# Patient Record
Sex: MALE | Race: WHITE | Employment: UNEMPLOYED | URBAN - NONMETROPOLITAN AREA
[De-identification: names, ages, dates, MRNs, and addresses within clinical notes are randomized per-mention and may not be internally consistent; named-entity substitution may affect disease eponyms.]

---

## 2020-01-07 ENCOUNTER — APPOINTMENT (OUTPATIENT)
Dept: GENERAL RADIOLOGY | Age: 55
DRG: 871 | End: 2020-01-07
Payer: MEDICARE

## 2020-01-07 ENCOUNTER — HOSPITAL ENCOUNTER (INPATIENT)
Age: 55
LOS: 2 days | Discharge: NURSING, REHAB OR LONG-TERM CARE | DRG: 871 | End: 2020-01-09
Attending: EMERGENCY MEDICINE | Admitting: INTERNAL MEDICINE
Payer: MEDICARE

## 2020-01-07 PROBLEM — A41.9 SEPSIS (HCC): Status: ACTIVE | Noted: 2020-01-07

## 2020-01-07 LAB
ALBUMIN SERPL-MCNC: 4 G/DL (ref 3.5–5.1)
ALP BLD-CCNC: 76 U/L (ref 38–126)
ALT SERPL-CCNC: 7 U/L (ref 11–66)
ANION GAP SERPL CALCULATED.3IONS-SCNC: 14 MEQ/L (ref 8–16)
ANION GAP SERPL CALCULATED.3IONS-SCNC: 14 MEQ/L (ref 8–16)
AST SERPL-CCNC: 15 U/L (ref 5–40)
AVERAGE GLUCOSE: 255 MG/DL (ref 70–126)
BASOPHILS # BLD: 0.2 %
BASOPHILS ABSOLUTE: 0 THOU/MM3 (ref 0–0.1)
BILIRUB SERPL-MCNC: 0.2 MG/DL (ref 0.3–1.2)
BILIRUBIN DIRECT: < 0.2 MG/DL (ref 0–0.3)
BILIRUBIN URINE: NEGATIVE
BLOOD, URINE: NEGATIVE
BUN BLDV-MCNC: 10 MG/DL (ref 7–22)
BUN BLDV-MCNC: 14 MG/DL (ref 7–22)
CALCIUM SERPL-MCNC: 9.3 MG/DL (ref 8.5–10.5)
CALCIUM SERPL-MCNC: 9.3 MG/DL (ref 8.5–10.5)
CHARACTER, URINE: CLEAR
CHLORIDE BLD-SCNC: 99 MEQ/L (ref 98–111)
CHLORIDE BLD-SCNC: 99 MEQ/L (ref 98–111)
CO2: 22 MEQ/L (ref 23–33)
CO2: 26 MEQ/L (ref 23–33)
COLOR: YELLOW
CREAT SERPL-MCNC: 0.6 MG/DL (ref 0.4–1.2)
CREAT SERPL-MCNC: 0.7 MG/DL (ref 0.4–1.2)
EOSINOPHIL # BLD: 0.3 %
EOSINOPHILS ABSOLUTE: 0.1 THOU/MM3 (ref 0–0.4)
ERYTHROCYTE [DISTWIDTH] IN BLOOD BY AUTOMATED COUNT: 12 % (ref 11.5–14.5)
ERYTHROCYTE [DISTWIDTH] IN BLOOD BY AUTOMATED COUNT: 44.2 FL (ref 35–45)
FLU A ANTIGEN: NEGATIVE
FLU B ANTIGEN: NEGATIVE
GFR SERPL CREATININE-BSD FRML MDRD: > 90 ML/MIN/1.73M2
GFR SERPL CREATININE-BSD FRML MDRD: > 90 ML/MIN/1.73M2
GLUCOSE BLD-MCNC: 237 MG/DL (ref 70–108)
GLUCOSE BLD-MCNC: 244 MG/DL (ref 70–108)
GLUCOSE BLD-MCNC: 270 MG/DL (ref 70–108)
GLUCOSE BLD-MCNC: 286 MG/DL (ref 70–108)
GLUCOSE BLD-MCNC: 319 MG/DL (ref 70–108)
GLUCOSE URINE: >= 1000 MG/DL
HBA1C MFR BLD: 10.5 % (ref 4.4–6.4)
HCT VFR BLD CALC: 43.7 % (ref 42–52)
HEMOGLOBIN: 13.8 GM/DL (ref 14–18)
IMMATURE GRANS (ABS): 0.06 THOU/MM3 (ref 0–0.07)
IMMATURE GRANULOCYTES: 0.3 %
KETONES, URINE: 15
LACTIC ACID, SEPSIS: 2.9 MMOL/L (ref 0.5–1.9)
LACTIC ACID, SEPSIS: 2.9 MMOL/L (ref 0.5–1.9)
LACTIC ACID, SEPSIS: 4.9 MMOL/L (ref 0.5–1.9)
LEUKOCYTE ESTERASE, URINE: NEGATIVE
LYMPHOCYTES # BLD: 10.5 %
LYMPHOCYTES ABSOLUTE: 1.8 THOU/MM3 (ref 1–4.8)
MCH RBC QN AUTO: 31.6 PG (ref 26–33)
MCHC RBC AUTO-ENTMCNC: 31.6 GM/DL (ref 32.2–35.5)
MCV RBC AUTO: 100 FL (ref 80–94)
MONOCYTES # BLD: 5.8 %
MONOCYTES ABSOLUTE: 1 THOU/MM3 (ref 0.4–1.3)
MRSA SCREEN RT-PCR: NEGATIVE
NITRITE, URINE: NEGATIVE
NUCLEATED RED BLOOD CELLS: 0 /100 WBC
OSMOLALITY CALCULATION: 280.1 MOSMOL/KG (ref 275–300)
OSMOLALITY CALCULATION: 284.7 MOSMOL/KG (ref 275–300)
PH UA: 5.5 (ref 5–9)
PLATELET # BLD: 181 THOU/MM3 (ref 130–400)
PMV BLD AUTO: 9.2 FL (ref 9.4–12.4)
POTASSIUM REFLEX MAGNESIUM: 4.3 MEQ/L (ref 3.5–5.2)
POTASSIUM REFLEX MAGNESIUM: 4.7 MEQ/L (ref 3.5–5.2)
PROTEIN UA: NEGATIVE
RBC # BLD: 4.37 MILL/MM3 (ref 4.7–6.1)
SEG NEUTROPHILS: 82.9 %
SEGMENTED NEUTROPHILS ABSOLUTE COUNT: 14.3 THOU/MM3 (ref 1.8–7.7)
SODIUM BLD-SCNC: 135 MEQ/L (ref 135–145)
SODIUM BLD-SCNC: 139 MEQ/L (ref 135–145)
SPECIFIC GRAVITY, URINE: > 1.03 (ref 1–1.03)
TOTAL PROTEIN: 7.3 G/DL (ref 6.1–8)
TROPONIN T: < 0.01 NG/ML
UROBILINOGEN, URINE: 0.2 EU/DL (ref 0–1)
VANCOMYCIN RESISTANT ENTEROCOCCUS: NEGATIVE
WBC # BLD: 17.2 THOU/MM3 (ref 4.8–10.8)

## 2020-01-07 PROCEDURE — 6370000000 HC RX 637 (ALT 250 FOR IP): Performed by: INTERNAL MEDICINE

## 2020-01-07 PROCEDURE — 82948 REAGENT STRIP/BLOOD GLUCOSE: CPT

## 2020-01-07 PROCEDURE — 36415 COLL VENOUS BLD VENIPUNCTURE: CPT

## 2020-01-07 PROCEDURE — 80048 BASIC METABOLIC PNL TOTAL CA: CPT

## 2020-01-07 PROCEDURE — 83036 HEMOGLOBIN GLYCOSYLATED A1C: CPT

## 2020-01-07 PROCEDURE — 87500 VANOMYCIN DNA AMP PROBE: CPT

## 2020-01-07 PROCEDURE — 2580000003 HC RX 258: Performed by: INTERNAL MEDICINE

## 2020-01-07 PROCEDURE — 83605 ASSAY OF LACTIC ACID: CPT

## 2020-01-07 PROCEDURE — 87040 BLOOD CULTURE FOR BACTERIA: CPT

## 2020-01-07 PROCEDURE — 99285 EMERGENCY DEPT VISIT HI MDM: CPT

## 2020-01-07 PROCEDURE — 84484 ASSAY OF TROPONIN QUANT: CPT

## 2020-01-07 PROCEDURE — 71045 X-RAY EXAM CHEST 1 VIEW: CPT

## 2020-01-07 PROCEDURE — 6370000000 HC RX 637 (ALT 250 FOR IP): Performed by: PHYSICIAN ASSISTANT

## 2020-01-07 PROCEDURE — 99223 1ST HOSP IP/OBS HIGH 75: CPT | Performed by: INTERNAL MEDICINE

## 2020-01-07 PROCEDURE — 2580000003 HC RX 258: Performed by: EMERGENCY MEDICINE

## 2020-01-07 PROCEDURE — 87081 CULTURE SCREEN ONLY: CPT

## 2020-01-07 PROCEDURE — 87449 NOS EACH ORGANISM AG IA: CPT

## 2020-01-07 PROCEDURE — 87899 AGENT NOS ASSAY W/OPTIC: CPT

## 2020-01-07 PROCEDURE — 93005 ELECTROCARDIOGRAM TRACING: CPT | Performed by: EMERGENCY MEDICINE

## 2020-01-07 PROCEDURE — 87804 INFLUENZA ASSAY W/OPTIC: CPT

## 2020-01-07 PROCEDURE — 81003 URINALYSIS AUTO W/O SCOPE: CPT

## 2020-01-07 PROCEDURE — 6360000002 HC RX W HCPCS: Performed by: EMERGENCY MEDICINE

## 2020-01-07 PROCEDURE — 2060000000 HC ICU INTERMEDIATE R&B

## 2020-01-07 PROCEDURE — 85025 COMPLETE CBC W/AUTO DIFF WBC: CPT

## 2020-01-07 PROCEDURE — 87641 MR-STAPH DNA AMP PROBE: CPT

## 2020-01-07 PROCEDURE — 94760 N-INVAS EAR/PLS OXIMETRY 1: CPT

## 2020-01-07 PROCEDURE — 80076 HEPATIC FUNCTION PANEL: CPT

## 2020-01-07 PROCEDURE — 6360000002 HC RX W HCPCS: Performed by: INTERNAL MEDICINE

## 2020-01-07 RX ORDER — SODIUM CHLORIDE 0.9 % (FLUSH) 0.9 %
10 SYRINGE (ML) INJECTION EVERY 12 HOURS SCHEDULED
Status: DISCONTINUED | OUTPATIENT
Start: 2020-01-07 | End: 2020-01-09 | Stop reason: HOSPADM

## 2020-01-07 RX ORDER — DIVALPROEX SODIUM 500 MG/1
1000 TABLET, EXTENDED RELEASE ORAL NIGHTLY
Status: DISCONTINUED | OUTPATIENT
Start: 2020-01-07 | End: 2020-01-09 | Stop reason: HOSPADM

## 2020-01-07 RX ORDER — DEXTROSE MONOHYDRATE 25 G/50ML
12.5 INJECTION, SOLUTION INTRAVENOUS PRN
Status: DISCONTINUED | OUTPATIENT
Start: 2020-01-07 | End: 2020-01-09 | Stop reason: HOSPADM

## 2020-01-07 RX ORDER — NICOTINE POLACRILEX 4 MG
15 LOZENGE BUCCAL PRN
Status: DISCONTINUED | OUTPATIENT
Start: 2020-01-07 | End: 2020-01-09 | Stop reason: HOSPADM

## 2020-01-07 RX ORDER — CITALOPRAM 40 MG/1
40 TABLET ORAL DAILY
Status: DISCONTINUED | OUTPATIENT
Start: 2020-01-07 | End: 2020-01-09 | Stop reason: HOSPADM

## 2020-01-07 RX ORDER — DIAPER,BRIEF,INFANT-TODD,DISP
EACH MISCELLANEOUS PRN
Status: ON HOLD | COMMUNITY
End: 2020-01-09

## 2020-01-07 RX ORDER — CLOZAPINE 25 MG/1
50 TABLET ORAL 2 TIMES DAILY
Status: ON HOLD | COMMUNITY
End: 2020-01-09 | Stop reason: SDUPTHER

## 2020-01-07 RX ORDER — CLOZAPINE 100 MG/1
200 TABLET ORAL NIGHTLY
Status: DISCONTINUED | OUTPATIENT
Start: 2020-01-07 | End: 2020-01-09 | Stop reason: HOSPADM

## 2020-01-07 RX ORDER — CLOZAPINE 25 MG/1
50 TABLET ORAL 2 TIMES DAILY WITH MEALS
Status: DISCONTINUED | OUTPATIENT
Start: 2020-01-08 | End: 2020-01-09 | Stop reason: HOSPADM

## 2020-01-07 RX ORDER — METOPROLOL TARTRATE 50 MG/1
50 TABLET, FILM COATED ORAL 2 TIMES DAILY
Status: DISCONTINUED | OUTPATIENT
Start: 2020-01-07 | End: 2020-01-09 | Stop reason: HOSPADM

## 2020-01-07 RX ORDER — DIVALPROEX SODIUM 500 MG/1
1000 TABLET, DELAYED RELEASE ORAL NIGHTLY
Status: ON HOLD | COMMUNITY
End: 2020-01-09

## 2020-01-07 RX ORDER — SODIUM CHLORIDE 9 MG/ML
INJECTION, SOLUTION INTRAVENOUS CONTINUOUS
Status: DISCONTINUED | OUTPATIENT
Start: 2020-01-07 | End: 2020-01-09 | Stop reason: HOSPADM

## 2020-01-07 RX ORDER — CLOZAPINE 100 MG/1
200 TABLET ORAL NIGHTLY
Status: ON HOLD | COMMUNITY
End: 2020-01-09 | Stop reason: SDUPTHER

## 2020-01-07 RX ORDER — DIVALPROEX SODIUM 500 MG/1
500 TABLET, EXTENDED RELEASE ORAL DAILY
Status: DISCONTINUED | OUTPATIENT
Start: 2020-01-07 | End: 2020-01-09 | Stop reason: HOSPADM

## 2020-01-07 RX ORDER — CLOTRIMAZOLE 1 %
CREAM (GRAM) TOPICAL 2 TIMES DAILY PRN
COMMUNITY

## 2020-01-07 RX ORDER — DEXTROSE MONOHYDRATE 50 MG/ML
100 INJECTION, SOLUTION INTRAVENOUS PRN
Status: DISCONTINUED | OUTPATIENT
Start: 2020-01-07 | End: 2020-01-09 | Stop reason: HOSPADM

## 2020-01-07 RX ORDER — DIVALPROEX SODIUM 500 MG/1
1000 TABLET, DELAYED RELEASE ORAL NIGHTLY
Status: DISCONTINUED | OUTPATIENT
Start: 2020-01-07 | End: 2020-01-07

## 2020-01-07 RX ORDER — BENZONATATE 100 MG/1
100 CAPSULE ORAL 3 TIMES DAILY PRN
Status: DISCONTINUED | OUTPATIENT
Start: 2020-01-07 | End: 2020-01-09 | Stop reason: HOSPADM

## 2020-01-07 RX ORDER — NICOTINE 21 MG/24HR
1 PATCH, TRANSDERMAL 24 HOURS TRANSDERMAL NIGHTLY
Status: DISCONTINUED | OUTPATIENT
Start: 2020-01-07 | End: 2020-01-09 | Stop reason: HOSPADM

## 2020-01-07 RX ORDER — DESVENLAFAXINE 50 MG/1
50 TABLET, EXTENDED RELEASE ORAL DAILY
Status: DISCONTINUED | OUTPATIENT
Start: 2020-01-07 | End: 2020-01-09 | Stop reason: HOSPADM

## 2020-01-07 RX ORDER — ASPIRIN 81 MG/1
81 TABLET ORAL DAILY
Status: DISCONTINUED | OUTPATIENT
Start: 2020-01-07 | End: 2020-01-09 | Stop reason: HOSPADM

## 2020-01-07 RX ORDER — 0.9 % SODIUM CHLORIDE 0.9 %
1000 INTRAVENOUS SOLUTION INTRAVENOUS ONCE
Status: COMPLETED | OUTPATIENT
Start: 2020-01-07 | End: 2020-01-07

## 2020-01-07 RX ORDER — DOCUSATE SODIUM 100 MG/1
100 CAPSULE, LIQUID FILLED ORAL DAILY
Status: DISCONTINUED | OUTPATIENT
Start: 2020-01-07 | End: 2020-01-09 | Stop reason: HOSPADM

## 2020-01-07 RX ORDER — SODIUM CHLORIDE 0.9 % (FLUSH) 0.9 %
10 SYRINGE (ML) INJECTION PRN
Status: DISCONTINUED | OUTPATIENT
Start: 2020-01-07 | End: 2020-01-09 | Stop reason: HOSPADM

## 2020-01-07 RX ORDER — DOCUSATE SODIUM 100 MG/1
100 CAPSULE, LIQUID FILLED ORAL DAILY
COMMUNITY

## 2020-01-07 RX ADMIN — AZITHROMYCIN DIHYDRATE 500 MG: 500 INJECTION, POWDER, LYOPHILIZED, FOR SOLUTION INTRAVENOUS at 21:51

## 2020-01-07 RX ADMIN — INSULIN LISPRO 2 UNITS: 100 INJECTION, SOLUTION INTRAVENOUS; SUBCUTANEOUS at 21:55

## 2020-01-07 RX ADMIN — CITALOPRAM 40 MG: 40 TABLET, FILM COATED ORAL at 20:34

## 2020-01-07 RX ADMIN — SODIUM CHLORIDE 1000 ML: 9 INJECTION, SOLUTION INTRAVENOUS at 15:26

## 2020-01-07 RX ADMIN — ASPIRIN 81 MG: 81 TABLET ORAL at 20:33

## 2020-01-07 RX ADMIN — METOPROLOL TARTRATE 50 MG: 50 TABLET, FILM COATED ORAL at 20:34

## 2020-01-07 RX ADMIN — ENOXAPARIN SODIUM 40 MG: 40 INJECTION SUBCUTANEOUS at 20:36

## 2020-01-07 RX ADMIN — DOCUSATE SODIUM 100 MG: 100 CAPSULE, LIQUID FILLED ORAL at 20:34

## 2020-01-07 RX ADMIN — CEFEPIME HYDROCHLORIDE 2 G: 2 INJECTION, POWDER, FOR SOLUTION INTRAVENOUS at 17:11

## 2020-01-07 RX ADMIN — SODIUM CHLORIDE: 9 INJECTION, SOLUTION INTRAVENOUS at 23:13

## 2020-01-07 RX ADMIN — DIVALPROEX SODIUM 1000 MG: 500 TABLET, EXTENDED RELEASE ORAL at 21:45

## 2020-01-07 RX ADMIN — DESVENLAFAXINE SUCCINATE 50 MG: 50 TABLET, FILM COATED, EXTENDED RELEASE ORAL at 20:35

## 2020-01-07 RX ADMIN — BENZONATATE 100 MG: 100 CAPSULE ORAL at 20:34

## 2020-01-07 RX ADMIN — VANCOMYCIN HYDROCHLORIDE 2000 MG: 5 INJECTION, POWDER, LYOPHILIZED, FOR SOLUTION INTRAVENOUS at 17:51

## 2020-01-07 RX ADMIN — CLOZAPINE 200 MG: 100 TABLET ORAL at 21:45

## 2020-01-07 ASSESSMENT — PAIN SCALES - GENERAL
PAINLEVEL_OUTOF10: 0

## 2020-01-07 ASSESSMENT — ENCOUNTER SYMPTOMS
SHORTNESS OF BREATH: 0
NAUSEA: 0
ABDOMINAL PAIN: 0
VOMITING: 0
COUGH: 1

## 2020-01-07 NOTE — ED PROVIDER NOTES
[address] [facility]          CHIEF COMPLAINT       Chief Complaint   Patient presents with    Gait Problem       Nurses Notes reviewed and I agree except as noted inthe HPI. HISTORY OF PRESENT ILLNESS    Comfort Gardiner is a 49 y.o. male who presents to the Emergency Department by EMs from [facility]. The patient was apparently sent in for unsteady gait appreciated by staff at the facility this morning. The patient is a poor historian, but states he was walking down the hallway around 1100 this morning when he developed lightheadedness and dizziness. This persisted for approximately 10 minutes. The gait imbalance was appreciated at that time. Patient denies falls or syncope and neither of these things were reported. Patient was said to have had some bilateral leg weakness as well which patient is denying at this time. He was said to have been dragging around his right foot. Patient denies any numbness, weakness, or tingling. Patient does not have any complaints at this time other than a cough. He states his cough is productive with yellow sputum. He is alert and oriented although he does is unsure the reasoning for him being sent here. Reported short of breath with diaphoresis today. He denies chest pain or SOB. He is afebrile. He denies nausea, vomiting, or abdominal pain. He denies headache. He denies cold symptoms other than the cough. He denies diarrhea, constipation, or dysuria. HPI is limited. Patient is a poor historian. REVIEW OF SYSTEMS     Review of Systems   Constitutional: Negative for chills and fever. Eyes: Negative for visual disturbance. Respiratory: Positive for cough. Negative for shortness of breath. Cardiovascular: Negative for chest pain. Gastrointestinal: Negative for abdominal pain, nausea and vomiting. Musculoskeletal: Positive for gait problem. Negative for arthralgias.    Neurological: Positive for dizziness, Cardiovascular:      Rate and Rhythm: Regular rhythm. Tachycardia present. Pulmonary:      Effort: Pulmonary effort is normal. No respiratory distress. Breath sounds: No stridor. Rhonchi (scattered) present. Abdominal:      General: There is no distension. Palpations: Abdomen is soft. Musculoskeletal: Normal range of motion. Skin:     General: Skin is warm and dry. Findings: No erythema. Neurological:      Mental Status: He is alert and oriented to person, place, and time. Cranial Nerves: Cranial nerves are intact. Sensory: Sensation is intact. Motor: Motor function is intact. No abnormal muscle tone. Coordination: Coordination is intact. Finger-Nose-Finger Test normal.   Psychiatric:         Behavior: Behavior normal.       DIFFERENTIAL DIAGNOSIS:   Orthostasis, Pneumonia, sepsis, unlikely CVA. RESULTS     EKG: All EKG's are interpreted by the Emergency Department Physician who either signs or Co-signs this chart in the absence of acardiologist.    .Vent. Rate: 110 bpm  GA interval: 204 ms  QRS duration: 68 ms  QTc: 414 ms  P-R-T axes: 67, 89, 63  Sinus tachycardia. Nonspecific T wave abnormality. No STEMI  Compared to old EKG on 28-Sep-2013    RADIOLOGY: non-plain film images(s) such as CT, Ultrasound and MRI are read by the radiologist.    XR CHEST PORTABLE   Final Result      Left mid and lower lung atelectasis/infiltrate. **This report has been created using voice recognition software. It may contain minor errors which are inherent in voice recognition technology. **      Final report electronically signed by Dr. Sharmaine Ngo on 1/7/2020 2:46 PM          LABS:   Labs Reviewed   CBC WITH AUTO DIFFERENTIAL - Abnormal; Notable for the following components:       Result Value    WBC 17.2 (*)     RBC 4.37 (*)     Hemoglobin 13.8 (*)     .0 (*)     MCHC 31.6 (*)     MPV 9.2 (*)     Segs Absolute 14.3 (*)     All other components within normal limits   RAPID INFLUENZA A/B ANTIGENS   BASIC METABOLIC PANEL W/ REFLEX TO MG FOR LOW K   HEPATIC FUNCTION PANEL   TROPONIN   URINE RT REFLEX TO CULTURE       EMERGENCY DEPARTMENT COURSE:   Vitals:    Vitals:    01/07/20 1347   BP: 130/77   Pulse: 110   Resp: 18   Temp: 99.5 °F (37.5 °C)   TempSrc: Oral   SpO2: 93%   Weight: 195 lb (88.5 kg)   Height: 5' 9\" (1.753 m)     1432 Labs and chest xray ordered         MDM  52-year-old male with episodes of exertional dyspnea, cough productive of green-yellow sputum, episode of orthostasis. For orthostatic hypotension ED. Vital signs patient has a low-grade fever and tachycardic. No respiratory distress. Lungs scattered rhonchi cardiovascular  Chest x-ray left mid and lower infiltrates  White blood cell count 17,000. Lactate 2.9. ECG and troponin negative for ischemia  Patient heart rate improved with IV hydration. IV antibiotics started in the ED. D/w Dr. Mohan Matos- who graciously accepted admission    CRITICAL CARE:   None      CONSULTS:  none    PROCEDURES:  None    FINAL IMPRESSION      1. Septicemia ([facility] [address]    2. Pneumonia due to organism          DISPOSITION/PLAN   admit    PATIENT REFERRED TO:  No follow-up provider specified. DISCHARGE MEDICATIONS:  New Prescriptions    No medications on file       (Please note that portions of this note were completed with a voice recognition program.Efforts were made to edit the dictations but occasionally words are mis-transcribed.)    Scribe:  Signed by: Saima Bland, 1/7/202:30 PM Scribing for and in the presence of Lynn De Anda DO    Provider:  I personally performed the services described in the documentation, reviewed and edited the documentation which was dictated to the scribe in mypresence, and it accurately records my words and actions.     Lynn De Anda DO 1/7/20 3:09 PM                      Lynn De Anda DO  01/07/20 2806

## 2020-01-07 NOTE — PROGRESS NOTES
Pharmacy Note  Vancomycin Consult    Kay Sanchez is a 49 y.o. male started on Vancomycin for sepsis; consult received from Dr. Nupur Borges to manage therapy. Also receiving the following antibiotics: azithromycin, cefepime. Patient Active Problem List   Diagnosis    Sepsis ([address]       Allergies:  Patient has no known allergies. Temp max: 99.5    Recent Labs     01/07/20  1456   BUN 14       Recent Labs     01/07/20  1456   CREATININE 0.7       Recent Labs     01/07/20  1456   WBC 17.2*       No intake or output data in the 24 hours ending 01/07/20 1745      Culture Date Source Results   1/7/20 BCx2    1/7/20 Flu A/B neg              Ht Readings from Last 1 Encounters:   01/07/20 5' 9\" (1.753 m)        Wt Readings from Last 1 Encounters:   01/07/20 195 lb (88.5 kg)         Body mass index is 28.8 kg/m². Estimated Creatinine Clearance: 134 mL/min (based on SCr of 0.7 mg/dL). Goal Trough Level: 15-20 mcg/mL    Assessment/Plan:  Received vancomycin 2000 mg in ER at 1751 today. Will initiate vancomycin 1250 mg IV every 12 hours. Timing of trough level will be determined based on culture results, renal function, and clinical response. Thank you for the consult. Will continue to follow.     Rina Perkins RPh  1/7/2020  5:50 PM

## 2020-01-07 NOTE — ED NOTES
From Nursing Home for new onset of altered gate. Jenny Holloway RN states about 15 minutes ago pt began walking and dragging his right foot. RN states this is not normal. States he feels the pt \"seems out of it. \" Pt alert and oriented. Denies pain or any concerns. Ambulance in route.      Andrew Reinoso RN  01/07/20 4272

## 2020-01-07 NOTE — ED NOTES
Bed: 011A  Expected date:   Expected time:   Means of arrival: [facility]  Comments:     Trent Marroquin RN  01/07/20 2947

## 2020-01-07 NOTE — ED TRIAGE NOTES
Pt to ed from [facility] assisted living for abnormal gait that was noted today. Upon arrival pt A&O x3 reporting dizziness while he was up walking around home when staff noted the abnormal gait. Noted pt to be diaphoretic however pt denies pain and or sob at this time. ekg obtained and pt placed on the cardiac monitor. Lung sounds diminished with a productive cough pt reports phlegm is thick and green and intermittent. Pt reports no other concerns. Orthostatic vitals obtained and positive with standing position. Pt returned to bed without incident. Pt also provided a clean catch urine sample at this time. Pt waiting on provider to discuss the POC will monitor for further orders.  Call light in reach

## 2020-01-07 NOTE — H&P
Hospitalist - History & Physical      Patient: Kay Lay    Unit/Bed:11/011A  YOB: 1965  MRN: [id number]   Acct: [de-identified]   PCP: No primary care provider on file. Date of Service: Pt seen/examined on 01/07/20  and Admitted to Inpatient with expected LOS greater than two midnights due to medical therapy. Chief Complaint:  Lightheaded    Assessment and Plan:-  1. Sepsis due to likely Pneumonia   - Await sputum and blood cultures   - UA negative   - Start on broad spectrum antibiotics   - IVF    2. Schizoaffective disorder   - Denies any active SI, or hallucination/delusions   - Continue home antipsychotics    3. Hypertension   - Continue betablocker    4. Cough   - likely due to Pneumonia   - Cough suppressant PRN    5. Hyperglycemia   - check A1c   - insulin SS added    History Of Present Illness:    50-year-old gentleman with past medical history of schizoaffective disorder who came from [facility] due to feeling lightheaded. History is limited as patient is a poor historian. Apparently patient was having lightheadedness and dizziness that started this morning at 11 AM.  Patient states it continued for 10 minutes. He denies any syncope or falls. Denies any chest pain or shortness of breath. Does have cough with productive sputum. He states that he has had sick contacts. Denies any chest pain or shortness of breath. No fevers but does have chills. Patient has no other complaints. In ER, patient was tachycardic, with low-grade fever. Labs showed a lactic of 2.9, white count of 17.2. Patient was appropriately hydrated. UA was negative chest x-ray showed questionable infiltrate. Past Medical History:    History reviewed. No pertinent past medical history. Past Surgical History:    History reviewed. No pertinent surgical history. Home Medications:   No current facility-administered medications on file prior to encounter.       Current Outpatient Medications on File Prior to Encounter   Medication Sig Dispense Refill    aspirin 81 MG tablet Take 81 mg by mouth daily      docusate sodium (COLACE) 100 MG capsule Take 100 mg by mouth daily      cloZAPine (CLOZARIL) 25 MG tablet Take 50 mg by mouth 2 times daily      cloZAPine (CLOZARIL) 100 MG tablet Take 200 mg by mouth nightly      divalproex (DEPAKOTE ER) 500 MG ER tablet Take 500 mg by mouth daily Takes 3 tabs daily      desvenlafaxine (PRISTIQ) 50 MG TB24 Take 50 mg by mouth daily      metoprolol (LOPRESSOR) 50 MG tablet Take 50 mg by mouth 2 times daily      citalopram (CELEXA) 40 MG tablet Take 40 mg by mouth daily         Allergies:    Patient has no known allergies. Social History:    reports that he has been smoking cigarettes. He has been smoking about 1.00 pack per day. He has never used smokeless tobacco. He reports that he does not drink alcohol or use drugs. Family History:   History reviewed. No pertinent family history. Diet:  DIET GENERAL;    Review of systems:   Pertinent positives as noted in the HPI. All other systems reviewed and negative. PHYSICAL EXAM:  /79   Pulse 103   Temp 99.5 °F (37.5 °C) (Oral)   Resp 18   Ht 5' 9\" (1.753 m)   Wt 195 lb (88.5 kg)   SpO2 92%   BMI 28.80 kg/m²   General appearance: No apparent distress, appears stated age and cooperative. HEENT: Normal cephalic, atraumatic without obvious deformity. Pupils equal, round, and reactive to light. Extra ocular muscles intact. Conjunctivae/corneas clear. Neck: Supple, with full range of motion. No jugular venous distention. Trachea midline. Respiratory:  Normal respiratory effort. Clear to auscultation, bilaterally without Rales/Wheezes/Rhonchi. Cardiovascular: Regular rate and rhythm with normal S1/S2 without murmurs, rubs or gallops. Abdomen: Soft, non-tender, non-distended with normal bowel sounds. Musculoskeletal:  No clubbing, cyanosis or edema bilaterally.   Skin: Skin color, texture, turgor normal.  No

## 2020-01-08 LAB
ANION GAP SERPL CALCULATED.3IONS-SCNC: 13 MEQ/L (ref 8–16)
APTT: 36.6 SECONDS (ref 22–38)
BASOPHILS # BLD: 0.3 %
BASOPHILS ABSOLUTE: 0 THOU/MM3 (ref 0–0.1)
BUN BLDV-MCNC: 9 MG/DL (ref 7–22)
CALCIUM SERPL-MCNC: 8.8 MG/DL (ref 8.5–10.5)
CHLORIDE BLD-SCNC: 103 MEQ/L (ref 98–111)
CO2: 23 MEQ/L (ref 23–33)
CREAT SERPL-MCNC: 0.5 MG/DL (ref 0.4–1.2)
EKG ATRIAL RATE: 110 BPM
EKG P AXIS: 67 DEGREES
EKG P-R INTERVAL: 204 MS
EKG Q-T INTERVAL: 306 MS
EKG QRS DURATION: 68 MS
EKG QTC CALCULATION (BAZETT): 414 MS
EKG R AXIS: 89 DEGREES
EKG T AXIS: 63 DEGREES
EKG VENTRICULAR RATE: 110 BPM
EOSINOPHIL # BLD: 0.8 %
EOSINOPHILS ABSOLUTE: 0.1 THOU/MM3 (ref 0–0.4)
ERYTHROCYTE [DISTWIDTH] IN BLOOD BY AUTOMATED COUNT: 12.1 % (ref 11.5–14.5)
ERYTHROCYTE [DISTWIDTH] IN BLOOD BY AUTOMATED COUNT: 43.8 FL (ref 35–45)
GFR SERPL CREATININE-BSD FRML MDRD: > 90 ML/MIN/1.73M2
GLUCOSE BLD-MCNC: 207 MG/DL (ref 70–108)
GLUCOSE BLD-MCNC: 219 MG/DL (ref 70–108)
GLUCOSE BLD-MCNC: 229 MG/DL (ref 70–108)
GLUCOSE BLD-MCNC: 254 MG/DL (ref 70–108)
GLUCOSE BLD-MCNC: 286 MG/DL (ref 70–108)
HCT VFR BLD CALC: 40.2 % (ref 42–52)
HEMOGLOBIN: 12.8 GM/DL (ref 14–18)
IMMATURE GRANS (ABS): 0.04 THOU/MM3 (ref 0–0.07)
IMMATURE GRANULOCYTES: 0.3 %
INR BLD: 1.03 (ref 0.85–1.13)
LACTIC ACID, SEPSIS: 1.9 MMOL/L (ref 0.5–1.9)
LEGIONELLA PNEUMOPHILIA AG, URINE: NORMAL
LV EF: 65 %
LVEF MODALITY: NORMAL
LYMPHOCYTES # BLD: 17.8 %
LYMPHOCYTES ABSOLUTE: 2.5 THOU/MM3 (ref 1–4.8)
MAGNESIUM: 2.1 MG/DL (ref 1.6–2.4)
MCH RBC QN AUTO: 31.3 PG (ref 26–33)
MCHC RBC AUTO-ENTMCNC: 31.8 GM/DL (ref 32.2–35.5)
MCV RBC AUTO: 98.3 FL (ref 80–94)
MONOCYTES # BLD: 8.7 %
MONOCYTES ABSOLUTE: 1.2 THOU/MM3 (ref 0.4–1.3)
NUCLEATED RED BLOOD CELLS: 0 /100 WBC
PLATELET # BLD: 167 THOU/MM3 (ref 130–400)
PMV BLD AUTO: 9.4 FL (ref 9.4–12.4)
POTASSIUM SERPL-SCNC: 3.9 MEQ/L (ref 3.5–5.2)
PROCALCITONIN: 0.09 NG/ML (ref 0.01–0.09)
RBC # BLD: 4.09 MILL/MM3 (ref 4.7–6.1)
SEG NEUTROPHILS: 72.1 %
SEGMENTED NEUTROPHILS ABSOLUTE COUNT: 10.3 THOU/MM3 (ref 1.8–7.7)
SODIUM BLD-SCNC: 139 MEQ/L (ref 135–145)
STREP PNEUMO AG, UR: NORMAL
WBC # BLD: 14.3 THOU/MM3 (ref 4.8–10.8)

## 2020-01-08 PROCEDURE — 2060000000 HC ICU INTERMEDIATE R&B

## 2020-01-08 PROCEDURE — 83605 ASSAY OF LACTIC ACID: CPT

## 2020-01-08 PROCEDURE — 82948 REAGENT STRIP/BLOOD GLUCOSE: CPT

## 2020-01-08 PROCEDURE — 99232 SBSQ HOSP IP/OBS MODERATE 35: CPT | Performed by: INTERNAL MEDICINE

## 2020-01-08 PROCEDURE — 85610 PROTHROMBIN TIME: CPT

## 2020-01-08 PROCEDURE — 97165 OT EVAL LOW COMPLEX 30 MIN: CPT

## 2020-01-08 PROCEDURE — 2580000003 HC RX 258: Performed by: INTERNAL MEDICINE

## 2020-01-08 PROCEDURE — 85025 COMPLETE CBC W/AUTO DIFF WBC: CPT

## 2020-01-08 PROCEDURE — 97110 THERAPEUTIC EXERCISES: CPT

## 2020-01-08 PROCEDURE — 97162 PT EVAL MOD COMPLEX 30 MIN: CPT

## 2020-01-08 PROCEDURE — 36415 COLL VENOUS BLD VENIPUNCTURE: CPT

## 2020-01-08 PROCEDURE — 6370000000 HC RX 637 (ALT 250 FOR IP): Performed by: PHYSICIAN ASSISTANT

## 2020-01-08 PROCEDURE — 80048 BASIC METABOLIC PNL TOTAL CA: CPT

## 2020-01-08 PROCEDURE — 84145 PROCALCITONIN (PCT): CPT

## 2020-01-08 PROCEDURE — 6370000000 HC RX 637 (ALT 250 FOR IP): Performed by: INTERNAL MEDICINE

## 2020-01-08 PROCEDURE — 97535 SELF CARE MNGMENT TRAINING: CPT

## 2020-01-08 PROCEDURE — 6360000002 HC RX W HCPCS: Performed by: INTERNAL MEDICINE

## 2020-01-08 PROCEDURE — 2709999900 HC NON-CHARGEABLE SUPPLY

## 2020-01-08 PROCEDURE — 93306 TTE W/DOPPLER COMPLETE: CPT

## 2020-01-08 PROCEDURE — 85730 THROMBOPLASTIN TIME PARTIAL: CPT

## 2020-01-08 PROCEDURE — 83735 ASSAY OF MAGNESIUM: CPT

## 2020-01-08 PROCEDURE — 93010 ELECTROCARDIOGRAM REPORT: CPT | Performed by: INTERNAL MEDICINE

## 2020-01-08 RX ADMIN — CEFEPIME HYDROCHLORIDE 2 G: 2 INJECTION, POWDER, FOR SOLUTION INTRAVENOUS at 18:26

## 2020-01-08 RX ADMIN — CLOZAPINE 50 MG: 25 TABLET ORAL at 08:43

## 2020-01-08 RX ADMIN — SODIUM CHLORIDE: 9 INJECTION, SOLUTION INTRAVENOUS at 06:19

## 2020-01-08 RX ADMIN — INSULIN LISPRO 2 UNITS: 100 INJECTION, SOLUTION INTRAVENOUS; SUBCUTANEOUS at 08:44

## 2020-01-08 RX ADMIN — INSULIN LISPRO 2 UNITS: 100 INJECTION, SOLUTION INTRAVENOUS; SUBCUTANEOUS at 18:26

## 2020-01-08 RX ADMIN — SODIUM CHLORIDE: 9 INJECTION, SOLUTION INTRAVENOUS at 20:50

## 2020-01-08 RX ADMIN — SODIUM CHLORIDE: 9 INJECTION, SOLUTION INTRAVENOUS at 13:42

## 2020-01-08 RX ADMIN — CEFEPIME HYDROCHLORIDE 2 G: 2 INJECTION, POWDER, FOR SOLUTION INTRAVENOUS at 08:44

## 2020-01-08 RX ADMIN — METOPROLOL TARTRATE 50 MG: 50 TABLET, FILM COATED ORAL at 08:43

## 2020-01-08 RX ADMIN — DIVALPROEX SODIUM 1000 MG: 500 TABLET, EXTENDED RELEASE ORAL at 19:54

## 2020-01-08 RX ADMIN — DOCUSATE SODIUM 100 MG: 100 CAPSULE, LIQUID FILLED ORAL at 08:43

## 2020-01-08 RX ADMIN — METOPROLOL TARTRATE 50 MG: 50 TABLET, FILM COATED ORAL at 19:53

## 2020-01-08 RX ADMIN — AZITHROMYCIN DIHYDRATE 500 MG: 500 INJECTION, POWDER, LYOPHILIZED, FOR SOLUTION INTRAVENOUS at 20:52

## 2020-01-08 RX ADMIN — INSULIN LISPRO 2 UNITS: 100 INJECTION, SOLUTION INTRAVENOUS; SUBCUTANEOUS at 20:00

## 2020-01-08 RX ADMIN — CLOZAPINE 50 MG: 25 TABLET ORAL at 18:26

## 2020-01-08 RX ADMIN — CLOZAPINE 200 MG: 100 TABLET ORAL at 19:53

## 2020-01-08 RX ADMIN — DESVENLAFAXINE SUCCINATE 50 MG: 50 TABLET, FILM COATED, EXTENDED RELEASE ORAL at 08:43

## 2020-01-08 RX ADMIN — CITALOPRAM 40 MG: 40 TABLET, FILM COATED ORAL at 08:43

## 2020-01-08 RX ADMIN — INSULIN LISPRO 2 UNITS: 100 INJECTION, SOLUTION INTRAVENOUS; SUBCUTANEOUS at 11:50

## 2020-01-08 RX ADMIN — DIVALPROEX SODIUM 500 MG: 500 TABLET, EXTENDED RELEASE ORAL at 08:43

## 2020-01-08 RX ADMIN — ENOXAPARIN SODIUM 40 MG: 40 INJECTION SUBCUTANEOUS at 18:26

## 2020-01-08 RX ADMIN — ASPIRIN 81 MG: 81 TABLET ORAL at 08:43

## 2020-01-08 RX ADMIN — CEFEPIME HYDROCHLORIDE 2 G: 2 INJECTION, POWDER, FOR SOLUTION INTRAVENOUS at 00:50

## 2020-01-08 ASSESSMENT — PAIN SCALES - GENERAL
PAINLEVEL_OUTOF10: 0

## 2020-01-08 NOTE — CARE COORDINATION
1/8/20, 1:28 PM    DISCHARGE PLANNING EVALUATION    Spoke with Andrew Vieyra and he stated that patient can return to [facility] if medically appropriate for their facility.

## 2020-01-08 NOTE — PROGRESS NOTES
[address] PHYSICAL THERAPY  EVALUATION  [facility] ICU STEPDOWN TELEMETRY 4K - 4K-01/001-A    Time In: 7493  Time Out: 0800  Timed Code Treatment Minutes: 8 Minutes  Minutes: 23          Date: 2020  Patient Name: Jeet Cody,  Gender:  male        MRN: [id number]  : 1965  (49 y.o.)      Referring Practitioner: Bharathi Kothari MD  Diagnosis: sepsis  Additional Pertinent Hx: Per EMR: \" Jeet Cody is a 49 y.o. male who presents to the Emergency Department by EMs from [facility]. The patient was apparently sent in for unsteady gait appreciated by staff at the facility this morning. The patient is a poor historian, but states he was walking down the hallway around 1100 this morning when he developed lightheadedness and dizziness. This persisted for approximately 10 minutes. The gait imbalance was appreciated at that time. Patient denies falls or syncope and neither of these things were reported. Patient was said to have had some bilateral leg weakness as well which patient is denying at this time. He was said to have been dragging around his right foot. Patient denies any numbness, weakness, or tingling. Patient does not have any complaints at this time other than a cough. He states his cough is productive with yellow sputum. He is alert and oriented although he does is unsure the reasoning for him being sent here. Reported short of breath with diaphoresis today. He denies chest pain or SOB. He is afebrile. He denies nausea, vomiting, or abdominal pain. He denies headache. He denies cold symptoms other than the cough. He denies diarrhea, constipation, or dysuria. \"     Restrictions/Precautions:  Restrictions/Precautions: Fall Risk, General Precautions    Subjective:  Chart Reviewed: Yes  Patient assessed for rehabilitation services?: Yes  Family / Caregiver Present: No  Subjective: RN approved PT evaluation. Pt in supine upon entry and was agreeable to PT interventions.  Post

## 2020-01-08 NOTE — PROGRESS NOTES
[address] [facility]  INPATIENT OCCUPATIONAL THERAPY  [facility] ICU STEPDOWN TELEMETRY 4K  EVALUATION    Time:    Time In: 920  Time Out:   Timed Code Treatment Minutes: 10 Minutes  Minutes: 23          Date: 2020  Patient Name: Bisi Middleton,   Gender: male      MRN: [id number]  : 1965  (49 y.o.)  Referring Practitioner: Dr. Princess Grayson. Cookie   Diagnosis: sepsis  Additional Pertinent Hx: 49 y.o. male who presents to the Emergency Department by EMs from [facility]. The patient was apparently sent in for unsteady gait appreciated by staff at the facility this morning. The patient is a poor historian, but states he was walking down the hallway around 1100 this morning when he developed lightheadedness and dizziness. Patient was said to have had some bilateral leg weakness as well which patient is denying at this time. He was said to have been dragging around his right foot. Restrictions/Precautions:  Restrictions/Precautions: Fall Risk, General Precautions    Subjective  Chart Reviewed: Yes, History and Physical, Orders, Progress Notes  Patient assessed for rehabilitation services?: Yes    Subjective: Pt finished up with ECHO. Pt agreeable to OT session. Pain:  Pain Assessment  Patient Currently in Pain: Denies    Social/Functional History:  Lives With: Alone  Type of Home: Assisted living([facility]  Home Layout: One level  Home Access: Level entry   Bathroom Shower/Tub: Walk-in shower  Bathroom Toilet: Handicap height  Bathroom Accessibility: Accessible       ADL Assistance: Independent  Ambulation Assistance: Independent  Transfer Assistance: Independent          Additional Comments: Pt reports getting assist for bathing, laundry, cleaning, medication mgmt and meals. pt stating otherwise he was indep with other ADL tasks.     VISION:WNL    HEARING:  WNL    COGNITION: Decreased Insight    RANGE OF MOTION:  Bilateral Upper Extremity:  WNL    STRENGTH:  Bilateral Upper Extremity: WNL    ADL:   Grooming: Supervision. standing at sink   Lower Extremity Dressing: Supervision. donning socks at EOB   Toileting: Supervision. Toilet Transfer: Supervision. Joel Rodriguez BALANCE:  Sitting Balance:  Modified Independent  Standing Balance: Stand By Assistance  standng with no UE support     BED MOBILITY:  Supine to Sit: Supervision    Sit to Supine: Supervision      TRANSFERS:  Sit to Stand:  Stand By Assistance. from eOB  Stand to Sit: Stand By Assistance. onto EOB    FUNCTIONAL MOBILITY:  Assistive Device: None  Assist Level:  Stand By Assistance. Distance: To and from bathroom and around unit  Pt with no LOB during        Activity Tolerance:  Patient tolerance of  treatment: good. Assessment:  Assessment: Pt dmeo ability to complete his ADL tasks with S this date. Pt stating he feels like he is at his pLOF. Pt will not need any further skilled OTservices at this time. Performance deficits / Impairments: Decreased ADL status, Decreased balance  Prognosis: Good  REQUIRES OT FOLLOW UP: No  Decision Making: Low Complexity    Treatment Initiated: Treatment and education initiated within context of evaluation. Evaluation time included review of current medical information, gathering information related to past medical, social and functional history, completion of standardized testing, formal and informal observation of tasks, assessment of data and development of plan of care and goals. Treatment time included skilled education and facilitation of tasks to increase safety and independence with ADL's for improved functional independence and quality of life.     Discharge Recommendations:  (assisted living)    Patient Education:  OT Education: OT Role, Plan of Care    Equipment Recommendations:  Equipment Needed: No    Plan:  Times per week: 1 visit    Goals:  Patient goals : go home   Short term goals  Time Frame for Short term goals: 1 visit  Short term goal 1: Pt to complete ADL tasks with S and no vcs for sfety - mET  Short term goal 2: Pt to demo dynamic standing > 2 min with no UE support and S to complete ADL tasks- MET  Long term goals  Time Frame for Long term goals : not est d/t ELOS          Following session, patient left in safe position with all fall risk precautions in place.

## 2020-01-08 NOTE — FLOWSHEET NOTE
01/07/20 2029   Provider Notification   Reason for Communication Evaluate  (Home mediation incorrect)   Provider Name [facility]   Provider Notification Physician Assistant   Method of Communication Secure Message   Response Waiting for response   Notification Time 2029 2029- This RN sent a secure message to [facility] PA to update that the patient was admitted tonight with sepsis/ pneumonia. Pt's home nightly Depakote is incorrect. Pt takes Depakote 1000mg extended release not the DR- can we get this changed to the correct medication? Waiting on response. 2035- Carol Cruz 6 placed her own orders and corrected the patients depakote medication.

## 2020-01-08 NOTE — PLAN OF CARE
Problem: Risk for Impaired Skin Integrity  Goal: Tissue integrity - skin and mucous membranes  Description  Structural intactness and normal physiological function of skin and  mucous membranes. Outcome: Ongoing  Note:   No new areas of skin breakdown noted tonight. Skin is warm and dry. Patient has scattered abrasions. Patient able to turn and repositioned himself independently. Problem: Discharge Planning:  Goal: Discharged to appropriate level of care  Description  Discharged to appropriate level of care  Outcome: Ongoing  Note:   Patient from [facility] living and plans to return when medically stable. Problem: Airway Clearance - Ineffective:  Goal: Clear lung sounds  Description  Clear lung sounds  Outcome: Ongoing  Note:   Patient's lungs are clear in the upper lobes and diminished in the bases. Problem: Tobacco Use:  Goal: Will participate in inpatient tobacco-use cessation counseling  Description  Will participate in inpatient tobacco-use cessation counseling  Outcome: Ongoing  Note:   Patient is a current everyday smoker. Patient getting a nicotine patch daily. Problem: Pain Control  Goal: Maintain pain level at or below patient's acceptable level (or 5 if patient is unable to determine acceptable level)  Outcome: Ongoing  Note:   Pain Assessment: 0-10  Pain Level: 0   Patient's Stated Pain Goal: No pain   Is pain goal met at this time? Yes     Problem: Cardiovascular  Goal: No DVT, peripheral vascular complications  Outcome: Ongoing  Note:   No signs or symptoms of DVT. Patient getting Lovenox injections daily. Goal: Hemodynamic stability  Outcome: Ongoing  Note:   Vital signs have remained stable and within normal limits. Vitals being monitored every 4 hours and as needed. Continuous cardiac monitor in place. Heart rhythm is NSR/ST. Problem: Respiratory  Goal: No pulmonary complications  Outcome: Ongoing  Note:   Patient on room air with oxygen saturations above 92%. No SOB noted at rest or with ambulation. Lungs are clear in the upper lobes and diminished in the bases. Goal: O2 Sat > 90%  Outcome: Ongoing  Note:   Patient on room air with oxygen saturations above 92%. Problem: GI  Goal: No bowel complications  Outcome: Ongoing  Note:   No BM tonight. Abdomen is soft, rounded and non-tender. Active bowel sounds heard in all 4 quadrants. Patient is passing gas. Problem:   Goal: Adequate urinary output  Outcome: Ongoing  Note:   Patient voiding and adequate amount tonight. Patient voiding clear, yellow urine. Problem: Nutrition  Goal: Optimal nutrition therapy  Outcome: Ongoing  Note:   Patient on a general diet. Problem: Musculor/Skeletal Functional Status  Goal: Absence of falls  Outcome: Ongoing  Note:   Patent has remained free from falls tonight. Call light and bedside table are within reach. Patient alert and oriented and uses his call light appropriately. Patient ambulates with 1 assist from staff. Fall precautions in place for his safety. Nurse checks on the patient every hour. Problem: Serum Glucose Level - Abnormal:  Goal: Ability to maintain appropriate glucose levels will improve  Description  Ability to maintain appropriate glucose levels will improve  Outcome: Ongoing  Note:   Checking the patient's blood sugar before meals and at bedtime. Care plan reviewed with patient. Patient verbalize understanding of the plan of care and contribute to goal setting.

## 2020-01-09 VITALS
WEIGHT: 190.4 LBS | HEIGHT: 69 IN | SYSTOLIC BLOOD PRESSURE: 111 MMHG | RESPIRATION RATE: 16 BRPM | HEART RATE: 79 BPM | OXYGEN SATURATION: 94 % | BODY MASS INDEX: 28.2 KG/M2 | TEMPERATURE: 99.2 F | DIASTOLIC BLOOD PRESSURE: 65 MMHG

## 2020-01-09 LAB
ANION GAP SERPL CALCULATED.3IONS-SCNC: 10 MEQ/L (ref 8–16)
BASOPHILS # BLD: 0.4 %
BASOPHILS ABSOLUTE: 0 THOU/MM3 (ref 0–0.1)
BUN BLDV-MCNC: 9 MG/DL (ref 7–22)
CALCIUM SERPL-MCNC: 8.8 MG/DL (ref 8.5–10.5)
CHLORIDE BLD-SCNC: 105 MEQ/L (ref 98–111)
CO2: 26 MEQ/L (ref 23–33)
CREAT SERPL-MCNC: 0.6 MG/DL (ref 0.4–1.2)
EOSINOPHIL # BLD: 1.6 %
EOSINOPHILS ABSOLUTE: 0.2 THOU/MM3 (ref 0–0.4)
ERYTHROCYTE [DISTWIDTH] IN BLOOD BY AUTOMATED COUNT: 12.3 % (ref 11.5–14.5)
ERYTHROCYTE [DISTWIDTH] IN BLOOD BY AUTOMATED COUNT: 45.4 FL (ref 35–45)
GFR SERPL CREATININE-BSD FRML MDRD: > 90 ML/MIN/1.73M2
GLUCOSE BLD-MCNC: 188 MG/DL (ref 70–108)
GLUCOSE BLD-MCNC: 194 MG/DL (ref 70–108)
GLUCOSE BLD-MCNC: 238 MG/DL (ref 70–108)
HCT VFR BLD CALC: 40.2 % (ref 42–52)
HEMOGLOBIN: 13 GM/DL (ref 14–18)
IMMATURE GRANS (ABS): 0.04 THOU/MM3 (ref 0–0.07)
IMMATURE GRANULOCYTES: 0.4 %
LYMPHOCYTES # BLD: 19.5 %
LYMPHOCYTES ABSOLUTE: 2 THOU/MM3 (ref 1–4.8)
MCH RBC QN AUTO: 32.5 PG (ref 26–33)
MCHC RBC AUTO-ENTMCNC: 32.3 GM/DL (ref 32.2–35.5)
MCV RBC AUTO: 100.5 FL (ref 80–94)
MONOCYTES # BLD: 9.9 %
MONOCYTES ABSOLUTE: 1 THOU/MM3 (ref 0.4–1.3)
MRSA SCREEN: NORMAL
NUCLEATED RED BLOOD CELLS: 0 /100 WBC
PLATELET # BLD: 169 THOU/MM3 (ref 130–400)
PMV BLD AUTO: 9.4 FL (ref 9.4–12.4)
POTASSIUM SERPL-SCNC: 4.3 MEQ/L (ref 3.5–5.2)
RBC # BLD: 4 MILL/MM3 (ref 4.7–6.1)
SEG NEUTROPHILS: 68.2 %
SEGMENTED NEUTROPHILS ABSOLUTE COUNT: 7 THOU/MM3 (ref 1.8–7.7)
SODIUM BLD-SCNC: 141 MEQ/L (ref 135–145)
WBC # BLD: 10.2 THOU/MM3 (ref 4.8–10.8)

## 2020-01-09 PROCEDURE — 97116 GAIT TRAINING THERAPY: CPT

## 2020-01-09 PROCEDURE — 97110 THERAPEUTIC EXERCISES: CPT

## 2020-01-09 PROCEDURE — 82948 REAGENT STRIP/BLOOD GLUCOSE: CPT

## 2020-01-09 PROCEDURE — 2580000003 HC RX 258: Performed by: INTERNAL MEDICINE

## 2020-01-09 PROCEDURE — 80048 BASIC METABOLIC PNL TOTAL CA: CPT

## 2020-01-09 PROCEDURE — 6360000002 HC RX W HCPCS: Performed by: INTERNAL MEDICINE

## 2020-01-09 PROCEDURE — 6370000000 HC RX 637 (ALT 250 FOR IP): Performed by: INTERNAL MEDICINE

## 2020-01-09 PROCEDURE — 36415 COLL VENOUS BLD VENIPUNCTURE: CPT

## 2020-01-09 PROCEDURE — 99239 HOSP IP/OBS DSCHRG MGMT >30: CPT | Performed by: INTERNAL MEDICINE

## 2020-01-09 PROCEDURE — 94760 N-INVAS EAR/PLS OXIMETRY 1: CPT

## 2020-01-09 PROCEDURE — 85025 COMPLETE CBC W/AUTO DIFF WBC: CPT

## 2020-01-09 RX ORDER — IBUPROFEN 600 MG/1
600 TABLET ORAL EVERY 8 HOURS PRN
COMMUNITY

## 2020-01-09 RX ORDER — NICOTINE 21 MG/24HR
1 PATCH, TRANSDERMAL 24 HOURS TRANSDERMAL NIGHTLY
Qty: 30 PATCH | Refills: 3 | Status: SHIPPED | OUTPATIENT
Start: 2020-01-09

## 2020-01-09 RX ORDER — DIVALPROEX SODIUM 500 MG/1
1000 TABLET, EXTENDED RELEASE ORAL NIGHTLY
COMMUNITY

## 2020-01-09 RX ORDER — CLOZAPINE 50 MG/1
50 TABLET ORAL 2 TIMES DAILY
COMMUNITY

## 2020-01-09 RX ORDER — AMOXICILLIN AND CLAVULANATE POTASSIUM 500; 125 MG/1; MG/1
1 TABLET, FILM COATED ORAL 3 TIMES DAILY
Qty: 21 TABLET | Refills: 0 | Status: SHIPPED | OUTPATIENT
Start: 2020-01-09 | End: 2020-01-16

## 2020-01-09 RX ORDER — ACETAMINOPHEN 325 MG/1
650 TABLET ORAL EVERY 6 HOURS PRN
COMMUNITY

## 2020-01-09 RX ORDER — GREEN TEA/HOODIA GORDONII 315-12.5MG
1 CAPSULE ORAL 2 TIMES DAILY
Qty: 60 TABLET | Refills: 0 | Status: SHIPPED | OUTPATIENT
Start: 2020-01-09 | End: 2020-02-08

## 2020-01-09 RX ORDER — AZITHROMYCIN 250 MG/1
500 TABLET, FILM COATED ORAL EVERY 24 HOURS
Status: DISCONTINUED | OUTPATIENT
Start: 2020-01-09 | End: 2020-01-09 | Stop reason: HOSPADM

## 2020-01-09 RX ORDER — BENZONATATE 100 MG/1
100 CAPSULE ORAL 3 TIMES DAILY PRN
Qty: 30 CAPSULE | Refills: 0 | Status: SHIPPED | OUTPATIENT
Start: 2020-01-09 | End: 2020-01-16

## 2020-01-09 RX ORDER — GLUCOSAMINE HCL/CHONDROITIN SU 500-400 MG
CAPSULE ORAL
Qty: 100 STRIP | Refills: 0 | Status: SHIPPED | OUTPATIENT
Start: 2020-01-09

## 2020-01-09 RX ORDER — GUAIFENESIN 100 MG/5ML
SYRUP ORAL 4 TIMES DAILY PRN
COMMUNITY

## 2020-01-09 RX ORDER — BLOOD-GLUCOSE METER
1 KIT MISCELLANEOUS DAILY
Qty: 1 KIT | Refills: 0 | Status: SHIPPED | OUTPATIENT
Start: 2020-01-09

## 2020-01-09 RX ORDER — CLOZAPINE 200 MG/1
200 TABLET ORAL NIGHTLY
COMMUNITY

## 2020-01-09 RX ORDER — METOPROLOL TARTRATE 50 MG/1
50 TABLET, FILM COATED ORAL 2 TIMES DAILY
COMMUNITY

## 2020-01-09 RX ORDER — DIAPER,BRIEF,INFANT-TODD,DISP
EACH MISCELLANEOUS 3 TIMES DAILY PRN
COMMUNITY

## 2020-01-09 RX ORDER — PSEUDOEPHEDRINE HYDROCHLORIDE 30 MG/1
30 TABLET ORAL 3 TIMES DAILY PRN
COMMUNITY

## 2020-01-09 RX ADMIN — INSULIN LISPRO 1 UNITS: 100 INJECTION, SOLUTION INTRAVENOUS; SUBCUTANEOUS at 08:09

## 2020-01-09 RX ADMIN — CLOZAPINE 50 MG: 25 TABLET ORAL at 08:09

## 2020-01-09 RX ADMIN — ASPIRIN 81 MG: 81 TABLET ORAL at 08:09

## 2020-01-09 RX ADMIN — DESVENLAFAXINE SUCCINATE 50 MG: 50 TABLET, FILM COATED, EXTENDED RELEASE ORAL at 08:09

## 2020-01-09 RX ADMIN — METOPROLOL TARTRATE 50 MG: 50 TABLET, FILM COATED ORAL at 08:09

## 2020-01-09 RX ADMIN — INSULIN LISPRO 2 UNITS: 100 INJECTION, SOLUTION INTRAVENOUS; SUBCUTANEOUS at 12:48

## 2020-01-09 RX ADMIN — CITALOPRAM 40 MG: 40 TABLET, FILM COATED ORAL at 08:09

## 2020-01-09 RX ADMIN — DOCUSATE SODIUM 100 MG: 100 CAPSULE, LIQUID FILLED ORAL at 08:09

## 2020-01-09 RX ADMIN — SODIUM CHLORIDE: 9 INJECTION, SOLUTION INTRAVENOUS at 04:09

## 2020-01-09 RX ADMIN — CEFEPIME HYDROCHLORIDE 2 G: 2 INJECTION, POWDER, FOR SOLUTION INTRAVENOUS at 08:10

## 2020-01-09 RX ADMIN — CEFEPIME HYDROCHLORIDE 2 G: 2 INJECTION, POWDER, FOR SOLUTION INTRAVENOUS at 00:40

## 2020-01-09 RX ADMIN — DIVALPROEX SODIUM 500 MG: 500 TABLET, EXTENDED RELEASE ORAL at 08:19

## 2020-01-09 ASSESSMENT — PAIN SCALES - GENERAL
PAINLEVEL_OUTOF10: 0
PAINLEVEL_OUTOF10: 0

## 2020-01-09 NOTE — PROGRESS NOTES
[facility] Patient[facility]  Total # of Interventions Recommended: 0   -   Total # Interventions Accepted: 0  Intervention Severity:   - Level 1 Intervention Present?: No   - Level 2 #: 0   - Level 3 #: 0   Time Spent (min): 15    Additional Documentation:  AVS reviewed for discharge back to assisted living.

## 2020-01-09 NOTE — CARE COORDINATION
Update: client did not qualify for home oxygen; collaborated with Mary Gaines, [address]    1/9/20, 12:34 PM  Client plans return [facility] when medically cleared  Patient goals/plan/ treatment preferences discussed by  and . Patient goals/plan/ treatment preferences reviewed with patient/ family. Patient/ family verbalize understanding of discharge plan and are in agreement with goal/plan/treatment preferences. Understanding was demonstrated using the teach back method. AVS provided by RN at time of discharge, which includes all necessary medical information pertaining to the patients current course of illness, treatment, post-discharge goals of care, and treatment preferences.

## 2020-01-09 NOTE — PROGRESS NOTES
Hospitalist - Progress      Patient: Héctor Hilton    Unit/Bed:4K-01/001-A  YOB: 1965  MRN: [id number]   Acct: [de-identified]   PCP: No primary care provider on file. Date of Service: Pt seen/examined on 01/08/20  and Admitted to Inpatient with expected LOS greater than two midnights due to medical therapy. Chief Complaint:  Lightheaded    Assessment and Plan:-  Sepsis present on admission secondary to pneumonia  Resolving  Lactic acid normalized  Still with low-grade fever  Continue IV antibiotics as ordered    Acute hypoxic respiratory failure secondary to pneumonia and sepsis  -On 2 L of oxygen wean oxygen as able    2. Schizoaffective disorder   - Denies any active SI, or hallucination/delusions   - Continue home antipsychotics    3. Hypertension   - Continue betablocker    4. Cough   - likely due to Pneumonia   - Cough suppressant PRN    5. Hyperglycemia   - check A1c- pending   - insulin SS added    History Of Present Illness:    25-year-old gentleman with past medical history of schizoaffective disorder who came from [facility] due to feeling lightheaded. History is limited as patient is a poor historian. Apparently patient was having lightheadedness and dizziness that started this morning at 11 AM.  Patient states it continued for 10 minutes. He denies any syncope or falls. Denies any chest pain or shortness of breath. Does have cough with productive sputum. He states that he has had sick contacts. Denies any chest pain or shortness of breath. No fevers but does have chills. Patient has no other complaints. In ER, patient was tachycardic, with low-grade fever. Labs showed a lactic of 2.9, white count of 17.2. Patient was appropriately hydrated. UA was negative chest x-ray showed questionable infiltrate.     Subjective:- (Last 24 hours)  Very pleasant  Less short of breath today, has productive cough  Still with low-grade fever  Denies any nausea or vomiting or chest pain      Past medical history, family history, social history and allergies reviewed again and is unchanged since admission. ROS (12 point review of systems completed. Pertinent positives noted. Otherwise ROS is negative)      Scheduled Meds:   aspirin  81 mg Oral Daily    citalopram  40 mg Oral Daily    cloZAPine  200 mg Oral Nightly    desvenlafaxine succinate  50 mg Oral Daily    cloZAPine  50 mg Oral BID WC    divalproex  500 mg Oral Daily    docusate sodium  100 mg Oral Daily    metoprolol tartrate  50 mg Oral BID    sodium chloride flush  10 mL Intravenous 2 times per day    enoxaparin  40 mg Subcutaneous Q24H    cefepime  2 g Intravenous Q8H    azithromycin  500 mg Intravenous Q24H    insulin lispro  0-6 Units Subcutaneous TID WC    insulin lispro  0-3 Units Subcutaneous Nightly    nicotine  1 patch Transdermal Nightly    divalproex  1,000 mg Oral Nightly     Continuous Infusions:   sodium chloride 150 mL/hr at 01/08/20 2050    dextrose       PRN Meds:.sodium chloride flush, benzonatate, glucose, dextrose, glucagon (rDNA), dextrose     Diet:  DIET GENERAL;    Review of systems:   Pertinent positives as noted in the HPI. All other systems reviewed and negative. PHYSICAL EXAM:  /69   Pulse 93   Temp 98.9 °F (37.2 °C) (Oral)   Resp 16   Ht 5' 9\" (1.753 m)   Wt 189 lb 4 oz (85.8 kg)   SpO2 92%   BMI 27.95 kg/m²   General appearance: No apparent distress, appears stated age and cooperative. HEENT: Normal cephalic, atraumatic without obvious deformity. Pupils equal, round, and reactive to light. Extra ocular muscles intact. Conjunctivae/corneas clear. Neck: Supple, with full range of motion. No jugular venous distention. Trachea midline. Respiratory:  Normal respiratory effort. Clear to auscultation, bilaterally without Rales/Wheezes/Rhonchi. Cardiovascular: Regular rate and rhythm with normal S1/S2 without murmurs, rubs or gallops.   Abdomen: Soft, non-tender, lung. Degenerative changes in the thoracic spine are poorly visualized. Soft tissues are unremarkable. Left mid and lower lung atelectasis/infiltrate. **This report has been created using voice recognition software. It may contain minor errors which are inherent in voice recognition technology. ** Final report electronically signed by Dr. Eric Hodgson on 1/7/2020 2:46 PM    Electronically signed by Melissa Garcia MD on 1/8/2020 at 8:55 PM

## 2020-01-09 NOTE — PROGRESS NOTES
Pharmacy Intravenous to Oral Protocol  Medication changed per P&T protocol: Azithromycin    Patient meets criteria based on the followin. IV therapy > 24 hours: Yes  2. Nausea/vomiting: No  3. Regular diet : Yes  4. Tolerating other oral medications: Yes  5. Afebrile for 24 hours: Yes  6. WBC trending downward:  Yes

## 2020-01-09 NOTE — DISCHARGE INSTR - COC
ADGN:077309275}  Toileting  {P DME RFJM:556619862}  Feeding  {[facility] DME ZUVD:641561640}  Med Admin  {[facility] DME LLWU:424016042}  Med Delivery   { [facility] MED Delivery:605304898}    Wound Care Documentation and Therapy:        Elimination:  Continence:   · Bowel: {YES / TV:30776}  · Bladder: {YES / OR:08893}  Urinary Catheter: {Urinary Catheter:209060373}   Colostomy/Ileostomy/Ileal Conduit: {YES / SA:24495}       Date of Last BM: ***    Intake/Output Summary (Last 24 hours) at 2020 1258  Last data filed at 2020 0422  Gross per 24 hour   Intake 4753.35 ml   Output 1575 ml   Net 3178. 35 ml     I/O last 3 completed shifts: In: 5468.4 [P.O.:1605;  I.V.:3863.4]  Out: 0933 [Urine:1575]    Safety Concerns:     [employer] Safety Concerns:994019220}    Impairments/Disabilities:      { ROSEY Impairments/Disabilities:013037971}    Nutrition Therapy:  Current Nutrition Therapy:   [employer] Diet List:410635473}    Routes of Feeding: {[facility] DME Other Feedings:589662012}  Liquids: {Slp liquid thickness:23767}  Daily Fluid Restriction: {[facility] DME Yes amt example:054569599}  Last Modified Barium Swallow with Video (Video Swallowing Test): {Done Not Done NMJA:253223766}    Treatments at the Time of Hospital Discharge:   Respiratory Treatments: ***  Oxygen Therapy:  {Therapy; copd oxygen:19524}  Ventilator:    { CC Vent RPGV:661823688}    Rehab Therapies: {THERAPEUTIC INTERVENTION:3947013707}  Weight Bearing Status/Restrictions: [employer] Weight Bearin}  Other Medical Equipment (for information only, NOT a DME order):  {EQUIPMENT:181394361}  Other Treatments: ***    Patient's personal belongings (please select all that are sent with patient):  {[facility] DME Belongings:371441959}    RN SIGNATURE:  {Esignature:441325220}    CASE MANAGEMENT/SOCIAL WORK SECTION    Inpatient Status Date: ***    Readmission Risk Assessment Score:  Readmission Risk              Risk of Unplanned Readmission:        10           Discharging to Facility/ Agency · Name:   · Address:  · Phone:  · Fax:    Dialysis Facility (if applicable)   · Name:  · Address:  · Dialysis Schedule:  · Phone:  · Fax:    / signature: {Esignature:211977994}    PHYSICIAN SECTION    Prognosis: {Prognosis:6068301251}    Condition at Discharge: Christian8 Ashley Oconnell Patient Condition:073815592}    Rehab Potential (if transferring to Rehab): {Prognosis:5440718977}    Recommended Labs or Other Treatments After Discharge: ***    Physician Certification: I certify the above information and transfer of Brandon Carlos  is necessary for the continuing treatment of the diagnosis listed and that he requires {Admit to Appropriate Level of Care:40018} for {GREATER/LESS:799228493} 30 days.      Update Admission H&P: {CHP DME Changes in EVO:675098759}    PHYSICIAN SIGNATURE:  Electronically signed by Melanie Calderon MD on 1/9/2020 at 12:58 PM

## 2020-01-09 NOTE — PLAN OF CARE
Problem: Risk for Impaired Skin Integrity  Goal: Tissue integrity - skin and mucous membranes  Description  Structural intactness and normal physiological function of skin and  mucous membranes. Outcome: Ongoing  Note:   No new areas of skin breakdown noted tonight. Skin is warm and dry. Patient has scattered abrasions. Patient able to turn and repositioned himself independently. Problem: Discharge Planning:  Goal: Discharged to appropriate level of care  Description  Discharged to appropriate level of care  1/8/2020 2126 by Shari Gandhi RN  Outcome: Ongoing  Note:   Patient from [facility] living and plans to return when medically stable. Problem: Airway Clearance - Ineffective:  Goal: Clear lung sounds  Description  Clear lung sounds  Outcome: Ongoing  Note:   Patient's lungs are clear in the upper lobes and diminished in the bases. Problem: Tobacco Use:  Goal: Will participate in inpatient tobacco-use cessation counseling  Description  Will participate in inpatient tobacco-use cessation counseling  Outcome: Ongoing  Note:   Patient is a current everyday smoker. Patient getting a nicotine patch daily. Problem: Pain Control  Goal: Maintain pain level at or below patient's acceptable level (or 5 if patient is unable to determine acceptable level)  Outcome: Ongoing  Note:   Pain Assessment: 0-10  Pain Level: 0   Patient's Stated Pain Goal: No pain   Is pain goal met at this time? Yes     Problem: Cardiovascular  Goal: No DVT, peripheral vascular complications  Outcome: Ongoing  Note:   Patient getting Lovenox injections daily. Goal: Hemodynamic stability  Outcome: Ongoing  Note:   Vital signs have remained stable and within normal limits. Vitals being monitored every 4 hours and as needed. Continuous cardiac monitor in place. Heart rhythm is NSR.      Problem: Respiratory  Goal: No pulmonary complications  Outcome: Ongoing  Note:   Patient on room air with oxygen saturations

## 2020-01-09 NOTE — PROGRESS NOTES
[address] [address]  INPATIENT PHYSICAL THERAPY  DAILY NOTE  [facility] ICU STEPDOWN TELEMETRY 4K - 4K-01/001-A    Time In: 0916  Time Out: 0940  Timed Code Treatment Minutes: 24 Minutes  Minutes: 24          Date: 2020  Patient Name: Madeline Chacon,  Gender:  male        MRN: [id number]  : 1965  (49 y.o.)     Referring Practitioner: Florentino Osuna MD  Diagnosis: sepsis  Additional Pertinent Hx: Per EMR: \" Madeline Chacon is a 49 y.o. male who presents to the Emergency Department by EMs from [facility]. The patient was apparently sent in for unsteady gait appreciated by staff at the facility this morning. The patient is a poor historian, but states he was walking down the hallway around 1100 this morning when he developed lightheadedness and dizziness. This persisted for approximately 10 minutes. The gait imbalance was appreciated at that time. Patient denies falls or syncope and neither of these things were reported. Patient was said to have had some bilateral leg weakness as well which patient is denying at this time. He was said to have been dragging around his right foot. Patient denies any numbness, weakness, or tingling. Patient does not have any complaints at this time other than a cough. He states his cough is productive with yellow sputum. He is alert and oriented although he does is unsure the reasoning for him being sent here. Reported short of breath with diaphoresis today. He denies chest pain or SOB. He is afebrile. He denies nausea, vomiting, or abdominal pain. He denies headache. He denies cold symptoms other than the cough. He denies diarrhea, constipation, or dysuria.   \"     Prior Level of Function:  Lives With: Alone  Type of Home: Assisted living([facility] )  Home Layout: One level  Home Access: Level entry   Bathroom Shower/Tub: Walk-in shower  Bathroom Toilet: Handicap height  Bathroom Accessibility: Accessible    ADL Assistance: Independent  Ambulation Assistance: Independent  Transfer Assistance: Independent  Additional Comments: Pt reports getting assist for bathing, laundry, cleaning, medication mgmt and meals. pt stating otherwise he was indep with other ADL tasks. Restrictions/Precautions:  Restrictions/Precautions: Fall Risk, General Precautions    SUBJECTIVE: RN approved session. Pt resting in bed upon arrival, pleasant and agreeable to therapy. PAIN: 0/10: Denies pain. OBJECTIVE:  Bed Mobility:  Rolling to Left: Independent   Supine to Sit: Independent    Transfers:  Sit to Stand: Independent  Stand to Sit:Independent    Ambulation:  Stand By Assistance  Distance: 300 feet x1  Surface: Level Tile  Device:Rolling Walker  Gait Deviations: Forward Flexed Posture, Slow May and Steady with no LOB    Balance:  Dynamic Sitting Balance: Supervision    Exercise:  Patient was guided in 1 set(s) 15 reps of exercise to both lower extremities. Long arc quads, Hip abduction/adduction, Seated hip flexion, Seated hamstring curls and Seated heel/toe raises. Exercises were completed for increased independence with functional mobility. Functional Outcome Measures: Completed  -PAC Inpatient Mobility without Stair Climbing Raw Score : 17  -PAC Inpatient without Stair Climbing T-Scale Score : 48.47    ASSESSMENT:  Assessment: Patient progressing toward established goals. and Pt tolerated session well. Return to prior living arrangments  Activity Tolerance:  Patient tolerance of  treatment: good.          Equipment Recommendations:Equipment Needed: No  Discharge Recommendations:  (return to prior living arrangements)    Plan: Times per week: 3-5xGM  Current Treatment Recommendations: Strengthening, Gait Training, Balance Training, Neuromuscular Re-education, Equipment Evaluation, Education, & procurement, Stair training, Patient/Caregiver Education & Training, Endurance Training, Functional Mobility Training, Home Exercise Program, Transfer Training, Safety Education

## 2020-01-09 NOTE — PROGRESS NOTES
Changed made to AVS per Dr. Aidan Diaz and notified this nurse. Updated AVS faxed to facility. Also notified nurse Richerd Nyhan of new AVS coming.

## 2020-01-10 NOTE — DISCHARGE SUMMARY
Hospitalist -discharge  Patient: Alisia Dominguez    Unit/Bed:4K-01/001-A  YOB: 1965  MRN: [id number]   Acct: [de-identified]   PCP: No primary care provider on file. Date of Service: Pt seen/examined on 01/10/20  and Admitted to Inpatient with expected LOS greater than two midnights due to medical therapy. Chief Complaint:  Lightheaded    Assessment and Plan:-  Sepsis present on admission secondary to pneumonia  Resolved  Lactic acid normalized  Change IV antibiotics to oral antibiotic Augmentin at discharge    Acute hypoxic respiratory failure secondary to pneumonia and sepsis  -On 2 L of oxygen wean oxygen as able  Weaned off and did not qualify for home oxygen    2. Schizoaffective disorder   - Denies any active SI, or hallucination/delusions   - Continue home antipsychotics    3. Hypertension   - Continue betablocker    4. Cough   - likely due to Pneumonia   - Cough suppressant PRN    5. Newly diagnosed type 2 diabetes--hemoglobin A1c is 10.5 will add> Lantus as well as pre-meal insulin, once in the ECF further medication changes can be done and monitoring of the blood sugars can be done by the physician at the nursing home. Stable for discharge today    Discharge time 35 minutes    History Of Present Illness:    52-year-old gentleman with past medical history of schizoaffective disorder who came from [facility] due to feeling lightheaded. History is limited as patient is a poor historian. Apparently patient was having lightheadedness and dizziness that started this morning at 11 AM.  Patient states it continued for 10 minutes. He denies any syncope or falls. Denies any chest pain or shortness of breath. Does have cough with productive sputum. He states that he has had sick contacts. Denies any chest pain or shortness of breath. No fevers but does have chills. Patient has no other complaints. In ER, patient was tachycardic, with low-grade fever.   Labs showed a lactic of 2.9, white count of 17.2. Patient was appropriately hydrated. UA was negative chest x-ray showed questionable infiltrate. Medication List      START taking these medications    amoxicillin-clavulanate 500-125 MG per tablet  Commonly known as:  AUGMENTIN  Take 1 tablet by mouth 3 times daily for 7 days     benzonatate 100 MG capsule  Commonly known as:  TESSALON  Take 1 capsule by mouth 3 times daily as needed for Cough     blood glucose test strips  Test 3 times a day & as needed for symptoms of irregular blood glucose. glucose monitoring kit monitoring kit  1 kit by Does not apply route daily     insulin glargine 100 UNIT/ML injection pen  Commonly known as:  LANTUS SOLOSTAR  Inject 10 Units into the skin nightly     insulin lispro 100 UNIT/ML injection vial  Commonly known as:  HUMALOG  Inject 5 Units into the skin 3 times daily (before meals)     nicotine 14 MG/24HR  Commonly known as:  NICODERM CQ  Place 1 patch onto the skin nightly     PROBIOTIC ACIDOPHILUS Tabs  Take 1 tablet by mouth 2 times daily        CHANGE how you take these medications    * divalproex 500 MG extended release tablet  Commonly known as:  DEPAKOTE ER  What changed:  Another medication with the same name was removed. Continue taking this medication, and follow the directions you see here. * divalproex 500 MG extended release tablet  Commonly known as:  DEPAKOTE ER  What changed:  Another medication with the same name was removed. Continue taking this medication, and follow the directions you see here.     hydrocortisone 1 % cream  What changed:  Another medication with the same name was removed. Continue taking this medication, and follow the directions you see here. * This list has 2 medication(s) that are the same as other medications prescribed for you. Read the directions carefully, and ask your doctor or other care provider to review them with you.             CONTINUE taking these medications    acetaminophen 325 MG tablet  Commonly known as:  TYLENOL     aspirin 81 MG tablet     clotrimazole 1 % cream  Commonly known as:  LOTRIMIN     * cloZAPine 50 MG tablet  Commonly known as:  CLOZARIL     * cloZAPine 200 MG tablet  Commonly known as:  CLOZARIL     desvenlafaxine succinate 50 MG Tb24 extended release tablet  Commonly known as:  PRISTIQ     docusate sodium 100 MG capsule  Commonly known as:  COLACE     guaiFENesin 100 MG/5ML syrup  Commonly known as:  ROBITUSSIN     ibuprofen 600 MG tablet  Commonly known as:  ADVIL;MOTRIN     metoprolol tartrate 50 MG tablet  Commonly known as:  LOPRESSOR     pseudoephedrine 30 MG tablet  Commonly known as:  SUDAFED         * This list has 2 medication(s) that are the same as other medications prescribed for you. Read the directions carefully, and ask your doctor or other care provider to review them with you.             STOP taking these medications    benztropine 1 MG tablet  Commonly known as:  COGENTIN     LORazepam 1 MG tablet  Commonly known as:  ATIVAN     OLANZapine 10 MG tablet  Commonly known as:  ZYPREXA     paliperidone 6 MG extended release tablet  Commonly known as:  INVEGA     paliperidone palmitate 234 MG/1.5ML Susp IM injection  Commonly known as:  INVEGA SUSTENNA     perphenazine 8 MG tablet     zolpidem 10 MG tablet  Commonly known as:  AMBIEN           Where to Get Your Medications      These medications were sent to [facility] [address], [address]  [address] [place] [address], [address] [place] [address] Road [facility]    Phone:  [phone]   · amoxicillin-clavulanate 500-125 MG per tablet  · benzonatate 100 MG capsule  · blood glucose test strips  · glucose monitoring kit monitoring kit  · insulin glargine 100 UNIT/ML injection pen  · insulin lispro 100 UNIT/ML injection vial  · nicotine 14 MG/24HR  · PROBIOTIC ACIDOPHILUS Tabs       Diet:  No diet orders on file    Review of systems:   Pertinent positives as noted in the HPI. All other systems reviewed and negative. PHYSICAL EXAM:  /65   Pulse 79   Temp 99.2 °F (37.3 °C) (Oral)   Resp 16   Ht 5' 9\" (1.753 m)   Wt 190 lb 6.4 oz (86.4 kg)   SpO2 94%   BMI 28.12 kg/m²   General appearance: No apparent distress, appears stated age and cooperative. HEENT: Normal cephalic, atraumatic without obvious deformity. Pupils equal, round, and reactive to light. Extra ocular muscles intact. Conjunctivae/corneas clear. Neck: Supple, with full range of motion. No jugular venous distention. Trachea midline. Respiratory:  Normal respiratory effort. Clear to auscultation, bilaterally without Rales/Wheezes/Rhonchi. Cardiovascular: Regular rate and rhythm with normal S1/S2 without murmurs, rubs or gallops. Abdomen: Soft, non-tender, non-distended with normal bowel sounds. Musculoskeletal:  No clubbing, cyanosis or edema bilaterally. Skin: Skin color, texture, turgor normal.  No rashes or lesions. Neurologic:  Neurovascularly intact without any focal sensory/motor deficits. Cranial nerves: II-XII intact, grossly non-focal.  Psychiatric: Alert and oriented, thought content appropriate, normal insight  Capillary Refill: Brisk,< 3 seconds   Peripheral Pulses: +2 palpable, equal bilaterally     Labs:   Recent Labs     01/08/20  0130 01/09/20  0524   WBC 14.3* 10.2   HGB 12.8* 13.0*   HCT 40.2* 40.2*    169     Recent Labs     01/07/20  1837 01/08/20  0200 01/09/20  0524    139 141   K 4.7 3.9 4.3   CL 99 103 105   CO2 26 23 26   BUN 10 9 9   CREATININE 0.6 0.5 0.6   CALCIUM 9.3 8.8 8.8     No results for input(s): AST, ALT, BILIDIR, BILITOT, ALKPHOS in the last 72 hours. Recent Labs     01/08/20  0130   INR 1.03     No results for input(s): Eulene Sarks in the last 72 hours.     Urinalysis:    Lab Results   Component Value Date    NITRU NEGATIVE 01/07/2020    WBCUA 2-4 12/20/2016    BACTERIA NONE 12/20/2016    RBCUA 0-2 12/20/2016    BLOODU NEGATIVE 01/07/2020

## 2020-01-13 LAB
BLOOD CULTURE, ROUTINE: NORMAL
BLOOD CULTURE, ROUTINE: NORMAL

## 2023-01-02 ENCOUNTER — HOSPITAL ENCOUNTER (INPATIENT)
Age: 58
LOS: 7 days | Discharge: NURSING, REHAB OR LONG-TERM CARE | DRG: 871 | End: 2023-01-10
Attending: STUDENT IN AN ORGANIZED HEALTH CARE EDUCATION/TRAINING PROGRAM | Admitting: INTERNAL MEDICINE
Payer: MEDICARE

## 2023-01-02 ENCOUNTER — APPOINTMENT (OUTPATIENT)
Dept: GENERAL RADIOLOGY | Age: 58
DRG: 871 | End: 2023-01-02
Payer: MEDICARE

## 2023-01-02 DIAGNOSIS — I65.22 ICAO (INTERNAL CAROTID ARTERY OCCLUSION), LEFT: ICD-10-CM

## 2023-01-02 DIAGNOSIS — J11.1 INFLUENZA WITH RESPIRATORY MANIFESTATION OTHER THAN PNEUMONIA: Primary | ICD-10-CM

## 2023-01-02 PROBLEM — J96.01 ACUTE RESPIRATORY FAILURE WITH HYPOXIA (HCC): Status: ACTIVE | Noted: 2023-01-02

## 2023-01-02 LAB
ALBUMIN SERPL-MCNC: 4 G/DL (ref 3.5–5.1)
ALP BLD-CCNC: 60 U/L (ref 38–126)
ALT SERPL-CCNC: 12 U/L (ref 11–66)
ANION GAP SERPL CALCULATED.3IONS-SCNC: 17 MEQ/L (ref 8–16)
AST SERPL-CCNC: 19 U/L (ref 5–40)
BASOPHILS # BLD: 0.3 %
BASOPHILS ABSOLUTE: 0 THOU/MM3 (ref 0–0.1)
BILIRUB SERPL-MCNC: 0.3 MG/DL (ref 0.3–1.2)
BUN BLDV-MCNC: 22 MG/DL (ref 7–22)
CALCIUM SERPL-MCNC: 8.5 MG/DL (ref 8.5–10.5)
CHLORIDE BLD-SCNC: 103 MEQ/L (ref 98–111)
CO2: 22 MEQ/L (ref 23–33)
CREAT SERPL-MCNC: 0.8 MG/DL (ref 0.4–1.2)
EKG ATRIAL RATE: 109 BPM
EKG P AXIS: 69 DEGREES
EKG P-R INTERVAL: 128 MS
EKG Q-T INTERVAL: 320 MS
EKG QRS DURATION: 74 MS
EKG QTC CALCULATION (BAZETT): 430 MS
EKG R AXIS: 94 DEGREES
EKG T AXIS: 76 DEGREES
EKG VENTRICULAR RATE: 109 BPM
EOSINOPHIL # BLD: 0 %
EOSINOPHILS ABSOLUTE: 0 THOU/MM3 (ref 0–0.4)
ERYTHROCYTE [DISTWIDTH] IN BLOOD BY AUTOMATED COUNT: 13.4 % (ref 11.5–14.5)
ERYTHROCYTE [DISTWIDTH] IN BLOOD BY AUTOMATED COUNT: 48.6 FL (ref 35–45)
GFR SERPL CREATININE-BSD FRML MDRD: > 60 ML/MIN/1.73M2
GLUCOSE BLD-MCNC: 125 MG/DL (ref 70–108)
GLUCOSE BLD-MCNC: 156 MG/DL (ref 70–108)
HCT VFR BLD CALC: 44.1 % (ref 42–52)
HEMOGLOBIN: 14.7 GM/DL (ref 14–18)
IMMATURE GRANS (ABS): 0.03 THOU/MM3 (ref 0–0.07)
IMMATURE GRANULOCYTES: 0.3 %
INFLUENZA A: DETECTED
INFLUENZA B: NOT DETECTED
LACTIC ACID, SEPSIS: 1.4 MMOL/L (ref 0.5–1.9)
LYMPHOCYTES # BLD: 5.7 %
LYMPHOCYTES ABSOLUTE: 0.7 THOU/MM3 (ref 1–4.8)
MCH RBC QN AUTO: 32.6 PG (ref 26–33)
MCHC RBC AUTO-ENTMCNC: 33.3 GM/DL (ref 32.2–35.5)
MCV RBC AUTO: 97.8 FL (ref 80–94)
MONOCYTES # BLD: 11.9 %
MONOCYTES ABSOLUTE: 1.4 THOU/MM3 (ref 0.4–1.3)
NUCLEATED RED BLOOD CELLS: 0 /100 WBC
OSMOLALITY CALCULATION: 287.9 MOSMOL/KG (ref 275–300)
PLATELET # BLD: 145 THOU/MM3 (ref 130–400)
PMV BLD AUTO: 9.1 FL (ref 9.4–12.4)
POTASSIUM SERPL-SCNC: 4 MEQ/L (ref 3.5–5.2)
PROCALCITONIN: 0.55 NG/ML (ref 0.01–0.09)
RBC # BLD: 4.51 MILL/MM3 (ref 4.7–6.1)
SARS-COV-2 RNA, RT PCR: NOT DETECTED
SEG NEUTROPHILS: 81.8 %
SEGMENTED NEUTROPHILS ABSOLUTE COUNT: 9.7 THOU/MM3 (ref 1.8–7.7)
SODIUM BLD-SCNC: 142 MEQ/L (ref 135–145)
TOTAL PROTEIN: 6.9 G/DL (ref 6.1–8)
WBC # BLD: 11.9 THOU/MM3 (ref 4.8–10.8)

## 2023-01-02 PROCEDURE — G0378 HOSPITAL OBSERVATION PER HR: HCPCS

## 2023-01-02 PROCEDURE — 80053 COMPREHEN METABOLIC PANEL: CPT

## 2023-01-02 PROCEDURE — 99223 1ST HOSP IP/OBS HIGH 75: CPT

## 2023-01-02 PROCEDURE — 85025 COMPLETE CBC W/AUTO DIFF WBC: CPT

## 2023-01-02 PROCEDURE — 84145 PROCALCITONIN (PCT): CPT

## 2023-01-02 PROCEDURE — 99285 EMERGENCY DEPT VISIT HI MDM: CPT

## 2023-01-02 PROCEDURE — 96372 THER/PROPH/DIAG INJ SC/IM: CPT

## 2023-01-02 PROCEDURE — 87040 BLOOD CULTURE FOR BACTERIA: CPT

## 2023-01-02 PROCEDURE — 83605 ASSAY OF LACTIC ACID: CPT

## 2023-01-02 PROCEDURE — 36415 COLL VENOUS BLD VENIPUNCTURE: CPT

## 2023-01-02 PROCEDURE — 87636 SARSCOV2 & INF A&B AMP PRB: CPT

## 2023-01-02 PROCEDURE — 6360000002 HC RX W HCPCS

## 2023-01-02 PROCEDURE — 2580000003 HC RX 258

## 2023-01-02 PROCEDURE — 6370000000 HC RX 637 (ALT 250 FOR IP)

## 2023-01-02 PROCEDURE — 93005 ELECTROCARDIOGRAM TRACING: CPT | Performed by: STUDENT IN AN ORGANIZED HEALTH CARE EDUCATION/TRAINING PROGRAM

## 2023-01-02 PROCEDURE — 71045 X-RAY EXAM CHEST 1 VIEW: CPT

## 2023-01-02 PROCEDURE — 82948 REAGENT STRIP/BLOOD GLUCOSE: CPT

## 2023-01-02 RX ORDER — DIVALPROEX SODIUM 500 MG/1
500 TABLET, EXTENDED RELEASE ORAL EVERY MORNING
Status: DISCONTINUED | OUTPATIENT
Start: 2023-01-03 | End: 2023-01-10 | Stop reason: HOSPADM

## 2023-01-02 RX ORDER — DIVALPROEX SODIUM 500 MG/1
1000 TABLET, EXTENDED RELEASE ORAL NIGHTLY
Status: DISCONTINUED | OUTPATIENT
Start: 2023-01-02 | End: 2023-01-10 | Stop reason: HOSPADM

## 2023-01-02 RX ORDER — CLOZAPINE 25 MG/1
50 TABLET ORAL 2 TIMES DAILY
Status: DISCONTINUED | OUTPATIENT
Start: 2023-01-02 | End: 2023-01-10 | Stop reason: HOSPADM

## 2023-01-02 RX ORDER — ONDANSETRON 4 MG/1
4 TABLET, ORALLY DISINTEGRATING ORAL EVERY 8 HOURS PRN
Status: DISCONTINUED | OUTPATIENT
Start: 2023-01-02 | End: 2023-01-03

## 2023-01-02 RX ORDER — CLOTRIMAZOLE 1 %
CREAM (GRAM) TOPICAL 2 TIMES DAILY PRN
Status: DISCONTINUED | OUTPATIENT
Start: 2023-01-02 | End: 2023-01-10 | Stop reason: HOSPADM

## 2023-01-02 RX ORDER — POTASSIUM CHLORIDE 7.45 MG/ML
10 INJECTION INTRAVENOUS PRN
Status: DISCONTINUED | OUTPATIENT
Start: 2023-01-02 | End: 2023-01-10 | Stop reason: HOSPADM

## 2023-01-02 RX ORDER — CLOZAPINE 100 MG/1
200 TABLET ORAL NIGHTLY
Status: DISCONTINUED | OUTPATIENT
Start: 2023-01-02 | End: 2023-01-10 | Stop reason: HOSPADM

## 2023-01-02 RX ORDER — BISACODYL 10 MG
10 SUPPOSITORY, RECTAL RECTAL DAILY PRN
Status: DISCONTINUED | OUTPATIENT
Start: 2023-01-02 | End: 2023-01-03

## 2023-01-02 RX ORDER — SODIUM CHLORIDE 9 MG/ML
INJECTION, SOLUTION INTRAVENOUS PRN
Status: DISCONTINUED | OUTPATIENT
Start: 2023-01-02 | End: 2023-01-03

## 2023-01-02 RX ORDER — ENOXAPARIN SODIUM 100 MG/ML
40 INJECTION SUBCUTANEOUS DAILY
Status: DISCONTINUED | OUTPATIENT
Start: 2023-01-02 | End: 2023-01-10 | Stop reason: HOSPADM

## 2023-01-02 RX ORDER — NICOTINE 21 MG/24HR
1 PATCH, TRANSDERMAL 24 HOURS TRANSDERMAL DAILY
Status: DISCONTINUED | OUTPATIENT
Start: 2023-01-02 | End: 2023-01-03

## 2023-01-02 RX ORDER — ATORVASTATIN CALCIUM 20 MG/1
20 TABLET, FILM COATED ORAL DAILY
Status: DISCONTINUED | OUTPATIENT
Start: 2023-01-02 | End: 2023-01-10 | Stop reason: HOSPADM

## 2023-01-02 RX ORDER — INSULIN LISPRO 100 [IU]/ML
0-4 INJECTION, SOLUTION INTRAVENOUS; SUBCUTANEOUS NIGHTLY
Status: DISCONTINUED | OUTPATIENT
Start: 2023-01-02 | End: 2023-01-03

## 2023-01-02 RX ORDER — DULAGLUTIDE 1.5 MG/.5ML
1.5 INJECTION, SOLUTION SUBCUTANEOUS WEEKLY
Status: ON HOLD | COMMUNITY

## 2023-01-02 RX ORDER — ATORVASTATIN CALCIUM 20 MG/1
20 TABLET, FILM COATED ORAL DAILY
Status: ON HOLD | COMMUNITY

## 2023-01-02 RX ORDER — DEXTROSE MONOHYDRATE 100 MG/ML
INJECTION, SOLUTION INTRAVENOUS CONTINUOUS PRN
Status: DISCONTINUED | OUTPATIENT
Start: 2023-01-02 | End: 2023-01-10 | Stop reason: HOSPADM

## 2023-01-02 RX ORDER — CLOZAPINE 100 MG/1
200 TABLET ORAL NIGHTLY
Status: ON HOLD | COMMUNITY

## 2023-01-02 RX ORDER — ASPIRIN 81 MG/1
81 TABLET, CHEWABLE ORAL DAILY
Status: DISCONTINUED | OUTPATIENT
Start: 2023-01-02 | End: 2023-01-10 | Stop reason: HOSPADM

## 2023-01-02 RX ORDER — ACETAMINOPHEN 325 MG/1
650 TABLET ORAL EVERY 6 HOURS PRN
Status: DISCONTINUED | OUTPATIENT
Start: 2023-01-02 | End: 2023-01-10 | Stop reason: HOSPADM

## 2023-01-02 RX ORDER — DOCUSATE SODIUM 100 MG/1
100 CAPSULE, LIQUID FILLED ORAL DAILY
Status: DISCONTINUED | OUTPATIENT
Start: 2023-01-02 | End: 2023-01-10 | Stop reason: HOSPADM

## 2023-01-02 RX ORDER — OSELTAMIVIR PHOSPHATE 75 MG/1
75 CAPSULE ORAL 2 TIMES DAILY
Status: COMPLETED | OUTPATIENT
Start: 2023-01-02 | End: 2023-01-06

## 2023-01-02 RX ORDER — METOPROLOL TARTRATE 50 MG/1
50 TABLET, FILM COATED ORAL 2 TIMES DAILY
Status: DISCONTINUED | OUTPATIENT
Start: 2023-01-02 | End: 2023-01-05

## 2023-01-02 RX ORDER — ONDANSETRON 2 MG/ML
4 INJECTION INTRAMUSCULAR; INTRAVENOUS EVERY 6 HOURS PRN
Status: DISCONTINUED | OUTPATIENT
Start: 2023-01-02 | End: 2023-01-03

## 2023-01-02 RX ORDER — SODIUM CHLORIDE, SODIUM LACTATE, POTASSIUM CHLORIDE, AND CALCIUM CHLORIDE .6; .31; .03; .02 G/100ML; G/100ML; G/100ML; G/100ML
500 INJECTION, SOLUTION INTRAVENOUS ONCE
Status: DISCONTINUED | OUTPATIENT
Start: 2023-01-02 | End: 2023-01-03

## 2023-01-02 RX ORDER — MAGNESIUM SULFATE IN WATER 40 MG/ML
2000 INJECTION, SOLUTION INTRAVENOUS PRN
Status: DISCONTINUED | OUTPATIENT
Start: 2023-01-02 | End: 2023-01-10 | Stop reason: HOSPADM

## 2023-01-02 RX ORDER — POLYETHYLENE GLYCOL 3350 17 G/17G
17 POWDER, FOR SOLUTION ORAL DAILY PRN
Status: DISCONTINUED | OUTPATIENT
Start: 2023-01-02 | End: 2023-01-03

## 2023-01-02 RX ORDER — IPRATROPIUM BROMIDE AND ALBUTEROL SULFATE 2.5; .5 MG/3ML; MG/3ML
1 SOLUTION RESPIRATORY (INHALATION) EVERY 4 HOURS PRN
Status: DISCONTINUED | OUTPATIENT
Start: 2023-01-02 | End: 2023-01-03

## 2023-01-02 RX ORDER — SODIUM CHLORIDE 0.9 % (FLUSH) 0.9 %
5-40 SYRINGE (ML) INJECTION PRN
Status: DISCONTINUED | OUTPATIENT
Start: 2023-01-02 | End: 2023-01-10 | Stop reason: HOSPADM

## 2023-01-02 RX ORDER — POTASSIUM CHLORIDE 20 MEQ/1
40 TABLET, EXTENDED RELEASE ORAL PRN
Status: DISCONTINUED | OUTPATIENT
Start: 2023-01-02 | End: 2023-01-10 | Stop reason: HOSPADM

## 2023-01-02 RX ORDER — INSULIN LISPRO 100 [IU]/ML
0-4 INJECTION, SOLUTION INTRAVENOUS; SUBCUTANEOUS
Status: DISCONTINUED | OUTPATIENT
Start: 2023-01-02 | End: 2023-01-03

## 2023-01-02 RX ORDER — SODIUM CHLORIDE 0.9 % (FLUSH) 0.9 %
5-40 SYRINGE (ML) INJECTION EVERY 12 HOURS SCHEDULED
Status: DISCONTINUED | OUTPATIENT
Start: 2023-01-02 | End: 2023-01-03

## 2023-01-02 RX ORDER — DESVENLAFAXINE 50 MG/1
50 TABLET, EXTENDED RELEASE ORAL DAILY
Status: DISCONTINUED | OUTPATIENT
Start: 2023-01-02 | End: 2023-01-10 | Stop reason: HOSPADM

## 2023-01-02 RX ORDER — ACETAMINOPHEN 650 MG/1
650 SUPPOSITORY RECTAL EVERY 6 HOURS PRN
Status: DISCONTINUED | OUTPATIENT
Start: 2023-01-02 | End: 2023-01-10 | Stop reason: HOSPADM

## 2023-01-02 RX ADMIN — ASPIRIN 81 MG: 81 TABLET, CHEWABLE ORAL at 14:44

## 2023-01-02 RX ADMIN — DIVALPROEX SODIUM 1000 MG: 500 TABLET, EXTENDED RELEASE ORAL at 20:10

## 2023-01-02 RX ADMIN — DESVENLAFAXINE 50 MG: 50 TABLET, EXTENDED RELEASE ORAL at 14:44

## 2023-01-02 RX ADMIN — ATORVASTATIN CALCIUM 20 MG: 20 TABLET, FILM COATED ORAL at 14:44

## 2023-01-02 RX ADMIN — METOPROLOL TARTRATE 50 MG: 50 TABLET, FILM COATED ORAL at 14:44

## 2023-01-02 RX ADMIN — OSELTAMIVIR PHOSPHATE 75 MG: 75 CAPSULE ORAL at 14:44

## 2023-01-02 RX ADMIN — OSELTAMIVIR PHOSPHATE 75 MG: 75 CAPSULE ORAL at 21:46

## 2023-01-02 RX ADMIN — METOPROLOL TARTRATE 50 MG: 50 TABLET, FILM COATED ORAL at 20:10

## 2023-01-02 RX ADMIN — ENOXAPARIN SODIUM 40 MG: 100 INJECTION SUBCUTANEOUS at 14:44

## 2023-01-02 RX ADMIN — DOCUSATE SODIUM 100 MG: 100 CAPSULE, LIQUID FILLED ORAL at 14:44

## 2023-01-02 RX ADMIN — SODIUM CHLORIDE, PRESERVATIVE FREE 10 ML: 5 INJECTION INTRAVENOUS at 20:11

## 2023-01-02 RX ADMIN — CLOZAPINE 200 MG: 100 TABLET ORAL at 20:10

## 2023-01-02 ASSESSMENT — LIFESTYLE VARIABLES
HOW OFTEN DO YOU HAVE A DRINK CONTAINING ALCOHOL: NEVER
HOW MANY STANDARD DRINKS CONTAINING ALCOHOL DO YOU HAVE ON A TYPICAL DAY: PATIENT DOES NOT DRINK

## 2023-01-02 ASSESSMENT — ENCOUNTER SYMPTOMS
VOMITING: 0
SORE THROAT: 0
COUGH: 1
NAUSEA: 0
SHORTNESS OF BREATH: 1
COLOR CHANGE: 0

## 2023-01-02 ASSESSMENT — PAIN - FUNCTIONAL ASSESSMENT: PAIN_FUNCTIONAL_ASSESSMENT: NONE - DENIES PAIN

## 2023-01-02 NOTE — ED NOTES
ED nurse-to-nurse bedside report    Chief Complaint   Patient presents with    Fever    Cough      LOC: alert and orientated to name, place, date  Vital signs   Vitals:    01/02/23 0858 01/02/23 0956 01/02/23 1040 01/02/23 1207   BP:  (!) 91/58 (!) 109/56 (!) 109/50   Pulse:  (!) 108 (!) 105 (!) 107   Resp:  25 19 29   Temp:       TempSrc:       SpO2: 91% 94% 95% 93%   Weight:       Height:          Pain:    Pain Interventions: none  Pain Goal: 0  Oxygen: Yes    Current needs required 1L nasal cannula   Telemetry: Yes  LDAs:   Peripheral IV 01/02/23 Left Antecubital (Active)   Site Assessment Clean, dry & intact 01/02/23 0913   Line Status Blood return noted; Flushed;Normal saline locked 01/02/23 0913   Dressing Status Clean, dry & intact 01/02/23 0913   Dressing Type Transparent 01/02/23 0913     Continuous Infusions:    sodium chloride       Mobility: Independent  Lezama Fall Risk Score: No flowsheet data found.   Fall Interventions: side rails and call isidra Luna RN  01/02/23 0873

## 2023-01-02 NOTE — ED NOTES
[facility]          Pt Name: Lo Schmitt  MRN: [id number]  [place] 1965  Date of evaluation: 1/2/2023  Treating Resident Physician: Chris Price DPM  Supervising Physician: Fadi Moore DO    History obtained from the patient. CHIEF COMPLAINT       Chief Complaint   Patient presents with    Fever    Cough           HISTORY OF PRESENT ILLNESS    HPI  Lo Schmitt is a 65 y.o. male who presents to the emergency department for evaluation of flu like symptoms. States that he has had fever, chills, and non-productive cough that has worsened over the last 3 days. Denies taking any medication for his symptoms. Received 800mL NS while en route to ED. Denies being on oxygen at home. Has been vaccinated for COVID and influenza. Denies any nausea, vomiting, or chest pain. The patient has no other acute complaints at this time. REVIEW OF SYSTEMS   Review of Systems   Constitutional:  Positive for chills and fever. HENT:  Negative for sore throat. Respiratory:  Positive for cough and shortness of breath. Cardiovascular:  Negative for chest pain. Gastrointestinal:  Negative for nausea and vomiting. Skin:  Negative for color change. Allergic/Immunologic: Negative for immunocompromised state. PAST MEDICAL AND SURGICAL HISTORY   History reviewed. No pertinent past medical history. History reviewed. No pertinent surgical history.       MEDICATIONS     Current Facility-Administered Medications:     sodium chloride flush 0.9 % injection 5-40 mL, 5-40 mL, IntraVENous, 2 times per day, Coty Holdheide, PA-C    sodium chloride flush 0.9 % injection 5-40 mL, 5-40 mL, IntraVENous, PRN, Coty Holdheide, PA-C    0.9 % sodium chloride infusion, , IntraVENous, PRN, Coty Holdheide, PA-C    potassium chloride (KLOR-CON M) extended release tablet 40 mEq, 40 mEq, Oral, PRN **OR** potassium bicarb-citric acid (EFFER-K) effervescent tablet 40 mEq, 40 mEq, Oral, PRN **OR** potassium chloride 10 mEq/100 mL IVPB (Peripheral Line), 10 mEq, IntraVENous, PRN, PRISCILLA Singer-KEKE    enoxaparin (LOVENOX) injection 40 mg, 40 mg, SubCUTAneous, Daily, PRISCILLA Singer-KEKE    ondansetron (ZOFRAN-ODT) disintegrating tablet 4 mg, 4 mg, Oral, Q8H PRN **OR** ondansetron (ZOFRAN) injection 4 mg, 4 mg, IntraVENous, Q6H PRN, PRISCILLA Singer-KEKE    polyethylene glycol (GLYCOLAX) packet 17 g, 17 g, Oral, Daily PRN, PRISCILLA Singer-KEKE    bisacodyl (DULCOLAX) suppository 10 mg, 10 mg, Rectal, Daily PRN, PRISCILLA Singer-C    acetaminophen (TYLENOL) tablet 650 mg, 650 mg, Oral, Q6H PRN **OR** acetaminophen (TYLENOL) suppository 650 mg, 650 mg, Rectal, Q6H PRN, PRISCILLA Singer-KEKE    magnesium sulfate 2000 mg in 50 mL IVPB premix, 2,000 mg, IntraVENous, PRN, PRISCILLA Singer-KEKE    nicotine (NICODERM CQ) 21 MG/24HR 1 patch, 1 patch, TransDERmal, Daily, Coty Roberts PA-C    Current Outpatient Medications:     empagliflozin (JARDIANCE) 25 MG tablet, Take 25 mg by mouth daily, Disp: , Rfl:     dulaglutide (TRULICITY) 1.5 MH/1.7WQ SC injection, Inject 1.5 mg into the skin once a week, Disp: , Rfl:     metFORMIN (GLUCOPHAGE) 500 MG tablet, Take 500 mg by mouth 2 times daily (with meals), Disp: , Rfl:     atorvastatin (LIPITOR) 20 MG tablet, Take 20 mg by mouth daily, Disp: , Rfl:     cloZAPine (CLOZARIL) 100 MG tablet, Take 200 mg by mouth nightly, Disp: , Rfl:     divalproex (DEPAKOTE ER) 500 MG extended release tablet, Take 1,000 mg by mouth nightly, Disp: , Rfl:     cloZAPine (CLOZARIL) 50 MG tablet, Take 50 mg by mouth 2 times daily At 0800 and 1700, Disp: , Rfl:     hydrocortisone 1 % cream, Apply topically 3 times daily as needed, Disp: , Rfl:     metoprolol tartrate (LOPRESSOR) 50 MG tablet, Take 50 mg by mouth 2 times daily, Disp: , Rfl:     ibuprofen (ADVIL;MOTRIN) 600 MG tablet, Take 600 mg by mouth every 8 hours as needed, Disp: , Rfl: acetaminophen (TYLENOL) 325 MG tablet, Take 650 mg by mouth every 6 hours as needed for Pain or Fever, Disp: , Rfl:     guaiFENesin (ROBITUSSIN) 100 MG/5ML syrup, Take by mouth 4 times daily as needed 1 to 2 teaspoonsful, Disp: , Rfl:     pseudoephedrine (SUDAFED) 30 MG tablet, Take 30 mg by mouth 3 times daily as needed for Congestion, Disp: , Rfl:     nicotine (NICODERM CQ) 14 MG/24HR, Place 1 patch onto the skin nightly, Disp: 30 patch, Rfl: 3    glucose monitoring kit (FREESTYLE) monitoring kit, 1 kit by Does not apply route daily, Disp: 1 kit, Rfl: 0    blood glucose monitor strips, Test 3 times a day & as needed for symptoms of irregular blood glucose., Disp: 100 strip, Rfl: 0    aspirin 81 MG tablet, Take 81 mg by mouth daily, Disp: , Rfl:     docusate sodium (COLACE) 100 MG capsule, Take 100 mg by mouth daily, Disp: , Rfl:     clotrimazole (LOTRIMIN) 1 % cream, Apply topically 2 times daily as needed , Disp: , Rfl:     divalproex (DEPAKOTE ER) 500 MG ER tablet, Take 500 mg by mouth every morning , Disp: , Rfl:     desvenlafaxine (PRISTIQ) 50 MG TB24, Take 50 mg by mouth daily, Disp: , Rfl:       SOCIAL HISTORY     Social History     Social History Narrative    Not on file     Social History     Tobacco Use    Smoking status: Every Day     Packs/day: 1.00     Types: Cigarettes    Smokeless tobacco: Never   Substance Use Topics    Alcohol use: No    Drug use: No         ALLERGIES   No Known Allergies      FAMILY HISTORY   History reviewed. No pertinent family history. PREVIOUS RECORDS   Previous records reviewed: This is this patient's first visit to [facility] ED, no previous records available on EMR. .        PHYSICAL EXAM     ED Triage Vitals [01/02/23 0854]   BP Temp Temp Source Heart Rate Resp SpO2 Height Weight   (!) 101/59 99.4 °F (37.4 °C) Oral (!) 110 24 (!) 89 % 5' 9\" (1.753 m) 197 lb (89.4 kg)     Initial vital signs and nursing assessment reviewed. Body mass index is 29.09 kg/m².  Pulsoximetry is abnormal per my interpretation. Additional Vital Signs:  Vitals:    01/02/23 1040   BP: (!) 109/56   Pulse: (!) 105   Resp: 19   Temp:    SpO2: 95%       Physical Exam  HENT:      Head: Normocephalic. Mouth/Throat:      Mouth: Mucous membranes are moist.   Cardiovascular:      Rate and Rhythm: Regular rhythm. Tachycardia present. Pulmonary:      Effort: Pulmonary effort is normal.   Abdominal:      General: Abdomen is flat. Palpations: Abdomen is soft. Musculoskeletal:         General: Normal range of motion. Cervical back: Normal range of motion and neck supple. Skin:     General: Skin is warm. Neurological:      Mental Status: He is alert and oriented to person, place, and time.            MEDICAL DECISION MAKING   Initial Assessment:   Influenza A with respiratory manifestation  Plan:   Patient initially seen and evaluated  Labs ordered; CBC, CMP, blood cultures x2, lactate  COVID/influenza combo swab; positive for influenza A  CXR reviewed; no acute findings  Admit to observation service        ED RESULTS   Laboratory results:  Labs Reviewed   COVID-19 & INFLUENZA COMBO - Abnormal; Notable for the following components:       Result Value    INFLUENZA A DETECTED (*)     All other components within normal limits   CBC WITH AUTO DIFFERENTIAL - Abnormal; Notable for the following components:    WBC 11.9 (*)     RBC 4.51 (*)     MCV 97.8 (*)     RDW-SD 48.6 (*)     MPV 9.1 (*)     Segs Absolute 9.7 (*)     Lymphocytes Absolute 0.7 (*)     Monocytes Absolute 1.4 (*)     All other components within normal limits   COMPREHENSIVE METABOLIC PANEL - Abnormal; Notable for the following components:    Glucose 125 (*)     CO2 22 (*)     All other components within normal limits   ANION GAP - Abnormal; Notable for the following components:    Anion Gap 17.0 (*)     All other components within normal limits   CULTURE, BLOOD 1   CULTURE, BLOOD 1   LACTATE, SEPSIS   GLOMERULAR FILTRATION RATE, ESTIMATED OSMOLALITY       Radiologic studies results:  XR CHEST PORTABLE   Final Result   Coarsened appearance to the pulmonary parenchyma which is likely chronic. No definite acute findings. **This report has been created using voice recognition software. It may contain minor errors which are inherent in voice recognition technology. **      Final report electronically signed by Dr. Frank Quispe on 1/2/2023 9:39 AM          ED Medications administered this visit:   Medications   sodium chloride flush 0.9 % injection 5-40 mL (has no administration in time range)   sodium chloride flush 0.9 % injection 5-40 mL (has no administration in time range)   0.9 % sodium chloride infusion (has no administration in time range)   potassium chloride (KLOR-CON M) extended release tablet 40 mEq (has no administration in time range)     Or   potassium bicarb-citric acid (EFFER-K) effervescent tablet 40 mEq (has no administration in time range)     Or   potassium chloride 10 mEq/100 mL IVPB (Peripheral Line) (has no administration in time range)   enoxaparin (LOVENOX) injection 40 mg (has no administration in time range)   ondansetron (ZOFRAN-ODT) disintegrating tablet 4 mg (has no administration in time range)     Or   ondansetron (ZOFRAN) injection 4 mg (has no administration in time range)   polyethylene glycol (GLYCOLAX) packet 17 g (has no administration in time range)   bisacodyl (DULCOLAX) suppository 10 mg (has no administration in time range)   acetaminophen (TYLENOL) tablet 650 mg (has no administration in time range)     Or   acetaminophen (TYLENOL) suppository 650 mg (has no administration in time range)   magnesium sulfate 2000 mg in 50 mL IVPB premix (has no administration in time range)   nicotine (NICODERM CQ) 21 MG/24HR 1 patch (has no administration in time range)         ED COURSE          MEDICATION CHANGES     New Prescriptions    No medications on file         FINAL DISPOSITION     Final diagnoses: Influenza with respiratory manifestation other than pneumonia     Condition: condition: fair  Dispo: Admit to med/surg floor      This transcription was electronically signed. Parts of this transcriptions may have been dictated by use of voice recognition software and electronically transcribed, and parts may have been transcribed with the assistance of an ED scribe. The transcription may contain errors not detected in proofreading. Please refer to my supervising physician's documentation if my documentation differs.     Electronically Signed: Italia Chowdary DPM, 01/02/23, 11:59 AM        Italia Chowdary DPM  Resident  01/02/23 1200

## 2023-01-02 NOTE — ED PROVIDER NOTES
[facility]          Pt Name: Nichelle So  MRN: [id number]  [place] 1965  Date of evaluation: 1/2/2023  Treating Resident Physician: Irma Cisneros DPM  Supervising Physician: Yousif Moctezuma DO    History obtained from the patient. CHIEF COMPLAINT       Chief Complaint   Patient presents with    Fever    Cough           HISTORY OF PRESENT ILLNESS    HPI  Nichelle So is a 65 y.o. male who presents to the emergency department for evaluation of flu like symptoms. States that he has had fever, chills, and non-productive cough that has worsened over the last 3 days. Denies taking any medication for his symptoms. Received 800mL NS while en route to ED. Denies being on oxygen at home. Has been vaccinated for COVID and influenza. Denies any nausea, vomiting, or chest pain. The patient has no other acute complaints at this time. REVIEW OF SYSTEMS   Review of Systems   Constitutional:  Positive for chills and fever. HENT:  Negative for sore throat. Respiratory:  Positive for cough and shortness of breath. Cardiovascular:  Negative for chest pain. Gastrointestinal:  Negative for nausea and vomiting. Skin:  Negative for color change. Allergic/Immunologic: Negative for immunocompromised state. PAST MEDICAL AND SURGICAL HISTORY   History reviewed. No pertinent past medical history. History reviewed. No pertinent surgical history.       MEDICATIONS     Current Facility-Administered Medications:     sodium chloride flush 0.9 % injection 5-40 mL, 5-40 mL, IntraVENous, 2 times per day, Coty Holdheide, PA-C    sodium chloride flush 0.9 % injection 5-40 mL, 5-40 mL, IntraVENous, PRN, Coty Holdheide, PA-C    0.9 % sodium chloride infusion, , IntraVENous, PRN, Coty Holdheide, PA-C    potassium chloride (KLOR-CON M) extended release tablet 40 mEq, 40 mEq, Oral, PRN **OR** potassium bicarb-citric acid (EFFER-K) effervescent tablet 40 mEq, 40 mEq, Oral, PRN **OR** potassium chloride 10 mEq/100 mL IVPB (Peripheral Line), 10 mEq, IntraVENous, PRN, PRISCILLA Singer-KEKE    enoxaparin (LOVENOX) injection 40 mg, 40 mg, SubCUTAneous, Daily, PRISCILLA Singer-KEKE    ondansetron (ZOFRAN-ODT) disintegrating tablet 4 mg, 4 mg, Oral, Q8H PRN **OR** ondansetron (ZOFRAN) injection 4 mg, 4 mg, IntraVENous, Q6H PRN, PRISCILLA Singer-KEKE    polyethylene glycol (GLYCOLAX) packet 17 g, 17 g, Oral, Daily PRN, PRISCILLA Singer-KEKE    bisacodyl (DULCOLAX) suppository 10 mg, 10 mg, Rectal, Daily PRN, PRISCILLA Singer-C    acetaminophen (TYLENOL) tablet 650 mg, 650 mg, Oral, Q6H PRN **OR** acetaminophen (TYLENOL) suppository 650 mg, 650 mg, Rectal, Q6H PRN, PRISCILLA Singer-KEKE    magnesium sulfate 2000 mg in 50 mL IVPB premix, 2,000 mg, IntraVENous, PRN, PRISCILLA Singer-KEKE    nicotine (NICODERM CQ) 21 MG/24HR 1 patch, 1 patch, TransDERmal, Daily, Coty Roberts PA-C    Current Outpatient Medications:     empagliflozin (JARDIANCE) 25 MG tablet, Take 25 mg by mouth daily, Disp: , Rfl:     dulaglutide (TRULICITY) 1.5 RX/9.6GE SC injection, Inject 1.5 mg into the skin once a week, Disp: , Rfl:     metFORMIN (GLUCOPHAGE) 500 MG tablet, Take 500 mg by mouth 2 times daily (with meals), Disp: , Rfl:     atorvastatin (LIPITOR) 20 MG tablet, Take 20 mg by mouth daily, Disp: , Rfl:     cloZAPine (CLOZARIL) 100 MG tablet, Take 200 mg by mouth nightly, Disp: , Rfl:     divalproex (DEPAKOTE ER) 500 MG extended release tablet, Take 1,000 mg by mouth nightly, Disp: , Rfl:     cloZAPine (CLOZARIL) 50 MG tablet, Take 50 mg by mouth 2 times daily At 0800 and 1700, Disp: , Rfl:     hydrocortisone 1 % cream, Apply topically 3 times daily as needed, Disp: , Rfl:     metoprolol tartrate (LOPRESSOR) 50 MG tablet, Take 50 mg by mouth 2 times daily, Disp: , Rfl:     ibuprofen (ADVIL;MOTRIN) 600 MG tablet, Take 600 mg by mouth every 8 hours as needed, Disp: , Rfl: acetaminophen (TYLENOL) 325 MG tablet, Take 650 mg by mouth every 6 hours as needed for Pain or Fever, Disp: , Rfl:     guaiFENesin (ROBITUSSIN) 100 MG/5ML syrup, Take by mouth 4 times daily as needed 1 to 2 teaspoonsful, Disp: , Rfl:     pseudoephedrine (SUDAFED) 30 MG tablet, Take 30 mg by mouth 3 times daily as needed for Congestion, Disp: , Rfl:     nicotine (NICODERM CQ) 14 MG/24HR, Place 1 patch onto the skin nightly, Disp: 30 patch, Rfl: 3    glucose monitoring kit (FREESTYLE) monitoring kit, 1 kit by Does not apply route daily, Disp: 1 kit, Rfl: 0    blood glucose monitor strips, Test 3 times a day & as needed for symptoms of irregular blood glucose., Disp: 100 strip, Rfl: 0    aspirin 81 MG tablet, Take 81 mg by mouth daily, Disp: , Rfl:     docusate sodium (COLACE) 100 MG capsule, Take 100 mg by mouth daily, Disp: , Rfl:     clotrimazole (LOTRIMIN) 1 % cream, Apply topically 2 times daily as needed , Disp: , Rfl:     divalproex (DEPAKOTE ER) 500 MG ER tablet, Take 500 mg by mouth every morning , Disp: , Rfl:     desvenlafaxine (PRISTIQ) 50 MG TB24, Take 50 mg by mouth daily, Disp: , Rfl:       SOCIAL HISTORY     Social History     Social History Narrative    Not on file     Social History     Tobacco Use    Smoking status: Every Day     Packs/day: 1.00     Types: Cigarettes    Smokeless tobacco: Never   Substance Use Topics    Alcohol use: No    Drug use: No         ALLERGIES   No Known Allergies      FAMILY HISTORY   History reviewed. No pertinent family history. PREVIOUS RECORDS   Previous records reviewed: This is this patient's first visit to [facility] ED, no previous records available on EMR. .        PHYSICAL EXAM     ED Triage Vitals [01/02/23 0854]   BP Temp Temp Source Heart Rate Resp SpO2 Height Weight   (!) 101/59 99.4 °F (37.4 °C) Oral (!) 110 24 (!) 89 % 5' 9\" (1.753 m) 197 lb (89.4 kg)     Initial vital signs and nursing assessment reviewed. Body mass index is 29.09 kg/m².  Pulsoximetry is abnormal per my interpretation. Additional Vital Signs:  Vitals:    01/02/23 1040   BP: (!) 109/56   Pulse: (!) 105   Resp: 19   Temp:    SpO2: 95%       Physical Exam  HENT:      Head: Normocephalic. Mouth/Throat:      Mouth: Mucous membranes are moist.   Cardiovascular:      Rate and Rhythm: Regular rhythm. Tachycardia present. Pulmonary:      Effort: Pulmonary effort is normal.   Abdominal:      General: Abdomen is flat. Palpations: Abdomen is soft. Musculoskeletal:         General: Normal range of motion. Cervical back: Normal range of motion and neck supple. Skin:     General: Skin is warm. Neurological:      Mental Status: He is alert and oriented to person, place, and time.            MEDICAL DECISION MAKING   Initial Assessment:   Influenza A with respiratory manifestation  Plan:   Patient initially seen and evaluated  Labs ordered; CBC, CMP, blood cultures x2, lactate  COVID/influenza combo swab; positive for influenza A  CXR reviewed; no acute findings  Admit to observation service        ED RESULTS   Laboratory results:  Labs Reviewed   COVID-19 & INFLUENZA COMBO - Abnormal; Notable for the following components:       Result Value    INFLUENZA A DETECTED (*)     All other components within normal limits   CBC WITH AUTO DIFFERENTIAL - Abnormal; Notable for the following components:    WBC 11.9 (*)     RBC 4.51 (*)     MCV 97.8 (*)     RDW-SD 48.6 (*)     MPV 9.1 (*)     Segs Absolute 9.7 (*)     Lymphocytes Absolute 0.7 (*)     Monocytes Absolute 1.4 (*)     All other components within normal limits   COMPREHENSIVE METABOLIC PANEL - Abnormal; Notable for the following components:    Glucose 125 (*)     CO2 22 (*)     All other components within normal limits   ANION GAP - Abnormal; Notable for the following components:    Anion Gap 17.0 (*)     All other components within normal limits   CULTURE, BLOOD 1   CULTURE, BLOOD 1   LACTATE, SEPSIS   GLOMERULAR FILTRATION RATE, ESTIMATED OSMOLALITY       Radiologic studies results:  XR CHEST PORTABLE   Final Result   Coarsened appearance to the pulmonary parenchyma which is likely chronic. No definite acute findings. **This report has been created using voice recognition software. It may contain minor errors which are inherent in voice recognition technology. **      Final report electronically signed by Dr. Kari Pedro on 1/2/2023 9:39 AM          ED Medications administered this visit:   Medications   sodium chloride flush 0.9 % injection 5-40 mL (has no administration in time range)   sodium chloride flush 0.9 % injection 5-40 mL (has no administration in time range)   0.9 % sodium chloride infusion (has no administration in time range)   potassium chloride (KLOR-CON M) extended release tablet 40 mEq (has no administration in time range)     Or   potassium bicarb-citric acid (EFFER-K) effervescent tablet 40 mEq (has no administration in time range)     Or   potassium chloride 10 mEq/100 mL IVPB (Peripheral Line) (has no administration in time range)   enoxaparin (LOVENOX) injection 40 mg (has no administration in time range)   ondansetron (ZOFRAN-ODT) disintegrating tablet 4 mg (has no administration in time range)     Or   ondansetron (ZOFRAN) injection 4 mg (has no administration in time range)   polyethylene glycol (GLYCOLAX) packet 17 g (has no administration in time range)   bisacodyl (DULCOLAX) suppository 10 mg (has no administration in time range)   acetaminophen (TYLENOL) tablet 650 mg (has no administration in time range)     Or   acetaminophen (TYLENOL) suppository 650 mg (has no administration in time range)   magnesium sulfate 2000 mg in 50 mL IVPB premix (has no administration in time range)   nicotine (NICODERM CQ) 21 MG/24HR 1 patch (has no administration in time range)         ED COURSE          MEDICATION CHANGES     New Prescriptions    No medications on file         FINAL DISPOSITION     Final diagnoses: Influenza with respiratory manifestation other than pneumonia     Condition: condition: fair  Dispo: Admit to med/surg floor      This transcription was electronically signed. Parts of this transcriptions may have been dictated by use of voice recognition software and electronically transcribed, and parts may have been transcribed with the assistance of an ED scribe. The transcription may contain errors not detected in proofreading. Please refer to my supervising physician's documentation if my documentation differs.     Electronically Signed: Denver Mayhew, DPM, 01/02/23, 11:59 AM        Denver Mayhew, DPM  Resident  01/02/23 [address] Alesia Foreman, DO  01/02/23 4392

## 2023-01-02 NOTE — H&P
Hospitalist History & Physical    Patient:  Sage Felder    Unit/Bed:19/019A  YOB: 1965  MRN: [id number]   Acct: [de-identified]   PCP: No primary care provider on file. Code Status: Full Code    Date of Service: Pt seen/examined on 01/02/23 and admitted to Observation with expected LOS less than two midnights due to medical therapy. Chief Complaint: shortness of Breath    Assessment/Plan:    Acute Hypoxic Respiratory Failure 2/2 Influenza A: patient hypoxic to 89% on RA. Placed on 2L NC O2. During time of my exam, pt was saturated >91% on RA. CXR reveals no acute findings. Wean O2 as tolerated. Tamiflu initiated (01/2). Encourage pulmonary hygiene. Consider home O2 eval at discharge. NIDDMII: patient on several diabetic medications outpatient. On low dose sliding scale insulin. Hypoglycemia protocol. Accu checks. Continue to monitor. Bipolar Affective Disorder: clozapine, pristiq, depakote. Essential HTN: continue home medications and continue to monitor. Nicotine Dependence: patient reports he smokes 1ppd for years. Nicotine patch. History of Present Illness:  Sage Felder is a 65 y.o. male with PMHx of DM, HTN, and bipolar disorder who presented to [facility] with chief complaint of shortness of breath. Patient reports he started having a productive cough and became short of breath a few days ago. The cough is constant and has not had any relief. He states that it has become progressively worse over the last few days. He also states he has had fever/chills. His shortness of breath is worse with exertion. Patient admits to smoking 1 ppd for many years and has never been to a pulmonologist for evaluation. He also reports living in an assisted living facility. He denies chest pain, N/V, abdominal pain, urinary symptoms, and leg swelling. Review of Systems: Pertinent positives as noted in the HPI. All other systems reviewed and negative.     Past Medical History:    History reviewed. No pertinent past medical history. Past Surgical History:    History reviewed. No pertinent surgical history. Home Medications:   No current facility-administered medications on file prior to encounter. Current Outpatient Medications on File Prior to Encounter   Medication Sig Dispense Refill    empagliflozin (JARDIANCE) 25 MG tablet Take 25 mg by mouth daily      dulaglutide (TRULICITY) 1.5 RG/3.8NK SC injection Inject 1.5 mg into the skin once a week      metFORMIN (GLUCOPHAGE) 500 MG tablet Take 500 mg by mouth 2 times daily (with meals)      atorvastatin (LIPITOR) 20 MG tablet Take 20 mg by mouth daily      cloZAPine (CLOZARIL) 100 MG tablet Take 200 mg by mouth nightly      divalproex (DEPAKOTE ER) 500 MG extended release tablet Take 1,000 mg by mouth nightly      cloZAPine (CLOZARIL) 50 MG tablet Take 50 mg by mouth 2 times daily At 0800 and 1700      hydrocortisone 1 % cream Apply topically 3 times daily as needed      metoprolol tartrate (LOPRESSOR) 50 MG tablet Take 50 mg by mouth 2 times daily      ibuprofen (ADVIL;MOTRIN) 600 MG tablet Take 600 mg by mouth every 8 hours as needed      acetaminophen (TYLENOL) 325 MG tablet Take 650 mg by mouth every 6 hours as needed for Pain or Fever      guaiFENesin (ROBITUSSIN) 100 MG/5ML syrup Take by mouth 4 times daily as needed 1 to 2 teaspoonsful      pseudoephedrine (SUDAFED) 30 MG tablet Take 30 mg by mouth 3 times daily as needed for Congestion      nicotine (NICODERM CQ) 14 MG/24HR Place 1 patch onto the skin nightly 30 patch 3    glucose monitoring kit (FREESTYLE) monitoring kit 1 kit by Does not apply route daily 1 kit 0    blood glucose monitor strips Test 3 times a day & as needed for symptoms of irregular blood glucose.  100 strip 0    aspirin 81 MG tablet Take 81 mg by mouth daily      docusate sodium (COLACE) 100 MG capsule Take 100 mg by mouth daily      clotrimazole (LOTRIMIN) 1 % cream Apply topically 2 times daily as needed divalproex (DEPAKOTE ER) 500 MG ER tablet Take 500 mg by mouth every morning       desvenlafaxine (PRISTIQ) 50 MG TB24 Take 50 mg by mouth daily         Allergies:    Patient has no known allergies. Social History:    reports that he has been smoking cigarettes. He has been smoking an average of 1 pack per day. He has never used smokeless tobacco. He reports that he does not drink alcohol and does not use drugs. Family History:   History reviewed. No pertinent family history. Diet:  ADULT DIET; Regular      Physical Exam:  BP (!) 109/50   Pulse (!) 107   Temp 99.4 °F (37.4 °C) (Oral)   Resp 29   Ht 5' 9\" (1.753 m)   Wt 197 lb (89.4 kg)   SpO2 93%   BMI 29.09 kg/m²   General:   Pleasant male resting in bed. HEENT:  normocephalic and atraumatic. No scleral icterus. PERR. Neck: supple. No JVD. Lungs: Diffuse crackles through all lung fields. No retractions. Able to speak in full sentences. No accessory muscle use. Cardiac: Tachycardic with regular rhythm without murmur. Abdomen: soft. Nontender. Bowel sounds positive. Extremities:  No clubbing, cyanosis, or edema x 4. Vasculature: capillary refill < 3 seconds. Palpable LE pulses bilaterally. Skin:  warm and dry. No rashes or lesions. Psych:  Alert and oriented x3. Affect appropriate  Neurologic:  No focal deficit. No seizures. Data: (All radiographs, tracings, PFTs, and imaging are personally viewed and interpreted unless otherwise noted)  Labs:   Recent Labs     01/02/23 0918   WBC 11.9*   HGB 14.7   HCT 44.1        Recent Labs     01/02/23  0918      K 4.0      CO2 22*   BUN 22   CREATININE 0.8   CALCIUM 8.5     Recent Labs     01/02/23 0918   AST 19   ALT 12   BILITOT 0.3   ALKPHOS 60     No results for input(s): INR in the last 72 hours. No results for input(s): Estle Carrow in the last 72 hours.   Urinalysis:   Lab Results   Component Value Date/Time    NITRU NEGATIVE 01/07/2020 03:00 PM    [address] 2-4 12/20/2016 01:00 PM    BACTERIA NONE 12/20/2016 01:00 PM    RBCUA 0-2 12/20/2016 01:00 PM    BLOODU NEGATIVE 01/07/2020 03:00 PM    SPECGRAV 1.015 08/21/2013 11:05 AM    GLUCOSEU >= 1000 01/07/2020 03:00 PM       EKG: sinus tachycardia, iaej=[id number]    Radiology:  XR CHEST PORTABLE   Final Result   Coarsened appearance to the pulmonary parenchyma which is likely chronic. No definite acute findings. **This report has been created using voice recognition software. It may contain minor errors which are inherent in voice recognition technology. **      Final report electronically signed by Dr. Primitivo Garcia on 1/2/2023 9:39 AM        XR CHEST PORTABLE    Result Date: 1/2/2023  PROCEDURE: XR CHEST PORTABLE CLINICAL INFORMATION: SOB. Fever, cough, dyspnea. Smoker. COMPARISON: Chest x-ray 1/7/2020 12/20/2016. TECHNIQUE: AP upright view of the chest. FINDINGS: The heart size is normal.The mediastinum is not widened. There is coarsened appearance to the pulmonary parenchyma. This appears similar to the prior examination. There are no definite infiltrates. There are no pleural effusions. The pulmonary vascularity is normal.No suspicious osseous lesions are present. Coarsened appearance to the pulmonary parenchyma which is likely chronic. No definite acute findings. **This report has been created using voice recognition software. It may contain minor errors which are inherent in voice recognition technology. ** Final report electronically signed by Dr. Primitivo Garcia on 1/2/2023 9:39 AM        Tele:   [] yes             [x] no      Thank you No primary care provider on file. for the opportunity to be involved in this patient's care.     Electronically signed by Lisa Waite PA-C on 1/2/2023 at 12:17 PM

## 2023-01-02 NOTE — ED NOTES
Pt to ER from [facility] assisted living due to flu like symptoms. EMS reports the facility has had an outbreak of the flu and the pt has developed a fever and cough over the past few days. En route pt received about 800ml of NS. Upon arrival pt states he has had a non productive cough for about three days.  EKG completed upon arrival.      Torito Boykin RN  01/02/23 [id number]

## 2023-01-02 NOTE — ED NOTES
Pt transported to [facility] on cart in stable condition. Floor contacted before transport and spoke with Eva.      Khalif Martinez  01/02/23 [id number]

## 2023-01-02 NOTE — ED NOTES
Pt resting on cot at this time. No needs voiced. Will continue to monitor.       [address], RN  01/02/23 3418

## 2023-01-03 ENCOUNTER — APPOINTMENT (OUTPATIENT)
Dept: CT IMAGING | Age: 58
DRG: 871 | End: 2023-01-03
Payer: MEDICARE

## 2023-01-03 ENCOUNTER — APPOINTMENT (OUTPATIENT)
Dept: GENERAL RADIOLOGY | Age: 58
DRG: 871 | End: 2023-01-03
Payer: MEDICARE

## 2023-01-03 PROBLEM — I63.239 CEREBRAL INFARCTION DUE TO INTERNAL CAROTID ARTERY OCCLUSION (HCC): Status: ACTIVE | Noted: 2023-01-03

## 2023-01-03 PROBLEM — J11.1 INFLUENZA WITH RESPIRATORY MANIFESTATION OTHER THAN PNEUMONIA: Status: ACTIVE | Noted: 2023-01-03

## 2023-01-03 LAB
ALBUMIN SERPL-MCNC: 3.8 G/DL (ref 3.5–5.1)
ALLEN TEST: POSITIVE
ALP BLD-CCNC: 53 U/L (ref 38–126)
ALT SERPL-CCNC: 11 U/L (ref 11–66)
ANION GAP SERPL CALCULATED.3IONS-SCNC: 15 MEQ/L (ref 8–16)
AST SERPL-CCNC: 22 U/L (ref 5–40)
AVERAGE GLUCOSE: 144 MG/DL (ref 70–126)
BASE EXCESS (CALCULATED): -0.9 MMOL/L (ref -2.5–2.5)
BASOPHILS # BLD: 0.2 %
BASOPHILS ABSOLUTE: 0 THOU/MM3 (ref 0–0.1)
BILIRUB SERPL-MCNC: 0.3 MG/DL (ref 0.3–1.2)
BUN BLDV-MCNC: 25 MG/DL (ref 7–22)
C-REACTIVE PROTEIN: 15.05 MG/DL (ref 0–1)
CALCIUM SERPL-MCNC: 8.3 MG/DL (ref 8.5–10.5)
CHLORIDE BLD-SCNC: 101 MEQ/L (ref 98–111)
CHOLESTEROL, TOTAL: 101 MG/DL (ref 100–199)
CO2: 25 MEQ/L (ref 23–33)
COLLECTED BY:: NORMAL
CREAT SERPL-MCNC: 0.7 MG/DL (ref 0.4–1.2)
DEVICE: NORMAL
EKG ATRIAL RATE: 103 BPM
EKG ATRIAL RATE: 104 BPM
EKG P AXIS: 70 DEGREES
EKG P AXIS: 75 DEGREES
EKG P-R INTERVAL: 112 MS
EKG P-R INTERVAL: 120 MS
EKG Q-T INTERVAL: 304 MS
EKG Q-T INTERVAL: 340 MS
EKG QRS DURATION: 78 MS
EKG QRS DURATION: 80 MS
EKG QTC CALCULATION (BAZETT): 399 MS
EKG QTC CALCULATION (BAZETT): 445 MS
EKG R AXIS: 89 DEGREES
EKG R AXIS: 90 DEGREES
EKG T AXIS: 14 DEGREES
EKG T AXIS: 85 DEGREES
EKG VENTRICULAR RATE: 103 BPM
EKG VENTRICULAR RATE: 104 BPM
EOSINOPHIL # BLD: 0 %
EOSINOPHILS ABSOLUTE: 0 THOU/MM3 (ref 0–0.4)
ERYTHROCYTE [DISTWIDTH] IN BLOOD BY AUTOMATED COUNT: 13.1 % (ref 11.5–14.5)
ERYTHROCYTE [DISTWIDTH] IN BLOOD BY AUTOMATED COUNT: 47.4 FL (ref 35–45)
GFR SERPL CREATININE-BSD FRML MDRD: > 60 ML/MIN/1.73M2
GLUCOSE BLD-MCNC: 107 MG/DL (ref 70–108)
GLUCOSE BLD-MCNC: 119 MG/DL (ref 70–108)
GLUCOSE BLD-MCNC: 128 MG/DL (ref 70–108)
HBA1C MFR BLD: 6.8 % (ref 4.4–6.4)
HCO3: 25 MMOL/L (ref 23–28)
HCT VFR BLD CALC: 40.7 % (ref 42–52)
HDLC SERPL-MCNC: 34 MG/DL
HEMOGLOBIN: 13.2 GM/DL (ref 14–18)
IFIO2: 36
IMMATURE GRANS (ABS): 0.02 THOU/MM3 (ref 0–0.07)
IMMATURE GRANULOCYTES: 0.2 %
LACTIC ACID: 0.9 MMOL/L (ref 0.5–2)
LDL CHOLESTEROL CALCULATED: 44 MG/DL
LV EF: 55 %
LVEF MODALITY: NORMAL
LYMPHOCYTES # BLD: 10.5 %
LYMPHOCYTES ABSOLUTE: 1 THOU/MM3 (ref 1–4.8)
MAGNESIUM: 2.2 MG/DL (ref 1.6–2.4)
MCH RBC QN AUTO: 31.8 PG (ref 26–33)
MCHC RBC AUTO-ENTMCNC: 32.4 GM/DL (ref 32.2–35.5)
MCV RBC AUTO: 98.1 FL (ref 80–94)
MONOCYTES # BLD: 15.1 %
MONOCYTES ABSOLUTE: 1.4 THOU/MM3 (ref 0.4–1.3)
NUCLEATED RED BLOOD CELLS: 0 /100 WBC
O2 SATURATION: 94 %
PCO2: 45 MMHG (ref 35–45)
PH BLOOD GAS: 7.35 (ref 7.35–7.45)
PLATELET # BLD: 140 THOU/MM3 (ref 130–400)
PMV BLD AUTO: 9.2 FL (ref 9.4–12.4)
PO2: 77 MMHG (ref 71–104)
POTASSIUM SERPL-SCNC: 4.1 MEQ/L (ref 3.5–5.2)
RBC # BLD: 4.15 MILL/MM3 (ref 4.7–6.1)
SEG NEUTROPHILS: 74 %
SEGMENTED NEUTROPHILS ABSOLUTE COUNT: 6.7 THOU/MM3 (ref 1.8–7.7)
SODIUM BLD-SCNC: 141 MEQ/L (ref 135–145)
SOURCE, BLOOD GAS: NORMAL
TOTAL PROTEIN: 6.1 G/DL (ref 6.1–8)
TRIGL SERPL-MCNC: 113 MG/DL (ref 0–199)
WBC # BLD: 9.1 THOU/MM3 (ref 4.8–10.8)

## 2023-01-03 PROCEDURE — 82948 REAGENT STRIP/BLOOD GLUCOSE: CPT

## 2023-01-03 PROCEDURE — 94761 N-INVAS EAR/PLS OXIMETRY MLT: CPT

## 2023-01-03 PROCEDURE — 6360000002 HC RX W HCPCS: Performed by: INTERNAL MEDICINE

## 2023-01-03 PROCEDURE — 70496 CT ANGIOGRAPHY HEAD: CPT

## 2023-01-03 PROCEDURE — 87500 VANOMYCIN DNA AMP PROBE: CPT

## 2023-01-03 PROCEDURE — 70498 CT ANGIOGRAPHY NECK: CPT

## 2023-01-03 PROCEDURE — 82803 BLOOD GASES ANY COMBINATION: CPT

## 2023-01-03 PROCEDURE — 83605 ASSAY OF LACTIC ACID: CPT

## 2023-01-03 PROCEDURE — 86140 C-REACTIVE PROTEIN: CPT

## 2023-01-03 PROCEDURE — 70450 CT HEAD/BRAIN W/O DYE: CPT

## 2023-01-03 PROCEDURE — 92523 SPEECH SOUND LANG COMPREHEN: CPT

## 2023-01-03 PROCEDURE — 92610 EVALUATE SWALLOWING FUNCTION: CPT

## 2023-01-03 PROCEDURE — 36415 COLL VENOUS BLD VENIPUNCTURE: CPT

## 2023-01-03 PROCEDURE — 87641 MR-STAPH DNA AMP PROBE: CPT

## 2023-01-03 PROCEDURE — 2580000003 HC RX 258: Performed by: NURSE PRACTITIONER

## 2023-01-03 PROCEDURE — 80053 COMPREHEN METABOLIC PANEL: CPT

## 2023-01-03 PROCEDURE — 6360000002 HC RX W HCPCS: Performed by: NURSE PRACTITIONER

## 2023-01-03 PROCEDURE — 83735 ASSAY OF MAGNESIUM: CPT

## 2023-01-03 PROCEDURE — 93005 ELECTROCARDIOGRAM TRACING: CPT | Performed by: NURSE PRACTITIONER

## 2023-01-03 PROCEDURE — 6370000000 HC RX 637 (ALT 250 FOR IP)

## 2023-01-03 PROCEDURE — 94640 AIRWAY INHALATION TREATMENT: CPT

## 2023-01-03 PROCEDURE — 80061 LIPID PANEL: CPT

## 2023-01-03 PROCEDURE — 85025 COMPLETE CBC W/AUTO DIFF WBC: CPT

## 2023-01-03 PROCEDURE — 87070 CULTURE OTHR SPECIMN AEROBIC: CPT

## 2023-01-03 PROCEDURE — 36600 WITHDRAWAL OF ARTERIAL BLOOD: CPT

## 2023-01-03 PROCEDURE — 6360000004 HC RX CONTRAST MEDICATION

## 2023-01-03 PROCEDURE — 93306 TTE W/DOPPLER COMPLETE: CPT

## 2023-01-03 PROCEDURE — 2000000000 HC ICU R&B

## 2023-01-03 PROCEDURE — 71045 X-RAY EXAM CHEST 1 VIEW: CPT

## 2023-01-03 PROCEDURE — 96374 THER/PROPH/DIAG INJ IV PUSH: CPT

## 2023-01-03 PROCEDURE — 6370000000 HC RX 637 (ALT 250 FOR IP): Performed by: INTERNAL MEDICINE

## 2023-01-03 PROCEDURE — 2700000000 HC OXYGEN THERAPY PER DAY

## 2023-01-03 PROCEDURE — 6370000000 HC RX 637 (ALT 250 FOR IP): Performed by: NURSE PRACTITIONER

## 2023-01-03 PROCEDURE — 83036 HEMOGLOBIN GLYCOSYLATED A1C: CPT

## 2023-01-03 RX ORDER — ACETAMINOPHEN 650 MG/1
650 SUPPOSITORY RECTAL EVERY 6 HOURS PRN
Status: DISCONTINUED | OUTPATIENT
Start: 2023-01-03 | End: 2023-01-03 | Stop reason: SDUPTHER

## 2023-01-03 RX ORDER — ACETAMINOPHEN 325 MG/1
650 TABLET ORAL EVERY 6 HOURS PRN
Status: DISCONTINUED | OUTPATIENT
Start: 2023-01-03 | End: 2023-01-03 | Stop reason: SDUPTHER

## 2023-01-03 RX ORDER — POLYETHYLENE GLYCOL 3350 17 G/17G
17 POWDER, FOR SOLUTION ORAL DAILY PRN
Status: DISCONTINUED | OUTPATIENT
Start: 2023-01-03 | End: 2023-01-10 | Stop reason: HOSPADM

## 2023-01-03 RX ORDER — SODIUM CHLORIDE 9 MG/ML
INJECTION, SOLUTION INTRAVENOUS PRN
Status: DISCONTINUED | OUTPATIENT
Start: 2023-01-03 | End: 2023-01-10 | Stop reason: HOSPADM

## 2023-01-03 RX ORDER — SODIUM CHLORIDE 0.9 % (FLUSH) 0.9 %
5-40 SYRINGE (ML) INJECTION PRN
Status: DISCONTINUED | OUTPATIENT
Start: 2023-01-03 | End: 2023-01-03 | Stop reason: SDUPTHER

## 2023-01-03 RX ORDER — SODIUM CHLORIDE 0.9 % (FLUSH) 0.9 %
5-40 SYRINGE (ML) INJECTION EVERY 12 HOURS SCHEDULED
Status: DISCONTINUED | OUTPATIENT
Start: 2023-01-03 | End: 2023-01-03

## 2023-01-03 RX ORDER — ALBUTEROL SULFATE 2.5 MG/3ML
2.5 SOLUTION RESPIRATORY (INHALATION) EVERY 4 HOURS PRN
Status: DISCONTINUED | OUTPATIENT
Start: 2023-01-03 | End: 2023-01-10 | Stop reason: HOSPADM

## 2023-01-03 RX ORDER — SODIUM CHLORIDE, SODIUM LACTATE, POTASSIUM CHLORIDE, CALCIUM CHLORIDE 600; 310; 30; 20 MG/100ML; MG/100ML; MG/100ML; MG/100ML
INJECTION, SOLUTION INTRAVENOUS CONTINUOUS
Status: DISCONTINUED | OUTPATIENT
Start: 2023-01-03 | End: 2023-01-08

## 2023-01-03 RX ORDER — SODIUM CHLORIDE 0.9 % (FLUSH) 0.9 %
5-40 SYRINGE (ML) INJECTION EVERY 12 HOURS SCHEDULED
Status: DISCONTINUED | OUTPATIENT
Start: 2023-01-03 | End: 2023-01-10 | Stop reason: HOSPADM

## 2023-01-03 RX ORDER — SODIUM CHLORIDE 0.9 % (FLUSH) 0.9 %
10 SYRINGE (ML) INJECTION ONCE
Status: COMPLETED | OUTPATIENT
Start: 2023-01-03 | End: 2023-01-03

## 2023-01-03 RX ORDER — INSULIN LISPRO 100 [IU]/ML
0-4 INJECTION, SOLUTION INTRAVENOUS; SUBCUTANEOUS EVERY 4 HOURS
Status: DISCONTINUED | OUTPATIENT
Start: 2023-01-03 | End: 2023-01-09

## 2023-01-03 RX ORDER — IPRATROPIUM BROMIDE AND ALBUTEROL SULFATE 2.5; .5 MG/3ML; MG/3ML
1 SOLUTION RESPIRATORY (INHALATION) EVERY 6 HOURS
Status: DISCONTINUED | OUTPATIENT
Start: 2023-01-03 | End: 2023-01-04

## 2023-01-03 RX ORDER — ONDANSETRON 2 MG/ML
4 INJECTION INTRAMUSCULAR; INTRAVENOUS EVERY 6 HOURS PRN
Status: DISCONTINUED | OUTPATIENT
Start: 2023-01-03 | End: 2023-01-10 | Stop reason: HOSPADM

## 2023-01-03 RX ORDER — DEXTROSE MONOHYDRATE 25 G/50ML
12.5 INJECTION, SOLUTION INTRAVENOUS
Status: DISCONTINUED | OUTPATIENT
Start: 2023-01-03 | End: 2023-01-04

## 2023-01-03 RX ORDER — ONDANSETRON 4 MG/1
4 TABLET, ORALLY DISINTEGRATING ORAL EVERY 8 HOURS PRN
Status: DISCONTINUED | OUTPATIENT
Start: 2023-01-03 | End: 2023-01-10 | Stop reason: HOSPADM

## 2023-01-03 RX ORDER — ALBUTEROL SULFATE 2.5 MG/3ML
2.5 SOLUTION RESPIRATORY (INHALATION) ONCE
Status: DISCONTINUED | OUTPATIENT
Start: 2023-01-03 | End: 2023-01-10 | Stop reason: HOSPADM

## 2023-01-03 RX ORDER — SODIUM CHLORIDE 9 MG/ML
INJECTION, SOLUTION INTRAVENOUS PRN
Status: DISCONTINUED | OUTPATIENT
Start: 2023-01-03 | End: 2023-01-03

## 2023-01-03 RX ORDER — METHYLPREDNISOLONE SODIUM SUCCINATE 40 MG/ML
40 INJECTION, POWDER, LYOPHILIZED, FOR SOLUTION INTRAMUSCULAR; INTRAVENOUS EVERY 12 HOURS
Status: DISCONTINUED | OUTPATIENT
Start: 2023-01-03 | End: 2023-01-07

## 2023-01-03 RX ORDER — ASPIRIN 81 MG/1
81 TABLET, CHEWABLE ORAL ONCE
Status: COMPLETED | OUTPATIENT
Start: 2023-01-04 | End: 2023-01-04

## 2023-01-03 RX ADMIN — SODIUM CHLORIDE, POTASSIUM CHLORIDE, SODIUM LACTATE AND CALCIUM CHLORIDE: 600; 310; 30; 20 INJECTION, SOLUTION INTRAVENOUS at 21:46

## 2023-01-03 RX ADMIN — SODIUM CHLORIDE, PRESERVATIVE FREE 10 ML: 5 INJECTION INTRAVENOUS at 05:04

## 2023-01-03 RX ADMIN — METHYLPREDNISOLONE SODIUM SUCCINATE 40 MG: 40 INJECTION, POWDER, FOR SOLUTION INTRAMUSCULAR; INTRAVENOUS at 21:40

## 2023-01-03 RX ADMIN — OSELTAMIVIR PHOSPHATE 75 MG: 75 CAPSULE ORAL at 09:59

## 2023-01-03 RX ADMIN — METOPROLOL TARTRATE 50 MG: 50 TABLET, FILM COATED ORAL at 09:59

## 2023-01-03 RX ADMIN — METHYLPREDNISOLONE SODIUM SUCCINATE 40 MG: 40 INJECTION, POWDER, FOR SOLUTION INTRAMUSCULAR; INTRAVENOUS at 09:59

## 2023-01-03 RX ADMIN — IOPAMIDOL 80 ML: 755 INJECTION, SOLUTION INTRAVENOUS at 04:38

## 2023-01-03 RX ADMIN — IPRATROPIUM BROMIDE AND ALBUTEROL SULFATE 1 AMPULE: .5; 3 SOLUTION RESPIRATORY (INHALATION) at 05:06

## 2023-01-03 RX ADMIN — TENECTEPLASE 22 MG: KIT at 05:05

## 2023-01-03 RX ADMIN — SODIUM CHLORIDE, PRESERVATIVE FREE 10 ML: 5 INJECTION INTRAVENOUS at 05:05

## 2023-01-03 RX ADMIN — CLOZAPINE 50 MG: 25 TABLET ORAL at 14:08

## 2023-01-03 RX ADMIN — IPRATROPIUM BROMIDE AND ALBUTEROL SULFATE 1 AMPULE: .5; 3 SOLUTION RESPIRATORY (INHALATION) at 12:15

## 2023-01-03 RX ADMIN — DESVENLAFAXINE 50 MG: 50 TABLET, EXTENDED RELEASE ORAL at 09:59

## 2023-01-03 RX ADMIN — OSELTAMIVIR PHOSPHATE 75 MG: 75 CAPSULE ORAL at 21:40

## 2023-01-03 RX ADMIN — CLOZAPINE 50 MG: 25 TABLET ORAL at 09:59

## 2023-01-03 RX ADMIN — DIVALPROEX SODIUM 500 MG: 500 TABLET, EXTENDED RELEASE ORAL at 09:59

## 2023-01-03 RX ADMIN — DIVALPROEX SODIUM 1000 MG: 500 TABLET, EXTENDED RELEASE ORAL at 21:39

## 2023-01-03 RX ADMIN — SODIUM CHLORIDE, POTASSIUM CHLORIDE, SODIUM LACTATE AND CALCIUM CHLORIDE: 600; 310; 30; 20 INJECTION, SOLUTION INTRAVENOUS at 06:17

## 2023-01-03 ASSESSMENT — ENCOUNTER SYMPTOMS
COUGH: 1
VOMITING: 0
NAUSEA: 0
COLOR CHANGE: 0
TROUBLE SWALLOWING: 0
SHORTNESS OF BREATH: 1
PHOTOPHOBIA: 0
DIARRHEA: 0

## 2023-01-03 NOTE — CONSULTS
CRITICAL CARE PROGRESS NOTE      Patient:  [facility]    Unit/Bed:4D-10/010-A  YOB: 1965  MRN: [id number]   PCP: No primary care provider on file. Date of Admission: 1/2/2023  Chief Complaint:- dysarthria    Assessment and Plan:    Likely Acute ischemic CVA:  NIH=3. LKW 0200 1/3/2023 CTH-negative intracranial pathology, no hemorrhage. CTA/Head/Neck Positive for Left Internal Carotid Artery Occlusion shortly beyond its origin and remain occluded to the skull base. 1/3/2023 TNKase given. NIH/GCS/NHISS per protocol. Maintain SBP>100 and less than 160. ECHO pending. ASA on hold. Lipitor continued. Lipid panel/HgbA1c pending. Neurology to follow. Acute respiratory failure, hypoxia:  History of smoking and Influenza A. ABG was completed prior to [facility] given. P/F ratio 200 based on 4L/NC. Oxygen switched from NC to HFNC. Chest Xray is pending. Acute influenza A: 1/2/2023 Tamiflu initiated. DMT2, uncontrolled:  history, HgbA1c is pending. Jardiance, Trulicity, Glucophage were not restarted at time of admission. Monitor with ACCU and SSI. Restart when taking oral well, patient is currently NPO. Dyslipidemia:  history, Lipid profile pending. Lipitor continued. Schizoaffective disorder/anxiety/depression:  history, Clozaril, Pristiq, Depakote continued, monitor  Essential HPTN:  history, ECHO 2020 LVEF 65%. Lopressor continued with parameters to hold. INITIAL H AND P AND ICU COURSE:  Myesha Edward is a 71-year-old  male admitted to [facility] ICU on 1/3/2023 with chief complaint of dysarthria and facial droop. Patient has past medical history significant for current everyday smoker, essential hypertension-ECHO 2020 LVEF 65%, SVT, diabetes mellitus type 2, bipolar-depression disorder/schizophrenia. Mitra Benson presented to [facility] ER on 1/2/2023 from [facility] with chief complaint of cough, fever and fatigue. EMS reported that the facility has had an outbreak of influenza A. Onset of nonproductive cough and shortness of breath was 2 days ago. The cough is constant and has not had any relief. He states that it has become progressively worse over the last few days. He also states he has had fever/chills. His shortness of breath is worse with exertion. Patient admits to smoking 1 ppd for many years and has never been to a pulmonologist for evaluation. While in the ER patient did test positive for Influenza A. Chest Xray revealed coarsened appearance to the pulmonary parenchyma ?chronic. Pulmonary vascularity is normal. Tamiflu was started. Patient was admitted to [facility]. 1/3/2023 Code Stroke was called LKW 0200 positive for left facial asymmetry, lethargic, dysarthria. NIH=3. Fingerstick glucose=119. CTH/CTA Head and Neck completed. Collaborated with Dr. Chen/Neurology. TNKase given. Transfer to the ICU for further management and care. Past Medical History:  see HPI. Family History: Mother and Father . Social History:  current every day smoker, denies any ETOH or illicit drug use. ROS   GENERAL: Positive for fatigue, weakness  SKN: No lesions or rashes. HEAD: No headaches or recent injury  EYES: No acute changes in vision, no diplopia or blurred vision, no drainage  EARS: No hearing loss, no tinnitus, no drainage  NOSE/THROAT: No rhinorrhea or pharyngitis, no nasal drainage  NECK: No lumps or unusual neck stiffness  PULMONARY: Positive for cough, wheezing, dyspnea  CARDIAC: Positive for tachycardia  GI: No history melena or hematochezia, no diarrhea or constipation  PERIPHERAL VASCULAR: No intermittent claudication or unusual leg cramps  MUSCULOSKELETAL: Occasional arthralgias, myalgias  NEUROLOGICAL: Positive for facial asymmetry, dysartria  HEMATOLOGIC:  No unusual bruising or bleeding  PSYCH: No homicidal or suicidal ideations.     Scheduled Meds:   sodium chloride flush  5-40 mL IntraVENous 2 times per day    sodium chloride flush  10 mL IntraVENous Once    Followed by    tenecteplase  0.25 mg/kg IntraVENous Once    Followed by    sodium chloride flush  10 mL IntraVENous Once    aspirin  81 mg Oral Daily    atorvastatin  20 mg Oral Daily    cloZAPine  200 mg Oral Nightly    cloZAPine  50 mg Oral BID    desvenlafaxine succinate  50 mg Oral Daily    divalproex  500 mg Oral QAM    divalproex  1,000 mg Oral Nightly    docusate sodium  100 mg Oral Daily    metoprolol tartrate  50 mg Oral BID    insulin lispro  0-4 Units SubCUTAneous TID WC    insulin lispro  0-4 Units SubCUTAneous Nightly    sodium chloride flush  5-40 mL IntraVENous 2 times per day    enoxaparin  40 mg SubCUTAneous Daily    nicotine  1 patch TransDERmal Daily    lactated ringers bolus  500 mL IntraVENous Once    oseltamivir  75 mg Oral BID     Continuous Infusions:   sodium chloride      dextrose      sodium chloride         PHYSICAL EXAMINATION:  T:  100.2. P:  104. RR:  26. B/P:  111/59. FiO2:  4L. O2 Sat:  97.  I/O:  pending  Body mass index is 29.09 kg/m². GCS:   14  General:   pale, warm and dry  HEENT:  normocephalic and atraumatic. No scleral icterus. PERR  Neck: supple. No Thyromegaly. Lungs: coarse rhonchi and wheezing through out to auscultation. No retractions  Cardiac: RRR-tachycardia. No JVD. Abdomen: soft. Nontender, bowel sounds present  Extremities:  No clubbing, cyanosis, or edema x 4. Vasculature: capillary refill < 3 seconds. Palpable dorsalis pedis pulses. Skin:  warm and dry. Psych:  Alert and oriented x3. Affect appropriate  Lymph:  No supraclavicular adenopathy. Neurologic:  Positive for facial asymmetry, dysarthria. NIH=3. Data: (All radiographs, tracings, PFTs, and imaging are personally viewed and interpreted unless otherwise noted).    1/3/2023 0500 sodium 141 potassium 3.1 chlorides 101 CO2 25  BUN 25 creatinine 0.7  Magnesium is 2.2  Glucose 107  Calcium is 8.3  CRP 15.05  Albumin 3.8 alk phos is 53 ALT is 11 AST is 22 total bili 0.3  White count 9.1 hemoglobin 13.2 hematocrit 40.7 platelet count 831  0/8/4886 09 35 influenza A-positive influenza B-negative  COVID-19-negative  1/3/2023 04 58 pH 7.35 PCO2 45 PO2 77 bicarb 25 4 L-nasal cannula  1/3/2023 CTH-no acute intracranial pathology. No hydrocephalus midline shift or acute hemorrhage. No sinus or mastoid fluid. No acute fracture. Orbits are within normal limits. 1/3/2023 CTA head neck left internal carotid artery is occluded. Right carotid and bilateral vertebral arteries are patent. Aortic arch is unremarkable. Subclavian arteries are patent. Left internal carotid artery is occluded shortly beyond its origin in remains occluded to the skull base. Vertebral arteries are patent. No dissection. No vessel occlusion intracranially. EKG sinus tachycardia heart rate 104   positive for R wave progression across precordial leads no acute ST or T wave abnormality. Cardiac telemetry sinus tachycardia        Seen with multidisciplinary ICU team yes. Meets Continued ICU Level Care Criteria:    [x] Yes   [] No - Transfer Planned to listed location:  [] HOSPITALIST CONTACTED-      Case and plan discussed with Dr. Francisco Garcia.         Electronically signed by RAJNI Guerrero - CNP  CRITICAL CARE SPECIALIST

## 2023-01-03 NOTE — PLAN OF CARE
Problem: Discharge Planning  Goal: Discharge to home or other facility with appropriate resources  1/3/2023 0915 by Yrn Colvin RN  Outcome: Progressing  Flowsheets (Taken 1/3/2023 0915)  Discharge to home or other facility with appropriate resources:   Identify barriers to discharge with patient and caregiver   Arrange for needed discharge resources and transportation as appropriate   Identify discharge learning needs (meds, wound care, etc)   Arrange for interpreters to assist at discharge as needed   Refer to discharge planning if patient needs post-hospital services based on physician order or complex needs related to functional status, cognitive ability or social support system  Note: Plan is for patient to be discharged back to residence      Problem: Safety - Adult  Goal: Free from fall injury  1/3/2023 0915 by Yrn Colvin RN  Outcome: Progressing  Flowsheets (Taken 1/3/2023 0915)  Free From Fall Injury: Based on caregiver fall risk screen, instruct family/caregiver to ask for assistance with transferring infant if caregiver noted to have fall risk factors     Problem: Neurosensory - Adult  Goal: Neuropyschological functioning will improve  Description: Neuropyschological functioning will improve  1/3/2023 0915 by Yrn Colvin RN  Outcome: Progressing  Note: Patient has slurred speech and needs help to arouse. Continue to assess for improvement       Problem: Hematologic - Adult  Goal: Maintains hematologic stability  Outcome: Progressing  Flowsheets (Taken 1/3/2023 0915)  Maintains hematologic stability:   Assess for signs and symptoms of bleeding or hemorrhage   Administer blood products/factors as ordered   Monitor labs for bleeding or clotting disorders  Note: No signs of bleeding at this time   Care plan reviewed with patient. Patient verbalize understanding of the plan of care and contribute to goal setting. No guardian at bedside at this time.  Guardian and sister called but no answer

## 2023-01-03 NOTE — CONSENT
Informed Consent for Tenecteplase Note    I have discussed with the patient the rationale for Tenecteplase (TNKase); I understand that Tenecteplase [facility]) is a medication that can dissolve the blockage and that will be given intravenously. I understand that I am having symptoms of a cardiovascular accident (CVA) or stroke. I realized that medical treatments carry risk. The most common complication with Tenecteplase (TNKase) is active/internal bleeding. The patient had an opportunity to ask questions and had agreed to proceed with Tenecteplase (TNKase) injection. I left message with Boni Jenkins () Vianey Wolf and Baltazar Roman listed as Legal Guardian [phone].  I left message with Cindi Jay Brother    Electronically signed by RAJNI Stinson CNP on 1/3/23 at 5:15 AM EST

## 2023-01-03 NOTE — PROGRESS NOTES
Mortimer Gerlach is a 65year old who is in bed on ICU. This  talked with his nurse who stated that she tried to call his guardian at the courts and has not yet heard from him. Brenda Escobar, legal Guardian. Since Mortimer Gerlach has a guardian, we need these papers for his records. The nurse will try and get these papers faxed to us. 01/03/23 1043   Encounter Summary   Encounter Overview/Reason  Initial Encounter   Service Provided For: Patient   Referral/Consult From: Nurse   Support System Other (Comment)  Nissa Arauzer and brother?)   Last Encounter  01/03/23   Complexity of Encounter Moderate   Encounter    Type Initial Screen/Assessment   Spiritual/Emotional needs   Type Spiritual Support   Advance Care Planning   Type ACP conversation   Care Plan:  Continue spiritual and emotional care for patient and family. Including prayers.

## 2023-01-03 NOTE — SIGNIFICANT EVENT
Significant Event Note    Name: Yohannes England  : 1965  MRN: [id number]  Date of Service: 23      4:31 AM: Code Stroke paged overhead. Upon arrival patient was laying in semi-adame's position, nasal cannula in place, somnolent yet easily rousable and appearing in no acute distress. The patient's primary RN noted that on her last rounding the patient had new left sided facial droop that was not present prior. His last known well was at 0200 on 1/3/23. Point of Care Blood glucose=119. VS: BP=92/62, SE=491, RR=15, SpO2=96% on 4 LPM nasal cannula. This physician completed a FAST exam and noted left facial droop and mild dysarthria. An NIH was completed with a score of 2 and patient taken emergently to CT. CT head completed and Neurointerventional was contacted via telephone. This physician discussed the case with Dr. Shania Whitten who recommended CTA head and neck with and without contrast, preparation to administer TNK, transfer to the ICU, and that he does not want to be called back by any medical resident. Handoff was given to ICU provider RAJNI Parish-CNP. Assessment/Plan:  ?Stroke - CT head (-). NIH=2, no known history of stroke.   - CTA head/neck completed with pending results  - Transfer to ICU and plan for TNK administration    Case discussed with Dr. Osvaldo Lovett    Electronically signed by Queenie Suarez DO, MBA on 1/3/2023 at 4:31 AM

## 2023-01-03 NOTE — PROGRESS NOTES
Penelope James 61  PHYSICAL THERAPY MISSED TREATMENT NOTE  [facility] ICU 4D    Date: 1/3/2023  Patient Name: Kaveh Ledesma        MRN: [id number]   : 1965  (65 y.o.)  Gender: male                REASON FOR MISSED TREATMENT:  Hold treatment per nursing request.  Pt received TNK at 0515 this AM, to remain on bedrest X24 hours per protocol. Will check back . Skip Nip.  Valla Saint, [place] [facility] Home Suture Removal Text: Patient was provided instructions on removing sutures and will remove their sutures at home.  If they have any questions or difficulties they will call the office.

## 2023-01-03 NOTE — CONSULTS
Neurology Consult Note    Date:1/3/2023       JUWW:[id number]  Patient Katy Rhodes     YOB: 1965     Age:57 y.o. Requesting Physician: Sharmaine Sparks MD     Reason for Consult:  Evaluate for Stroke s/p TNK      Chief Complaint: Dysarthria, Left Facial Droop    Subjective     Chel Chu is a 81-year-old  male with past medical history significant for T2DM, essential hypertension, bipolar disorder who presented to [facility] and is admitted for influenza A. At [phone] on 1/3/2023 stroke alert was called for this patient for left-sided facial droop and dysarthria which were noted by his nurse. Nurse states that at 2 AM the facial droop and slurred speech were not present. Patient taken to imaging for stat CT head which revealed no ICH, midline shift, mass-effect. 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 No LVO. Based on patient's symptoms and time since last known well decision was made for TNK to be given. 22 mg of TN K given at 5:05 AM.  Patient transferred to the ICU with thrombolytic precautions. Neurology is consulted for stroke work-up. On visit today patient is resting in bed with high flow nasal cannula. His speech is dysarthric though he claims this is baseline for him. Able to lift all 4 extremities antigravity though subtle weakness in the left lower extremity. No facial droop appreciated though this may be obscured by the high flow nasal cannula. Patient has no new concerns or complaints today. Review of Systems   Review of Systems   Constitutional:  Positive for chills, fatigue and fever. Negative for activity change. HENT:  Negative for tinnitus and trouble swallowing. Eyes:  Negative for photophobia and visual disturbance. Respiratory:  Positive for cough and shortness of breath. Cardiovascular:  Negative for chest pain and palpitations.    Gastrointestinal:  Negative for diarrhea, nausea and vomiting. Genitourinary:  Negative for dysuria and flank pain. Musculoskeletal:  Negative for neck pain and neck stiffness. Skin:  Negative for color change and rash. Neurological:  Positive for speech difficulty (? baseline). Negative for dizziness, facial asymmetry, weakness, numbness and headaches. Psychiatric/Behavioral:  Negative for agitation and confusion. Medications   Scheduled Meds:    albuterol  2.5 mg Nebulization Once    sodium chloride flush  5-40 mL IntraVENous 2 times per day    insulin lispro  0-4 Units SubCUTAneous Q4H    [Held by provider] aspirin  81 mg Oral Daily    atorvastatin  20 mg Oral Daily    cloZAPine  200 mg Oral Nightly    cloZAPine  50 mg Oral BID    desvenlafaxine succinate  50 mg Oral Daily    divalproex  500 mg Oral QAM    divalproex  1,000 mg Oral Nightly    docusate sodium  100 mg Oral Daily    metoprolol tartrate  50 mg Oral BID    [Held by provider] enoxaparin  40 mg SubCUTAneous Daily    oseltamivir  75 mg Oral BID     Continuous Infusions:    sodium chloride      lactated ringers 100 mL/hr at 01/03/23 0617    dextrose       PRN Meds: sodium chloride flush, dextrose, sodium chloride flush, sodium chloride, ondansetron **OR** ondansetron, polyethylene glycol, acetaminophen **OR** acetaminophen, albuterol, clotrimazole, glucose, dextrose bolus **OR** dextrose bolus, glucagon (rDNA), dextrose, sodium chloride flush, potassium chloride **OR** potassium alternative oral replacement **OR** potassium chloride, acetaminophen **OR** acetaminophen, magnesium sulfate    Past History    Past Medical History:   has no past medical history on file. Social History:   reports that he has been smoking cigarettes. He has been smoking an average of 1 pack per day. He has never used smokeless tobacco. He reports that he does not drink alcohol and does not use drugs. Family History: History reviewed. No pertinent family history.     Physical Examination Vitals:  BP (!) 111/48   Pulse (!) 102   Temp 99.1 °F (37.3 °C) (Oral)   Resp 23   Ht 5' 9\" (1.753 m)   Wt 184 lb 8.4 oz (83.7 kg)   SpO2 95%   BMI 27.25 kg/m²   Temp (24hrs), Av.6 °F (37.6 °C), Min:99 °F (37.2 °C), Max:100.2 °F (37.9 °C)      I/O (24Hr): Intake/Output Summary (Last 24 hours) at 1/3/2023 0730  Last data filed at 1/3/2023 0340  Gross per 24 hour   Intake 100 ml   Output --   Net 100 ml     Physical Exam  Vitals reviewed. Constitutional:       Appearance: Normal appearance. HENT:      Head: Normocephalic and atraumatic. Right Ear: External ear normal.      Left Ear: External ear normal.      Nose: Nose normal.      Mouth/Throat:      Mouth: Mucous membranes are moist.      Pharynx: Oropharynx is clear. Eyes:      Extraocular Movements: Extraocular movements intact and EOM normal.      Pupils: Pupils are equal, round, and reactive to light. Cardiovascular:      Rate and Rhythm: Normal rate and regular rhythm. Pulmonary:      Effort: Pulmonary effort is normal. No respiratory distress. Comments: On high flow NC   Abdominal:      General: There is no distension. Palpations: Abdomen is soft. Tenderness: There is no abdominal tenderness. Musculoskeletal:         General: No deformity or signs of injury. Cervical back: No rigidity or tenderness. Skin:     General: Skin is warm and dry. Neurological:      Mental Status: He is alert and oriented to person, place, and time. Coordination: Finger-Nose-Finger Test and Heel to [facility] normal.      Deep Tendon Reflexes:      Reflex Scores:       Bicep reflexes are 2+ on the right side and 2+ on the left side. Patellar reflexes are 2+ on the right side and 2+ on the left side. Psychiatric:         Mood and Affect: Mood normal.         Speech: Speech is slurred. Behavior: Behavior normal.         Thought Content:  Thought content normal.     Neurologic Exam     Mental Status   Oriented to person, place, and time. Follows 2 step commands. Attention: normal.   Speech: slurred   Level of consciousness: alert  Knowledge: good. Able to name object. Able to read. Able to repeat. Mild -moderate dysarthria, pt states this is baseline but knows no reason for his dysarthria. Cranial Nerves     CN II   Visual fields full to confrontation. CN III, IV, VI   Pupils are equal, round, and reactive to light. Extraocular motions are normal.     CN V   Facial sensation intact. CN VIII   CN VIII normal.   Hearing: intact    CN IX, X   CN IX normal.   Palate: symmetric    CN XI   CN XI normal.   Right sternocleidomastoid strength: normal  Right trapezius strength: normal    CN XII   CN XII normal.   Tongue: not atrophic  No facial droop noted but he is wearing HFNC with strap which obscures his face to some degree. Motor Exam   Muscle bulk: normal  Overall muscle tone: normal  Strength:  BUE 5/5  RLE 5/5  LLE 5/5 at the hip, 4+/5 at the knee       Sensory Exam   Light touch normal.     Gait, Coordination, and Reflexes     Coordination   Finger to nose coordination: normal  Heel to shin coordination: normal    Reflexes   Right biceps: 2+  Left biceps: 2+  Right patellar: 2+  Left patellar: 2+     Labs/Imaging/Diagnostics   Labs:  CBC:  Recent Labs     01/02/23  0918 01/03/23  0500   WBC 11.9* 9.1   RBC 4.51* 4.15*   HGB 14.7 13.2*   HCT 44.1 40.7*   MCV 97.8* 98.1*    140     CHEMISTRIES:  Recent Labs     01/02/23  0918 01/03/23  0500    141   K 4.0 4.1    101   CO2 22* 25   BUN 22 25*   CREATININE 0.8 0.7   GLUCOSE 125* 107   MG  --  2.2     PT/INR:No results for input(s): PROTIME, INR in the last 72 hours. APTT:No results for input(s): APTT in the last 72 hours.   LIVER PROFILE:  Recent Labs     01/02/23  0918 01/03/23  0500   AST 19 22   ALT 12 11   BILITOT 0.3 0.3   ALKPHOS 60 53       Imaging Last 24 Hours:  CTA HEAD W WO CONTRAST (CODE STROKE)    Result Date: 1/3/2023  CT angiography head with contrast. 3D Postprocessing. Comparison: CT/SR - CT HEAD WO CONTRAST - 01/03/2023 04:18 AM EST Findings: The left internal carotid artery is occluded within the neck. There is diminutive enhancement of the intracranial left internal carotid artery reconstituted by collateral flow. Right internal carotid artery is patent. Anterior cerebral arteries are patent. The middle cerebral arteries are patent. Vertebrobasilar arteries are patent. Bilateral posterior cerebral arteries are patent. Patent left posterior communicating artery noted. Patent anterior communicating artery. No appreciable mass-effect or abnormal enhancement. Mild multifocal sinus mucosal thickening. Mastoid air cells are clear. Left internal carotid artery is occluded within the neck and reconstituted intracranially by collateral flow. No vessel occlusion intracranially. This document has been electronically signed by: Vel Godinez MD on 01/03/2023 05:28 AM All CTs at this facility use dose modulation techniques and iterative reconstructions, and/or weight-based dosing when appropriate to reduce radiation to a low as reasonably achievable. 3D Post-processing was performed on this study. CT HEAD WO CONTRAST    Result Date: 1/3/2023   ADDENDUM #1  This report was discussed with Maggi Hay RN on Jan 03, 2023 04:51:00 EST. This document has been electronically signed by: Angely Scott on 01/03/2023 04:52 AM  ORIGINAL REPORT  CT head without contrast Comparison: 8/21/2013 MRI brain. Findings: No intra-axial mass, midline shift, hydrocephalus, or acute hemorrhage. No significant atrophy-like change or white matter disease. There is no sinus or mastoid fluid. Chronic mild mucosal thickening scattered through multiple sinuses. The orbits are within normal limits. There is no acute fracture. Impression: No acute intracranial pathology.  This document has been electronically signed by: Jose Garcia MD on 01/03/2023 04:42 AM All CTs at this facility use dose modulation techniques and iterative reconstructions, and/or weight-based dosing when appropriate to reduce radiation to a low as reasonably achievable. CTA NECK W WO CONTRAST (CODE STROKE)    Result Date: 1/3/2023  CT angiography neck with contrast. 3D Postprocessing. Comparison: None Findings: Aortic arch is unremarkable. Subclavian arteries are patent. The common carotid arteries are patent to the carotid bifurcations. On the left the internal carotid arteries occluded shortly beyond its origin and remains occluded to the skull base. The right internal carotid artery is patent. Vertebral arteries are patent. No dissection. Lung apices are clear. No cervical mass or lymphadenopathy. No abnormal fluid collection. No acute osseous finding. Left internal carotid artery is occluded. Right carotid and bilateral vertebral arteries are patent. This document has been electronically signed by: Anastacia Navarro MD on 01/03/2023 05:33 AM All CTs at this facility use dose modulation techniques and iterative reconstructions, and/or weight-based dosing when appropriate to reduce radiation to a low as reasonably achievable. Carotid stenosis and measurements are in accordance with NASCET criteria. 3D Post-processing was performed on this study. XR CHEST PORTABLE    Result Date: 1/2/2023  PROCEDURE: XR CHEST PORTABLE CLINICAL INFORMATION: SOB. Fever, cough, dyspnea. Smoker. COMPARISON: Chest x-ray 1/7/2020 12/20/2016. TECHNIQUE: AP upright view of the chest. FINDINGS: The heart size is normal.The mediastinum is not widened. There is coarsened appearance to the pulmonary parenchyma. This appears similar to the prior examination. There are no definite infiltrates. There are no pleural effusions. The pulmonary vascularity is normal.No suspicious osseous lesions are present. Coarsened appearance to the pulmonary parenchyma which is likely chronic.  No definite acute findings. **This report has been created using voice recognition software. It may contain minor errors which are inherent in voice recognition technology. ** Final report electronically signed by Dr. Frank Quispe on 1/2/2023 9:39 AM        Assessment and Plan:        Hospital Problems             Last Modified POA    * (Principal) Acute respiratory failure with hypoxia ([address] 1/2/2023 Yes       Left Facial Droop, Dysarthria s/p TNK on 1/3/23 at [address]  CT revealed no ICH, midline shift, mass effect  CTA of head and neck revealed left ICA occlusion from origin to base of skull. No LVO. No need for carotid ultrasound. MRI of brain without contrast ordered. If this can be performed around 24 hours post-tPA administration, no follow-up head CT is needed. 2D echocardiogram ordered. Stat CT head is needed if the patient develops new-onset altered mental status, a severe headache, or new-onset neurologic deficit. Thrombolytic recommendations  Admit to ICU  Bedrest for 24 hours. Thrombolytic precautions. No Lovenox, antiplatelets, or anticoagulation for the first 24 hours. Please use SCDs for DVT prophylaxis. Blood pressure recommendations as below. Neurochecks and NIHSS per thrombolytic order set  Risk factors and medications  Permissive hypertension (up to 180/105) for 24 hours following thrombolytic administration. Following this period, hypertension management with goal blood pressure of <130/80 unless otherwise specified. Keep well hydrated. Initiate normal saline at 75 ml/hr as needed. Hold antiplatelets and anticoagulation for minimum of 24 hours. HgbA1C 6.8. LDL 44, goal of 45-70. Continue Lipitor 20  Smoking and alcohol cessation when applicable. Provide stroke eduction for individualized risk factors.   DVT prophylaxis with SCDs (hold heparin/lovenox for 24 hours after IV tPA or IA intervention)  Other recommendations  Dysphagia screen prior to oral intake  PT/OT/SLP Consult  EKG/Telemetry to monitor for atrial fibrillation  NIHSS every shift. Neuro checks per unit unless otherwise specified. Head of bed 45 degree. This patient was seen and evaluated with Dr. Jennifer Wellington and he is in agreement with the assessment and plan. Electronically signed by Sherrell Muro PA-C on 1/3/23 at 7:39 AM EST    I agree with evaluation and plan. 65year old man with HTN and DM who developed left facial weakness and slurred speech. Head CT And CTA were unremarkable so he received TNK with significant improvement. Continue the above mentioned stroke dowling and treatment plan.     Christopher Richards MD

## 2023-01-03 NOTE — CARE COORDINATION
Case Management Assessment  Initial Evaluation    Date/Time of Evaluation: 1/3/2023 2:33 PM  Assessment Completed by: Katarzyna Meade RN    If patient is discharged prior to next notation, then this note serves as note for discharge by case management.    Patient Name: Chico Anaya                   YOB: 1965  Diagnosis: Influenza with respiratory manifestation other than pneumonia [J11.1]  Acute respiratory failure with hypoxia (HCC) [J96.01]                   Date / Time: 1/2/2023  8:49 AM  [facility]     Patient Admission Status: Inpatient   Readmission Risk (Low < 19, Mod (19-27), High > 27): Readmission Risk Score: 16    Current PCP: Joao Mo MD  PCP verified by ? Yes    Chart Reviewed: Yes      History Provided by: Patient  Patient Orientation: Alert and Oriented    Patient Cognition: Alert    Hospitalization in the last 30 days (Readmission):  No    If yes, Readmission Assessment in  Navigator will be completed.    Advance Directives:      Code Status: Full Code   Patient's Primary Decision Maker is: Legal Next of Kin      Discharge Planning:    Patient lives with: Other (Comment) (From [facility]) Type of Home: Assisted living  Primary Care Giver: Self (From [facility])  Patient Support Systems include: Other (Comment) (Assisted Living Staff)   Current Financial resources: Medicare, Medicaid  Current community resources: Assisted Living ([facility])  Current services prior to admission: None            Current DME:              Type of Home Care services:  None    ADLS  Prior functional level: Independent in ADLs/IADLs  Current functional level: Other (see comment) (Awaiting therapy evals since code stroke & received TNK)    Family can provide assistance at DC: Other (comment) (From [facility])  Would you like Case Management to discuss the discharge plan with any other family members/significant others, and if so, who? No  Plans to Return to Present  Housing: Unknown at present  Other Identified Issues/Barriers to RETURNING to current housing: may need rehab before returning to assisted living  Potential Assistance needed at discharge: [address]            Potential DME:    Patient expects to discharge to: Assisted living  Plan for transportation at discharge: Other (see comment) (Unsure)    Financial    Payor: MEDICARE / Plan: MEDICARE PART A AND B / Product Type: *No Product type* /     Does insurance require precert for SNF: No    Potential assistance Purchasing Medications: No  Meds-to-Beds request:      No Pharmacies Listed    Notes:    Factors facilitating achievement of predicted outcomes: Cooperative and Pleasant    Barriers to discharge: Medical complications and Medication managment    Additional Case Management Notes: Presented to ED with c/o SOB and productive cough that worsened over past few days. Also reports fever/chills, SOB worse with exertion. Found to be +Flu A. Sats 89% on room air, placed on 2L O2. Admitted to [address] [place] [address]. Code stroke on 7K this morning d/t new left facial droop. BP 92/62. NIH 2 with facial droop and mild dysarthria. CT completed. Neurology consulted. TNK given. Transferred to ICU. NIH 2: best language and dysarthria. Ox4. Slurred speech, delayed responses. Tmax 100.2. NSR. On HFNC, 60L, 45% FIO2, sats 99%. SLP/PT/OT. Intensivist and Neurology following. +Flu A. Telemetry, NIHSS/neuro checks, n/v checks, SCDs. IVF, lipitor, clozapine, pristiq, depakote er, SSI, nebs, IV solumedrol 40 mg Q12H, lopressor, tamiflu, Electrolyte replacement protocols. Procal 0.55, crp 15.05, hgbA1C 6.8, wbc 11.9 - now 9.1, hgb 13.2. Blood cultures sent. Procedure:   1/2 CXR: Coarsened appearance to the pulmonary parenchyma which is likely chronic.  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 No vessel occlusion intracranially  1/3 CXR: Normal      The Plan for Transition of Care is related to the following treatment goals of Influenza with respiratory manifestation other than pneumonia [J11.1]  Acute respiratory failure with hypoxia ([facility] [address] [J96.01]    Patient Goals/Plan/Treatment Preferences: From [address] [place] [address]. Monitor therapy evals for possible needs/recommendations. SW consulted. Transportation/Food Security/Housekeeping Addressed: No issues identified.      Latesha Everett RN  Case Management Department

## 2023-01-03 NOTE — PROGRESS NOTES
Patient:  Janae Cristina    Unit/Bed:4D-10/010-A  MRN: [id number]   PCP: No primary care provider on file. Date of Admission: 1/2/2023    Assessment and Plan(All pulmonary edema, renal failure, PE, and respiratory failure diagnoses are acute in nature unless otherwise specified):        Acute hypoxic respiratory failure 2/2 influenza A: Patient hypoxic to 89% on room air. Placed on 2 L nasal cannula O2. Chest x-ray reveals no acute findings. Tamiflu initiated 01/02. Patient's hypoxia worsened transferred to high flow nasal cannula 60 L, 50% with SPO2 100%. Patient tachypneic respiratory rate 28 may require BiPAP later in the day. Smoking history 1 pack/day for years may have COPD contributing. Decision to start Solu-Medrol and DuoNeb. Dysarthria: Patient was activated as code stroke at 4:31 AM 1/3/2023. Patient was noted to have new left-sided facial droop and mild dysarthria with NIH of 2. Dr. Lisa Garzon recommended CTA head and neck with and without contrast, administration of  TNKase and transferred to ICU. Patient received TNKase at 5 AM.  Neurology following. Plan for MRI brain tomorrow once patient is stable. Continue neurological checks and monitoring. Patient will receive aspirin 24 hours status post TNKase  NIDDM 2: Patient on several diabetic medications outpatient. On low-dose sliding scale insulin. Hypoglycemia protocol. Accu-Cheks. Continue monitor. Bipolar affective disorder: Clozapine, Pristiq, Depakote  Essential hypertension: Continue home medications and continue to monitor. Nicotine dependence: Patient reports smokes 1 pack/day for years. Nicotine patch. CC: Acute respiratory failure with hypoxia, status post TNKase  HPI: Patient is a 69-year-old male past medical history of diabetes mellitus, hypertension and bipolar disorder presented to [facility] with chief complaint of shortness of breath.   Patient reports he started having a productive cough and became short of breath a few days ago. The cough is constant has not had any relief. He states it has become progressively worse over the last few days. He also states he has had fevers chills. Shortness of breath is worse with exertion. Patient admits to smoking 1 pack/day for many years and has never been to a pulmonologist for evaluation. He also reports living in an assisted living facility. He denies chest pain, nausea vomiting, abdominal pain, urinary symptoms and leg swelling. ROS: Pertinent positives as noted in the HPI. All other systems reviewed and negative. PMH:  Per HPI  SHX: Patient reports has been smoking cigarettes 1 pack/day for years. He has never used smokeless tobacco.  He reports that he does not drink alcohol and does not use drugs. FHX: History reviewed. No pertinent family history. Allergies: NKDA  Medications:     sodium chloride      lactated ringers 100 mL/hr at 01/03/23 0617    dextrose        albuterol  2.5 mg Nebulization Once    sodium chloride flush  5-40 mL IntraVENous 2 times per day    insulin lispro  0-4 Units SubCUTAneous Q4H    ipratropium-albuterol  1 ampule Inhalation Q6H    methylPREDNISolone  40 mg IntraVENous Q12H    [Held by provider] aspirin  81 mg Oral Daily    atorvastatin  20 mg Oral Daily    cloZAPine  200 mg Oral Nightly    cloZAPine  50 mg Oral BID    desvenlafaxine succinate  50 mg Oral Daily    divalproex  500 mg Oral QAM    divalproex  1,000 mg Oral Nightly    docusate sodium  100 mg Oral Daily    metoprolol tartrate  50 mg Oral BID    [Held by provider] enoxaparin  40 mg SubCUTAneous Daily    oseltamivir  75 mg Oral BID       Vital Signs:   T: 98.6 Axilla : P: 107 RR: 28 B/P: 114/62: FiO2: 50: O2 Sat: 99: I/O: 100 P. O. GCS: 14  Body mass index is 27.25 kg/m². .  High flow nasal cannula: 60 L, 50%  General:   Lethargic, arousable, able to follow commands, alert and oriented x3, dysarthric  HEENT:  normocephalic and atraumatic. No scleral icterus. PERR  Neck: supple. No Thyromegaly. Lungs: Coarse breath sounds bilaterally. Tachypneic, increased work of breathing while on high flow nasal cannula. No retractions  Cardiac: RRR. No JVD. Abdomen: soft. Nontender. Extremities:  No clubbing, cyanosis, or edema x 4. Vasculature: capillary refill < 3 seconds. Palpable dorsalis pedis pulses. Skin:  warm and dry. Psych:  Alert and oriented x3. Affect appropriate  Lymph:  No supraclavicular adenopathy. Neurologic: Dysarthria, unchanged. No seizures. Data: (All radiographs, tracings, PFTs, and imaging are personally viewed and interpreted unless otherwise noted).        Electronically signed by Padmini Camp MD PGY1 EM resident

## 2023-01-03 NOTE — FLOWSHEET NOTE
[address] [place] [address] DNP of 9 beat run of Vtach on monitor. Orders for stat magnesium to be added on to labs and EKG. Monitoring. Patient asymptomatic with episode. BP's stable. 0600- Stroke folder at bedside for education. 0730- Report to [facility] JACKIE MONSON, bedside NIHSS performed.

## 2023-01-03 NOTE — PROGRESS NOTES
[address] [facility] THERAPY MISSED TREATMENT NOTE  [facility] ICU 4D  [id number]      Date: 1/3/2023  Patient Name: Citlalli Nagel        CSN: [id number]   : 1965  (65 y.o.)  Gender: male                REASON FOR MISSED TREATMENT: Hold Treatment per Nursing. Pt received TNK at 0515 this AM, to remain on bedrest X24 hours per protocol. Will check back .

## 2023-01-03 NOTE — PROGRESS NOTES
[facility] THERAPY  [facility] ICU 4D  Speech - Language - Cognitive Evaluation + Clinical Swallow Evaluation    SLP Individual Minutes  Time In: 0930  Time Out: 2885  Minutes: 22  Timed Code Treatment Minutes: 0 Minutes     Speech, Language, Cognitive Evaluation: 13 minutes  Clinical Swallow Evaluation: 9 minutes     Date: 1/3/2023  Patient Name: Honora Schwab      CSN: [id number]   : 1965  (65 y.o.)  Gender: male   Referring Physician:  Sharmaine Sparks MD  Diagnosis: Influenza with respiratory manifestation other than pneumonia   Precautions: Fall risk, aspiration precautions, droplet isolation   History of Present Illness/Injury: Patient admitted to [facility] with above med dx; please refer to physician H&P for full details. Per chart review, admission for acute hypoxic respiratory failure s/t influenza A with pertinent hx for Bipolar affective disorder. Significant event on 2023 for code stroke, s/p TNK administration at 0500. 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 No vessel occlusion intracranially. CTA neck 2023  Impression   Left internal carotid artery is occluded. Right carotid and bilateral    vertebral arteries are patent. ST consulted to complete clinical swallow evaluation and assessment of cognitive linguistic domains to develop goals per POC as indicated. History reviewed. No pertinent past medical history. Pain: No pain reported. Subjective:  ERMIAS [place] with approval to proceed with evaluations; nursing with indications for varying degrees of tachypnea. Upon arrival, patient resting in bed, awake and alert; dysarthria profound. Endorsed baseline diet of regular solids, thin liquids. SOCIAL HISTORY:   Living Arrangements: ? [facility] vs homeless per chart review; patient reporting residency in/at [facility] with lack of family support   Work History: Retired  Education Level: GED   Driving Status: Does not drive  Finance Management: Independent  Medication Management: Independent  ADL's: Independent. Hobbies: n/a  Vision Status: WFL  Hearing: WFL     SPEECH / VOICE:  Respiration:   Breathing Pattern: Thoracic  Respiration/Phonation Coordination: Impaired  Breath Support: Impaired: Inadequate Volume    Articulation: Impaired: Blended Word Boundaries and Decreased Articulatory Coordination  DDK Rates (Norm Rates 4.5-7.5)  Rate: . Too Fast  Rhythm: . Incoordinated  Precision:. Incoordinated  Word Level: Incoordinated  Sentence Level: Incoordinated    Conversation: .   25%-50%    LANGUAGE:  Receptive:  1 Step Commands: 3/3  2 Step Commands: 2/2  Simple Yes/No Questions: 3/3  Complex Yes/No Questions: 3/3    Expressive:  Automatic Speech: WFL numerical counting   Sentence Completion (open-ended): 3/3  Confrontational Naming: 3/3  Responsive Namin/2  Sentence Formulation: Intact for basic wants/eeds  Conversational Speech: Impaired thought organization   Paraphasias: None evidenced   Repetition: 1/2 sentence level    COGNITION:  [place] Cognitive Assessment (MOCA) version 7.1 completed. Patient scored 13/25. Normal is greater than or equal to 21/25. *Inclusion of +1 point given highest level of education achieved less than/equal to 12th grade or GED with limited-0 post-secondary schooling   **Adjusted score given patient not able to complete written subtests at date  Orientation: 4/6  Immediate Recall: 3/5  Short-Term Recall: 0/5  Divergent Naming: 3/11 members   Problem Solvin/3  Reasonin/2 verbal  Sequencin/1  Thought Organization: Impaired  Insight: Concerns for functional implications  Attention: Adequate sustained attention, basic level   Math Computation: 1/3 serial subtraction   Executive Functioning: Impaired    SWALLOWING:    Respiratory Status:  HHFNC 60 LPM, 53% FiO2       Behavioral Observation: Alert and Dysarthric    CRANIAL NERVE ASSESSMENT   CN V (Trigeminal) Closes and Opens Mandible WFL    Rotary Jaw Movement Impaired      CN VII (Facial) Cheeks Hold Food out of Sulci Impaired: Unilateral - Left    Opens, Closes/Seals, Protrudes, Retracts Lips Impaired: Unilateral - Left    General Appearance Impaired    Sensation WFL      CN X (Vagus - Pharyngeal) Raises Back of Tongue WFL      CN XI (Accessory) Lifts Soft Palate WFL      CN XII (Hypoglossal) Elevates Tongue Up and Back Impaired    Protrusion   WFL    Lateralizes Tongue Impaired    Sensation WFL      Other Observations Dentition Edentulous (baseline)    Vocal Quality WFL    Cough WFL     PATIENT WAS EVALUATED USING:  Ice Chips, Thin Liquids, and Puree    ORAL PHASE:  Impaired:  Impaired Mastication    PHARYNGEAL PHASE:  Impaired:  Suspected Decreased Airway Protection  *not able to fully validate presence of pharyngeal phase deficits without formal instrumentation/supportive imaging       SIGNS AND SYMPTOMS OF LARYNGEAL PENETRATION / ASPIRATION:  Delayed Cough    INSTRUMENTAL EVALUATION: Instrumental evaluation not indicated at this time. DIET RECOMMENDATIONS:  NPO; exceptions for ice chips post oral care (conservatively) and critical/crucial  medications crushed in puree (as pharmacy permits)    STRATEGIES: Recommend NPO and Oral Care q2h       RECOMMENDATIONS/ASSESSMENT:  DIAGNOSTIC IMPRESSIONS:  Clinical Swallow Evaluation: Patient presents with moderate oral dysphagia with inability to fully discern potential presence of pharyngeal phase deficits without formal instrumentation. Concerns for degree of oral weakness to potentially negatively influence pharyngeal integrity as well as heightened and variable respiratory status (tachypnea). Oral phase prolonged and uncoordinated within conservative offerings, AP transit and pharyngeal trigger consistently achieved.  Delayed cough reflex produced post intake of thin H20 via spoon, concerning for an airway invasion occurrence. Certainly not able to r/o totality of laryngeal penetration/tracheal aspiration events as well as potential presence of pharyngeal dysfunction without formal imaging. Recommendations to maintain NPO diet level, exceptions for conservative ice chips post oral care and critical/crucial medications crushed in puree (as pharmacy permits). Will prioritize service provision on subsequent date, considerations for FEES should medical stability maintain. *post evaluation, patient without respiratory distress upon leaving room; RN Ena Partida notified re: clinical findings and recommendations from the assessment; verbal receptiveness noted     Speech, Language, Cognitive Evaluation: Patient presents with a moderate cognitive impairment as derived by clinical findings outlined above to negatively influence abilities to return to ADL/IADL completion per PLOF. Expressive and receptive language domains grossly intact at basic level, no apparent communicative  breakdowns. Speech intelligibility best approximates 30% at multi-syllabic and simple phrase level given significance of dysarthria; positive presence of blended word  boundaries and imprecise articulatory placement. Skilled ST services are recommended to address the above aforementioned deficits to improve cognitive domains and speech production measures for re-integration into future settings.      Rehabilitation Potential: fair  Discharge Recommendations: Continue to Assess Pending Progress    EDUCATION:  Learner: Patient  Education:  Reviewed results and recommendations of this evaluation, Reviewed diet and strategies, Reviewed signs, symptoms and risks of aspiration, Reviewed ST goals and Plan of Care, and Reviewed recommendations for follow-up  Evaluation of Education: Demonstrates with assistance, Needs further instruction, and Family not present    PLAN:  Skilled SLP intervention on acute care 3-5 x per week or until goals met and/or pt plateaus in function. Specific interventions for next session may include: dysphagia management, speech production, cognitive rehabilitation . PATIENT GOAL:    Did not state. Will further assess during treatment. SHORT TERM GOALS:  Short Term Goals  Time Frame for Short Term Goals: 2 weeks  Goal 1: Patient will consume conservative PO offerings demonstrating consistency of pharyngeal trigger, adequate endurance measures, and stable respiratory status to determine readiness for formal instrumentation. Goal 2: Patient will complete structured speech drills/tasks (DDK rates, AMRs, multi-syllabic word repetition) with implementation of SOS speech strategies to improve intelligibility to 50% in all contexts to optimize communicative efficacy. Goal 3: Patient will complete functional immediate/delayed recall tasks with 60% accuracy, mod cues to improve retention of pertinent information. Goal 4: Patient will complete problem solving, verbal/visual reasoning, and executive functioning tasks (time, money/math/finances, medications) with 60% accuracy, mod cues to improve contributions within ADL/IADL completion. Goal 5: Patient will complete sequencing and thought organization tasks with 70% accuracy, mod cues to improve mental flexibility for daily living scenarios.     LONG TERM GOALS:  No LTG established given short DAVID Knox M.A., [facility]

## 2023-01-03 NOTE — PROGRESS NOTES
0- Was called by the nursing assistant who then stated pts O2 was 88% and pt was waking up for her. Immediately came to access pt, pts L sided facial droop was concerning, as well as him drooling, O2 was 87% increased to 4L pt 92%. Called chg d/t concerns of possible stroke, chg came & also assessed pt, called resouirce RN for further instruction & Rapid/Stroke alert was called. Pt was able to answer questions appropriately, however pt has been slurred since arriving on shift. Pt stated he has always talked this way. Pt also denied ever having a stroke. I walked pt to the BR around 0200, pt was unsteady. Pt has low grade temp all of shift as well.

## 2023-01-03 NOTE — PLAN OF CARE
Problem: Discharge Planning  Goal: Discharge to home or other facility with appropriate resources  Outcome: Progressing  Flowsheets (Taken 1/3/2023 0734)  Discharge to home or other facility with appropriate resources:   Identify barriers to discharge with patient and caregiver   Identify discharge learning needs (meds, wound care, etc)     Problem: Safety - Adult  Goal: Free from fall injury  Outcome: Progressing  Flowsheets (Taken 1/3/2023 0734)  Free From Fall Injury: Instruct family/caregiver on patient safety     Problem: Neurosensory - Adult  Goal: Neuropyschological functioning will improve  Description: Neuropyschological functioning will improve  Outcome: Progressing  Note: Monitoring neuro exam per protocol post-TNK administration.

## 2023-01-04 ENCOUNTER — APPOINTMENT (OUTPATIENT)
Dept: CT IMAGING | Age: 58
DRG: 871 | End: 2023-01-04
Payer: MEDICARE

## 2023-01-04 ENCOUNTER — APPOINTMENT (OUTPATIENT)
Dept: MRI IMAGING | Age: 58
DRG: 871 | End: 2023-01-04
Payer: MEDICARE

## 2023-01-04 LAB
ALBUMIN SERPL-MCNC: 3.7 G/DL (ref 3.5–5.1)
ALP BLD-CCNC: 62 U/L (ref 38–126)
ALT SERPL-CCNC: 13 U/L (ref 11–66)
ANION GAP SERPL CALCULATED.3IONS-SCNC: 19 MEQ/L (ref 8–16)
AST SERPL-CCNC: 23 U/L (ref 5–40)
BASOPHILS # BLD: 0.3 %
BASOPHILS ABSOLUTE: 0 THOU/MM3 (ref 0–0.1)
BILIRUB SERPL-MCNC: 0.4 MG/DL (ref 0.3–1.2)
BUN BLDV-MCNC: 27 MG/DL (ref 7–22)
CALCIUM SERPL-MCNC: 8.6 MG/DL (ref 8.5–10.5)
CHLORIDE BLD-SCNC: 101 MEQ/L (ref 98–111)
CO2: 19 MEQ/L (ref 23–33)
CREAT SERPL-MCNC: 0.7 MG/DL (ref 0.4–1.2)
EOSINOPHIL # BLD: 0 %
EOSINOPHILS ABSOLUTE: 0 THOU/MM3 (ref 0–0.4)
ERYTHROCYTE [DISTWIDTH] IN BLOOD BY AUTOMATED COUNT: 13 % (ref 11.5–14.5)
ERYTHROCYTE [DISTWIDTH] IN BLOOD BY AUTOMATED COUNT: 47.4 FL (ref 35–45)
GFR SERPL CREATININE-BSD FRML MDRD: > 60 ML/MIN/1.73M2
GLUCOSE BLD-MCNC: 127 MG/DL (ref 70–108)
GLUCOSE BLD-MCNC: 134 MG/DL (ref 70–108)
GLUCOSE BLD-MCNC: 145 MG/DL (ref 70–108)
GLUCOSE BLD-MCNC: 145 MG/DL (ref 70–108)
GLUCOSE BLD-MCNC: 167 MG/DL (ref 70–108)
GLUCOSE BLD-MCNC: 222 MG/DL (ref 70–108)
GLUCOSE BLD-MCNC: 226 MG/DL (ref 70–108)
GLUCOSE BLD-MCNC: 252 MG/DL (ref 70–108)
HCT VFR BLD CALC: 44.6 % (ref 42–52)
HEMOGLOBIN: 14.3 GM/DL (ref 14–18)
IMMATURE GRANS (ABS): 0.02 THOU/MM3 (ref 0–0.07)
IMMATURE GRANULOCYTES: 0.2 %
LYMPHOCYTES # BLD: 7.3 %
LYMPHOCYTES ABSOLUTE: 0.7 THOU/MM3 (ref 1–4.8)
MCH RBC QN AUTO: 32 PG (ref 26–33)
MCHC RBC AUTO-ENTMCNC: 32.1 GM/DL (ref 32.2–35.5)
MCV RBC AUTO: 99.8 FL (ref 80–94)
MONOCYTES # BLD: 5.7 %
MONOCYTES ABSOLUTE: 0.6 THOU/MM3 (ref 0.4–1.3)
MRSA SCREEN RT-PCR: NEGATIVE
NUCLEATED RED BLOOD CELLS: 0 /100 WBC
PLATELET # BLD: 167 THOU/MM3 (ref 130–400)
PMV BLD AUTO: 9.3 FL (ref 9.4–12.4)
POTASSIUM SERPL-SCNC: 4.6 MEQ/L (ref 3.5–5.2)
RBC # BLD: 4.47 MILL/MM3 (ref 4.7–6.1)
SEG NEUTROPHILS: 86.5 %
SEGMENTED NEUTROPHILS ABSOLUTE COUNT: 8.5 THOU/MM3 (ref 1.8–7.7)
SODIUM BLD-SCNC: 139 MEQ/L (ref 135–145)
TOTAL PROTEIN: 6.9 G/DL (ref 6.1–8)
VANCOMYCIN RESISTANT ENTEROCOCCUS: NEGATIVE
WBC # BLD: 9.8 THOU/MM3 (ref 4.8–10.8)

## 2023-01-04 PROCEDURE — 97535 SELF CARE MNGMENT TRAINING: CPT

## 2023-01-04 PROCEDURE — 2580000003 HC RX 258: Performed by: NURSE PRACTITIONER

## 2023-01-04 PROCEDURE — 2700000000 HC OXYGEN THERAPY PER DAY

## 2023-01-04 PROCEDURE — 94640 AIRWAY INHALATION TREATMENT: CPT

## 2023-01-04 PROCEDURE — 6370000000 HC RX 637 (ALT 250 FOR IP): Performed by: NURSE PRACTITIONER

## 2023-01-04 PROCEDURE — 85025 COMPLETE CBC W/AUTO DIFF WBC: CPT

## 2023-01-04 PROCEDURE — 94761 N-INVAS EAR/PLS OXIMETRY MLT: CPT

## 2023-01-04 PROCEDURE — 6370000000 HC RX 637 (ALT 250 FOR IP)

## 2023-01-04 PROCEDURE — 36415 COLL VENOUS BLD VENIPUNCTURE: CPT

## 2023-01-04 PROCEDURE — 6360000002 HC RX W HCPCS: Performed by: INTERNAL MEDICINE

## 2023-01-04 PROCEDURE — 94760 N-INVAS EAR/PLS OXIMETRY 1: CPT

## 2023-01-04 PROCEDURE — 70450 CT HEAD/BRAIN W/O DYE: CPT

## 2023-01-04 PROCEDURE — 82948 REAGENT STRIP/BLOOD GLUCOSE: CPT

## 2023-01-04 PROCEDURE — 6370000000 HC RX 637 (ALT 250 FOR IP): Performed by: INTERNAL MEDICINE

## 2023-01-04 PROCEDURE — 97162 PT EVAL MOD COMPLEX 30 MIN: CPT

## 2023-01-04 PROCEDURE — 97129 THER IVNTJ 1ST 15 MIN: CPT

## 2023-01-04 PROCEDURE — 92526 ORAL FUNCTION THERAPY: CPT

## 2023-01-04 PROCEDURE — 2060000000 HC ICU INTERMEDIATE R&B

## 2023-01-04 PROCEDURE — 6370000000 HC RX 637 (ALT 250 FOR IP): Performed by: STUDENT IN AN ORGANIZED HEALTH CARE EDUCATION/TRAINING PROGRAM

## 2023-01-04 PROCEDURE — 70551 MRI BRAIN STEM W/O DYE: CPT

## 2023-01-04 PROCEDURE — 97530 THERAPEUTIC ACTIVITIES: CPT

## 2023-01-04 PROCEDURE — 97110 THERAPEUTIC EXERCISES: CPT

## 2023-01-04 PROCEDURE — 97166 OT EVAL MOD COMPLEX 45 MIN: CPT

## 2023-01-04 PROCEDURE — 80053 COMPREHEN METABOLIC PANEL: CPT

## 2023-01-04 RX ORDER — INSULIN GLARGINE 100 [IU]/ML
7 INJECTION, SOLUTION SUBCUTANEOUS NIGHTLY
Status: DISCONTINUED | OUTPATIENT
Start: 2023-01-04 | End: 2023-01-10 | Stop reason: HOSPADM

## 2023-01-04 RX ORDER — IPRATROPIUM BROMIDE AND ALBUTEROL SULFATE 2.5; .5 MG/3ML; MG/3ML
1 SOLUTION RESPIRATORY (INHALATION) EVERY 4 HOURS PRN
Status: DISCONTINUED | OUTPATIENT
Start: 2023-01-04 | End: 2023-01-10 | Stop reason: HOSPADM

## 2023-01-04 RX ADMIN — IPRATROPIUM BROMIDE AND ALBUTEROL SULFATE 1 AMPULE: .5; 3 SOLUTION RESPIRATORY (INHALATION) at 00:57

## 2023-01-04 RX ADMIN — DIVALPROEX SODIUM 500 MG: 500 TABLET, EXTENDED RELEASE ORAL at 08:34

## 2023-01-04 RX ADMIN — SODIUM CHLORIDE, PRESERVATIVE FREE 10 ML: 5 INJECTION INTRAVENOUS at 08:33

## 2023-01-04 RX ADMIN — DESVENLAFAXINE 50 MG: 50 TABLET, EXTENDED RELEASE ORAL at 08:34

## 2023-01-04 RX ADMIN — METHYLPREDNISOLONE SODIUM SUCCINATE 40 MG: 40 INJECTION, POWDER, FOR SOLUTION INTRAMUSCULAR; INTRAVENOUS at 20:29

## 2023-01-04 RX ADMIN — SODIUM CHLORIDE, PRESERVATIVE FREE 10 ML: 5 INJECTION INTRAVENOUS at 20:28

## 2023-01-04 RX ADMIN — METOPROLOL TARTRATE 50 MG: 50 TABLET, FILM COATED ORAL at 08:34

## 2023-01-04 RX ADMIN — METOPROLOL TARTRATE 50 MG: 50 TABLET, FILM COATED ORAL at 20:27

## 2023-01-04 RX ADMIN — DIVALPROEX SODIUM 1000 MG: 500 TABLET, EXTENDED RELEASE ORAL at 20:27

## 2023-01-04 RX ADMIN — INSULIN LISPRO 1 UNITS: 100 INJECTION, SOLUTION INTRAVENOUS; SUBCUTANEOUS at 18:37

## 2023-01-04 RX ADMIN — INSULIN GLARGINE 7 UNITS: 100 INJECTION, SOLUTION SUBCUTANEOUS at 22:15

## 2023-01-04 RX ADMIN — IPRATROPIUM BROMIDE AND ALBUTEROL SULFATE 1 AMPULE: .5; 3 SOLUTION RESPIRATORY (INHALATION) at 21:09

## 2023-01-04 RX ADMIN — CLOZAPINE 50 MG: 25 TABLET ORAL at 15:37

## 2023-01-04 RX ADMIN — CLOZAPINE 200 MG: 100 TABLET ORAL at 20:28

## 2023-01-04 RX ADMIN — OSELTAMIVIR PHOSPHATE 75 MG: 75 CAPSULE ORAL at 20:28

## 2023-01-04 RX ADMIN — OSELTAMIVIR PHOSPHATE 75 MG: 75 CAPSULE ORAL at 08:34

## 2023-01-04 RX ADMIN — SODIUM CHLORIDE, POTASSIUM CHLORIDE, SODIUM LACTATE AND CALCIUM CHLORIDE: 600; 310; 30; 20 INJECTION, SOLUTION INTRAVENOUS at 10:32

## 2023-01-04 RX ADMIN — METHYLPREDNISOLONE SODIUM SUCCINATE 40 MG: 40 INJECTION, POWDER, FOR SOLUTION INTRAMUSCULAR; INTRAVENOUS at 08:34

## 2023-01-04 RX ADMIN — ATORVASTATIN CALCIUM 20 MG: 20 TABLET, FILM COATED ORAL at 08:34

## 2023-01-04 RX ADMIN — CLOZAPINE 200 MG: 100 TABLET ORAL at 00:10

## 2023-01-04 RX ADMIN — CLOZAPINE 50 MG: 25 TABLET ORAL at 08:34

## 2023-01-04 RX ADMIN — IPRATROPIUM BROMIDE AND ALBUTEROL SULFATE 1 AMPULE: .5; 3 SOLUTION RESPIRATORY (INHALATION) at 14:15

## 2023-01-04 RX ADMIN — DOCUSATE SODIUM 100 MG: 100 CAPSULE, LIQUID FILLED ORAL at 08:34

## 2023-01-04 RX ADMIN — ASPIRIN 81 MG: 81 TABLET, CHEWABLE ORAL at 05:38

## 2023-01-04 ASSESSMENT — ENCOUNTER SYMPTOMS
COUGH: 1
NAUSEA: 0
VOMITING: 0
SHORTNESS OF BREATH: 1
PHOTOPHOBIA: 0
COLOR CHANGE: 0
TROUBLE SWALLOWING: 0
DIARRHEA: 0

## 2023-01-04 NOTE — PROGRESS NOTES
Patient:  Inocencio Dixon  MRN: [id number]   PCP: Arnold Ortiz MD  Date of Admission: 1/2/2023       Assessment and Plan (All pulmonary edema, renal failure, PE, and respiratory failure diagnoses are acute in nature unless otherwise specified):        Acute hypoxic respiratory failure 2/2 influenza A: Patient hypoxic to 89% on room air. Placed on 2 L nasal cannula O2. Chest x-ray reveals no acute findings. Tamiflu initiated 01/02. Patient's hypoxia worsened transferred to high flow nasal cannula 60 L, 50% with SPO2 100%. Patient tachypneic respiratory rate 28 may require BiPAP  Smoking history 1 pack/day for years may have COPD contributing. Decision to start Solu-Medrol and DuoNeb. Dysarthria: Patient was activated as code stroke at 4:31 AM 1/3/2023. Patient was noted to have new left-sided facial droop and mild dysarthria with NIH of 2. Dr. Baldev Butler recommended CTA head and neck with and without contrast, administration of  TNKase and transferred to ICU. Patient received TNKase at 5 AM.  Neurology following. Plan for MRI brain once patient is stable. Continue neurological checks and monitoring. Repeat Head CT 1/4/23 showed stable head CT, no hemorrhage. Patient will received aspirin 24 hours status post TNKase  NIDDM 2: Patient on several diabetic medications outpatient. On low-dose sliding scale insulin. Hypoglycemia protocol. Accu-Cheks. Continue monitor. Bipolar affective disorder: Clozapine, Pristiq, Depakote  Essential hypertension: Continue home medications and continue to monitor. Nicotine dependence: Patient reports smokes 1 pack/day for years. Nicotine patch. CC: Acute respiratory failure with hypoxia, status post TNKase  HPI: Patient is a 61-year-old male past medical history of diabetes mellitus, hypertension and bipolar disorder presented to [facility] with chief complaint of shortness of breath.   Patient reports he started having a productive cough and became short of breath a few days ago. The cough is constant has not had any relief. He states it has become progressively worse over the last few days. He also states he has had fevers chills. Shortness of breath is worse with exertion. Patient admits to smoking 1 pack/day for many years and has never been to a pulmonologist for evaluation. He also reports living in an assisted living facility. He denies chest pain, nausea vomiting, abdominal pain, urinary symptoms and leg swelling. Patient was activated as code stroke on 1/3/2023 due to new onset left-sided facial droop and dysarthria. Patient was seen by neurology team and given TNKase. Within 12 hours patient developed small area of ecchymosis on his back with no known injury. Patient underwent bedside ultrasound FAST exam negative for free fluid. Ecchymosis was monitored with no further enlargement throughout the shift. Patient was given aspirin 81 mg at 24 hours status post TNK.    ROS: Pertinent positives as noted in the HPI. All other systems reviewed and negative. PMH:  Per HPI  SHX: Patient reports has been smoking cigarettes 1 pack/day for years. He has never used smokeless tobacco.  He reports that he does not drink alcohol and does not use drugs. FHX: History reviewed. No pertinent family history.   Allergies: NKDA  Medications:     sodium chloride      lactated ringers 100 mL/hr at 01/04/23 1032    dextrose        albuterol  2.5 mg Nebulization Once    sodium chloride flush  5-40 mL IntraVENous 2 times per day    insulin lispro  0-4 Units SubCUTAneous Q4H    ipratropium-albuterol  1 ampule Inhalation Q6H    methylPREDNISolone  40 mg IntraVENous Q12H    [Held by provider] aspirin  81 mg Oral Daily    atorvastatin  20 mg Oral Daily    cloZAPine  200 mg Oral Nightly    cloZAPine  50 mg Oral BID    desvenlafaxine succinate  50 mg Oral Daily    divalproex  500 mg Oral QAM    divalproex  1,000 mg Oral Nightly docusate sodium  100 mg Oral Daily    metoprolol tartrate  50 mg Oral BID    [Held by provider] enoxaparin  40 mg SubCUTAneous Daily    oseltamivir  75 mg Oral BID       Vital Signs:   T: 98.4 oral: P: 97 RR: 27 B/P: 115/90: FiO2: 50: O2 Sat:93: I/O 24 hrs: In 2.2L, Out 0.9L GCS: 15  Body mass index is 27.38 kg/m². .  High flow nasal cannula: 60 L 50%  General:   Lethargic, arousable, able to follow commands, alert and oriented x3, dysarthric  HEENT:  normocephalic and atraumatic. No scleral icterus. PERR  Neck: supple. No Thyromegaly. Lungs: Coarse breath sounds bilaterally. Tachypneic, increased work of breathing while on high flow nasal cannula. No retractions  Cardiac: RRR. No JVD. Abdomen: soft. Nontender. Extremities:  No clubbing, cyanosis, or edema x 4. Vasculature: capillary refill < 3 seconds. Palpable dorsalis pedis pulses. Skin:  warm and dry. Ecchymosis right back unchanged since noticed midday yesterday. Psych:  Alert and oriented x3. Affect appropriate  Lymph:  No supraclavicular adenopathy. Neurologic: Dysarthria, unchanged. No seizures. Data: (All radiographs, tracings, PFTs, and imaging are personally viewed and interpreted unless otherwise noted). Patient no longer appropriate for ICU care due to repeat CT head scan showing no further complications s/p TNK at 24 hours. Patient to be transferred to neuro stepdown 4A today, handoff report provided to hospitalist team Dr. Mk Torrez by ICU NP Precious Sparks.       Electronically signed by Kim Bedoya MD PGY1 emergency medicine

## 2023-01-04 NOTE — PROGRESS NOTES
Neurology Consult Note    Date:1/4/2023       [id number]  Patient Yue Hopkins     YOB: 1965     Age:57 y.o. Requesting Physician: Shira Grant MD     Reason for Consult:  Evaluate for Stroke s/p TNK      Chief Complaint: Dysarthria, Left Facial Droop    Subjective     Wilmer Faith is a 72-year-old  male with past medical history significant for T2DM, essential hypertension, bipolar disorder who presented to [facility] and is admitted for influenza A. At [phone] on 1/3/2023 stroke alert was called for this patient for left-sided facial droop and dysarthria which were noted by his nurse. Nurse states that at 2 AM the facial droop and slurred speech were not present. Patient taken to imaging for stat CT head which revealed no ICH, midline shift, mass-effect. 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 No LVO. Based on patient's symptoms and time since last known well decision was made for TNK to be given. 22 mg of TN K given at 5:05 AM.  Patient transferred to the ICU with thrombolytic precautions. Neurology is consulted for stroke work-up. On visit today patient is resting in bed with high flow nasal cannula. His speech is dysarthric though he claims this is baseline for him. Able to lift all 4 extremities antigravity though subtle weakness in the left lower extremity. No facial droop appreciated though this may be obscured by the high flow nasal cannula. Patient has no new concerns or complaints today. Interval history 1/4/2023:  No acute events overnight. Patient transition from ICU to stepdown today. Exam stable. He has no new complaints or concerns. Review of Systems   Review of Systems   Constitutional:  Positive for chills, fatigue and fever. Negative for activity change. HENT:  Negative for tinnitus and trouble swallowing. Eyes:  Negative for photophobia and visual disturbance. Respiratory:  Positive for cough and shortness of breath. Cardiovascular:  Negative for chest pain and palpitations. Gastrointestinal:  Negative for diarrhea, nausea and vomiting. Genitourinary:  Negative for dysuria and flank pain. Musculoskeletal:  Negative for neck pain and neck stiffness. Skin:  Negative for color change and rash. Neurological:  Positive for speech difficulty (? baseline). Negative for dizziness, facial asymmetry, weakness, numbness and headaches. Psychiatric/Behavioral:  Negative for agitation and confusion. Medications   Scheduled Meds:    albuterol  2.5 mg Nebulization Once    sodium chloride flush  5-40 mL IntraVENous 2 times per day    insulin lispro  0-4 Units SubCUTAneous Q4H    ipratropium-albuterol  1 ampule Inhalation Q6H    methylPREDNISolone  40 mg IntraVENous Q12H    [Held by provider] aspirin  81 mg Oral Daily    atorvastatin  20 mg Oral Daily    cloZAPine  200 mg Oral Nightly    cloZAPine  50 mg Oral BID    desvenlafaxine succinate  50 mg Oral Daily    divalproex  500 mg Oral QAM    divalproex  1,000 mg Oral Nightly    docusate sodium  100 mg Oral Daily    metoprolol tartrate  50 mg Oral BID    [Held by provider] enoxaparin  40 mg SubCUTAneous Daily    oseltamivir  75 mg Oral BID     Continuous Infusions:    sodium chloride      lactated ringers 100 mL/hr at 01/04/23 0706    dextrose       PRN Meds: sodium chloride, ondansetron **OR** ondansetron, polyethylene glycol, albuterol, clotrimazole, glucose, dextrose bolus **OR** dextrose bolus, glucagon (rDNA), dextrose, sodium chloride flush, potassium chloride **OR** potassium alternative oral replacement **OR** potassium chloride, acetaminophen **OR** acetaminophen, magnesium sulfate    Past History    Past Medical History:   has no past medical history on file. Social History:   reports that he has been smoking cigarettes. He has been smoking an average of 1 pack per day.  He has never used smokeless tobacco. He reports that he does not drink alcohol and does not use drugs. Family History: History reviewed. No pertinent family history. Physical Examination      Vitals:  BP (!) 115/51   Pulse (!) 103   Temp 98.4 °F (36.9 °C) (Oral)   Resp 26   Ht 5' 9\" (1.753 m)   Wt 185 lb 6.5 oz (84.1 kg)   SpO2 91%   BMI 27.38 kg/m²   Temp (24hrs), Av.5 °F (36.9 °C), Min:98.3 °F (36.8 °C), Max:98.8 °F (37.1 °C)      I/O (24Hr): Intake/Output Summary (Last 24 hours) at 2023 0732  Last data filed at 2023 0706  Gross per 24 hour   Intake 2230.37 ml   Output 1250 ml   Net 980.37 ml       Physical Exam  Vitals reviewed. Constitutional:       Appearance: Normal appearance. HENT:      Head: Normocephalic and atraumatic. Right Ear: External ear normal.      Left Ear: External ear normal.      Nose: Nose normal.      Mouth/Throat:      Mouth: Mucous membranes are moist.      Pharynx: Oropharynx is clear. Eyes:      Extraocular Movements: Extraocular movements intact and EOM normal.      Pupils: Pupils are equal, round, and reactive to light. Pulmonary:      Effort: Pulmonary effort is normal. No respiratory distress. Musculoskeletal:         General: No deformity or signs of injury. Cervical back: No rigidity or tenderness. Skin:     General: Skin is warm and dry. Neurological:      Mental Status: He is alert and oriented to person, place, and time. Coordination: Finger-Nose-Finger Test and Heel to [employer] normal.   Psychiatric:         Mood and Affect: Mood normal.         Speech: Speech is slurred. Behavior: Behavior normal.         Thought Content: Thought content normal.     Neurologic Exam     Mental Status   Oriented to person, place, and time. Follows 2 step commands. Attention: normal.   Speech: slurred   Level of consciousness: alert  Knowledge: good. Able to name object. Able to read. Able to repeat.    Mild -moderate dysarthria, pt states this is baseline but knows no reason for his dysarthria. Cranial Nerves     CN II   Visual fields full to confrontation. CN III, IV, VI   Pupils are equal, round, and reactive to light. Extraocular motions are normal.     CN V   Facial sensation intact. CN VII   Right facial weakness: none  Left facial weakness: none    CN VIII   CN VIII normal.   Hearing: intact    CN IX, X   CN IX normal.   Palate: symmetric    CN XI   CN XI normal.   Right sternocleidomastoid strength: normal  Right trapezius strength: normal    CN XII   CN XII normal.   Tongue: not atrophic    Motor Exam   Muscle bulk: normal  Overall muscle tone: normal  Strength:  BUE 5/5  RLE 5/5  LLE 5/5 at the hip, 4+/5 at the knee       Sensory Exam   Light touch normal.     Gait, Coordination, and Reflexes     Coordination   Finger to nose coordination: normal  Heel to shin coordination: normal     Labs/Imaging/Diagnostics   Labs:  CBC:  Recent Labs     01/02/23  0918 01/03/23  0500   WBC 11.9* 9.1   RBC 4.51* 4.15*   HGB 14.7 13.2*   HCT 44.1 40.7*   MCV 97.8* 98.1*    140       CHEMISTRIES:  Recent Labs     01/02/23  0918 01/03/23  0500    141   K 4.0 4.1    101   CO2 22* 25   BUN 22 25*   CREATININE 0.8 0.7   GLUCOSE 125* 107   MG  --  2.2       PT/INR:No results for input(s): PROTIME, INR in the last 72 hours. APTT:No results for input(s): APTT in the last 72 hours. LIVER PROFILE:  Recent Labs     01/02/23  0918 01/03/23  0500   AST 19 22   ALT 12 11   BILITOT 0.3 0.3   ALKPHOS 60 53         Imaging Last 24 Hours:  CTA HEAD W WO CONTRAST (CODE STROKE)    Result Date: 1/3/2023  CT angiography head with contrast. 3D Postprocessing. Comparison: CT/SR - CT HEAD WO CONTRAST - 01/03/2023 04:18 AM EST Findings: The left internal carotid artery is occluded within the neck. There is diminutive enhancement of the intracranial left internal carotid artery reconstituted by collateral flow. Right internal carotid artery is patent.  Anterior cerebral arteries are patent. The middle cerebral arteries are patent. Vertebrobasilar arteries are patent. Bilateral posterior cerebral arteries are patent. Patent left posterior communicating artery noted. Patent anterior communicating artery. No appreciable mass-effect or abnormal enhancement. Mild multifocal sinus mucosal thickening. Mastoid air cells are clear. Left internal carotid artery is occluded within the neck and reconstituted intracranially by collateral flow. No vessel occlusion intracranially. This document has been electronically signed by: Heidy Zheng MD on 01/03/2023 05:28 AM All CTs at this facility use dose modulation techniques and iterative reconstructions, and/or weight-based dosing when appropriate to reduce radiation to a low as reasonably achievable. 3D Post-processing was performed on this study. CT HEAD WO CONTRAST    Result Date: 1/3/2023   ADDENDUM #1  This report was discussed with Bárbara Garcia RN on Jan 03, 2023 04:51:00 EST. This document has been electronically signed by: Breann Kirkland on 01/03/2023 04:52 AM  ORIGINAL REPORT  CT head without contrast Comparison: 8/21/2013 MRI brain. Findings: No intra-axial mass, midline shift, hydrocephalus, or acute hemorrhage. No significant atrophy-like change or white matter disease. There is no sinus or mastoid fluid. Chronic mild mucosal thickening scattered through multiple sinuses. The orbits are within normal limits. There is no acute fracture. Impression: No acute intracranial pathology. This document has been electronically signed by: Ivonne Mortimer, MD on 01/03/2023 04:42 AM All CTs at this facility use dose modulation techniques and iterative reconstructions, and/or weight-based dosing when appropriate to reduce radiation to a low as reasonably achievable. CTA NECK W WO CONTRAST (CODE STROKE)    Result Date: 1/3/2023  CT angiography neck with contrast. 3D Postprocessing. Comparison: None Findings:  Aortic arch is unremarkable. Subclavian arteries are patent. The common carotid arteries are patent to the carotid bifurcations. On the left the internal carotid arteries occluded shortly beyond its origin and remains occluded to the skull base. The right internal carotid artery is patent. Vertebral arteries are patent. No dissection. Lung apices are clear. No cervical mass or lymphadenopathy. No abnormal fluid collection. No acute osseous finding. Left internal carotid artery is occluded. Right carotid and bilateral vertebral arteries are patent. This document has been electronically signed by: Homer Norwood MD on 01/03/2023 05:33 AM All CTs at this facility use dose modulation techniques and iterative reconstructions, and/or weight-based dosing when appropriate to reduce radiation to a low as reasonably achievable. Carotid stenosis and measurements are in accordance with NASCET criteria. 3D Post-processing was performed on this study. XR CHEST PORTABLE    Result Date: 1/2/2023  PROCEDURE: XR CHEST PORTABLE CLINICAL INFORMATION: SOB. Fever, cough, dyspnea. Smoker. COMPARISON: Chest x-ray 1/7/2020 12/20/2016. TECHNIQUE: AP upright view of the chest. FINDINGS: The heart size is normal.The mediastinum is not widened. There is coarsened appearance to the pulmonary parenchyma. This appears similar to the prior examination. There are no definite infiltrates. There are no pleural effusions. The pulmonary vascularity is normal.No suspicious osseous lesions are present. Coarsened appearance to the pulmonary parenchyma which is likely chronic. No definite acute findings. **This report has been created using voice recognition software. It may contain minor errors which are inherent in voice recognition technology. ** Final report electronically signed by Dr. 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 No LVO. No need for carotid ultrasound. 24-hour follow-up head CT-no ICH, no stroke visible  MRI of brain without contrast no acute infarct  2D echo-EF 55%, LV function normal  Stat CT head is needed if the patient develops new-onset altered mental status, a severe headache, or new-onset neurologic deficit. Thrombolytic recommendations  No hemorrhage on follow-up CT, okay for transition to stepdown  Bedrest completed, okay for initiation of therapies  Risk factors and medications  Goal blood pressure of <130/80   Keep well hydrated. Initiate normal saline at 75 ml/hr as needed. Resume 81 mg ASA daily  Start Plavix 75 mg daily, continue indefinitely  HgbA1C 6.8. LDL 44, goal of 45-70. Continue Lipitor 20  Smoking and alcohol cessation when applicable. Provide stroke eduction for individualized risk factors. DVT prophylaxis with SCDs (hold heparin/lovenox for 24 hours after IV tPA or IA intervention)  Other recommendations  Dysphagia screen prior to oral intake  PT/OT/SLP Consult  EKG/Telemetry to monitor for atrial fibrillation  NIHSS every shift. Neuro checks per unit unless otherwise specified. Head of bed 45 degree. Follow-up with neuro interventional clinic in 6 months for chronic ICA occlusion with CTA head and neck prior to visit. No further work-up or recommendations per neurology, we will sign off at this time. Please call with any questions or if we can be of additional assistance. Thank you for this consult.     This was discussed with Dr. Shania Whitten and he is in agreement with the assessment and plan.     Electronically signed by Delaney Olea PA-C on 1/4/23 at 7:37 PM EST

## 2023-01-04 NOTE — FLOWSHEET NOTE
01/04/23 1331   Safe Environment   Safety Measures Other (comment)  (VN completed remaining documents with pt on pt arrival to this floor 4a 16 , pt stated [facility] pharmacy was ok , he stated he has no preferred pharmacy)   Pt responds to audio , permits video , self and role identified , pt resting in bed in position of comfort , call light visible within reach , no voiced concerns or complaints .

## 2023-01-04 NOTE — PROGRESS NOTES
Patient transferred to [facility] Room 16 from . Complaint upon arrival to the room: None  IV site free of s/s of infection or infiltration. Vital signs obtained. Assessment and data collection initiated. Oriented to room. Policies and procedures for 4A explained. All questions answered with no further questions at this time. Fall prevention and safety brochure discussed with patient. 2 person skin check completed.

## 2023-01-04 NOTE — PROGRESS NOTES
Hospitalist Progress Note    Patient:  Sage Felder      Unit/Bed:4A-16/016-A    YOB: 1965    MRN: [id number]       Acct: [de-identified]     PCP: Brian Díaz MD    Date of Admission: 1/2/2023    Assessment/Plan:    Acute hypoxic respiratory failure: Secondary to influenza A. Patient does not require oxygen at baseline. Patient initially required nasal cannula that progressed to high settings of HFNC. Patient's oxygenation however is starting to improve and patient is currently requiring FiO2 only 21% and flow rate 0. Of note patient was placed on Solu-Medrol 1/3/23 as well as and duonebs to assist with lung function due to possible underlying COPD due to significant smoking history.   -Pulmonary hygiene    -Wean oxygen as tolerated   -Maintain oxygen saturation > 94%    Influenza A: Patient finally positive in the emergency department upon arrival.  Patient is on day 3 of Tamiflu.   -Continue Tamiflu for total 5 days. Left facial droop with dysarthria: CTA had demonstrated left ICA occlusion from origin the base of skull. There is no LVO. Patient did receive TKNase 1/3/23. Repeat head CT 1/4/2028 showed stability. Patient had echocardiogram 1/3/2023 that showed EF of 55% and overall normal left ventricular function.    -Neurology following   -Continue neurovascular checks   -Patient can be started on chemical DVT prophylaxis once it has been  24 hours since TNKase administration.   -Patient will also be started on aspirin today, 1/4/2023 which is also 24 hours status post TNKase administration.   -Patient scheduled for MRI today   -Patient remains n.p.o. until speech/swallow evaluation has been completed   -PT/OT   -Continue telemetry   -Continue statin therapy    Type 2 diabetes mellitus: 1/3/23 hemoglobin A1c 6.8. Goal blood glucose while inpatient 140-180.   While patient started to eat again blood glucose increased therefore placed patient on low-dose sliding scale as well as Lantus. Will likely need to dose adjust throughout patient's stay. And as diet progresses.   -Hold home meds   -Low dose sliding scale    -Lantus 7 units nightly (2/3 home dose of 10 units nightly)  -Hypoglycemic protocol  -Accu-Cheks  -Diabetic diet when able    Essential hypertension: Has been relatively controlled since arrival.  Permissive hypertension (up to 180/105) was allowed for 24 hours following thrombolytic administration.   -At this time patient's goal blood pressure  <130/80  -Home Lopressor resumed with holding parameters    Bipolar affective disorder: Continue home meds Clozapine, Pristiq, and Depakote    Current smoker: 1 pack/day   -Patient currently has nicotine patch   -Smoking cessation advised    Expected discharge date: To be determined    Disposition:    [] Home       [] TCU       [] Rehab       [] Psych       [] SNF       [x] [place]       [] Other-    Chief Complaint: Shortness of breath    Hospital Course:     60-year-old male presented to [facility] emergency department 1/2/2022 with a chief complaint of shortness of breath. Patient was experiencing productive cough and shortness of breath for 2 to 3 days prior to arrival and said that his cough is constant in nature he also said that he has been experiencing fever and chills. Patient has past medical history significant for diabetes mellitus, hypertension, bipolar disorder as well as current smoker of 1 pack/day. Of note patient does reside in assisted living facility. While in the emergency room department patient was found to be hypoxic on room air SPO2 of 89% subsequent placed on 2 L nasal cannula. Is found also to be influenza A positive.   Patient was started on Tamiflu and admitted to hospitalist team.  Unfortunately on 1/3/2022 code stroke was called and patient was found to have left internal carotid artery occlusion which he did receive TNKase and admitted to the ICU.  1/3/2023 patient's oxygenation continued to decline in which he required high flow nasal cannula. This morning, 1/4/2023 patient remains relatively hemodynamically stable and on high flow however flow rate is 0 and FiO2 is only 21. CT head 1/4/2023 also demonstrated Stable CT appearance the brain. No hemorrhage. Patient was actually deemed stable for ICU transfer to ICU stepdown. Hospitalist team will resume care of this patient. Medications:  Reviewed    Infusion Medications    sodium chloride      lactated ringers Stopped (01/04/23 1118)    dextrose       Scheduled Medications    insulin glargine  7 Units SubCUTAneous Nightly    albuterol  2.5 mg Nebulization Once    sodium chloride flush  5-40 mL IntraVENous 2 times per day    insulin lispro  0-4 Units SubCUTAneous Q4H    methylPREDNISolone  40 mg IntraVENous Q12H    aspirin  81 mg Oral Daily    atorvastatin  20 mg Oral Daily    cloZAPine  200 mg Oral Nightly    cloZAPine  50 mg Oral BID    desvenlafaxine succinate  50 mg Oral Daily    divalproex  500 mg Oral QAM    divalproex  1,000 mg Oral Nightly    docusate sodium  100 mg Oral Daily    metoprolol tartrate  50 mg Oral BID    enoxaparin  40 mg SubCUTAneous Daily    oseltamivir  75 mg Oral BID     PRN Meds: ipratropium-albuterol, sodium chloride, ondansetron **OR** ondansetron, polyethylene glycol, albuterol, clotrimazole, glucose, dextrose bolus **OR** dextrose bolus, glucagon (rDNA), dextrose, sodium chloride flush, potassium chloride **OR** potassium alternative oral replacement **OR** potassium chloride, acetaminophen **OR** acetaminophen, magnesium sulfate      Intake/Output Summary (Last 24 hours) at 1/4/2023 2044  Last data filed at 1/4/2023 2027  Gross per 24 hour   Intake 2602.45 ml   Output 1250 ml   Net 1352.45 ml       Diet:  ADULT DIET;  Dysphagia - Minced and Moist    Exam:  /70   Pulse 86   Temp 97.5 °F (36.4 °C) (Oral)   Resp 20   Ht 5' 9\" (1.753 m)   Wt 181 lb 14.4 oz (82.5 kg)   SpO2 94% BMI 26.86 kg/m²     General appearance: No apparent distress, appears stated age and cooperative. HEENT: Normal cephalic, atraumatic without obvious deformity. Conjunctivae/corneas clear. Neck: Supple, with full range of motion. No jugular venous distention. Trachea midline. Respiratory: Diminished breath sounds throughout lung fields, notable expiratory wheezing throughout all lung fields. Patient does have tachypnea however is on room air and maintaining adequate oxygenation. Cardiovascular: Regular rate and rhythm with normal S1/S2  Abdomen: Soft, rounded, bowel sounds present. Musculoskeletal:  No clubbing, cyanosis or edema bilaterally. Skin: Warm and dry  Neurologic: Follows commands, does have slurring of speech/delayed response time  Peripheral Pulses: +1 palpable, equal bilaterally       Labs:   Recent Labs     01/02/23 0918 01/03/23  0500 01/04/23  1328   WBC 11.9* 9.1 9.8   HGB 14.7 13.2* 14.3   HCT 44.1 40.7* 44.6    140 167     Recent Labs     01/02/23  0918 01/03/23  0500 01/04/23  1328    141 139   K 4.0 4.1 4.6    101 101   CO2 22* 25 19*   BUN 22 25* 27*   CREATININE 0.8 0.7 0.7   CALCIUM 8.5 8.3* 8.6     Recent Labs     01/02/23  0918 01/03/23  0500 01/04/23  1328   AST 19 22 23   ALT 12 11 13   BILITOT 0.3 0.3 0.4   ALKPHOS 60 53 62     No results for input(s): INR in the last 72 hours. No results for input(s): Marquez Benjamin in the last 72 hours. Microbiology:      Urinalysis:      Lab Results   Component Value Date/Time    NITRU NEGATIVE 01/07/2020 03:00 PM    [address] 12/20/2016 01:00 PM    BACTERIA NONE 12/20/2016 01:00 PM    RBCUA 0-2 12/20/2016 01:00 PM    BLOODU NEGATIVE 01/07/2020 03:00 PM    SPECGRAV 1.015 08/21/2013 11:05 AM    GLUCOSEU >= 1000 01/07/2020 03:00 PM       Radiology:  MRI BRAIN WO CONTRAST         CT HEAD WO CONTRAST   Final Result    Stable CT appearance the brain. No hemorrhage.                **This report has been created using voice recognition software. It may contain minor errors which are inherent in voice recognition technology. **      Final report electronically signed by Dr. Sandra Mchugh on 1/4/2023 7:13 AM      XR CHEST PORTABLE   Final Result   Normal mobile chest.            **This report has been created using voice recognition software. It may contain minor errors which are inherent in voice recognition technology. **      Final report electronically signed by Dr. Oscar Galarza on 1/3/2023 8:15 AM      CTA HEAD W WO CONTRAST (CODE STROKE)   Final Result   Left internal carotid artery is occluded within the neck and reconstituted    intracranially by collateral flow. No vessel occlusion intracranially. This document has been electronically signed by: Wojciech Coley MD on    01/03/2023 05:28 AM      All CTs at this facility use dose modulation techniques and iterative    reconstructions, and/or weight-based dosing   when appropriate to reduce radiation to a low as reasonably achievable. 3D Post-processing was performed on this study. CTA NECK W WO CONTRAST (CODE STROKE)   Final Result   Left internal carotid artery is occluded. Right carotid and bilateral    vertebral arteries are patent. This document has been electronically signed by: Wojciech Coley MD on    01/03/2023 05:33 AM      All CTs at this facility use dose modulation techniques and iterative    reconstructions, and/or weight-based dosing   when appropriate to reduce radiation to a low as reasonably achievable. Carotid stenosis and measurements are in accordance with NASCET criteria. 3D Post-processing was performed on this study. CT HEAD WO CONTRAST   Final Result   Impression:   No acute intracranial pathology.       This document has been electronically signed by: Orin Jose MD on    01/03/2023 04:42 AM      All CTs at this facility use dose modulation techniques and iterative    reconstructions, and/or weight-based dosing   when appropriate to reduce radiation to a low as reasonably achievable. XR CHEST PORTABLE   Final Result   Coarsened appearance to the pulmonary parenchyma which is likely chronic. No definite acute findings. **This report has been created using voice recognition software. It may contain minor errors which are inherent in voice recognition technology. **      Final report electronically signed by Dr. Primitivo Garcia on 1/2/2023 9:39 AM       [address]    (Results Pending)   CTA NECK W [address]    (Results Pending)       DVT prophylaxis: [] Lovenox                                 [x] SCDs                                 [] SQ Heparin                                 [] Encourage ambulation           [] Already on Anticoagulation     Code Status: Full Code    PT/OT Eval Status: Consulted    Tele:   [x] yes             [] no    Active Hospital Problems    Diagnosis Date Noted    Influenza with respiratory manifestation other than pneumonia [J11.1] 01/03/2023     Priority: Medium    Cerebral infarction due to internal carotid artery occlusion ([facility] [address] [I63.239] 01/03/2023     Priority: Medium    Acute respiratory failure with hypoxia ([address] [J96.01] 01/02/2023     Priority: Medium       Electronically signed by Eelna Hernández DO on 1/4/2023 at 8:44 PM

## 2023-01-04 NOTE — CARE COORDINATION
1/4/23, 9:44 AM EST    DISCHARGE PLANNING EVALUATION     DAVE spoke with Esteban Frey at [facility], pt is able to return there as long as pt can care for himself besides household chores which they provide. Keila Guy states they as considered an assisted living facility and do not have a full time nurse available. DAVE spoke with pts Guardian [address] left message confirming discharge plan return to [facility]. Pc received from [facility] [employer], okay for pt to return to [facility].

## 2023-01-04 NOTE — PROGRESS NOTES
[facility]  INPATIENT SPEECH THERAPY  [facility] 4A  DAILY NOTE    TIME   SLP Individual Minutes  Time In: 8255  Time Out: 4756  Minutes: 16  Timed Code Treatment Minutes: 8 Minutes  Dysphagia therapy: 8 minutes  Cognitive therapy: 8 minutes       Date: 2023  Patient Name: Lo Schmitt      CSN: [id number]   : 1965  (65 y.o.)  Gender: male   Referring Physician:  Adelfo Lovelace MD  Diagnosis: Influenza with respiratory manifestation other than pneumonia   Precautions: Fall risk, aspiration precautions, droplet precautions   Current Diet: Minced and moist, thin liquids - established 2023   Swallowing Strategies:  Full upright position, small bite/sip, slow rate for intake, pulmonary montioring, oral care after meals, alternating solids/liquids, SUPERVISION d/t impulsivity  Date of Last MBS/FEES: Not Applicable    Pain:  No pain reported. Subjective:  ERMIAS Maxwell with approval to proceed with session, indications conveyed for improvements with respiratory status to warrant transition from [facility] to [place], 3 LPM. Upon arrival, patient resting in bed, awake and alert. Confirmation from both patient and nursing (per legal guardian) for speech production measures to be at [address]. Short-Term Goals:  SHORT TERM GOAL #1:  Goal 1: Patient will consume conservative PO offerings demonstrating consistency of pharyngeal trigger, adequate endurance measures, and stable respiratory status to determine readiness for formal instrumentation. GOAL MET  REVISED GOAL: Patient will safely consume minced and moist with thin liquids (advanced texture trials as indicated) without overt s/s aspiration and with implementation of identified compensatory strategies to assist with nutrition/hydration measures. INTERVENTIONS: PO offerings trialed to assist with further dysphagia management POC development at date.  Oral phase prolonged with min difficulties for coordination, however, efficacious with additional time provided with no evidence for oral stasis. Thin H20 via straw with controlled/single sips with no overt s/s aspiration; patient with x1 occurrence for immediate cough reflex post intake of consecutive sips with concerns for an airway invasion occurrence. Certainly not able to r/o totality of airway invasion events and/or pharyngeal dysfunction at bedside alone; patient's swallow physiology appears functional to support goal for comfortable oral intake without distress. Recommendations to initiate minced and moist diet with thin liquids, strict adherence to identified compensatory strategies (SMALL sips). Ongoing dysphagia management services to be rendered with considerations for formal instrumentation via MBS should it become clinically indicated. SHORT TERM GOAL #2:  Goal 2: Patient will complete structured speech drills/tasks (DDK rates, AMRs, multi-syllabic word repetition) with implementation of SOS speech strategies to improve intelligibility to 50% in all contexts to optimize communicative efficacy. INTERVENTIONS: Automatic speech sequences (with SOS speech strategies)  -Numerical countin% intelligibility  -MAL: 50% intelligibility   *limited carryover for instructed strategies; anticipate poor generalization     SHORT TERM GOAL #3:  Goal 3: Patient will complete functional immediate/delayed recall tasks with 60% accuracy, mod cues to improve retention of pertinent information. INTERVENTIONS: Orientation  -Month: independent  -Year: min cues  -Place: independent  -JAVIER: independent  -Date: mod cues  -Time: mod cues    SHORT TERM GOAL #4:  Goal 4: Patient will complete problem solving, verbal/visual reasoning, and executive functioning tasks (time, money/math/finances, medications) with 60% accuracy, mod cues to improve contributions within ADL/IADL completion.   INTERVENTIONS: Problem solving   -Call light justifications: 2/3 independent, 1/3 mod cues  -LEONEL scenarios:  independent    SHORT TERM GOAL #5:  Goal 5: Patient will complete sequencing and thought organization tasks with 70% accuracy, mod cues to improve mental flexibility for daily living scenarios. INTERVENTIONS: DNT d/t focus on additional STGS      Long-Term Goals:  No LTG established given short ELOS      EDUCATION:  Learner: Patient  Education:  Reviewed diet and strategies, Reviewed signs, symptoms and risks of aspiration, Reviewed ST goals and Plan of Care, and Reviewed recommendations for follow-up  Evaluation of Education: Verbalizes understanding, Demonstrates with assistance, Needs further instruction, and Family not present    ASSESSMENT/PLAN:  Activity Tolerance:  Patient tolerance of  treatment: good. Cooperative with ST and POC. Improvements with respiratory status. Assessment/Plan: Patient progressing toward established goals. Continues to require skilled care of licensed speech pathologist to progress toward achievement of established goals and plan of care. .     Plan for Next Session: cognitive rehabilitation, speech production, dysphagia management  Discharge Recommendations: SNF       Vega Sarah M.A., [facility]

## 2023-01-04 NOTE — PROGRESS NOTES
[address] [address]  INPATIENT PHYSICAL THERAPY  EVALUATION  [facility] 4A - 4A-16/016-A    Time In: 5922  Time Out: 4472  Timed Code Treatment Minutes: 10 Minutes  Minutes: 25          Date: 2023  Patient Name: Goyo Chakraborty,  Gender:  male        MRN: [id number]  : 1965  (65 y.o.)      Referring Practitioner: Scout Urena PA-C  Diagnosis: Influenza with respiratory manifestation other than pneumonia  Additional Pertinent Hx: Per ER note on 2023: 65 y.o. male who presents to the emergency department for evaluation of flu like symptoms. States that he has had fever, chills, and non-productive cough that has worsened over the last 3 days. Denies taking any medication for his symptoms. Received 800mL NS while en route to ED. Denies being on oxygen at home. Has been vaccinated for COVID and influenza. Per neuro note: At [phone] on 1/3/2023 stroke alert was called for this patient for left-sided facial droop and dysarthria which were noted by his nurse. Nurse states that at 2 AM the facial droop and slurred speech were not present. Patient taken to imaging for stat CT head which revealed no ICH, midline shift, mass-effect. 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 No LVO. Based on patient's symptoms and time since last known well decision was made for TNK to be given. 22 mg of TN K given at 5:05 AM     Restrictions/Precautions:  Restrictions/Precautions: Fall Risk, General Precautions  Position Activity Restriction  Other position/activity restrictions: impulsive    Subjective:  Chart Reviewed: Yes  Patient assessed for rehabilitation services?: Yes  Subjective: Pt resting in bed and agrees to therapy.     General:     Vision: Within Functional Limits  Hearing: Within functional limits       Pain: not reported      Vitals: Vitals not assessed per clinical judgement, see nursing flowsheet    Social/Functional History:    Type of Home: Assisted living  Home Layout: One level  Home Access: Level entry  Home Equipment: Cane     Bathroom Shower/Tub: Walk-in shower  Bathroom Toilet: Standard  Bathroom Equipment: Grab bars around toilet, Grab bars in shower       ADL Assistance: [address]: Needs assistance  Ambulation Assistance: Independent  Transfer Assistance: Independent    Active : No     Additional Comments: Pt reports using no AD PLOF. Faciliate completes IADLs & Pt can go down for meals. OBJECTIVE:  Range of Motion:  Bilateral Lower Extremity: WFL    Strength:  Bilateral Lower Extremity: grossly 4-/5    Balance:  Static Sitting Balance:  Supervision  Dynamic Sitting Balance: Stand By Assistance  Static Standing Balance: Stand By Assistance  Dynamic Standing Balance: Contact Guard Assistance with pt having a posterior lean while standing at sink to dry hands    Bed Mobility:  Supine to Sit: Minimal Assistance, with head of bed raised, with rail, and pt reaching out for handhold to pull self to edge of bed. Transfers:  Sit to Stand: Stand By Assistance  Stand to Sit:Contact Guard Assistance    Ambulation:  Contact Guard Assistance  Distance: 15 ft x 3  Surface: Level Tile  Device:No Device  Gait Deviations:  Decreased Step Length Bilaterally, Decreased Gait Speed, Mild Path Deviations, and Unsteady Gait    Exercise:  Patient was guided in 1 set(s) 10 reps of exercise to both lower extremities. Ankle pumps, Hip abduction/adduction, Seated marches, Seated heel/toe raises, and Long arc quads. Exercises were completed for increased independence with functional mobility. Functional Outcome Measures: Completed  AM-PAC Inpatient Mobility without Stair Climbing Raw Score : 16  AM-PAC Inpatient without Stair Climbing T-Scale Score : 45.54    ASSESSMENT:  Activity Tolerance:  Patient tolerance of  treatment: good. Treatment Initiated: Treatment and education initiated within context of evaluation. Evaluation time included review of current medical information, gathering information related to past medical, social and functional history, completion of standardized testing, formal and informal observation of tasks, assessment of data and development of plan of care and goals. Treatment time included skilled education and facilitation of tasks to increase safety and independence with functional mobility for improved independence and quality of life. Assessment: Body Structures, Functions, Activity Limitations Requiring Skilled Therapeutic Intervention: Decreased functional mobility , Decreased endurance, Decreased balance, Decreased strength, Decreased safe awareness  Assessment: Pt is a 64 yo male with deconditioning due to influenza and found to have L internal carotid artery occlusion requiring TNKase administration. Pt participated well and is noted to be mildly impulsive and some balance impairments. Pt was generally Mod I for mobility in [facility] and is at [facility] to close SBA today. Pt would benefit from continued skilled PT to address strengthening, balance, bed mobility, endurance building, and functional mobility training. Therapy Prognosis: Good    Requires PT Follow-Up: Yes    Discharge Recommendations:  Discharge Recommendations: Continue to assess pending progress, Home with Home health PT, Home with assist PRN, Patient would benefit from continued therapy after discharge, Therapy recommended at discharge    Patient Education:      . Patient Education  Education Given To: Patient  Education Provided: Role of Therapy, Plan of Care, Home Exercise Program  Education Method: Verbal, Demonstration  Barriers to Learning: None  Education Outcome: Verbalized understanding, Demonstrated understanding       Equipment Recommendations:   Other: monitor for needs    Plan:  Current Treatment Recommendations: Strengthening, Balance training, Functional mobility training, Gait training, Safety education & training, Home exercise program, Patient/Caregiver education & training, Transfer training, Endurance training, Therapeutic activities  General Plan:  (6x N)    Goals:  Patient Goals : go home  Short Term Goals  Time Frame for Short Term Goals: at discharge  Short Term Goal 1: Pt to be Mod I for supine <> sit to get in/out of bed  Short Term Goal 2: Pt to be Mod I for sit <> stand to get up to ambulate  Short Term Goal 3: Pt to ambulate >80 ft with cane with Supervision for getting to dining room at [facility] Term Goals  Time Frame for Long Term Goals : not set due to short ELOS    Following session, patient left in safe position with all fall risk precautions in place. Raul Alexis.  Amy De Leon, [place] [address]

## 2023-01-04 NOTE — SIGNIFICANT EVENT
Patient will be transferring to neuro stepdown 4A today.   Handoff report provided to hospitalist team, Dr. Abiel Payne.     Electronically signed by RAJNI Cruz CNP on 1/4/2023 at 8:51 AM

## 2023-01-04 NOTE — PLAN OF CARE
Problem: Respiratory - Adult  Goal: Clear lung sounds  Outcome: Progressing      Patient lung sounds are considered normal for their current lung condition. No signs of distress noted. Current treatment regimen appropriate         Problem: Respiratory - Adult  Goal: Ability to maintain adequate oxygenation will improve  Description: Ability to maintain adequate oxygenation will improve  Outcome: Progressing      Remains on HFNC to support breathing, will wean as tolerated. Patient mutually agreed on goals.

## 2023-01-04 NOTE — PLAN OF CARE
Problem: Discharge Planning  Goal: Discharge to home or other facility with appropriate resources  Outcome: Progressing  Flowsheets (Taken 1/3/2023 2000)  Discharge to home or other facility with appropriate resources: Identify barriers to discharge with patient and caregiver     Problem: Safety - Adult  Goal: Free from fall injury  Outcome: Progressing  Flowsheets (Taken 1/4/2023 0316)  Free From Fall Injury:   Instruct family/caregiver on patient safety   Based on caregiver fall risk screen, instruct family/caregiver to ask for assistance with transferring infant if caregiver noted to have fall risk factors     Problem: Neurosensory - Adult  Goal: Neuropyschological functioning will improve  Description: Neuropyschological functioning will improve  Outcome: Progressing     Problem: Respiratory - Adult  Goal: Clear lung sounds  1/4/2023 0103 by Kelly Jensen RCP  Outcome: Progressing  Goal: Ability to maintain adequate oxygenation will improve  Description: Ability to maintain adequate oxygenation will improve  1/4/2023 0103 by Kelly Jensen RCP  Outcome: Progressing     Problem: Hematologic - Adult  Goal: Maintains hematologic stability  Outcome: Progressing  Flowsheets (Taken 1/3/2023 2000)  Maintains hematologic stability:   Assess for signs and symptoms of bleeding or hemorrhage   Monitor labs for bleeding or clotting disorders     Problem: Pain  Goal: Verbalizes/displays adequate comfort level or baseline comfort level  Outcome: Progressing  Flowsheets (Taken 1/3/2023 2000)  Verbalizes/displays adequate comfort level or baseline comfort level:   Encourage patient to monitor pain and request assistance   Assess pain using appropriate pain scale   Administer analgesics based on type and severity of pain and evaluate response   Implement non-pharmacological measures as appropriate and evaluate response   Notify Licensed Independent Practitioner if interventions unsuccessful or patient reports new pain Problem: Skin/Tissue Integrity  Goal: Absence of new skin breakdown  Description: 1. Monitor for areas of redness and/or skin breakdown  2. Assess vascular access sites hourly  3. Every 4-6 hours minimum:  Change oxygen saturation probe site  4. Every 4-6 hours:  If on nasal continuous positive airway pressure, respiratory therapy assess nares and determine need for appliance change or resting period. Outcome: Progressing  Note: No new skin breakdown noted this shift. Patient turned and repositioned every 2 hours and PRN. Care plan reviewed with patient. Patient verbalizes understanding of the plan of care and contributes to goal setting.

## 2023-01-04 NOTE — PROGRESS NOTES
Physician Progress Note      PATIENT:               Beata Castillo  CSN #:                  [id number]  :                       1965  ADMIT DATE:       2023 8:49 AM  DISCH DATE:  Krista Ramirez  PROVIDER #:        Dee Marlow MD          QUERY TEXT:    Dr Angelica Anguiano, Dr Jak Redmond,    Pt admitted with Influenza A. Pt noted to have WBC 11.9, Procalcitonin 0.55,   CRP 15.05, Pulse 110, respirations 24 on arrival. If possible, please document   in the progress notes and discharge summary if you are evaluating and /or   treating any of the following: The medical record reflects the following:  Risk Factors:  lives at a facility that there has been an outbreak of   influenza A  Clinical Indicators: Influenza A. WBC 11.9, Procalcitonin 0.55, CRP 15.05,   Pulse 110, respirations 24 on arrival  Treatment:  0.9  ml bolus, TAMIFLU. Options provided:  -- Sepsis, present on admission  -- Influenza A without Sepsis  -- SIRS. -- Sepsis was ruled out  -- Other - I will add my own diagnosis  -- Disagree - Not applicable / Not valid  -- Disagree - Clinically unable to determine / Unknown  -- Refer to Clinical Documentation Reviewer    PROVIDER RESPONSE TEXT:    This patient has sepsis which was present on admission. Query created by: Pierre Jonas on 2023 6:31 AM      Electronically signed by:   Dee Marlow MD 2023 8:15 AM

## 2023-01-04 NOTE — RT PROTOCOL NOTE
RT Inhaler-Nebulizer Bronchodilator Protocol Note    There is a bronchodilator order in the chart from a provider indicating to follow the RT Bronchodilator Protocol and there is an Initiate RT Inhaler-Nebulizer Bronchodilator Protocol order as well (see protocol at bottom of note). CXR Findings:  XR CHEST PORTABLE    Result Date: 1/3/2023  Normal mobile chest. **This report has been created using voice recognition software. It may contain minor errors which are inherent in voice recognition technology. ** Final report electronically signed by Dr. Romi Finley on 1/3/2023 8:15 AM      The findings from the last RT Protocol Assessment were as follows:   History Pulmonary Disease: Smoker 15 pack years or more  Respiratory Pattern: Regular pattern and RR 12-20 bpm  Breath Sounds: Slightly diminished and/or crackles  Cough: Strong, spontaneous, non-productive  Indication for Bronchodilator Therapy: Decreased or absent breath sounds  Bronchodilator Assessment Score: 3    Aerosolized bronchodilator medication orders have been revised according to the RT Inhaler-Nebulizer Bronchodilator Protocol below. Respiratory Therapist to perform RT Therapy Protocol Assessment initially then follow the protocol. Repeat RT Therapy Protocol Assessment PRN for score 0-3 or on second treatment, BID, and PRN for scores above 3. No Indications - adjust the frequency to every 6 hours PRN wheezing or bronchospasm, if no treatments needed after 48 hours then discontinue using Per Protocol order mode. If indication present, adjust the RT bronchodilator orders based on the Bronchodilator Assessment Score as indicated below.   Use Inhaler orders unless patient has one or more of the following: on home nebulizer, not able to hold breath for 10 seconds, is not alert and oriented, cannot activate and use MDI correctly, or respiratory rate 25 breaths per minute or more, then use the equivalent nebulizer order(s) with same Frequency and PRN reasons based on the score. If a patient is on this medication at home then do not decrease Frequency below that used at home. 0-3 - enter or revise RT bronchodilator order(s) to equivalent RT Bronchodilator order with Frequency of every 4 hours PRN for wheezing or increased work of breathing using Per Protocol order mode. 4-6 - enter or revise RT Bronchodilator order(s) to two equivalent RT bronchodilator orders with one order with BID Frequency and one order with Frequency of every 4 hours PRN wheezing or increased work of breathing using Per Protocol order mode. 7-10 - enter or revise RT Bronchodilator order(s) to two equivalent RT bronchodilator orders with one order with TID Frequency and one order with Frequency of every 4 hours PRN wheezing or increased work of breathing using Per Protocol order mode. 11-13 - enter or revise RT Bronchodilator order(s) to one equivalent RT bronchodilator order with QID Frequency and an Albuterol order with Frequency of every 4 hours PRN wheezing or increased work of breathing using Per Protocol order mode. Greater than 13 - enter or revise RT Bronchodilator order(s) to one equivalent RT bronchodilator order with every 4 hours Frequency and an Albuterol order with Frequency of every 2 hours PRN wheezing or increased work of breathing using Per Protocol order mode. RT to enter RT Home Evaluation for COPD & MDI Assessment order using Per Protocol order mode.     Electronically signed by Deric Bonilla RCP on 1/4/2023 at 2:19 PM

## 2023-01-04 NOTE — PLAN OF CARE
Problem: Discharge Planning  Goal: Discharge to home or other facility with appropriate resources  1/4/2023 1146 by Avon Bamberger, RN  Outcome: Progressing  Flowsheets (Taken 1/4/2023 1146)  Discharge to home or other facility with appropriate resources: Identify barriers to discharge with patient and caregiver  Note: Patient educated that discharge plan is still in progress. Problem: Safety - Adult  Goal: Free from fall injury  1/4/2023 1146 by Avon Bamberger, RN  Outcome: Progressing  Flowsheets (Taken 1/4/2023 1146)  Free From Fall Injury: Instruct family/caregiver on patient safety  Note: Patient remains free from falls this shift. Fall precautions in place with bed/chair exit alarmed. Fall sign posted and fall armband in place. Nonskid footwear used with transferring. Educated patient to use call light when in need of staff assistance with transferring, ambulating, and other activities of daily living. Patient appropriately uses call light this shift.         Problem: Neurosensory - Adult  Goal: Neuropyschological functioning will improve  Description: Neuropyschological functioning will improve  1/4/2023 1146 by Avon Bamberger, RN  Outcome: Progressing    Problem: Hematologic - Adult  Goal: Maintains hematologic stability  1/4/2023 1146 by Avon Bamberger, RN  Outcome: Progressing  Flowsheets (Taken 1/4/2023 1146)  Maintains hematologic stability: Assess for signs and symptoms of bleeding or hemorrhage  Note:   Vitals:    01/04/23 0600 01/04/23 0700 01/04/23 0913 01/04/23 1130   BP: (!) 128/55 (!) 115/51     Pulse: (!) 106 (!) 103 97    Resp: 20 26 27    Temp:       TempSrc:       SpO2:   93%    Weight:    181 lb 14.4 oz (82.5 kg)   Height:             Problem: Pain  Goal: Verbalizes/displays adequate comfort level or baseline comfort level  1/4/2023 1146 by Avon Bamberger, RN  Outcome: Progressing  Flowsheets (Taken 1/4/2023 1146)  Verbalizes/displays adequate comfort level or baseline comfort level: Encourage patient to monitor pain and request assistance   Assess pain using appropriate pain scale     Problem: Skin/Tissue Integrity  Goal: Absence of new skin breakdown  Description: 1. Monitor for areas of redness and/or skin breakdown  2. Assess vascular access sites hourly  3. Every 4-6 hours minimum:  Change oxygen saturation probe site  4. Every 4-6 hours:  If on nasal continuous positive airway pressure, respiratory therapy assess nares and determine need for appliance change or resting period. 1/4/2023 1146 by Mely Morgan RN  Outcome: Progressing  Note: Patient exhibits no new skin breakdown this shift. Patient repositioned Q2H and as needed with staff assistance. All skin integrity issuse charted in Flowsheets. Will continue to monitor. Care plan reviewed with patient and family. Patient and family verbalize understanding of the plan of care and contribute to goal setting.

## 2023-01-04 NOTE — CARE COORDINATION
1/4/23, 1:50 PM EST    DISCHARGE ON GOING EVALUATION    Cris Duane day: 1  Location: [facility]16/016-A Reason for admit: Influenza with respiratory manifestation other than pneumonia [J11.1]  Acute respiratory failure with hypoxia ([address] [J96.01]   Procedure:   1/2 CXR: Coarsened appearance to the pulmonary parenchyma which is likely chronic. 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 No vessel occlusion intracranially  1/3 Echo with EF 55%  1/4 CT Head: Stable CT appearance the brain. No hemorrhage    Barriers to Discharge: CT Head stable. Transferred to stepdown this shift. MRI Brain today. Started on minced/moist diet today. NIH 2: best language and dysarthria. Ox4. Slurred speech, delayed responses. Afebrile. NSR. On room air. SLP/PT/OT. Intensivist and Neurology following. +Flu A. Telemetry, NIHSS/neuro checks, n/v checks, SCDs. IVF, lipitor, clozapine, pristiq, depakote er, SSI, nebs, IV solumedrol 40 mg Q12H, lopressor, tamiflu, Electrolyte replacement protocols. PCP: Mimi Pinto MD  Readmission Risk Score: 15.9%  Patient Goals/Plan/Treatment Preferences: Return to [facility]. SW on case.

## 2023-01-04 NOTE — CARE COORDINATION
DISCHARGE PLANNING EVALUATION  1/4/23, 9:34 AM EST    Reason for Referral: pt from [facility]. Mental Status: alert and oriented. Decision Making: has Ramesh Carmelina Solomon [phone]  Family/Social/Home Environment:  Pt states he resides at [facility] x 5 yrs and plans to return there at discharge. Pt states he is not required to do chores or meals, [facility] provides everything. Pt states he does not use a cane or a walker. Current Services including food security, transportation and housekeeping: [facility] provides for pt. Current Equipment: none reported. Payment Source: Medicare and Medicaid. Concerns or Barriers to Discharge: no concerns reported. Post-acute [facility]) provider list was provided to patient. Patient was informed of their freedom to choose [facility] provider. Discussed and offered to show the patient the relevant [facility] Providers quality and resource use measures on Medicare Compare web site via computer based on patient's goals of care and treatment preferences. Questions regarding selection process were answered. Teach Back Method used with pt regarding care plan and discharge planning. Patient verbalized understanding of the plan of care and contribute to goal setting. Patient goals, treatment preferences and discharge plan:  pt planning return to [facility] at discharge. Sw will confirm plan with Guardian and [place]. SW spoke with pts nurse no deficits noted, pts speech is slurred however, this is pts baseline.      Electronically signed by ALEJANDRA Weiss on 1/4/2023 at 9:34 AM

## 2023-01-04 NOTE — PROGRESS NOTES
[address] THERAPY  [facility] ICU 4D  EVALUATION    Time:    Time In: 1004  Time Out: 1039  Timed Code Treatment Minutes: 25 Minutes  Minutes: 35          Date: 2023  Patient Name: Irene Moreno,   Gender: male      MRN: [id number]  : 1965  (65 y.o.)  Referring Practitioner: Kym Boas, PA-C  Diagnosis: acute respiratory failure with hypoxia  Additional Pertinent Hx: Per ER note on 2023: 65 y.o. male who presents to the emergency department for evaluation of flu like symptoms. States that he has had fever, chills, and non-productive cough that has worsened over the last 3 days. Denies taking any medication for his symptoms. Received 800mL NS while en route to ED. Denies being on oxygen at home. Has been vaccinated for COVID and influenza. Per neuro note: At [phone] on 1/3/2023 stroke alert was called for this patient for left-sided facial droop and dysarthria which were noted by his nurse. Nurse states that at 2 AM the facial droop and slurred speech were not present. Patient taken to imaging for stat CT head which revealed no ICH, midline shift, mass-effect. 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 No LVO. Based on patient's symptoms and time since last known well decision was made for TNK to be given.   22 mg of TN K given at 5:05 AM    Restrictions/Precautions:  Restrictions/Precautions: Fall Risk  Position Activity Restriction  Other position/activity restrictions: impulsive    Subjective  Chart Reviewed: Yes, Orders, Progress Notes, History and Physical  Patient assessed for rehabilitation services?: Yes  Family / Caregiver Present: No    Subjective: cooperative    Pain: 0/10: no c/o pain during session    Vitals:  vitals were monitored during session-WNL    Social/Functional History:  Type of Home: Assisted living  Home Layout: One level  Home Access: Level entry  Home Equipment: OmPrompt Shower/Tub: Walk-in shower  Bathroom Toilet: Standard  Bathroom Equipment: Grab bars around toilet, Grab bars in shower       ADL Assistance: [address]: Needs assistance  Ambulation Assistance: Independent  Transfer Assistance: Independent    Active : No  Patient's  Info: Assisted Living provides transport     Additional Comments: Pt reports using no AD PLOF. Faciliate completes IADLs & Pt can go down for meals. VISION:WFL    HEARING:  WFL    COGNITION: Decreased Problem Solving, Decreased Safety Awareness, and Impulsive    RANGE OF MOTION:  Bilateral Upper Extremity:  WFL    STRENGTH:  Bilateral Upper Extremity:  WFL    SENSATION:   WFL    ADL:   Lower Extremity Dressing: Dependent. For donning slipper socks . Toileting: Pt found wet in bed as external catheter was not working-therapist s/u Pt for cleaning self up    BALANCE:  Sitting Balance:  Contact Guard Assistance. Standing Balance: Minimal Assistance. -mod A    BED MOBILITY:  Supine to Sit: Minimal Assistance very impulsive & reaching out to stabilize self -did indicate he was dizzy (no drop in BP noted)    TRANSFERS:  Sit to Stand: Moderate Assistance, X 1. From EOB 1st time then 2nd time with min A; noted BLE buckling on 1st stand  Stand to Sit: Minimal Assistance, X 1. To EOB & recliner    FUNCTIONAL MOBILITY:  Assistive Device: Rolling Walker  Assist Level:  Minimal Assistance. Distance:  3 steps to recliner from EOB        Activity Tolerance:  Patient tolerance of  treatment: fair. Assessment:  Assessment: Pt demo decreased ADL & functional mobility status over PLOF s/p admission with influenza. Continued OT recommended to faciliate improvements in these performance areas to increase indep & safety upon returning to [place].   Performance deficits / Impairments: Decreased functional mobility , Decreased ADL status, Decreased safe awareness, Decreased balance, Decreased cognition, Decreased strength, Decreased endurance  Prognosis: Fair  REQUIRES OT FOLLOW-UP: Yes  Decision Making: Medium Complexity    Treatment Initiated: Treatment and education initiated within context of evaluation. Evaluation time included review of current medical information, gathering information related to past medical, social and functional history, completion of standardized testing, formal and informal observation of tasks, assessment of data and development of plan of care and goals. Treatment time included skilled education and facilitation of tasks to increase safety and independence with ADL's for improved functional independence and quality of life. Discharge Recommendations:  Continue to assess pending progress, Subacute/Skilled Nursing Facility    Patient Education:     Patient Education  Education Given To: Patient  Education Provided: Role of Therapy, Plan of Care, Transfer Training, ADL Adaptive Strategies  Education Method: Demonstration, Verbal  Barriers to Learning: Cognition  Education Outcome: Continued education needed    Equipment Recommendations: Other: RW use    Plan:  Times Per Week: 6x  Current Treatment Recommendations: Functional mobility training, Balance training, Self-Care / ADL, Safety education & training, Endurance training. See long-term goal time frame for expected duration of plan of care. If no long-term goals established, a short length of stay is anticipated.     Goals:  Patient goals : go home  Short Term Goals  Time Frame for Short Term Goals: until discharge  Short Term Goal 1: Pt will complete various t/fs including BSC with CGA & min vcs for safe technique  Short Term Goal 2: Pt will tolerate standing 2-3 min with CGA for increased ease of sinkside grooming  Short Term Goal 3: Pt will complete mobility to [facility] or bedside chair with RW, CGA, & 0-2 vcs for safety  Short Term Goal 4: Pt will complete BADL routine with min A & min vcs for safety & sequencing  Long Term Goals  Time Frame for Long Term Goals : No LTG set d/t short ELOS         Following session, patient left in safe position with all fall risk precautions in place.

## 2023-01-05 LAB
ALBUMIN SERPL-MCNC: 3.6 G/DL (ref 3.5–5.1)
ALP BLD-CCNC: 52 U/L (ref 38–126)
ALT SERPL-CCNC: 14 U/L (ref 11–66)
ANION GAP SERPL CALCULATED.3IONS-SCNC: 17 MEQ/L (ref 8–16)
AST SERPL-CCNC: 21 U/L (ref 5–40)
BASOPHILS # BLD: 0.1 %
BASOPHILS ABSOLUTE: 0 THOU/MM3 (ref 0–0.1)
BILIRUB SERPL-MCNC: 0.2 MG/DL (ref 0.3–1.2)
BUN BLDV-MCNC: 23 MG/DL (ref 7–22)
CALCIUM SERPL-MCNC: 8.6 MG/DL (ref 8.5–10.5)
CHLORIDE BLD-SCNC: 103 MEQ/L (ref 98–111)
CO2: 21 MEQ/L (ref 23–33)
CREAT SERPL-MCNC: 0.5 MG/DL (ref 0.4–1.2)
EOSINOPHIL # BLD: 0 %
EOSINOPHILS ABSOLUTE: 0 THOU/MM3 (ref 0–0.4)
ERYTHROCYTE [DISTWIDTH] IN BLOOD BY AUTOMATED COUNT: 12.9 % (ref 11.5–14.5)
ERYTHROCYTE [DISTWIDTH] IN BLOOD BY AUTOMATED COUNT: 45.6 FL (ref 35–45)
GFR SERPL CREATININE-BSD FRML MDRD: > 60 ML/MIN/1.73M2
GLUCOSE BLD-MCNC: 135 MG/DL (ref 70–108)
GLUCOSE BLD-MCNC: 143 MG/DL (ref 70–108)
GLUCOSE BLD-MCNC: 146 MG/DL (ref 70–108)
GLUCOSE BLD-MCNC: 147 MG/DL (ref 70–108)
GLUCOSE BLD-MCNC: 149 MG/DL (ref 70–108)
GLUCOSE BLD-MCNC: 162 MG/DL (ref 70–108)
GLUCOSE BLD-MCNC: 166 MG/DL (ref 70–108)
GLUCOSE BLD-MCNC: 238 MG/DL (ref 70–108)
HCT VFR BLD CALC: 40.8 % (ref 42–52)
HEMOGLOBIN: 13.4 GM/DL (ref 14–18)
IMMATURE GRANS (ABS): 0.02 THOU/MM3 (ref 0–0.07)
IMMATURE GRANULOCYTES: 0.3 %
LYMPHOCYTES # BLD: 12.4 %
LYMPHOCYTES ABSOLUTE: 1 THOU/MM3 (ref 1–4.8)
MCH RBC QN AUTO: 31.5 PG (ref 26–33)
MCHC RBC AUTO-ENTMCNC: 32.8 GM/DL (ref 32.2–35.5)
MCV RBC AUTO: 95.8 FL (ref 80–94)
MONOCYTES # BLD: 6.2 %
MONOCYTES ABSOLUTE: 0.5 THOU/MM3 (ref 0.4–1.3)
NUCLEATED RED BLOOD CELLS: 0 /100 WBC
PLATELET # BLD: 180 THOU/MM3 (ref 130–400)
PMV BLD AUTO: 9.1 FL (ref 9.4–12.4)
POTASSIUM SERPL-SCNC: 4.3 MEQ/L (ref 3.5–5.2)
RBC # BLD: 4.26 MILL/MM3 (ref 4.7–6.1)
SEG NEUTROPHILS: 81 %
SEGMENTED NEUTROPHILS ABSOLUTE COUNT: 6.5 THOU/MM3 (ref 1.8–7.7)
SODIUM BLD-SCNC: 141 MEQ/L (ref 135–145)
TOTAL PROTEIN: 5.9 G/DL (ref 6.1–8)
WBC # BLD: 8 THOU/MM3 (ref 4.8–10.8)

## 2023-01-05 PROCEDURE — 6370000000 HC RX 637 (ALT 250 FOR IP)

## 2023-01-05 PROCEDURE — 97116 GAIT TRAINING THERAPY: CPT

## 2023-01-05 PROCEDURE — 2580000003 HC RX 258: Performed by: NURSE PRACTITIONER

## 2023-01-05 PROCEDURE — 82948 REAGENT STRIP/BLOOD GLUCOSE: CPT

## 2023-01-05 PROCEDURE — 6370000000 HC RX 637 (ALT 250 FOR IP): Performed by: NURSE PRACTITIONER

## 2023-01-05 PROCEDURE — 6360000002 HC RX W HCPCS: Performed by: NURSE PRACTITIONER

## 2023-01-05 PROCEDURE — 6370000000 HC RX 637 (ALT 250 FOR IP): Performed by: STUDENT IN AN ORGANIZED HEALTH CARE EDUCATION/TRAINING PROGRAM

## 2023-01-05 PROCEDURE — 2700000000 HC OXYGEN THERAPY PER DAY

## 2023-01-05 PROCEDURE — 94640 AIRWAY INHALATION TREATMENT: CPT

## 2023-01-05 PROCEDURE — 6360000002 HC RX W HCPCS: Performed by: INTERNAL MEDICINE

## 2023-01-05 PROCEDURE — 36415 COLL VENOUS BLD VENIPUNCTURE: CPT

## 2023-01-05 PROCEDURE — 97535 SELF CARE MNGMENT TRAINING: CPT

## 2023-01-05 PROCEDURE — 97110 THERAPEUTIC EXERCISES: CPT

## 2023-01-05 PROCEDURE — 97530 THERAPEUTIC ACTIVITIES: CPT

## 2023-01-05 PROCEDURE — 6360000002 HC RX W HCPCS

## 2023-01-05 PROCEDURE — 85025 COMPLETE CBC W/AUTO DIFF WBC: CPT

## 2023-01-05 PROCEDURE — 80053 COMPREHEN METABOLIC PANEL: CPT

## 2023-01-05 PROCEDURE — 2060000000 HC ICU INTERMEDIATE R&B

## 2023-01-05 RX ORDER — METOPROLOL SUCCINATE 25 MG/1
25 TABLET, EXTENDED RELEASE ORAL DAILY
Status: DISCONTINUED | OUTPATIENT
Start: 2023-01-05 | End: 2023-01-10 | Stop reason: HOSPADM

## 2023-01-05 RX ADMIN — ATORVASTATIN CALCIUM 20 MG: 20 TABLET, FILM COATED ORAL at 10:13

## 2023-01-05 RX ADMIN — SODIUM CHLORIDE, POTASSIUM CHLORIDE, SODIUM LACTATE AND CALCIUM CHLORIDE: 600; 310; 30; 20 INJECTION, SOLUTION INTRAVENOUS at 22:24

## 2023-01-05 RX ADMIN — INSULIN LISPRO 1 UNITS: 100 INJECTION, SOLUTION INTRAVENOUS; SUBCUTANEOUS at 19:01

## 2023-01-05 RX ADMIN — OSELTAMIVIR PHOSPHATE 75 MG: 75 CAPSULE ORAL at 20:58

## 2023-01-05 RX ADMIN — CLOZAPINE 50 MG: 25 TABLET ORAL at 13:13

## 2023-01-05 RX ADMIN — DESVENLAFAXINE 50 MG: 50 TABLET, EXTENDED RELEASE ORAL at 10:13

## 2023-01-05 RX ADMIN — ALBUTEROL SULFATE 2.5 MG: 2.5 SOLUTION RESPIRATORY (INHALATION) at 22:47

## 2023-01-05 RX ADMIN — CLOZAPINE 50 MG: 25 TABLET ORAL at 10:13

## 2023-01-05 RX ADMIN — METHYLPREDNISOLONE SODIUM SUCCINATE 40 MG: 40 INJECTION, POWDER, FOR SOLUTION INTRAMUSCULAR; INTRAVENOUS at 22:20

## 2023-01-05 RX ADMIN — CLOZAPINE 200 MG: 100 TABLET ORAL at 20:58

## 2023-01-05 RX ADMIN — DIVALPROEX SODIUM 500 MG: 500 TABLET, EXTENDED RELEASE ORAL at 10:13

## 2023-01-05 RX ADMIN — METHYLPREDNISOLONE SODIUM SUCCINATE 40 MG: 40 INJECTION, POWDER, FOR SOLUTION INTRAMUSCULAR; INTRAVENOUS at 10:16

## 2023-01-05 RX ADMIN — INSULIN GLARGINE 7 UNITS: 100 INJECTION, SOLUTION SUBCUTANEOUS at 21:03

## 2023-01-05 RX ADMIN — ASPIRIN 81 MG: 81 TABLET, CHEWABLE ORAL at 10:16

## 2023-01-05 RX ADMIN — SODIUM CHLORIDE, PRESERVATIVE FREE 10 ML: 5 INJECTION INTRAVENOUS at 22:19

## 2023-01-05 RX ADMIN — DIVALPROEX SODIUM 1000 MG: 500 TABLET, EXTENDED RELEASE ORAL at 20:58

## 2023-01-05 RX ADMIN — SODIUM CHLORIDE, POTASSIUM CHLORIDE, SODIUM LACTATE AND CALCIUM CHLORIDE: 600; 310; 30; 20 INJECTION, SOLUTION INTRAVENOUS at 03:24

## 2023-01-05 RX ADMIN — METOPROLOL SUCCINATE 25 MG: 25 TABLET, EXTENDED RELEASE ORAL at 13:13

## 2023-01-05 RX ADMIN — OSELTAMIVIR PHOSPHATE 75 MG: 75 CAPSULE ORAL at 10:12

## 2023-01-05 RX ADMIN — ENOXAPARIN SODIUM 40 MG: 100 INJECTION SUBCUTANEOUS at 10:15

## 2023-01-05 NOTE — PLAN OF CARE
Problem: Discharge Planning  Goal: Discharge to home or other facility with appropriate resources  1/5/2023 1256 by Blade Driscoll RN  Outcome: Progressing  Flowsheets (Taken 1/5/2023 0930)  Discharge to home or other facility with appropriate resources: Identify barriers to discharge with patient and caregiver     Problem: Safety - Adult  Goal: Free from fall injury  1/5/2023 1256 by Blade Driscoll RN  Outcome: Progressing  Flowsheets (Taken 1/5/2023 1256)  Free From Fall Injury: Alejandro Joyner family/caregiver on patient safety     Problem: Neurosensory - Adult  Goal: Neuropyschological functioning will improve  Description: Neuropyschological functioning will improve  Outcome: Progressing     Problem: Hematologic - Adult  Goal: Maintains hematologic stability  1/5/2023 1256 by Blade Driscoll RN  Outcome: Progressing  Flowsheets  Taken 1/5/2023 1256  Maintains hematologic stability: Assess for signs and symptoms of bleeding or hemorrhage  Taken 1/5/2023 0930  Maintains hematologic stability: Assess for signs and symptoms of bleeding or hemorrhage     Problem: Pain  Goal: Verbalizes/displays adequate comfort level or baseline comfort level  1/5/2023 1256 by Blade Driscoll RN  Outcome: Progressing  Flowsheets  Taken 1/5/2023 1256  Verbalizes/displays adequate comfort level or baseline comfort level: Encourage patient to monitor pain and request assistance  Taken 1/5/2023 0727  Verbalizes/displays adequate comfort level or baseline comfort level:   Encourage patient to monitor pain and request assistance   Assess pain using appropriate pain scale     Problem: Skin/Tissue Integrity  Goal: Absence of new skin breakdown  Description: 1. Monitor for areas of redness and/or skin breakdown  2. Assess vascular access sites hourly  3. Every 4-6 hours minimum:  Change oxygen saturation probe site  4.   Every 4-6 hours:  If on nasal continuous positive airway pressure, respiratory therapy assess nares and determine need for appliance change or resting period. 1/5/2023 1256 by Shazia Loyola RN  Outcome: Progressing  Note: Patient exhibits no new skin breakdown this shift. Patient repositioned Q2H and as needed with staff assistance. All skin integrity issuse charted in Flowsheets. Will continue to monitor. Problem: ABCDS Injury Assessment  Goal: Absence of physical injury  1/5/2023 1256 by Shazia Loyola RN  Outcome: Progressing  Flowsheets (Taken 1/5/2023 1256)  Absence of Physical Injury: Implement safety measures based on patient assessment   Care plan reviewed with patient and family. Patient and family verbalize understanding of the plan of care and contribute to goal setting.

## 2023-01-05 NOTE — PROGRESS NOTES
[address]  Occupational Therapy  Daily Note  Time:    Time In: 8476  Time Out: 7030  Timed Code Treatment Minutes: 44 Minutes  Minutes: 39          Date: 2023  Patient Name: Lo Schmitt,   Gender: male      Room: -16/016-A  MRN: [id number]  : 1965  (65 y.o.)  Referring Practitioner: Debra De La O PA-C  Diagnosis: acute respiratory failure with hypoxia  Additional Pertinent Hx: Per ER note on 2023: 65 y.o. male who presents to the emergency department for evaluation of flu like symptoms. States that he has had fever, chills, and non-productive cough that has worsened over the last 3 days. Denies taking any medication for his symptoms. Received 800mL NS while en route to ED. Denies being on oxygen at home. Has been vaccinated for COVID and influenza. Per neuro note: At [phone] on 1/3/2023 stroke alert was called for this patient for left-sided facial droop and dysarthria which were noted by his nurse. Nurse states that at 2 AM the facial droop and slurred speech were not present. Patient taken to imaging for stat CT head which revealed no ICH, midline shift, mass-effect. 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 No LVO. Based on patient's symptoms and time since last known well decision was made for TNK to be given. 22 mg of TN K given at 5:05 AM    Restrictions/Precautions:  Restrictions/Precautions: Fall Risk, General Precautions  Position Activity Restriction  Other position/activity restrictions: impulsive      SUBJECTIVE: RN okayed session. Upon arrival, pt noted to have heart monitor off, oxygen off, and IV pulled out. RN notified of findings, provided assist with further IV management. Pt agreeable to OT and up to chair and getting cleaned up.      PAIN: Denies pain    Vitals: Oxygen: on RA, maintaining >9%    COGNITION: Slow Processing, Decreased Recall, Decreased Insight, Decreased Problem Solving, Decreased Safety Awareness, and Impulsive    ADL:   Bathing: Minimal Assistance. A required to wash distal BLE. Increased time required. Upper Extremity Dressing: Minimal Assistance. For gown mgmt  Lower Extremity Dressing: Minimal Assistance. For donning undergarment. Les Pimple BALANCE:  Sitting Balance:  Supervision. Standing Balance: Contact Guard Assistance. BED MOBILITY:  Supine to Sit: Stand By Assistance    Scooting: Stand By Assistance      TRANSFERS:  Sit to Stand:  [employer]. From EOB  Stand to Sit: Contact Guard Assistance. To recliner    FUNCTIONAL MOBILITY:  Assistive Device: None  Assist Level:  Contact Guard Assistance. Distance:  4' from EOB to recliner   Slightly unsteady with no LOB       ASSESSMENT:     Activity Tolerance:  Patient tolerance of  treatment: good. Discharge Recommendations: Continue to assess pending progress and Patient would benefit from continued OT at discharge  Equipment Recommendations: Other: RW use  Plan: Times Per Week: 6x  Current Treatment Recommendations: Functional mobility training, Balance training, Self-Care / ADL, Safety education & training, Endurance training    Patient Education  Patient Education: Plan of Care, ADL's, Energy Conservation, Reviewed Prior Education    Goals  Short Term Goals  Time Frame for Short Term Goals: until discharge  Short Term Goal 1: Pt will complete various t/fs including BSC with CGA & min vcs for safe technique  Short Term Goal 2: Pt will tolerate standing 2-3 min with CGA for increased ease of sinkside grooming  Short Term Goal 3: Pt will complete mobility to [facility] or bedside chair with RW, CGA, & 0-2 vcs for safety  Short Term Goal 4: Pt will complete BADL routine with min A & min vcs for safety & sequencing  Long Term Goals  Time Frame for Long Term Goals : No LTG set d/t short ELOS    Following session, patient left in safe position with all fall risk precautions in place.

## 2023-01-05 NOTE — FLOWSHEET NOTE
01/05/23 1033   Safe Environment   Safety Measures Other (comment)  (Virtual nurse round complete)   No response to audio. Camera turned on to check patient safety. Patient in bed, eyes closed. No signs of distress noted at this time.

## 2023-01-05 NOTE — PROGRESS NOTES
Hospitalist Progress Note    Patient:  Roxana Diamond      Unit/Bed:4A-16/016-A    YOB: 1965    MRN: [id number]       Acct: [de-identified]     PCP: Amanda Chávez MD    Date of Admission: 1/2/2023    Assessment/Plan:    Acute hypoxic respiratory failure: Secondary to influenza A. Patient does not require oxygen at baseline. Patient initially required nasal cannula that progressed to high settings of HFNC. Patient's oxygenation however is starting to improve and patient is currently requiring FiO2 only 21% and flow rate 0. Of note patient was placed on Solu-Medrol 1/3/23 as well as and duonebs to assist with lung function due to possible underlying COPD due to significant smoking history.   -Pulmonary hygiene    -Wean oxygen as tolerated   -Maintain oxygen saturation > 94%    Influenza A: Patient finally positive in the emergency department upon arrival.  Started on Tamiflu.   -Continue Tamiflu for total 5 days. Left facial droop with dysarthria: CTA had demonstrated left ICA occlusion from origin the base of skull. There is no LVO. Patient did receive TKNase 1/3/23. Repeat head CT 1/4/2028 showed stability. Patient had echocardiogram 1/3/2023 that showed EF of 55% and overall normal left ventricular function.    -Neurology following   -Continue neurovascular checks   -Patient can be started on chemical DVT prophylaxis once it has been  24 hours since TNKase administration.   -Patient will also be started on aspirin 1/4/2023 which is also 24 hours status post TNKase administration.   -Patient had MRI today, no e/o acute intracranial abnormality or bleed   -Patient remains n.p.o. until speech/swallow evaluation has been completed   -PT/OT   -Continue telemetry   -Continue statin therapy    Type 2 diabetes mellitus: 1/3/23 hemoglobin A1c 6.8. Goal blood glucose while inpatient 140-180.   While patient started to eat again blood glucose increased therefore placed patient on low-dose sliding scale as well as Lantus. Will likely need to dose adjust throughout patient's stay. And as diet progresses.   -Hold home meds   -Low dose sliding scale    -Lantus 7 units nightly (2/3 home dose of 10 units nightly)  -Hypoglycemic protocol  -Accu-Cheks  -Diabetic diet when able    Essential hypertension: Has been relatively controlled since arrival.  Permissive hypertension (up to 180/105) was allowed for 24 hours following thrombolytic administration.   -At this time patient's goal blood pressure  <130/80  -Home Lopressor resumed with holding parameters    Bipolar affective disorder: Continue home meds Clozapine, Pristiq, and Depakote    Current smoker: 1 pack/day   -Patient currently has nicotine patch   -Smoking cessation advised    Expected discharge date: To be determined    Disposition:    [] Home       [] TCU       [] Rehab       [] Psych       [] SNF       [x] [place]       [] Other-    Chief Complaint: Shortness of breath    Hospital Course:     59-year-old male presented to [facility] emergency department 1/2/2022 with a chief complaint of shortness of breath. Patient was experiencing productive cough and shortness of breath for 2 to 3 days prior to arrival and said that his cough is constant in nature he also said that he has been experiencing fever and chills. Patient has past medical history significant for diabetes mellitus, hypertension, bipolar disorder as well as current smoker of 1 pack/day. Of note patient does reside in assisted living facility. While in the emergency room department patient was found to be hypoxic on room air SPO2 of 89% subsequent placed on 2 L nasal cannula. Is found also to be influenza A positive. Patient was started on Tamiflu and admitted to hospitalist team.  Unfortunately on 1/3/2022 code stroke was called and patient was found to have left internal carotid artery occlusion which he did receive TNKase and admitted to the ICU. 1/3/2023 patient's oxygenation continued to decline in which he required high flow nasal cannula. This morning, 1/4/2023 patient remains relatively hemodynamically stable and on high flow however flow rate is 0 and FiO2 is only 21. CT head 1/4/2023 also demonstrated Stable CT appearance the brain. No hemorrhage. Patient was actually deemed stable for ICU transfer to ICU stepdown. Hospitalist team will resume care of this patient. Medications:  Reviewed    Infusion Medications    sodium chloride      lactated ringers 100 mL/hr at 01/05/23 0324    dextrose       Scheduled Medications    metoprolol succinate  25 mg Oral Daily    insulin glargine  7 Units SubCUTAneous Nightly    albuterol  2.5 mg Nebulization Once    sodium chloride flush  5-40 mL IntraVENous 2 times per day    insulin lispro  0-4 Units SubCUTAneous Q4H    methylPREDNISolone  40 mg IntraVENous Q12H    aspirin  81 mg Oral Daily    atorvastatin  20 mg Oral Daily    cloZAPine  200 mg Oral Nightly    cloZAPine  50 mg Oral BID    desvenlafaxine succinate  50 mg Oral Daily    divalproex  500 mg Oral QAM    divalproex  1,000 mg Oral Nightly    docusate sodium  100 mg Oral Daily    enoxaparin  40 mg SubCUTAneous Daily    oseltamivir  75 mg Oral BID     PRN Meds: ipratropium-albuterol, sodium chloride, ondansetron **OR** ondansetron, polyethylene glycol, albuterol, clotrimazole, glucose, dextrose bolus **OR** dextrose bolus, glucagon (rDNA), dextrose, sodium chloride flush, potassium chloride **OR** potassium alternative oral replacement **OR** potassium chloride, acetaminophen **OR** acetaminophen, magnesium sulfate      Intake/Output Summary (Last 24 hours) at 1/5/2023 1653  Last data filed at 1/4/2023 2027  Gross per 24 hour   Intake 372.08 ml   Output --   Net 372.08 ml       Diet:  ADULT DIET;  Dysphagia - Minced and Moist; 4 carb choices (60 gm/meal)    Exam:  /63   Pulse 92   Temp 98.2 °F (36.8 °C) (Oral)   Resp 16   Ht 5' 9\" (1.753 m)   Wt 189 lb (85.7 kg)   SpO2 92%   BMI 27.91 kg/m²     General appearance: No apparent distress, appears stated age and cooperative. HEENT: Normal cephalic, atraumatic without obvious deformity. Conjunctivae/corneas clear. Neck: Supple, with full range of motion. No jugular venous distention. Trachea midline. Respiratory: Diminished breath sounds throughout lung fields, notable expiratory wheezing throughout all lung fields. Patient does have tachypnea however is on room air and maintaining adequate oxygenation. Cardiovascular: Regular rate and rhythm with normal S1/S2  Abdomen: Soft, rounded, bowel sounds present. Musculoskeletal:  No clubbing, cyanosis or edema bilaterally. Skin: Warm and dry  Neurologic: Follows commands, does have slurring of speech/delayed response time  Peripheral Pulses: +1 palpable, equal bilaterally       Labs:   Recent Labs     01/03/23  0500 01/04/23  1328 01/05/23  0441   WBC 9.1 9.8 8.0   HGB 13.2* 14.3 13.4*   HCT 40.7* 44.6 40.8*    167 180     Recent Labs     01/03/23  0500 01/04/23  1328 01/05/23  0441    139 141   K 4.1 4.6 4.3    101 103   CO2 25 19* 21*   BUN 25* 27* 23*   CREATININE 0.7 0.7 0.5   CALCIUM 8.3* 8.6 8.6     Recent Labs     01/03/23  0500 01/04/23  1328 01/05/23  0441   AST 22 23 21   ALT 11 13 14   BILITOT 0.3 0.4 0.2*   ALKPHOS 53 62 52     No results for input(s): INR in the last 72 hours. No results for input(s): Alexis Beets in the last 72 hours. Microbiology:      Urinalysis:      Lab Results   Component Value Date/Time    NITRU NEGATIVE 01/07/2020 03:00 PM    [address] 12/20/2016 01:00 PM    BACTERIA NONE 12/20/2016 01:00 PM    RBCUA 0-2 12/20/2016 01:00 PM    BLOODU NEGATIVE 01/07/2020 03:00 PM    SPECGRAV 1.015 08/21/2013 11:05 AM    GLUCOSEU >= 1000 01/07/2020 03:00 PM       Radiology:  MRI BRAIN WO CONTRAST   Final Result       1. No evidence of acute intracranial abnormality.    2. Mild severity chronic microvascular angiopathy superimposed on mild volume loss which has progressed in the interval since prior MRI. 3. Mild mucosal inflammation of the paranasal sinuses. **This report has been created using voice recognition software. It may contain minor errors which are inherent in voice recognition technology. **      Final report electronically signed by Dr. Abelino Fish MD on 1/5/2023 8:16 AM      CT HEAD WO CONTRAST   Final Result    Stable CT appearance the brain. No hemorrhage. **This report has been created using voice recognition software. It may contain minor errors which are inherent in voice recognition technology. **      Final report electronically signed by Dr. Jose Linda on 1/4/2023 7:13 AM      XR CHEST PORTABLE   Final Result   Normal mobile chest.            **This report has been created using voice recognition software. It may contain minor errors which are inherent in voice recognition technology. **      Final report electronically signed by Dr. Genesis Baumann on 1/3/2023 8:15 AM      CTA HEAD W WO CONTRAST (CODE STROKE)   Final Result   Left internal carotid artery is occluded within the neck and reconstituted    intracranially by collateral flow. No vessel occlusion intracranially. This document has been electronically signed by: Kobe Sauceda MD on    01/03/2023 05:28 AM      All CTs at this facility use dose modulation techniques and iterative    reconstructions, and/or weight-based dosing   when appropriate to reduce radiation to a low as reasonably achievable. 3D Post-processing was performed on this study. CTA NECK W WO CONTRAST (CODE STROKE)   Final Result   Left internal carotid artery is occluded. Right carotid and bilateral    vertebral arteries are patent.       This document has been electronically signed by: Kobe Sauceda MD on    01/03/2023 05:33 AM      All CTs at this facility use dose modulation techniques and iterative reconstructions, and/or weight-based dosing   when appropriate to reduce radiation to a low as reasonably achievable. Carotid stenosis and measurements are in accordance with NASCET criteria. 3D Post-processing was performed on this study. CT HEAD WO CONTRAST   Final Result   Impression:   No acute intracranial pathology. This document has been electronically signed by: Esthela Lobato MD on    01/03/2023 04:42 AM      All CTs at this facility use dose modulation techniques and iterative    reconstructions, and/or weight-based dosing   when appropriate to reduce radiation to a low as reasonably achievable. XR CHEST PORTABLE   Final Result   Coarsened appearance to the pulmonary parenchyma which is likely chronic. No definite acute findings. **This report has been created using voice recognition software. It may contain minor errors which are inherent in voice recognition technology. **      Final report electronically signed by Dr. 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

## 2023-01-05 NOTE — PLAN OF CARE
Problem: Discharge Planning  Goal: Discharge to home or other facility with appropriate resources  1/5/2023 0107 by Serina Garduno RN  Outcome: Progressing  Flowsheets (Taken 1/5/2023 0107)  Discharge to home or other facility with appropriate resources:   Identify barriers to discharge with patient and caregiver   Identify discharge learning needs (meds, wound care, etc)     Problem: Safety - Adult  Goal: Free from fall injury  1/5/2023 0107 by Serina Garduno RN  Outcome: Progressing  Flowsheets (Taken 1/5/2023 0107)  Free From Fall Injury: Instruct family/caregiver on patient safety  Note: Patient educated on and encouraged to use the call light for assistance from staff with needs. Patient verbalizes an understanding of this. Bed wheels locked and bed in lowest position; bed alarm on. Patient possessions within reach; pathways clear at this time. Problem: Hematologic - Adult  Goal: Maintains hematologic stability  1/5/2023 0107 by Serina Garduno RN  Outcome: Progressing  Flowsheets (Taken 1/5/2023 0107)  Maintains hematologic stability:   Assess for signs and symptoms of bleeding or hemorrhage   Monitor labs for bleeding or clotting disorders     Problem: Pain  Goal: Verbalizes/displays adequate comfort level or baseline comfort level  1/5/2023 0107 by Serina Garduno RN  Outcome: Progressing  Flowsheets (Taken 1/5/2023 0107)  Verbalizes/displays adequate comfort level or baseline comfort level:   Encourage patient to monitor pain and request assistance   Administer analgesics based on type and severity of pain and evaluate response   Assess pain using appropriate pain scale   Implement non-pharmacological measures as appropriate and evaluate response   Consider cultural and social influences on pain and pain management     Problem: Skin/Tissue Integrity  Goal: Absence of new skin breakdown  Description: 1. Monitor for areas of redness and/or skin breakdown  2. Assess vascular access sites hourly  3. Every 4-6 hours minimum:  Change oxygen saturation probe site  4. Every 4-6 hours:  If on nasal continuous positive airway pressure, respiratory therapy assess nares and determine need for appliance change or resting period. 1/5/2023 0107 by Alina Corrales RN  Outcome: Progressing  Note: No signs and/or symptoms of infection noted during this shift. Patient afebrile during this shift. No new skin breakdown noted during this shift. Patient able to turn self in bed; staff assistance provided as needed. Foot of bed elevated. Problem: ABCDS Injury Assessment  Goal: Absence of physical injury  Outcome: Progressing  Flowsheets (Taken 1/5/2023 0107)  Absence of Physical Injury: Implement safety measures based on patient assessment     Problem: ABCDS Injury Assessment  Goal: Absence of physical injury  Outcome: Progressing  Flowsheets (Taken 1/5/2023 0107)  Absence of Physical Injury: Implement safety measures based on patient assessment     Care plan reviewed with patient. Patient verbalizes understanding of the plan of care and contributes to goal setting.

## 2023-01-05 NOTE — CARE COORDINATION
1/5/23, 2:36 PM EST    DISCHARGE ON GOING EVALUATION    Vicenta Paget day: 2  Location: -16/016-A Reason for admit: Influenza with respiratory manifestation other than pneumonia [J11.1]  Acute respiratory failure with hypoxia ([address] [J96.01]   Procedure:   1/2 CXR: Coarsened appearance to the pulmonary parenchyma which is likely chronic. 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 No vessel occlusion intracranially  1/3 Echo with EF 55%  1/4 CT Head: Stable CT appearance the brain. No hemorrhage  1/5 MRI Brain WO Contrast 1. No evidence of acute intracranial abnormality. 2. Mild severity chronic microvascular angiopathy superimposed on mild volume loss which has progressed in the interval since prior MRI. 3. Mild mucosal inflammation of the paranasal sinuses. Barriers to Discharge: diabetes management, PT/OT, Albuterol nebs, Asa, Lipitor, Lovenox, IV Solu medrol, Tamiflu, elctrolyte replacement protocols. PCP: Johana Marx MD  Readmission Risk Score: 16.2%  Patient Goals/Plan/Treatment Preferences: Plan is to return to [facility]. SW following.

## 2023-01-05 NOTE — PLAN OF CARE
Problem: Respiratory - Adult  Goal: Clear lung sounds  Outcome: Progressing   Continue treatments as ordered per protocol to help facilitate normal breath sounds. Pt is on room air.  Pt agreeable to plan

## 2023-01-05 NOTE — PROGRESS NOTES
[facility]  INPATIENT PHYSICAL THERAPY  DAILY NOTE  [facility] 4A - 4A-16/016-A      Time In: 9049  Time Out: 1428  Timed Code Treatment Minutes: 52 Minutes  Minutes: 47          Date: 2023  Patient Name: Sage Felder,  Gender:  male        MRN: [id number]  : 1965  (65 y.o.)     Referring Practitioner: Scott Mcguire PA-C  Diagnosis: Influenza with respiratory manifestation other than pneumonia  Additional Pertinent Hx: Per ER note on 2023: 65 y.o. male who presents to the emergency department for evaluation of flu like symptoms. States that he has had fever, chills, and non-productive cough that has worsened over the last 3 days. Denies taking any medication for his symptoms. Received 800mL NS while en route to ED. Denies being on oxygen at home. Has been vaccinated for COVID and influenza. Per neuro note: At [phone] on 1/3/2023 stroke alert was called for this patient for left-sided facial droop and dysarthria which were noted by his nurse. Nurse states that at 2 AM the facial droop and slurred speech were not present. Patient taken to imaging for stat CT head which revealed no ICH, midline shift, mass-effect. 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 No LVO. Based on patient's symptoms and time since last known well decision was made for TNK to be given. 22 mg of TN K given at 5:05 AM     Prior Level of Function:  Type of Home: Assisted living  Home Layout: One level  Home Access: Level entry  Home Equipment: Cane   Bathroom Shower/Tub: Walk-in shower  Bathroom Toilet: Standard  Bathroom Equipment: Grab bars around toilet, Grab bars in shower    ADL Assistance: Independent  Homemaking Assistance: Needs assistance  Ambulation Assistance: Independent  Transfer Assistance: Independent  Active : No  Additional Comments: Pt reports using no AD PLOF.  Faciliate completes IADLs & Pt can go down for meals.    Restrictions/Precautions:  Restrictions/Precautions: Fall Risk, General Precautions  Position Activity Restriction  Other position/activity restrictions: impulsive       SUBJECTIVE: nursing ok'd therapy, pt sleeping with bed completely flat he was groggy checked vitals and his O2 sats were 87% so did raise HOB up and still unable to maintain sats > 89% nursing ok'd to place him back on O2 placed him on 1L and still unable to keep above 92% so did increase to 2L for rest of session and able to tritiate to 1L at end of session, pt impulsive w/ mobility his IV was pulled out with mobility (did notify nursing) and noted his gown and tele monitor were wet pt needed assist for changing gown and sheets, pt demonstrated slow processing and decreased awareness of his balance deficits - pt also required cues to take small sips as he coughed immediately following drinking his water however was coughing throughout session as well     PAIN: 0/10:       Vitals: Blood Pressure: 130/66   Oxygen: 87-95% did have to place on O2 and nursing aware noted improved O2 sats when pt up and moving vs when he is in bed, encouraged deep breathing - also noted   Heart Rate: 90-low 100's     OBJECTIVE:  Bed Mobility:  Supine to Sit: Minimal Assistance, initially he was lacking initiation or very slow processing but he needed me to start to bring LEs over to the edge then he was impulsive and this is when his IV came out   Sit to Supine: Stand By Assistance     Transfers:  Sit to Stand: Minimal Assistance, to CGA pt impulsive and unsteady without UE at support   Stand to [address] [facility], to [facility] cues for safety to back up and reach back and to control descend    Ambulation:  Minimal Assistance  Distance: 20x2, and to and from bathroom   Surface: Level Tile  Device:No Device  Gait Deviations:  pt unsteady with path deviation, poor awareness of his O2 line, noted narrow base of support and arms in guarded position with short steps lacking complete heel strike and toe off- pt reported that he walks about 1/4 a mile to get to the dinning room at his living facility     Balance:  Sitting edge of bed with SBA pt impulsive needing cues not to stand or reach for objects till therapist ready- assisted with gown changing while sitting edge of bed  Standing balance w/o UE at support pt required mostly min assist due to post lean and trunk sway   Standing to use restroom pt demonstrated post loss of balance and needed multiple cues to get closer to the toilet as he was urinating on the floor   Exercise:  Patient was guided in 1 set(s) 10 reps of exercise to both lower extremities. Ankle pumps, Heelslides, Hip abduction/adduction, Upper trunk rotations, Seated marches, Seated hamstring curls, Long arc quads, and shoulder horz abd/add with deep breathing ex pt needed cues for proper technique of ex  . Exercises were completed for increased independence with functional mobility. Functional Outcome Measures: Completed  Balance Score: 4  Gait Score: 6  Tinetti Total Score: 10  Risk Indicators:  Less than/equal to 18 = high risk  19-23 Moderate risk  Greater than/equal to 24 = low risk    AM-PAC Inpatient Mobility without Stair Climbing Raw Score : 15  AM-PAC Inpatient without Stair Climbing T-Scale Score : 43.03    ASSESSMENT:  Assessment:  Pt demonstrated decreased safety awareness of deficits he was unsteady with gait and balance activity and scored a 10/28 on his tinetti balance test putting him at a high fall risk. Pt would greatly benefit from cont skilled therapy to work on strength, balance, endurance and increased independence and safety with functional mobility prior to return to [place]. Activity Tolerance:  Patient tolerance of  treatment: fair. Equipment Recommendations: Other: monitor for needs- he may need a rolling walker   Discharge Recommendations:  Pending his progress anticipate he may need a therapy stay prior to return to [place] Plan: Current Treatment Recommendations: Strengthening, Balance training, Functional mobility training, Gait training, Safety education & training, Home exercise program, Patient/Caregiver education & training, Transfer training, Endurance training, Therapeutic activities  General Plan:  (6x N)    Patient Education  Patient Education: Plan of Care, Home Exercise Program    Goals:  Patient Goals : go home  Short Term Goals  Time Frame for Short Term Goals: at discharge  Short Term Goal 1: Pt to be Mod I for supine <> sit to get in/out of bed  Short Term Goal 2: Pt to be Mod I for sit <> stand to get up to ambulate  Short Term Goal 3: Pt to ambulate >80 ft with cane with Supervision for getting to dining room at [facility] Term Goals  Time Frame for Long Term Goals : not set due to short ELOS    Following session, patient left in safe position with all fall risk precautions in place.

## 2023-01-06 LAB
ANION GAP SERPL CALCULATED.3IONS-SCNC: 16 MEQ/L (ref 8–16)
BASOPHILS # BLD: 0.2 %
BASOPHILS ABSOLUTE: 0 THOU/MM3 (ref 0–0.1)
BUN BLDV-MCNC: 25 MG/DL (ref 7–22)
CALCIUM SERPL-MCNC: 8.4 MG/DL (ref 8.5–10.5)
CHLORIDE BLD-SCNC: 104 MEQ/L (ref 98–111)
CO2: 25 MEQ/L (ref 23–33)
CREAT SERPL-MCNC: 0.5 MG/DL (ref 0.4–1.2)
EOSINOPHIL # BLD: 0 %
EOSINOPHILS ABSOLUTE: 0 THOU/MM3 (ref 0–0.4)
ERYTHROCYTE [DISTWIDTH] IN BLOOD BY AUTOMATED COUNT: 12.9 % (ref 11.5–14.5)
ERYTHROCYTE [DISTWIDTH] IN BLOOD BY AUTOMATED COUNT: 46.3 FL (ref 35–45)
GFR SERPL CREATININE-BSD FRML MDRD: > 60 ML/MIN/1.73M2
GLUCOSE BLD-MCNC: 124 MG/DL (ref 70–108)
GLUCOSE BLD-MCNC: 145 MG/DL (ref 70–108)
GLUCOSE BLD-MCNC: 145 MG/DL (ref 70–108)
GLUCOSE BLD-MCNC: 156 MG/DL (ref 70–108)
GLUCOSE BLD-MCNC: 160 MG/DL (ref 70–108)
GLUCOSE BLD-MCNC: 218 MG/DL (ref 70–108)
HCT VFR BLD CALC: 44 % (ref 42–52)
HEMOGLOBIN: 14.6 GM/DL (ref 14–18)
IMMATURE GRANS (ABS): 0.03 THOU/MM3 (ref 0–0.07)
IMMATURE GRANULOCYTES: 0.3 %
LYMPHOCYTES # BLD: 10.7 %
LYMPHOCYTES ABSOLUTE: 1 THOU/MM3 (ref 1–4.8)
MCH RBC QN AUTO: 32 PG (ref 26–33)
MCHC RBC AUTO-ENTMCNC: 33.2 GM/DL (ref 32.2–35.5)
MCV RBC AUTO: 96.5 FL (ref 80–94)
MONOCYTES # BLD: 7.5 %
MONOCYTES ABSOLUTE: 0.7 THOU/MM3 (ref 0.4–1.3)
NUCLEATED RED BLOOD CELLS: 0 /100 WBC
PLATELET # BLD: 175 THOU/MM3 (ref 130–400)
PMV BLD AUTO: 8.9 FL (ref 9.4–12.4)
POTASSIUM REFLEX MAGNESIUM: 4.2 MEQ/L (ref 3.5–5.2)
RBC # BLD: 4.56 MILL/MM3 (ref 4.7–6.1)
SEG NEUTROPHILS: 81.3 %
SEGMENTED NEUTROPHILS ABSOLUTE COUNT: 7.6 THOU/MM3 (ref 1.8–7.7)
SODIUM BLD-SCNC: 145 MEQ/L (ref 135–145)
VALPROIC ACID LEVEL: 60.2 UG/ML (ref 50–100)
WBC # BLD: 9.4 THOU/MM3 (ref 4.8–10.8)

## 2023-01-06 PROCEDURE — 97535 SELF CARE MNGMENT TRAINING: CPT

## 2023-01-06 PROCEDURE — 92526 ORAL FUNCTION THERAPY: CPT

## 2023-01-06 PROCEDURE — 6360000002 HC RX W HCPCS

## 2023-01-06 PROCEDURE — 36415 COLL VENOUS BLD VENIPUNCTURE: CPT

## 2023-01-06 PROCEDURE — 2060000000 HC ICU INTERMEDIATE R&B

## 2023-01-06 PROCEDURE — 6370000000 HC RX 637 (ALT 250 FOR IP): Performed by: STUDENT IN AN ORGANIZED HEALTH CARE EDUCATION/TRAINING PROGRAM

## 2023-01-06 PROCEDURE — 97110 THERAPEUTIC EXERCISES: CPT

## 2023-01-06 PROCEDURE — 80048 BASIC METABOLIC PNL TOTAL CA: CPT

## 2023-01-06 PROCEDURE — 80164 ASSAY DIPROPYLACETIC ACD TOT: CPT

## 2023-01-06 PROCEDURE — 97129 THER IVNTJ 1ST 15 MIN: CPT

## 2023-01-06 PROCEDURE — 2580000003 HC RX 258: Performed by: NURSE PRACTITIONER

## 2023-01-06 PROCEDURE — 82948 REAGENT STRIP/BLOOD GLUCOSE: CPT

## 2023-01-06 PROCEDURE — 97116 GAIT TRAINING THERAPY: CPT

## 2023-01-06 PROCEDURE — 6370000000 HC RX 637 (ALT 250 FOR IP): Performed by: NURSE PRACTITIONER

## 2023-01-06 PROCEDURE — 97530 THERAPEUTIC ACTIVITIES: CPT

## 2023-01-06 PROCEDURE — 6370000000 HC RX 637 (ALT 250 FOR IP)

## 2023-01-06 PROCEDURE — 85025 COMPLETE CBC W/AUTO DIFF WBC: CPT

## 2023-01-06 PROCEDURE — 6360000002 HC RX W HCPCS: Performed by: INTERNAL MEDICINE

## 2023-01-06 RX ADMIN — INSULIN GLARGINE 7 UNITS: 100 INJECTION, SOLUTION SUBCUTANEOUS at 21:48

## 2023-01-06 RX ADMIN — SODIUM CHLORIDE, PRESERVATIVE FREE 10 ML: 5 INJECTION INTRAVENOUS at 10:14

## 2023-01-06 RX ADMIN — ASPIRIN 81 MG: 81 TABLET, CHEWABLE ORAL at 10:14

## 2023-01-06 RX ADMIN — DIVALPROEX SODIUM 1000 MG: 500 TABLET, EXTENDED RELEASE ORAL at 20:23

## 2023-01-06 RX ADMIN — OSELTAMIVIR PHOSPHATE 75 MG: 75 CAPSULE ORAL at 20:23

## 2023-01-06 RX ADMIN — METOPROLOL SUCCINATE 25 MG: 25 TABLET, EXTENDED RELEASE ORAL at 10:16

## 2023-01-06 RX ADMIN — SODIUM CHLORIDE, PRESERVATIVE FREE 10 ML: 5 INJECTION INTRAVENOUS at 20:23

## 2023-01-06 RX ADMIN — CLOZAPINE 50 MG: 25 TABLET ORAL at 10:15

## 2023-01-06 RX ADMIN — ATORVASTATIN CALCIUM 20 MG: 20 TABLET, FILM COATED ORAL at 10:16

## 2023-01-06 RX ADMIN — METHYLPREDNISOLONE SODIUM SUCCINATE 40 MG: 40 INJECTION, POWDER, FOR SOLUTION INTRAMUSCULAR; INTRAVENOUS at 20:23

## 2023-01-06 RX ADMIN — DOCUSATE SODIUM 100 MG: 100 CAPSULE, LIQUID FILLED ORAL at 10:13

## 2023-01-06 RX ADMIN — DIVALPROEX SODIUM 500 MG: 500 TABLET, EXTENDED RELEASE ORAL at 10:15

## 2023-01-06 RX ADMIN — ENOXAPARIN SODIUM 40 MG: 100 INJECTION SUBCUTANEOUS at 10:14

## 2023-01-06 RX ADMIN — SODIUM CHLORIDE, POTASSIUM CHLORIDE, SODIUM LACTATE AND CALCIUM CHLORIDE: 600; 310; 30; 20 INJECTION, SOLUTION INTRAVENOUS at 02:52

## 2023-01-06 RX ADMIN — METHYLPREDNISOLONE SODIUM SUCCINATE 40 MG: 40 INJECTION, POWDER, FOR SOLUTION INTRAMUSCULAR; INTRAVENOUS at 10:13

## 2023-01-06 RX ADMIN — INSULIN LISPRO 1 UNITS: 100 INJECTION, SOLUTION INTRAVENOUS; SUBCUTANEOUS at 14:39

## 2023-01-06 RX ADMIN — DESVENLAFAXINE 50 MG: 50 TABLET, EXTENDED RELEASE ORAL at 10:16

## 2023-01-06 RX ADMIN — CLOZAPINE 50 MG: 25 TABLET ORAL at 14:39

## 2023-01-06 RX ADMIN — OSELTAMIVIR PHOSPHATE 75 MG: 75 CAPSULE ORAL at 10:15

## 2023-01-06 RX ADMIN — CLOZAPINE 200 MG: 100 TABLET ORAL at 20:23

## 2023-01-06 ASSESSMENT — PAIN SCALES - GENERAL
PAINLEVEL_OUTOF10: 0

## 2023-01-06 NOTE — PROGRESS NOTES
Physician Progress Note      PATIENT:               Titus Schmidt  CSN #:                  [id number]  :                       1965  ADMIT DATE:       2023 8:49 AM  [address] DATE:  RESPONDING  PROVIDER #:        Macy Mayers MD          QUERY TEXT:    Dr Venecia Jacobs, Dr Angie Duran,    Patient admitted with Sepsis & Influenza A. On 1/3 stroke alert initiated. 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 TNK   administered. Per Neuro: Left Facial Droop, Dysarthria s/p TNK on 1/3/23 at   [address] [place] [address] with negative MRI- suspect Toxic Metabolic Encephalopathy. If possible,   please document in progress notes and discharge summary if you are evaluating   and /or treating any of the following: The medical record reflects the following:  Risk Factors: Sepsis with Influenza A  Clinical Indicators: On 1/3 stroke alert initiated. Cerebral infarction due to   internal carotid artery occlusion documented. TNK administered. Per Neuro:   Left Facial Droop, Dysarthria s/p TNK on 1/3/23 at [address] [place] [address] with negative MRI-   suspect Toxic Metabolic Encephalopathy. Treatment:   NIHSS every shift. Neuro checks per unit unless otherwise   specified. Dysphagia screen prior to oral intake. Options provided:  -- CVA confirmed and Toxic Metabolic Encephalopathy ruled out  -- Toxic Metabolic Encephalopathy confirmed and CVA ruled out  -- CVA and Toxic Metabolic Encephalopathy confirmed  -- Other - I will add my own diagnosis  -- Disagree - Not applicable / Not valid  -- Disagree - Clinically unable to determine / Unknown  -- Refer to Clinical Documentation Reviewer    PROVIDER RESPONSE TEXT:    CVA ruled out per imaging  Metabolic encephalopathy confirmed    Query created by: Molly Scott on 2023 8:41 AM      QUERY TEXT:    Dr Venecia Jacobs, Dr Angie Duran,    Pt admitted with Influenza A. Per Dr Jossie Cooks 2023: This patient has sepsis   which was present on admission.  Pt noted to have WBC 11.9, Procalcitonin 0.55,   CRP 15.05, Pulse 110, respirations 24 on arrival. If possible, please   document in progress notes and discharge summary the present on admission   status of Sepsis:    The medical record reflects the following:  Risk Factors:  lives at a facility that there has been an outbreak of   influenza A  Clinical Indicators: Per Dr Giovana Abreu 1/4/2023: This patient has sepsis which was   present on admission. Influenza A. WBC 11.9, Procalcitonin 0.55, CRP 15.05,   Pulse 110, respirations 24 on arrival  Treatment:  0.9  ml bolus, TAMIFLU.   Options provided:  -- Yes, Sepsis was present at the time of the order to admit to the hospital  -- No, Sepsis was not present on admission and developed during the inpatient   stay  -- Other - I will add my own diagnosis  -- Disagree - Not applicable / Not valid  -- Disagree - Clinically unable to determine / Unknown  -- Refer to Clinical Documentation Reviewer    PROVIDER RESPONSE TEXT:    Yes, Sepsis was present at the time of the order to admit to the hospital.    Query created by: Yaz Duran on 1/5/2023 8:47 AM      Electronically signed by:  Yumiko Madrid MD 1/6/2023 8:38 AM

## 2023-01-06 NOTE — CARE COORDINATION
1/6/23, 3:12 PM EST    DISCHARGE PLANNING EVALUATION    Spoke with Keenan Peters at [facility]. They are unable to accept [place] over the weekend. They will take back Monday if he is medically stable. [facility] will set up transport at that time. They would like [facility] to provide the oxygen if needed at the facility. If he need home health PT/OT they would like it set up through [facility] .

## 2023-01-06 NOTE — PROGRESS NOTES
[address] [address]  INPATIENT PHYSICAL THERAPY  DAILY NOTE  [facility] 4A - 4A-16/016-A      Time In: 5384  Time Out: 1403  Timed Code Treatment Minutes: 44 Minutes  Minutes: 39          Date: 2023  Patient Name: Lark Lesch,  Gender:  male        MRN: [id number]  : 1965  (65 y.o.)     Referring Practitioner: Timi Arguello PA-C  Diagnosis: Influenza with respiratory manifestation other than pneumonia  Additional Pertinent Hx: Per ER note on 2023: 65 y.o. male who presents to the emergency department for evaluation of flu like symptoms. States that he has had fever, chills, and non-productive cough that has worsened over the last 3 days. Denies taking any medication for his symptoms. Received 800mL NS while en route to ED. Denies being on oxygen at home. Has been vaccinated for COVID and influenza. Per neuro note: At [phone] on 1/3/2023 stroke alert was called for this patient for left-sided facial droop and dysarthria which were noted by his nurse. Nurse states that at 2 AM the facial droop and slurred speech were not present. Patient taken to imaging for stat CT head which revealed no ICH, midline shift, mass-effect. 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 No LVO. Based on patient's symptoms and time since last known well decision was made for TNK to be given. 22 mg of TN K given at 5:05 AM     Prior Level of Function:  Type of Home: Assisted living  Home Layout: One level  Home Access: Level entry  Home Equipment: Cane   Bathroom Shower/Tub: Walk-in shower  Bathroom Toilet: Standard  Bathroom Equipment: Grab bars around toilet, Grab bars in shower    ADL Assistance: Independent  Homemaking Assistance: Needs assistance  Ambulation Assistance: Independent  Transfer Assistance: Independent  Active : No  Additional Comments: Pt reports using no AD PLOF.  Faciliate completes IADLs & Pt can go down for meals.    Restrictions/Precautions:  Restrictions/Precautions: Fall Risk, General Precautions  Position Activity Restriction  Other position/activity restrictions: impulsive       SUBJECTIVE: nursing ok'd therapy reported that ST was just in, pt in bed and agreeable for therapy, he cont to be impulsive and demonstrated poor safety awareness of O2 line and of his balance deficits - pt required physical assist for balance and to correctly use urinal as he was urinating on the floor     PAIN: 0/10:       Vitals: Oxygen: pt on 4L O2 and sats 92-95%   Noted elevated HR in low 100's with activity   OBJECTIVE:  Bed Mobility:  Rolling to Left: w/ SBA  Supine to Sit: Minimal Assistance, with head of bed flat, without rail, encouraged pt to try w/o therapist assist as he won't have assist at home, he needed step by step cues and then physical assist at trunk as he wasn't able to complete w/o my assist    Sit to Supine: Stand By Assistance   Scooting: Stand By Assistance    Transfers:  Sit to Stand: Minimal Assistance, to CGA- pt took more than one attempt at times and demonstrated post lean and loss of balance several times during session   Stand to Sit:Contact Guard Assistance, cues for safety for O2 line awareness and to control descend    Ambulation:  Minimal Assistance  Distance: 20x2  Surface: Level Tile  Device:No Device- pt wasn't using any AD prior to hospital stay  Gait Deviations:  noted quick luis with arms in guarded position w/ decreased step length and poor awareness of his O2 line - pt was on 4L O2 during gait     Balance:  Pt cont to demonstrate a post lean w/ standing balance while using urinal he needed min assist for balance and with initial stand he would lean post and support LEs against surface to help or sit back down   Pt completed dynamic balance activity w/o UE at support reaching above head and to knee ht and to the right and left ~ 2in w/ min to CGA for balance- one time he reached to the floor but reached for UE at support needing CGA for balance and cues for safety   Controlled stepping and tapping with one UE at support with min assist for balance he demonstrated increased difficulty during right LE closed chain activity   Exercise:  Patient was guided in 1 set(s) 10 reps of exercise to both lower extremities. Standing ex with jarad UEs at support with CGA for balance noted more difficulty while in closed chain at right LE with some ex  . Exercises were completed for increased independence with functional mobility. Functional Outcome Measures: Completed  AM-PAC Inpatient Mobility without Stair Climbing Raw Score : 15  AM-PAC Inpatient without Stair Climbing T-Scale Score : 43.03    ASSESSMENT:  Assessment:  Pt cont to be impulsive and demonstrated decreased safety awareness and decreased balance. Pt required hands on assist along with verbal cues throughout session. Pt would greatly benefit from cont skilled therapy prior to return to [facility]. Activity Tolerance:  Patient tolerance of  treatment: fair. Equipment Recommendations: Other: monitor for needs  Discharge Recommendations:  Pt would benefit from a therapy stay prior to return to [facility].    Plan: Current Treatment Recommendations: Strengthening, Balance training, Functional mobility training, Gait training, Safety education & training, Home exercise program, Patient/Caregiver education & training, Transfer training, Endurance training, Therapeutic activities  General Plan:  (6x N)    Patient Education  Patient Education: Plan of Care    Goals:  Patient Goals : go home  Short Term Goals  Time Frame for Short Term Goals: at discharge  Short Term Goal 1: Pt to be Mod I for supine <> sit to get in/out of bed  Short Term Goal 2: Pt to be Mod I for sit <> stand to get up to ambulate  Short Term Goal 3: Pt to ambulate >80 ft with cane with Supervision for getting to dining room at [facility] Term Goals : not set due to short ELOS    Following session, patient left in safe position with all fall risk precautions in place.

## 2023-01-06 NOTE — PROGRESS NOTES
[address]  Occupational Therapy  Daily Note  Time:   Time In: 4424  Time Out: 8065  Timed Code Treatment Minutes: 29 Minutes  Minutes: 29          Date: 2023  Patient Name: Janae Cristina,   Gender: male      Room: [facility]16/016-A  MRN: [id number]  : 1965  (65 y.o.)  Referring Practitioner: Debbie Chairez PA-C  Diagnosis: acute respiratory failure with hypoxia  Additional Pertinent Hx: Per ER note on 2023: 65 y.o. male who presents to the emergency department for evaluation of flu like symptoms. States that he has had fever, chills, and non-productive cough that has worsened over the last 3 days. Denies taking any medication for his symptoms. Received 800mL NS while en route to ED. Denies being on oxygen at home. Has been vaccinated for COVID and influenza. Per neuro note: At [phone] on 1/3/2023 stroke alert was called for this patient for left-sided facial droop and dysarthria which were noted by his nurse. Nurse states that at 2 AM the facial droop and slurred speech were not present. Patient taken to imaging for stat CT head which revealed no ICH, midline shift, mass-effect. 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 No LVO. Based on patient's symptoms and time since last known well decision was made for TNK to be given. 22 mg of TN K given at 5:05 AM    Restrictions/Precautions:  Restrictions/Precautions: Fall Risk, General Precautions  Position Activity Restriction  Other position/activity restrictions: impulsive     SUBJECTIVE: Patient sleeping in bed upon arrival with minimal verbal stimulation to alert; agreeable to therapy this date. Patient pleasant and cooperative throughout.     PAIN: 0/10:     Vitals: Vitals not assessed per clinical judgement, see nursing flowsheet    COGNITION: Decreased Insight, Decreased Problem Solving, and Decreased Safety Awareness    ADL:   Grooming: Stand By Assistance, X 1, with set-up, and with verbal cues . Bathing: Minimal Assistance, X 1, with verbal cues , and with increased time for completion. pericare  Upper Extremity Dressing: Minimal Assistance. Lower Extremity Dressing: Moderate Assistance. Patient able to thread RLE, assistance with L with verbal cues to increase independence, assistance to doff/pearl B socks . BALANCE:  Sitting Balance:  Stand By Assistance, X 1, with cues for safety, with verbal cues , with increased time for completion. Standing Balance: Contact Guard Assistance with no device. BED MOBILITY:  Rolling to Left: Stand By Assistance, with head of bed raised    Rolling to Right: Stand By Assistance, X 1, with head of bed flat    Supine to Sit: Stand By Assistance, X 1, with head of bed raised      TRANSFERS:  Sit to Stand:  Contact Guard Assistance. Stand to Sit: Contact Guard Assistance. FUNCTIONAL MOBILITY:  Assistive Device: None and hand held  Assist Level:  Contact Guard Assistance. Distance:  EOB to bedside chair    ASSESSMENT:     Activity Tolerance:  Patient tolerance of  treatment: good. Discharge Recommendations: Subacute/skilled nursing facility  Equipment Recommendations:  Other: RW use  Plan: Times Per Week: 6x  Current Treatment Recommendations: Functional mobility training, Balance training, Self-Care / ADL, Safety education & training, Endurance training    Patient Education  Patient Education: Role of OT, Plan of Care, ADL's, IADL's, Home Safety, and Importance of Increasing Activity    Goals  Short Term Goals  Time Frame for Short Term Goals: until discharge  Short Term Goal 1: Pt will complete various t/fs including BSC with CGA & min vcs for safe technique  Short Term Goal 2: Pt will tolerate standing 2-3 min with CGA for increased ease of sinkside grooming  Short Term Goal 3: Pt will complete mobility to [facility] or bedside chair with RW, CGA, & 0-2 vcs for safety  Short Term Goal 4: Pt will complete BADL routine with min A & min vcs for safety & sequencing  Long Term Goals  Time Frame for Long Term Goals : No LTG set d/t short ELOS    Following session, patient left in safe position with all fall risk precautions in place.

## 2023-01-06 NOTE — PLAN OF CARE
Problem: Discharge Planning  Goal: Discharge to home or other facility with appropriate resources  1/6/2023 0125 by Kassie Mcclelland RN  Outcome: Progressing  Flowsheets (Taken 1/6/2023 0125)  Discharge to home or other facility with appropriate resources:   Identify barriers to discharge with patient and caregiver   Identify discharge learning needs (meds, wound care, etc)     Problem: Safety - Adult  Goal: Free from fall injury  1/6/2023 0125 by Kassie Mcclelland RN  Outcome: Progressing  Flowsheets (Taken 1/6/2023 0125)  Free From Fall Injury: Instruct family/caregiver on patient safety  Note: Patient educated on and encouraged to use the call light for assistance from staff with needs. Patient verbalizes an understanding of this. Bed wheels locked and bed in lowest position; bed alarm on. Patient possessions within reach; pathways clear at this time.       Problem: Neurosensory - Adult  Goal: Neuropyschological functioning will improve  Description: Neuropyschological functioning will improve  1/6/2023 0125 by Kassie Mcclelland RN  Outcome: Progressing     Problem: Hematologic - Adult  Goal: Maintains hematologic stability  1/6/2023 0125 by Kassie Mcclelland RN  Outcome: Progressing  Flowsheets (Taken 1/6/2023 0125)  Maintains hematologic stability:   Assess for signs and symptoms of bleeding or hemorrhage   Monitor labs for bleeding or clotting disorders     Problem: Pain  Goal: Verbalizes/displays adequate comfort level or baseline comfort level  1/6/2023 0125 by Kassie Mcclelland RN  Outcome: Progressing  Flowsheets (Taken 1/6/2023 0125)  Verbalizes/displays adequate comfort level or baseline comfort level:   Encourage patient to monitor pain and request assistance   Assess pain using appropriate pain scale   Administer analgesics based on type and severity of pain and evaluate response   Implement non-pharmacological measures as appropriate and evaluate response   Consider cultural and social influences on pain and pain management     Problem: Skin/Tissue Integrity  Goal: Absence of new skin breakdown  Description: 1. Monitor for areas of redness and/or skin breakdown  2. Assess vascular access sites hourly  3. Every 4-6 hours minimum:  Change oxygen saturation probe site  4. Every 4-6 hours:  If on nasal continuous positive airway pressure, respiratory therapy assess nares and determine need for appliance change or resting period. 1/6/2023 0125 by Anshu Tesfaye RN  Outcome: Progressing  Note: No signs and/or symptoms of infection noted during this shift. Patient afebrile during this shift. No new skin breakdown noted during this shift. Patient able to turn self in bed; staff assistance provided as needed. Foot of bed elevated. Problem: ABCDS Injury Assessment  Goal: Absence of physical injury  1/6/2023 0125 by Anshu Tesfaye RN  Outcome: Progressing  Flowsheets (Taken 1/6/2023 0125)  Absence of Physical Injury: Implement safety measures based on patient assessment     Care plan reviewed with patient. Patient verbalizes understanding of the plan of care and contributes to goal setting.

## 2023-01-06 NOTE — CARE COORDINATION
1/6/23, 2:05 PM EST    DISCHARGE ON GOING EVALUATION    Jayjay Henriquez day: 3  Location: -16/016-A Reason for admit: Influenza with respiratory manifestation other than pneumonia [J11.1]  Acute respiratory failure with hypoxia ([address] [J96.01]   Procedure:   1/2 CXR: Coarsened appearance to the pulmonary parenchyma which is likely chronic. 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 No vessel occlusion intracranially  1/3 Echo with EF 55%  1/4 CT Head: Stable CT appearance the brain. No hemorrhage  1/5 MRI Brain WO Contrast 1. No evidence of acute intracranial abnormality. 2. Mild severity chronic microvascular angiopathy superimposed on mild volume loss which has progressed in the interval since prior MRI. 3. Mild mucosal inflammation of the paranasal sinuses  Barriers to Discharge: diabetes management, nasal cannula oxygen at 4 liters with saturations at 95%. Albuterol nebs, Asa, Depakote, Lovenox, Duonebs, IV fluids, IV Solu medrol, Tamiflu, electrolyte replacement protocols. PCP: Moose Joiner MD  Readmission Risk Score: 16.3%  Patient Goals/Plan/Treatment Preferences: Plan is to return to [facility]. SW following.

## 2023-01-06 NOTE — PROGRESS NOTES
[address] [address]  INPATIENT SPEECH THERAPY  [facility] NEUROSCIENCES 4A  DAILY NOTE    TIME   SLP Individual Minutes  Time In: 7641  Time Out: 3696  Minutes: 17  Timed Code Treatment Minutes: 8 Minutes  Dysphagia therapy: 9 minutes  Cognitive therapy: 8 minutes       Date: 2023  Patient Name: Goyo Chakraborty      CSN: [id number]   : 1965  (65 y.o.)  Gender: male   Referring Physician:  Tricia Mo MD  Diagnosis: Influenza with respiratory manifestation other than pneumonia   Precautions: Fall risk, aspiration precautions, droplet precautions   Current Diet: Minced and moist, thin liquids; UPGRADED to Soft and Bite Size with Thin Liquids on 2023  Swallowing Strategies:  Full upright position, small bite/sip, slow rate for intake, pulmonary montioring, oral care after meals, alternating solids/liquids, SUPERVISION d/t impulsivity  Date of Last MBS/FEES: Not Applicable    Pain:  No pain reported. Subjective:  ERMIAS Thacker with approval to proceed with session, indications conveyed for improvements with respiratory status and safe consumption of minced and moist diet with thin liquids. Upon arrival, patient with call light on with request to transfer to bed, however, agreeable to participate with ST prior to transfer. Alert, pleasant, and cooperative with ST throughout session. No family present. Short-Term Goals:  SHORT TERM GOAL #1:  Goal 1: Patient will safely consume minced and moist with thin liquids (advanced texture trials as indicated) without overt s/s aspiration and with implementation of identified compensatory strategies to assist with nutrition/hydration measures. GOAL MET  REVISED GOAL 1: Patient will safely consume soft and bite size diet with thin liquids (advanced texture trials as indicated) without overt s/s aspiration and with implementation of identified compensatory strategies to assist with nutrition/hydration measures.   INTERVENTIONS: Completed skilled, advanced dietary analysis with toast and jelly as well as thin via straw. Oral phase with additional time needed to complete effective mastication, however, efficacious with additional time provided with no evidence for oral stasis given liquid wash - independently initiated. Thin H20 via straw with controlled/single sips with no overt s/s aspiration; at least minimal cuing to utilize small sips. Certainly not able to r/o totality of airway invasion events and/or pharyngeal dysfunction at bedside alone; patient's swallow physiology appears functional to support goal for comfortable oral intake without distress. Recommendations for advancement to soft and bite size diet with thin liquids, strict adherence to identified compensatory strategies (SMALL sips) requiring intermittent supervision with meals. Ongoing dysphagia management services to be rendered with considerations for formal instrumentation via MBS should it become clinically indicated. SHORT TERM GOAL #2:  Goal 2: Patient will complete structured speech drills/tasks (DDK rates, AMRs, multi-syllabic word repetition) with implementation of SOS speech strategies to improve intelligibility to 50% in all contexts to optimize communicative efficacy. INTERVENTIONS: DNT due to focus on additional STGs. SHORT TERM GOAL #3:  Goal 3: Patient will complete functional immediate/delayed recall tasks with 60% accuracy, mod cues to improve retention of pertinent information. INTERVENTIONS:   Orientation  Date: logical cuing  Month: independent  Year: independent  Place: independent  JAVIER: independent  Time: independent within ten minutes withOUT clock use    SHORT TERM GOAL #4:  Goal 4: Patient will complete problem solving, verbal/visual reasoning, and executive functioning tasks (time, money/math/finances, medications) with 60% accuracy, mod cues to improve contributions within ADL/IADL completion.   INTERVENTIONS:   Problem Solving    Compensatory Strategy Utilization: minimal cuing   Soft and Bite Size vs. Regular Diet: moderate cuing   Safety with Transfer: minimal cuing    SHORT TERM GOAL #5:  Goal 5: Patient will complete sequencing and thought organization tasks with 70% accuracy, mod cues to improve mental flexibility for daily living scenarios. INTERVENTIONS: DNT d/t focus on additional STGS      Long-Term Goals:  No LTG established given short ELOS      EDUCATION:  Learner: Patient  Education:  Reviewed diet and strategies, Reviewed signs, symptoms and risks of aspiration, Reviewed ST goals and Plan of Care, and Reviewed recommendations for follow-up  Evaluation of Education: Verbalizes understanding, Demonstrates with assistance, Needs further instruction, and Family not present    ASSESSMENT/PLAN:  Activity Tolerance:  Patient tolerance of  treatment: good. Cooperative with ST and POC. Improvements with respiratory status. Assessment/Plan: Patient progressing toward established goals. Continues to require skilled care of licensed speech pathologist to progress toward achievement of established goals and plan of care. .     Plan for Next Session: cognitive rehabilitation, speech production, dysphagia management  Discharge Recommendations: [facility]       Elodie Mortimer, M.S., [facility]

## 2023-01-06 NOTE — PROGRESS NOTES
Hospitalist Progress Note    Patient:  Mile Garces      Unit/Bed:4A-16/016-A    YOB: 1965    MRN: [id number]       Acct: [de-identified]     PCP: Arthor Cranker, MD    Date of Admission: 1/2/2023    Disposition: intermediate unable to accept patient until Monday if medically stable. DME O2 needed    Assessment/Plan:    Acute hypoxic respiratory failure: Secondary to influenza A. Patient does not require oxygen at baseline. Patient initially required nasal cannula that progressed to high settings of HFNC. Patient's oxygenation however is starting to improve and patient is currently requiring FiO2 only 21% and flow rate 0. Of note patient was placed on Solu-Medrol 1/3/23 as well as and duonebs to assist with lung function due to possible underlying COPD due to significant smoking history.   -Pulmonary hygiene    -Wean oxygen as tolerated   -Maintain oxygen saturation > 94%    Influenza A: Patient finally positive in the emergency department upon arrival.  Started on Tamiflu.   -Continue Tamiflu for total 5 days. Left facial droop with dysarthria: CTA had demonstrated left ICA occlusion from origin the base of skull. There is no LVO. Patient did receive TKNase 1/3/23. Repeat head CT 1/4/2028 showed stability. Patient had echocardiogram 1/3/2023 that showed EF of 55% and overall normal left ventricular function.    -Neurology following   -Continue neurovascular checks   -Patient can be started on chemical DVT prophylaxis once it has been  24 hours since TNKase administration.   -Patient will also be started on aspirin 1/4/2023 which is also 24 hours status post TNKase administration.   -Patient had MRI today, no e/o acute intracranial abnormality or bleed   -Patient remains n.p.o. until speech/swallow evaluation has been completed   -PT/OT   -Continue telemetry   -Continue statin therapy    Type 2 diabetes mellitus: 1/3/23 hemoglobin A1c 6.8. Goal blood glucose while inpatient 140-180.   While patient started to eat again blood glucose increased therefore placed patient on low-dose sliding scale as well as Lantus.  Will likely need to dose adjust throughout patient's stay.  And as diet progresses.   -Hold home meds   -Low dose sliding scale    -Lantus 7 units nightly (2/3 home dose of 10 units nightly)  -Hypoglycemic protocol  -Accu-Cheks  -Diabetic diet when able    Essential hypertension: Has been relatively controlled since arrival.  Permissive hypertension (up to 180/105) was allowed for 24 hours following thrombolytic administration.   -At this time patient's goal blood pressure  <130/80  -Home Lopressor resumed with holding parameters    Bipolar affective disorder: Continue home meds Clozapine, Pristiq, and Depakote    Current smoker: 1 pack/day   -Patient currently has nicotine patch   -Smoking cessation advised    Expected discharge date: To be determined    Disposition:    [] Home       [] TCU       [] Rehab       [] Psych       [] SNF       [x] Long Term Care Facility       [] Other-    Chief Complaint: Shortness of breath    Hospital Course:     57-year-old male presented to [facility] emergency department 1/2/2022 with a chief complaint of shortness of breath.  Patient was experiencing productive cough and shortness of breath for 2 to 3 days prior to arrival and said that his cough is constant in nature he also said that he has been experiencing fever and chills.  Patient has past medical history significant for diabetes mellitus, hypertension, bipolar disorder as well as current smoker of 1 pack/day.  Of note patient does reside in assisted living facility.    While in the emergency room department patient was found to be hypoxic on room air SPO2 of 89% subsequent placed on 2 L nasal cannula.  Is found also to be influenza A positive.  Patient was started on Tamiflu and admitted to hospitalist team.  Unfortunately on 1/3/2022 code stroke was called and patient was found to have  left internal carotid artery occlusion which he did receive TNKase and admitted to the ICU.  1/3/2023 patient's oxygenation continued to decline in which he required high flow nasal cannula. This morning, 1/4/2023 patient remains relatively hemodynamically stable and on high flow however flow rate is 0 and FiO2 is only 21. CT head 1/4/2023 also demonstrated Stable CT appearance the brain. No hemorrhage. Patient was actually deemed stable for ICU transfer to ICU stepdown. Hospitalist team will resume care of this patient. Medications:  Reviewed    Infusion Medications    sodium chloride      lactated ringers 100 mL/hr at 01/06/23 0252    dextrose       Scheduled Medications    metoprolol succinate  25 mg Oral Daily    insulin glargine  7 Units SubCUTAneous Nightly    albuterol  2.5 mg Nebulization Once    sodium chloride flush  5-40 mL IntraVENous 2 times per day    insulin lispro  0-4 Units SubCUTAneous Q4H    methylPREDNISolone  40 mg IntraVENous Q12H    aspirin  81 mg Oral Daily    atorvastatin  20 mg Oral Daily    cloZAPine  200 mg Oral Nightly    cloZAPine  50 mg Oral BID    desvenlafaxine succinate  50 mg Oral Daily    divalproex  500 mg Oral QAM    divalproex  1,000 mg Oral Nightly    docusate sodium  100 mg Oral Daily    enoxaparin  40 mg SubCUTAneous Daily    oseltamivir  75 mg Oral BID     PRN Meds: ipratropium-albuterol, sodium chloride, ondansetron **OR** ondansetron, polyethylene glycol, albuterol, clotrimazole, glucose, dextrose bolus **OR** dextrose bolus, glucagon (rDNA), dextrose, sodium chloride flush, potassium chloride **OR** potassium alternative oral replacement **OR** potassium chloride, acetaminophen **OR** acetaminophen, magnesium sulfate      Intake/Output Summary (Last 24 hours) at 1/6/2023 1543  Last data filed at 1/6/2023 1252  Gross per 24 hour   Intake 370 ml   Output 300 ml   Net 70 ml       Diet:  ADULT DIET;  Dysphagia - Soft and Bite Sized; 4 carb choices (60 gm/meal)    Exam:  BP (!) 158/80   Pulse 88   Temp 97.9 °F (36.6 °C) (Oral)   Resp 18   Ht 5' 9\" (1.753 m)   Wt 190 lb 9.6 oz (86.5 kg)   SpO2 95%   BMI 28.15 kg/m²     General appearance: No apparent distress, appears stated age and cooperative.  HEENT: Normal cephalic, atraumatic without obvious deformity. Conjunctivae/corneas clear.  Neck: Supple, with full range of motion. No jugular venous distention. Trachea midline.  Respiratory: Diminished breath sounds throughout lung fields, notable expiratory wheezing throughout all lung fields.  Patient does have tachypnea however is on room air and maintaining adequate oxygenation.  Cardiovascular: Regular rate and rhythm with normal S1/S2  Abdomen: Soft, rounded, bowel sounds present.  Musculoskeletal:  No clubbing, cyanosis or edema bilaterally.  Skin: Warm and dry  Neurologic: Follows commands, does have slurring of speech/delayed response time  Peripheral Pulses: +1 palpable, equal bilaterally       Labs:   Recent Labs     01/04/23  1328 01/05/23  0441 01/06/23  0812   WBC 9.8 8.0 9.4   HGB 14.3 13.4* 14.6   HCT 44.6 40.8* 44.0    180 175     Recent Labs     01/04/23  1328 01/05/23  0441 01/06/23  0812    141 145   K 4.6 4.3 4.2    103 104   CO2 19* 21* 25   BUN 27* 23* 25*   CREATININE 0.7 0.5 0.5   CALCIUM 8.6 8.6 8.4*     Recent Labs     01/04/23  1328 01/05/23  0441   AST 23 21   ALT 13 14   BILITOT 0.4 0.2*   ALKPHOS 62 52     No results for input(s): INR in the last 72 hours.  No results for input(s): CKTOTAL, TROPONINI in the last 72 hours.    Microbiology:      Urinalysis:      Lab Results   Component Value Date/Time    NITRU NEGATIVE 01/07/2020 03:00 PM    WBCUA 2-4 12/20/2016 01:00 PM    BACTERIA NONE 12/20/2016 01:00 PM    RBCUA 0-2 12/20/2016 01:00 PM    BLOODU NEGATIVE 01/07/2020 03:00 PM    SPECGRAV 1.015 08/21/2013 11:05 AM    GLUCOSEU >= 1000 01/07/2020 03:00 PM       Radiology:  MRI BRAIN WO CONTRAST   Final Result       1.  No evidence of acute intracranial abnormality. 2. Mild severity chronic microvascular angiopathy superimposed on mild volume loss which has progressed in the interval since prior MRI. 3. Mild mucosal inflammation of the paranasal sinuses. **This report has been created using voice recognition software. It may contain minor errors which are inherent in voice recognition technology. **      Final report electronically signed by Dr. Randal Monahan MD on 1/5/2023 8:16 AM      CT HEAD WO CONTRAST   Final Result    Stable CT appearance the brain. No hemorrhage. **This report has been created using voice recognition software. It may contain minor errors which are inherent in voice recognition technology. **      Final report electronically signed by Dr. Mahendra Wood on 1/4/2023 7:13 AM      XR CHEST PORTABLE   Final Result   Normal mobile chest.            **This report has been created using voice recognition software. It may contain minor errors which are inherent in voice recognition technology. **      Final report electronically signed by Dr. Praveen Reece on 1/3/2023 8:15 AM      CTA HEAD W WO CONTRAST (CODE STROKE)   Final Result   Left internal carotid artery is occluded within the neck and reconstituted    intracranially by collateral flow. No vessel occlusion intracranially. This document has been electronically signed by: Tami Gauthier MD on    01/03/2023 05:28 AM      All CTs at this facility use dose modulation techniques and iterative    reconstructions, and/or weight-based dosing   when appropriate to reduce radiation to a low as reasonably achievable. 3D Post-processing was performed on this study. CTA NECK W WO CONTRAST (CODE STROKE)   Final Result   Left internal carotid artery is occluded. Right carotid and bilateral    vertebral arteries are patent.       This document has been electronically signed by: Tami Gauthier MD on    01/03/2023 05:33 AM All CTs at this facility use dose modulation techniques and iterative    reconstructions, and/or weight-based dosing   when appropriate to reduce radiation to a low as reasonably achievable. Carotid stenosis and measurements are in accordance with NASCET criteria. 3D Post-processing was performed on this study. CT HEAD WO CONTRAST   Final Result   Impression:   No acute intracranial pathology. This document has been electronically signed by: Annamaria Boyer MD on    01/03/2023 04:42 AM      All CTs at this facility use dose modulation techniques and iterative    reconstructions, and/or weight-based dosing   when appropriate to reduce radiation to a low as reasonably achievable. XR CHEST PORTABLE   Final Result   Coarsened appearance to the pulmonary parenchyma which is likely chronic. No definite acute findings. **This report has been created using voice recognition software. It may contain minor errors which are inherent in voice recognition technology. **      Final report electronically signed by Dr. Job Chen on 1/2/2023 9:39 AM       [address]    (Results Pending)   CTA NECK W WO CONTRAST    (Results Pending)       DVT prophylaxis: [x] Lovenox                                 [] SCDs                                 [] SQ Heparin                                 [] Encourage ambulation           [] Already on Anticoagulation     Code Status: Full Code    PT/OT Eval Status: Consulted    Tele:   [x] yes             [] no    Active Hospital Problems    Diagnosis Date Noted    Influenza with respiratory manifestation other than pneumonia [J11.1] 01/03/2023     Priority: Medium    Cerebral infarction due to internal carotid artery occlusion [facility]) [I63.239] 01/03/2023     Priority: Medium    Acute respiratory failure with hypoxia [facility]) [J96.01] 01/02/2023     Priority: Medium       Electronically signed by Doris Beck MD on 1/6/2023 at 3:43 PM

## 2023-01-07 LAB
AEROBIC CULTURE: NORMAL
ANION GAP SERPL CALCULATED.3IONS-SCNC: 11 MEQ/L (ref 8–16)
BASOPHILS # BLD: 0.4 %
BASOPHILS ABSOLUTE: 0 THOU/MM3 (ref 0–0.1)
BLOOD CULTURE, ROUTINE: NORMAL
BLOOD CULTURE, ROUTINE: NORMAL
BUN BLDV-MCNC: 18 MG/DL (ref 7–22)
CALCIUM SERPL-MCNC: 8.2 MG/DL (ref 8.5–10.5)
CHLORIDE BLD-SCNC: 105 MEQ/L (ref 98–111)
CO2: 26 MEQ/L (ref 23–33)
CREAT SERPL-MCNC: 0.4 MG/DL (ref 0.4–1.2)
EOSINOPHIL # BLD: 0 %
EOSINOPHILS ABSOLUTE: 0 THOU/MM3 (ref 0–0.4)
ERYTHROCYTE [DISTWIDTH] IN BLOOD BY AUTOMATED COUNT: 12.6 % (ref 11.5–14.5)
ERYTHROCYTE [DISTWIDTH] IN BLOOD BY AUTOMATED COUNT: 44 FL (ref 35–45)
GFR SERPL CREATININE-BSD FRML MDRD: > 60 ML/MIN/1.73M2
GLUCOSE BLD-MCNC: 125 MG/DL (ref 70–108)
GLUCOSE BLD-MCNC: 157 MG/DL (ref 70–108)
GLUCOSE BLD-MCNC: 170 MG/DL (ref 70–108)
GLUCOSE BLD-MCNC: 203 MG/DL (ref 70–108)
GLUCOSE BLD-MCNC: 211 MG/DL (ref 70–108)
GLUCOSE BLD-MCNC: 211 MG/DL (ref 70–108)
GLUCOSE BLD-MCNC: 227 MG/DL (ref 70–108)
HCT VFR BLD CALC: 41.2 % (ref 42–52)
HEMOGLOBIN: 13.8 GM/DL (ref 14–18)
IMMATURE GRANS (ABS): 0.07 THOU/MM3 (ref 0–0.07)
IMMATURE GRANULOCYTES: 0.9 %
LYMPHOCYTES # BLD: 17.9 %
LYMPHOCYTES ABSOLUTE: 1.3 THOU/MM3 (ref 1–4.8)
MCH RBC QN AUTO: 31.9 PG (ref 26–33)
MCHC RBC AUTO-ENTMCNC: 33.5 GM/DL (ref 32.2–35.5)
MCV RBC AUTO: 95.4 FL (ref 80–94)
MONOCYTES # BLD: 5.1 %
MONOCYTES ABSOLUTE: 0.4 THOU/MM3 (ref 0.4–1.3)
NUCLEATED RED BLOOD CELLS: 0 /100 WBC
PLATELET # BLD: 188 THOU/MM3 (ref 130–400)
PMV BLD AUTO: 8.8 FL (ref 9.4–12.4)
POTASSIUM REFLEX MAGNESIUM: 4.4 MEQ/L (ref 3.5–5.2)
RBC # BLD: 4.32 MILL/MM3 (ref 4.7–6.1)
SEG NEUTROPHILS: 75.7 %
SEGMENTED NEUTROPHILS ABSOLUTE COUNT: 5.7 THOU/MM3 (ref 1.8–7.7)
SODIUM BLD-SCNC: 142 MEQ/L (ref 135–145)
WBC # BLD: 7.5 THOU/MM3 (ref 4.8–10.8)

## 2023-01-07 PROCEDURE — 2060000000 HC ICU INTERMEDIATE R&B

## 2023-01-07 PROCEDURE — 6360000002 HC RX W HCPCS

## 2023-01-07 PROCEDURE — 36415 COLL VENOUS BLD VENIPUNCTURE: CPT

## 2023-01-07 PROCEDURE — 6370000000 HC RX 637 (ALT 250 FOR IP)

## 2023-01-07 PROCEDURE — 80048 BASIC METABOLIC PNL TOTAL CA: CPT

## 2023-01-07 PROCEDURE — 6370000000 HC RX 637 (ALT 250 FOR IP): Performed by: STUDENT IN AN ORGANIZED HEALTH CARE EDUCATION/TRAINING PROGRAM

## 2023-01-07 PROCEDURE — 6360000002 HC RX W HCPCS: Performed by: INTERNAL MEDICINE

## 2023-01-07 PROCEDURE — 2580000003 HC RX 258: Performed by: NURSE PRACTITIONER

## 2023-01-07 PROCEDURE — 82948 REAGENT STRIP/BLOOD GLUCOSE: CPT

## 2023-01-07 PROCEDURE — 85025 COMPLETE CBC W/AUTO DIFF WBC: CPT

## 2023-01-07 PROCEDURE — 6370000000 HC RX 637 (ALT 250 FOR IP): Performed by: NURSE PRACTITIONER

## 2023-01-07 RX ADMIN — DIVALPROEX SODIUM 1000 MG: 500 TABLET, EXTENDED RELEASE ORAL at 20:19

## 2023-01-07 RX ADMIN — INSULIN GLARGINE 7 UNITS: 100 INJECTION, SOLUTION SUBCUTANEOUS at 21:34

## 2023-01-07 RX ADMIN — INSULIN LISPRO 1 UNITS: 100 INJECTION, SOLUTION INTRAVENOUS; SUBCUTANEOUS at 10:12

## 2023-01-07 RX ADMIN — CLOZAPINE 200 MG: 100 TABLET ORAL at 20:19

## 2023-01-07 RX ADMIN — DIVALPROEX SODIUM 500 MG: 500 TABLET, EXTENDED RELEASE ORAL at 08:48

## 2023-01-07 RX ADMIN — DESVENLAFAXINE 50 MG: 50 TABLET, EXTENDED RELEASE ORAL at 08:48

## 2023-01-07 RX ADMIN — SODIUM CHLORIDE, POTASSIUM CHLORIDE, SODIUM LACTATE AND CALCIUM CHLORIDE: 600; 310; 30; 20 INJECTION, SOLUTION INTRAVENOUS at 16:28

## 2023-01-07 RX ADMIN — INSULIN LISPRO 1 UNITS: 100 INJECTION, SOLUTION INTRAVENOUS; SUBCUTANEOUS at 21:35

## 2023-01-07 RX ADMIN — INSULIN LISPRO 1 UNITS: 100 INJECTION, SOLUTION INTRAVENOUS; SUBCUTANEOUS at 18:20

## 2023-01-07 RX ADMIN — CLOZAPINE 50 MG: 25 TABLET ORAL at 13:55

## 2023-01-07 RX ADMIN — SODIUM CHLORIDE, POTASSIUM CHLORIDE, SODIUM LACTATE AND CALCIUM CHLORIDE: 600; 310; 30; 20 INJECTION, SOLUTION INTRAVENOUS at 05:54

## 2023-01-07 RX ADMIN — METHYLPREDNISOLONE SODIUM SUCCINATE 40 MG: 40 INJECTION, POWDER, FOR SOLUTION INTRAMUSCULAR; INTRAVENOUS at 08:48

## 2023-01-07 RX ADMIN — METOPROLOL SUCCINATE 25 MG: 25 TABLET, EXTENDED RELEASE ORAL at 08:48

## 2023-01-07 RX ADMIN — ASPIRIN 81 MG: 81 TABLET, CHEWABLE ORAL at 08:48

## 2023-01-07 RX ADMIN — DOCUSATE SODIUM 100 MG: 100 CAPSULE, LIQUID FILLED ORAL at 08:47

## 2023-01-07 RX ADMIN — ATORVASTATIN CALCIUM 20 MG: 20 TABLET, FILM COATED ORAL at 08:48

## 2023-01-07 RX ADMIN — INSULIN LISPRO 1 UNITS: 100 INJECTION, SOLUTION INTRAVENOUS; SUBCUTANEOUS at 13:55

## 2023-01-07 RX ADMIN — ENOXAPARIN SODIUM 40 MG: 100 INJECTION SUBCUTANEOUS at 08:47

## 2023-01-07 RX ADMIN — CLOZAPINE 50 MG: 25 TABLET ORAL at 08:48

## 2023-01-07 RX ADMIN — SODIUM CHLORIDE, PRESERVATIVE FREE 10 ML: 5 INJECTION INTRAVENOUS at 08:48

## 2023-01-07 ASSESSMENT — PAIN SCALES - GENERAL
PAINLEVEL_OUTOF10: 0

## 2023-01-07 NOTE — FLOWSHEET NOTE
01/07/23 1038   Safe Environment   Safety Measures Other (comment)  (VN safety round)   Pt responds to audio , permits video , self and role identified , pt resting in bed in position of comfort , call light visible within reach , no voiced concerns or complaints .  Pt currently setting up in chair at bedside , call light in hand , pleasant and interactive with call

## 2023-01-07 NOTE — PROGRESS NOTES
Hospitalist Progress Note    Patient:  [facility]      Unit/Bed:4A-16/016-A    YOB: 1965    MRN: [id number]       Acct: [de-identified]     PCP: Vandana Mars MD    Date of Admission: 1/2/2023    Disposition: SNF on Monday if medically stable. DME O2 needed    Assessment/Plan:    Acute hypoxic respiratory failure: Secondary to influenza A. Patient does not require oxygen at baseline. Patient initially required nasal cannula that progressed to high settings of HFNC. Patient's oxygenation however is starting to improve and patient is currently requiring FiO2 only 21% and flow rate 0. Of note patient was placed on Solu-Medrol 1/3/23 as well as and duonebs to assist with lung function due to possible underlying COPD due to significant smoking history.   -Pulmonary hygiene    -Wean oxygen as tolerated   -Maintain oxygen saturation > 94%   -DC'd Solu-Medrol 1/7/2020    Influenza A: Patient finally positive in the emergency department upon arrival.  Started on Tamiflu.   -Continue Tamiflu for total 5 days. Left facial droop with dysarthria: Resolved. ?  TIA. 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 There is no LVO. Patient did receive TKNase 1/3/23. Repeat head CT 1/4/2028 showed stability. Patient had echocardiogram 1/3/2023 that showed EF of 55% and overall normal left ventricular function.    -Neurology following   -Continue neurovascular checks   -Patient can be started on chemical DVT prophylaxis once it has been  24 hours since TNKase administration.   -Patient will also be started on aspirin 1/4/2023 which is also 24 hours status post TNKase administration.   -Patient had MRI today, no e/o acute intracranial abnormality or bleed   -Patient remains n.p.o. until speech/swallow evaluation has been completed   -PT/OT   -Continue telemetry   -Continue statin therapy    Type 2 diabetes mellitus: 1/3/23 hemoglobin A1c 6.8.   Goal blood glucose while inpatient 140-180. While patient started to eat again blood glucose increased therefore placed patient on low-dose sliding scale as well as Lantus. Will likely need to dose adjust throughout patient's stay. And as diet progresses.   -Hold home meds   -Low dose sliding scale    -Lantus 7 units nightly (2/3 home dose of 10 units nightly)  -Hypoglycemic protocol  -Accu-Cheks  -Diabetic diet when able    Essential hypertension: Has been relatively controlled since arrival.  Permissive hypertension (up to 180/105) was allowed for 24 hours following thrombolytic administration.   -At this time patient's goal blood pressure  <130/80  -Home Lopressor resumed with holding parameters    Bipolar affective disorder: Continue home meds Clozapine, Pristiq, and Depakote    Current smoker: 1 pack/day   -Patient currently has nicotine patch   -Smoking cessation advised    Expected discharge date: To be determined    Disposition:    [] Home       [] TCU       [] Rehab       [] Psych       [] SNF       [x] [place]       [] Other-    Chief Complaint: Shortness of breath    Hospital Course:     60-year-old male presented to [facility] emergency department 1/2/2022 with a chief complaint of shortness of breath. Patient was experiencing productive cough and shortness of breath for 2 to 3 days prior to arrival and said that his cough is constant in nature he also said that he has been experiencing fever and chills. Patient has past medical history significant for diabetes mellitus, hypertension, bipolar disorder as well as current smoker of 1 pack/day. Of note patient does reside in assisted living facility. While in the emergency room department patient was found to be hypoxic on room air SPO2 of 89% subsequent placed on 2 L nasal cannula. Is found also to be influenza A positive.   Patient was started on Tamiflu and admitted to hospitalist team.  Unfortunately on 1/3/2022 code stroke was called and patient was found to have left internal carotid artery occlusion which he did receive TNKase and admitted to the ICU.  1/3/2023 patient's oxygenation continued to decline in which he required high flow nasal cannula. This morning, 1/4/2023 patient remains relatively hemodynamically stable and on high flow however flow rate is 0 and FiO2 is only 21. CT head 1/4/2023 also demonstrated Stable CT appearance the brain. No hemorrhage. Patient was actually deemed stable for ICU transfer to ICU stepdown. Hospitalist team will resume care of this patient. Medications:  Reviewed    Infusion Medications    sodium chloride      lactated ringers 100 mL/hr at 01/07/23 0554    dextrose       Scheduled Medications    metoprolol succinate  25 mg Oral Daily    insulin glargine  7 Units SubCUTAneous Nightly    albuterol  2.5 mg Nebulization Once    sodium chloride flush  5-40 mL IntraVENous 2 times per day    insulin lispro  0-4 Units SubCUTAneous Q4H    aspirin  81 mg Oral Daily    atorvastatin  20 mg Oral Daily    cloZAPine  200 mg Oral Nightly    cloZAPine  50 mg Oral BID    desvenlafaxine succinate  50 mg Oral Daily    divalproex  500 mg Oral QAM    divalproex  1,000 mg Oral Nightly    docusate sodium  100 mg Oral Daily    enoxaparin  40 mg SubCUTAneous Daily     PRN Meds: ipratropium-albuterol, sodium chloride, ondansetron **OR** ondansetron, polyethylene glycol, albuterol, clotrimazole, glucose, dextrose bolus **OR** dextrose bolus, glucagon (rDNA), dextrose, sodium chloride flush, potassium chloride **OR** potassium alternative oral replacement **OR** potassium chloride, acetaminophen **OR** acetaminophen, magnesium sulfate      Intake/Output Summary (Last 24 hours) at 1/7/2023 1134  Last data filed at 1/7/2023 0934  Gross per 24 hour   Intake 970 ml   Output 1105 ml   Net -135 ml       Diet:  ADULT DIET;  Dysphagia - Soft and Bite Sized; 4 carb choices (60 gm/meal)    Exam:  /61   Pulse 85   Temp 97.7 °F (36.5 °C) (Oral)   Resp 16   Ht 5' 9\" (1.753 m)   Wt 189 lb 4.8 oz (85.9 kg)   SpO2 97%   BMI 27.95 kg/m²     General appearance: No apparent distress, appears stated age and cooperative. HEENT: Normal cephalic, atraumatic without obvious deformity. Conjunctivae/corneas clear. Neck: Supple, with full range of motion. No jugular venous distention. Trachea midline. Respiratory: Diminished breath sounds throughout lung fields, notable expiratory wheezing throughout all lung fields. Patient does have tachypnea however is on room air and maintaining adequate oxygenation. Cardiovascular: Regular rate and rhythm with normal S1/S2  Abdomen: Soft, rounded, bowel sounds present. Musculoskeletal:  No clubbing, cyanosis or edema bilaterally. Skin: Warm and dry  Neurologic: Follows commands, does have slurring of speech/delayed response time  Peripheral Pulses: +1 palpable, equal bilaterally       Labs:   Recent Labs     01/05/23  0441 01/06/23  0812 01/07/23  0333   WBC 8.0 9.4 7.5   HGB 13.4* 14.6 13.8*   HCT 40.8* 44.0 41.2*    175 188     Recent Labs     01/05/23  0441 01/06/23  0812 01/07/23  0333    145 142   K 4.3 4.2 4.4    104 105   CO2 21* 25 26   BUN 23* 25* 18   CREATININE 0.5 0.5 0.4   CALCIUM 8.6 8.4* 8.2*     Recent Labs     01/04/23  1328 01/05/23  0441   AST 23 21   ALT 13 14   BILITOT 0.4 0.2*   ALKPHOS 62 52     No results for input(s): INR in the last 72 hours. No results for input(s): Nora Bigness in the last 72 hours. Microbiology:      Urinalysis:      Lab Results   Component Value Date/Time    NITRU NEGATIVE 01/07/2020 03:00 PM    [address] 2-4 12/20/2016 01:00 PM    BACTERIA NONE 12/20/2016 01:00 PM    RBCUA 0-2 12/20/2016 01:00 PM    BLOODU NEGATIVE 01/07/2020 03:00 PM    SPECGRAV 1.015 08/21/2013 11:05 AM    GLUCOSEU >= 1000 01/07/2020 03:00 PM       Radiology:  MRI BRAIN WO CONTRAST   Final Result       1. No evidence of acute intracranial abnormality.    2. Mild severity chronic microvascular angiopathy superimposed on mild volume loss which has progressed in the interval since prior MRI. 3. Mild mucosal inflammation of the paranasal sinuses. **This report has been created using voice recognition software. It may contain minor errors which are inherent in voice recognition technology. **      Final report electronically signed by Dr. Kennedy [place] MD on 1/5/2023 8:16 AM      CT HEAD WO CONTRAST   Final Result    Stable CT appearance the brain. No hemorrhage. **This report has been created using voice recognition software. It may contain minor errors which are inherent in voice recognition technology. **      Final report electronically signed by Dr. Janel Leung on 1/4/2023 7:13 AM      XR CHEST PORTABLE   Final Result   Normal mobile chest.            **This report has been created using voice recognition software. It may contain minor errors which are inherent in voice recognition technology. **      Final report electronically signed by Dr. Eligio Reynoso on 1/3/2023 8:15 AM      CTA HEAD W WO CONTRAST (CODE STROKE)   Final Result   Left internal carotid artery is occluded within the neck and reconstituted    intracranially by collateral flow. No vessel occlusion intracranially. This document has been electronically signed by: Erick Hernandez MD on    01/03/2023 05:28 AM      All CTs at this facility use dose modulation techniques and iterative    reconstructions, and/or weight-based dosing   when appropriate to reduce radiation to a low as reasonably achievable. 3D Post-processing was performed on this study. CTA NECK W WO CONTRAST (CODE STROKE)   Final Result   Left internal carotid artery is occluded. Right carotid and bilateral    vertebral arteries are patent.       This document has been electronically signed by: Erick Hernandez MD on    01/03/2023 05:33 AM      All CTs at this facility use dose modulation techniques and iterative    reconstructions, and/or weight-based dosing   when appropriate to reduce radiation to a low as reasonably achievable. Carotid stenosis and measurements are in accordance with NASCET criteria. 3D Post-processing was performed on this study. CT HEAD WO CONTRAST   Final Result   Impression:   No acute intracranial pathology. This document has been electronically signed by: Salazar Sanders MD on    01/03/2023 04:42 AM      All CTs at this facility use dose modulation techniques and iterative    reconstructions, and/or weight-based dosing   when appropriate to reduce radiation to a low as reasonably achievable. XR CHEST PORTABLE   Final Result   Coarsened appearance to the pulmonary parenchyma which is likely chronic. No definite acute findings. **This report has been created using voice recognition software. It may contain minor errors which are inherent in voice recognition technology. **      Final report electronically signed by Dr. Steven Avelar on 1/2/2023 9:39 AM       [address]    (Results Pending)   CTA NECK W WO CONTRAST    (Results Pending)       DVT prophylaxis: [x] Lovenox                                 [] SCDs                                 [] SQ Heparin                                 [] Encourage ambulation           [] Already on Anticoagulation     Code Status: Full Code    PT/OT Eval Status: Consulted    Tele:   [x] yes             [] no    Active Hospital Problems    Diagnosis Date Noted    Influenza with respiratory manifestation other than pneumonia [J11.1] 01/03/2023     Priority: Medium    Cerebral infarction due to internal carotid artery occlusion [facility]) [I63.239] 01/03/2023     Priority: Medium    Acute respiratory failure with hypoxia [facility]) [J96.01] 01/02/2023     Priority: Medium       Electronically signed by Patti Pizarro MD on 1/7/2023 at 11:34 AM

## 2023-01-07 NOTE — PLAN OF CARE
Problem: Discharge Planning  Goal: Discharge to home or other facility with appropriate resources  1/7/2023 1109 by Paresh Correa RN  Outcome: Progressing  Problem: Safety - Adult  Goal: Free from fall injury  1/7/2023 1109 by Paresh Correa RN  Outcome: Progressing    Problem: Neurosensory - Adult  Goal: Neuropyschological functioning will improve  Description: Neuropyschological functioning will improve  1/7/2023 1109 by Paresh Correa RN  Outcome: Progressing     Problem: Hematologic - Adult  Goal: Maintains hematologic stability  1/7/2023 1109 by Paresh Correa RN  Outcome: Progressing    Problem: Pain  Goal: Verbalizes/displays adequate comfort level or baseline comfort level  1/7/2023 1109 by Paresh Correa RN  Outcome: Progressing     Problem: Skin/Tissue Integrity  Goal: Absence of new skin breakdown  Description: 1. Monitor for areas of redness and/or skin breakdown  2. Assess vascular access sites hourly  3. Every 4-6 hours minimum:  Change oxygen saturation probe site  4. Every 4-6 hours:  If on nasal continuous positive airway pressure, respiratory therapy assess nares and determine need for appliance change or resting period. 1/7/2023 1109 by Paresh Correa RN  Outcome: Progressing  Note: No signs of new skin breakdown with each assessment. Skin remains warm, dry, intact. Mucous membranes pink & moist. Patient is able to turn self. Problem: ABCDS Injury Assessment  Goal: Absence of physical injury  1/7/2023 1109 by Paresh Correa RN  Outcome: Progressing    Care plan reviewed with patient. Patient verbalizes understanding of the plan of care and contributed to goal setting.

## 2023-01-07 NOTE — PLAN OF CARE
Problem: Discharge Planning  Goal: Discharge to home or other facility with appropriate resources  Outcome: Progressing  Flowsheets  Taken 1/6/2023 2012 by Magy Hassan RN  Discharge to home or other facility with appropriate resources:   Identify barriers to discharge with patient and caregiver   Arrange for needed discharge resources and transportation as appropriate   Identify discharge learning needs (meds, wound care, etc)  Taken 1/6/2023 1000 by Tala Gutierrez RN  Discharge to home or other facility with appropriate resources:   Identify barriers to discharge with patient and caregiver   Arrange for needed discharge resources and transportation as appropriate   Identify discharge learning needs (meds, wound care, etc)  Note: Patient actively involved in [facility]. Discharge planning in progress at this time. Will continue to monitor. Problem: Safety - Adult  Goal: Free from fall injury  Outcome: Progressing  Flowsheets (Taken 1/6/2023 2254)  Free From Fall Injury: Instruct family/caregiver on patient safety  Note: Patient educated on and encouraged to use the call light for assistance from staff with needs. Patient verbalizes an understanding of this. Bed wheels locked and bed in lowest position; bed alarm on. Patient possessions within reach; pathways clear at this time. Problem: Neurosensory - Adult  Goal: Neuropyschological functioning will improve  Description: Neuropyschological functioning will improve  Outcome: Progressing  Note: Patient A&O x4 during this shift.      Problem: Hematologic - Adult  Goal: Maintains hematologic stability  Outcome: Progressing  Flowsheets  Taken 1/6/2023 2012 by Magy Hassan RN  Maintains hematologic stability:   Assess for signs and symptoms of bleeding or hemorrhage   Monitor labs for bleeding or clotting disorders   Administer blood products/factors as ordered  Taken 1/6/2023 1000 by Tala Gutierrez RN  Maintains hematologic stability:   Assess for signs and symptoms of bleeding or hemorrhage   Monitor labs for bleeding or clotting disorders     Problem: Pain  Goal: Verbalizes/displays adequate comfort level or baseline comfort level  Outcome: Progressing  Flowsheets  Taken 1/6/2023 2011 by Lina Reyes RN  Verbalizes/displays adequate comfort level or baseline comfort level:   Encourage patient to monitor pain and request assistance   Assess pain using appropriate pain scale   Administer analgesics based on type and severity of pain and evaluate response   Implement non-pharmacological measures as appropriate and evaluate response  Taken 1/6/2023 1011 by Niraj Wells RN  Verbalizes/displays adequate comfort level or baseline comfort level:   Encourage patient to monitor pain and request assistance   Assess pain using appropriate pain scale  Note: Patient denies pain during this shift. Will continue to monitor. Problem: Skin/Tissue Integrity  Goal: Absence of new skin breakdown  Description: 1. Monitor for areas of redness and/or skin breakdown  2. Assess vascular access sites hourly  3. Every 4-6 hours minimum:  Change oxygen saturation probe site  4. Every 4-6 hours:  If on nasal continuous positive airway pressure, respiratory therapy assess nares and determine need for appliance change or resting period. Outcome: Progressing  Note: No signs and/or symptoms of infection noted during this shift. Patient afebrile during this shift. No new skin breakdown noted during this shift. Patient able to turn self in bed; staff assistance provided as needed. Foot of bed elevated. Problem: ABCDS Injury Assessment  Goal: Absence of physical injury  Outcome: Progressing  Flowsheets (Taken 1/6/2023 2254)  Absence of Physical Injury: Implement safety measures based on patient assessment  Note: Patient educated on and encouraged to use the call light for assistance from staff with needs. Patient verbalizes an understanding of this.  Bed wheels locked and bed in lowest position; bed alarm on. Patient possessions within reach; pathways clear at this time. Care plan reviewed with patient. Patient verbalizes understanding of the plan of care and contributes to goal setting.

## 2023-01-08 LAB
ALBUMIN SERPL-MCNC: 3.3 G/DL (ref 3.5–5.1)
ALP BLD-CCNC: 57 U/L (ref 38–126)
ALT SERPL-CCNC: 27 U/L (ref 11–66)
AMPHETAMINE+METHAMPHETAMINE URINE SCREEN: NEGATIVE
ANION GAP SERPL CALCULATED.3IONS-SCNC: 8 MEQ/L (ref 8–16)
AST SERPL-CCNC: 37 U/L (ref 5–40)
ATYPICAL LYMPHOCYTES: ABNORMAL %
BARBITURATE QUANTITATIVE URINE: NEGATIVE
BASOPHILS # BLD: 0.5 %
BASOPHILS ABSOLUTE: 0 THOU/MM3 (ref 0–0.1)
BENZODIAZEPINE QUANTITATIVE URINE: NEGATIVE
BILIRUB SERPL-MCNC: 0.2 MG/DL (ref 0.3–1.2)
BILIRUBIN DIRECT: < 0.2 MG/DL (ref 0–0.3)
BILIRUBIN URINE: NEGATIVE
BLOOD, URINE: NEGATIVE
BUN BLDV-MCNC: 16 MG/DL (ref 7–22)
CALCIUM IONIZED: 1.09 MMOL/L (ref 1.12–1.32)
CALCIUM IONIZED: 1.16 MMOL/L (ref 1.12–1.32)
CALCIUM SERPL-MCNC: 8.1 MG/DL (ref 8.5–10.5)
CANNABINOID QUANTITATIVE URINE: NEGATIVE
CHARACTER, URINE: CLEAR
CHLORIDE BLD-SCNC: 104 MEQ/L (ref 98–111)
CO2: 28 MEQ/L (ref 23–33)
COCAINE METABOLITE QUANTITATIVE URINE: NEGATIVE
COLOR: YELLOW
CREAT SERPL-MCNC: 0.4 MG/DL (ref 0.4–1.2)
EOSINOPHIL # BLD: 0.4 %
EOSINOPHILS ABSOLUTE: 0 THOU/MM3 (ref 0–0.4)
ERYTHROCYTE [DISTWIDTH] IN BLOOD BY AUTOMATED COUNT: 12.4 % (ref 11.5–14.5)
ERYTHROCYTE [DISTWIDTH] IN BLOOD BY AUTOMATED COUNT: 43.8 FL (ref 35–45)
FENTANYL: NEGATIVE
GFR SERPL CREATININE-BSD FRML MDRD: > 60 ML/MIN/1.73M2
GLUCOSE BLD-MCNC: 108 MG/DL (ref 70–108)
GLUCOSE BLD-MCNC: 119 MG/DL (ref 70–108)
GLUCOSE BLD-MCNC: 126 MG/DL (ref 70–108)
GLUCOSE BLD-MCNC: 162 MG/DL (ref 70–108)
GLUCOSE BLD-MCNC: 168 MG/DL (ref 70–108)
GLUCOSE BLD-MCNC: 191 MG/DL (ref 70–108)
GLUCOSE BLD-MCNC: 256 MG/DL (ref 70–108)
GLUCOSE, URINE: >= 1000 MG/DL
HCT VFR BLD CALC: 41.7 % (ref 42–52)
HEMOGLOBIN: 13.8 GM/DL (ref 14–18)
IMMATURE GRANS (ABS): 0.14 THOU/MM3 (ref 0–0.07)
IMMATURE GRANULOCYTES: 1.9 %
KETONES, URINE: 15
LEUKOCYTE EST, POC: NEGATIVE
LYMPHOCYTES # BLD: 40.5 %
LYMPHOCYTES ABSOLUTE: 3 THOU/MM3 (ref 1–4.8)
MAGNESIUM: 2 MG/DL (ref 1.6–2.4)
MCH RBC QN AUTO: 31.9 PG (ref 26–33)
MCHC RBC AUTO-ENTMCNC: 33.1 GM/DL (ref 32.2–35.5)
MCV RBC AUTO: 96.3 FL (ref 80–94)
MONOCYTES # BLD: 8.4 %
MONOCYTES ABSOLUTE: 0.6 THOU/MM3 (ref 0.4–1.3)
NITRITE, URINE: NEGATIVE
NUCLEATED RED BLOOD CELLS: 0 /100 WBC
OPIATES, URINE: NEGATIVE
OXYCODONE: NEGATIVE
PH UA: 7.5 (ref 5–9)
PHENCYCLIDINE QUANTITATIVE URINE: NEGATIVE
PLATELET # BLD: 190 THOU/MM3 (ref 130–400)
PLATELET ESTIMATE: ADEQUATE
PMV BLD AUTO: 8.8 FL (ref 9.4–12.4)
POTASSIUM REFLEX MAGNESIUM: 3.9 MEQ/L (ref 3.5–5.2)
PROTEIN UA: NEGATIVE MG/DL
RBC # BLD: 4.33 MILL/MM3 (ref 4.7–6.1)
SCAN OF BLOOD SMEAR: NORMAL
SEG NEUTROPHILS: 48.3 %
SEGMENTED NEUTROPHILS ABSOLUTE COUNT: 3.6 THOU/MM3 (ref 1.8–7.7)
SODIUM BLD-SCNC: 140 MEQ/L (ref 135–145)
SPECIFIC GRAVITY UA: 1.02 (ref 1–1.03)
TOTAL PROTEIN: 5.7 G/DL (ref 6.1–8)
UROBILINOGEN, URINE: 1 EU/DL (ref 0–1)
WBC # BLD: 7.4 THOU/MM3 (ref 4.8–10.8)

## 2023-01-08 PROCEDURE — 36415 COLL VENOUS BLD VENIPUNCTURE: CPT

## 2023-01-08 PROCEDURE — 80307 DRUG TEST PRSMV CHEM ANLYZR: CPT

## 2023-01-08 PROCEDURE — 6370000000 HC RX 637 (ALT 250 FOR IP): Performed by: STUDENT IN AN ORGANIZED HEALTH CARE EDUCATION/TRAINING PROGRAM

## 2023-01-08 PROCEDURE — 80076 HEPATIC FUNCTION PANEL: CPT

## 2023-01-08 PROCEDURE — 85025 COMPLETE CBC W/AUTO DIFF WBC: CPT

## 2023-01-08 PROCEDURE — 83735 ASSAY OF MAGNESIUM: CPT

## 2023-01-08 PROCEDURE — 6370000000 HC RX 637 (ALT 250 FOR IP): Performed by: NURSE PRACTITIONER

## 2023-01-08 PROCEDURE — 82330 ASSAY OF CALCIUM: CPT

## 2023-01-08 PROCEDURE — 2060000000 HC ICU INTERMEDIATE R&B

## 2023-01-08 PROCEDURE — 6360000002 HC RX W HCPCS

## 2023-01-08 PROCEDURE — 97110 THERAPEUTIC EXERCISES: CPT

## 2023-01-08 PROCEDURE — 82948 REAGENT STRIP/BLOOD GLUCOSE: CPT

## 2023-01-08 PROCEDURE — 6370000000 HC RX 637 (ALT 250 FOR IP)

## 2023-01-08 PROCEDURE — 80048 BASIC METABOLIC PNL TOTAL CA: CPT

## 2023-01-08 PROCEDURE — 2580000003 HC RX 258: Performed by: NURSE PRACTITIONER

## 2023-01-08 PROCEDURE — 81003 URINALYSIS AUTO W/O SCOPE: CPT

## 2023-01-08 PROCEDURE — 97535 SELF CARE MNGMENT TRAINING: CPT

## 2023-01-08 RX ORDER — CALCIUM GLUCONATE 20 MG/ML
2000 INJECTION, SOLUTION INTRAVENOUS ONCE
Status: COMPLETED | OUTPATIENT
Start: 2023-01-08 | End: 2023-01-08

## 2023-01-08 RX ADMIN — ATORVASTATIN CALCIUM 20 MG: 20 TABLET, FILM COATED ORAL at 09:38

## 2023-01-08 RX ADMIN — METOPROLOL SUCCINATE 25 MG: 25 TABLET, EXTENDED RELEASE ORAL at 09:36

## 2023-01-08 RX ADMIN — INSULIN LISPRO 2 UNITS: 100 INJECTION, SOLUTION INTRAVENOUS; SUBCUTANEOUS at 17:40

## 2023-01-08 RX ADMIN — SODIUM CHLORIDE, POTASSIUM CHLORIDE, SODIUM LACTATE AND CALCIUM CHLORIDE: 600; 310; 30; 20 INJECTION, SOLUTION INTRAVENOUS at 03:00

## 2023-01-08 RX ADMIN — SODIUM CHLORIDE, PRESERVATIVE FREE 10 ML: 5 INJECTION INTRAVENOUS at 21:08

## 2023-01-08 RX ADMIN — CLOZAPINE 200 MG: 100 TABLET ORAL at 21:08

## 2023-01-08 RX ADMIN — ASPIRIN 81 MG: 81 TABLET, CHEWABLE ORAL at 09:38

## 2023-01-08 RX ADMIN — CALCIUM GLUCONATE 2000 MG: 20 INJECTION, SOLUTION INTRAVENOUS at 11:34

## 2023-01-08 RX ADMIN — ENOXAPARIN SODIUM 40 MG: 100 INJECTION SUBCUTANEOUS at 09:35

## 2023-01-08 RX ADMIN — CLOZAPINE 50 MG: 25 TABLET ORAL at 15:45

## 2023-01-08 RX ADMIN — DIVALPROEX SODIUM 1000 MG: 500 TABLET, EXTENDED RELEASE ORAL at 21:09

## 2023-01-08 RX ADMIN — DESVENLAFAXINE 50 MG: 50 TABLET, EXTENDED RELEASE ORAL at 09:36

## 2023-01-08 RX ADMIN — DOCUSATE SODIUM 100 MG: 100 CAPSULE, LIQUID FILLED ORAL at 09:36

## 2023-01-08 RX ADMIN — INSULIN GLARGINE 7 UNITS: 100 INJECTION, SOLUTION SUBCUTANEOUS at 21:08

## 2023-01-08 RX ADMIN — SODIUM CHLORIDE, PRESERVATIVE FREE 10 ML: 5 INJECTION INTRAVENOUS at 09:35

## 2023-01-08 RX ADMIN — DIVALPROEX SODIUM 500 MG: 500 TABLET, EXTENDED RELEASE ORAL at 09:35

## 2023-01-08 RX ADMIN — CLOZAPINE 50 MG: 25 TABLET ORAL at 09:38

## 2023-01-08 ASSESSMENT — PAIN SCALES - GENERAL
PAINLEVEL_OUTOF10: 0

## 2023-01-08 NOTE — PLAN OF CARE
Problem: Discharge Planning  Goal: Discharge to home or other facility with appropriate resources  1/7/2023 2319 by Iban Pineda RN  Outcome: Progressing  Flowsheets (Taken 1/7/2023 2012)  Discharge to home or other facility with appropriate resources:   Identify barriers to discharge with patient and caregiver   Arrange for needed discharge resources and transportation as appropriate   Identify discharge learning needs (meds, wound care, etc)  Note: Patient actively involved in POC. Discharge planning in progress at this time. Will continue to monitor.     Problem: Safety - Adult  Goal: Free from fall injury  1/7/2023 2319 by Iban Pineda RN  Outcome: Progressing  Flowsheets (Taken 1/7/2023 2319)  Free From Fall Injury: Instruct family/caregiver on patient safety  Note: Patient educated on and encouraged to use the call light for assistance from staff with needs. Patient verbalizes an understanding of this. Bed wheels locked and bed in lowest position; bed alarm on. Patient possessions within reach; pathways clear at this time.     Problem: Neurosensory - Adult  Goal: Neuropyschological functioning will improve  Description: Neuropyschological functioning will improve  1/7/2023 2319 by Iban Pnieda RN  Outcome: Progressing  Note: Patient A&O x4 during this shift.     Problem: Hematologic - Adult  Goal: Maintains hematologic stability  1/7/2023 2319 by Iban Pineda RN  Outcome: Progressing  Flowsheets (Taken 1/7/2023 2012)  Maintains hematologic stability:   Assess for signs and symptoms of bleeding or hemorrhage   Monitor labs for bleeding or clotting disorders   Administer blood products/factors as ordered     Problem: Pain  Goal: Verbalizes/displays adequate comfort level or baseline comfort level  1/7/2023 2319 by Iban Pineda RN  Outcome: Progressing  Flowsheets (Taken 1/6/2023 2011)  Verbalizes/displays adequate comfort level or baseline comfort level:   Encourage patient to monitor pain and  request assistance   Assess pain using appropriate pain scale   Administer analgesics based on type and severity of pain and evaluate response   Implement non-pharmacological measures as appropriate and evaluate response  Note: Patient denies pain during this shift. Will continue to monitor. Problem: Skin/Tissue Integrity  Goal: Absence of new skin breakdown  Description: 1. Monitor for areas of redness and/or skin breakdown  2. Assess vascular access sites hourly  3. Every 4-6 hours minimum:  Change oxygen saturation probe site  4. Every 4-6 hours:  If on nasal continuous positive airway pressure, respiratory therapy assess nares and determine need for appliance change or resting period. 1/7/2023 2319 by Shawna Ann RN  Outcome: Progressing  Note: No signs and/or symptoms of infection noted during this shift. Patient afebrile during this shift. No new skin breakdown noted during this shift. Patient able to turn self in bed; staff assistance provided as needed. Foot of bed elevated. Problem: ABCDS Injury Assessment  Goal: Absence of physical injury  1/7/2023 2319 by Shawna Ann RN  Outcome: Progressing  Flowsheets (Taken 1/7/2023 2319)  Absence of Physical Injury: Implement safety measures based on patient assessment  Note: Patient educated on and encouraged to use the call light for assistance from staff with needs. Patient verbalizes an understanding of this. Bed wheels locked and bed in lowest position; bed alarm on. Patient possessions within reach; pathways clear at this time. Care plan reviewed with patient. Patient verbalizes understanding of the plan of care and contributes to goal setting.

## 2023-01-08 NOTE — PROGRESS NOTES
[address] [address]  INPATIENT PHYSICAL THERAPY  Daily Note  [facility] 4A - 4A-16/016-A    Time In: 4439  Time Out: 1012  Timed Code Treatment Minutes: 23 Minutes  Minutes: 23          Date: 2023  Patient Name: Anderson Glasgow,  Gender:  male        MRN: [id number]  : 1965  (65 y.o.)     Referring Practitioner: Vanessa Galaviz PA-C  Diagnosis: Influenza with respiratory manifestation other than pneumonia  Additional Pertinent Hx: Per ER note on 2023: 65 y.o. male who presents to the emergency department for evaluation of flu like symptoms. States that he has had fever, chills, and non-productive cough that has worsened over the last 3 days. Denies taking any medication for his symptoms. Received 800mL NS while en route to ED. Denies being on oxygen at home. Has been vaccinated for COVID and influenza. Per neuro note: At [phone] on 1/3/2023 stroke alert was called for this patient for left-sided facial droop and dysarthria which were noted by his nurse. Nurse states that at 2 AM the facial droop and slurred speech were not present. Patient taken to imaging for stat CT head which revealed no ICH, midline shift, mass-effect. 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 No LVO. Based on patient's symptoms and time since last known well decision was made for TNK to be given. 22 mg of TN K given at 5:05 AM     Prior Level of Function:  Type of Home: Assisted living  Home Layout: One level  Home Access: Level entry  Home Equipment: Cane   Bathroom Shower/Tub: Walk-in shower  Bathroom Toilet: Standard  Bathroom Equipment: Grab bars around toilet, Grab bars in shower    ADL Assistance: Independent  Homemaking Assistance: Needs assistance  Ambulation Assistance: Independent  Transfer Assistance: Independent  Active : No  Additional Comments: Pt reports using no AD PLOF.  Faciliate completes IADLs & Pt can go down for meals.    Restrictions/Precautions:  Restrictions/Precautions: Fall Risk, General Precautions  Position Activity Restriction  Other position/activity restrictions: impulsive     SUBJECTIVE: Pt. Seated on his BS chair and pleasantly agrees to therapy session. Pt. Had just returned to chair from restroom with nursing so declines ambulation but agrees to ther ex. Pt. Easily short of breath and requires rest breaks. PAIN: None indicated    Vitals:   Patient Vitals for the past 8 hrs:   BP Patient Position Temp Temp src Pulse Resp SpO2 O2 Device O2 Flow Rate (L/min)   01/08/23 1110 106/61 Sitting 97.7 °F (36.5 °C) Oral 99 14 91 % None (Room air) --   01/08/23 0935 114/70 Up in chair;Sitting 98 °F (36.7 °C) Oral 100 14 98 % Nasal cannula --   01/08/23 0600 -- -- -- -- -- -- 91 % Nasal cannula 2 L/min        OBJECTIVE:  Bed Mobility:  Not Tested    Transfers:  Not Tested    Ambulation:  None    Stairs:  None    Balance:  None    Neuromuscular Re-education  None    Exercise:  Patient was guided in 1 set(s) 10-15 reps of exercises: Glut sets, Seated marches, Seated hamstring curls, Seated heel/toe raises, Long arc quads, Seated isometric hip adduction, Seated abduction/adduction, and abdominal isometrics, Quad sets, Heelslides, Hip abduction/adduction and Straight leg raises. Exercises were completed for increased independence with functional mobility. Functional Outcome Measures:   Not completed    ASSESSMENT:  Assessment: Patient progressing toward established goals. Activity Tolerance:  Patient tolerance of  treatment: good. Equipment Recommendations: Other: monitor for needs  Discharge Recommendations: Continue to assess pending progress, Home with Home health PT, Home with assist PRN, Patient would benefit from continued therapy after discharge, Therapy recommended at discharge     Plan: Current Treatment Recommendations: Strengthening, Balance training, Functional mobility training, Gait training, Safety education & training, Home exercise program, Patient/Caregiver education & training, Transfer training, Endurance training, Therapeutic activities  General Plan:  (6x N)    Patient Education  Patient Education: Plan of Care, Reviewed Prior Education, Health Promotion and Wellness Education, Safety, Verbal Exercise Instruction    Goals:  Patient Goals : go home  Short Term Goals  Time Frame for Short Term Goals: at discharge  Short Term Goal 1: Pt to be Mod I for supine <> sit to get in/out of bed  Short Term Goal 2: Pt to be Mod I for sit <> stand to get up to ambulate  Short Term Goal 3: Pt to ambulate >80 ft with cane with Supervision for getting to dining room at [facility] Term Goals  Time Frame for Long Term Goals : not set due to short ELOS    Following session, patient left in safe position with all fall risk precautions in place.

## 2023-01-08 NOTE — PROGRESS NOTES
[address]  Occupational Therapy  Daily Note  Time:   Time In: 3055  Time Out: 6159  Timed Code Treatment Minutes: 24 Minutes  Minutes: 24          Date: 2023  Patient Name: Paulina Burnham,   Gender: male      Room: 4A-16/016-A  MRN: [id number]  : 1965  (65 y.o.)  Referring Practitioner: KIRT Magdaleno  Diagnosis: acute respiratory failure with hypoxia  Additional Pertinent Hx: Per ER note on 2023: 65 y.o. male who presents to the emergency department for evaluation of flu like symptoms. States that he has had fever, chills, and non-productive cough that has worsened over the last 3 days. Denies taking any medication for his symptoms. Received 800mL NS while en route to ED. Denies being on oxygen at home. Has been vaccinated for COVID and influenza. Per neuro note: At [phone] on 1/3/2023 stroke alert was called for this patient for left-sided facial droop and dysarthria which were noted by his nurse. Nurse states that at 2 AM the facial droop and slurred speech were not present. Patient taken to imaging for stat CT head which revealed no ICH, midline shift, mass-effect. 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 No LVO. Based on patient's symptoms and time since last known well decision was made for TNK to be given. 22 mg of TN K given at 5:05 AM    Restrictions/Precautions:  Restrictions/Precautions: Fall Risk, General Precautions  Position Activity Restriction  Other position/activity restrictions: impulsive     SUBJECTIVE: Patient sleeping in bed upon arrival with minimal verbal stimulation to alert and increased time. Patient demonstrates increased productive cough throughout.     PAIN: 0/10:     Vitals: Blood Pressure: 114/68  Oxygen: 96% 2l nasal canual  Heart Rate: 107bpm    COGNITION: Decreased Insight, Decreased Problem Solving, Decreased Safety Awareness, and Impulsive    ADL:   Feeding: Modified Independent. Ability to open on containers and packaging  Grooming: Supervision, X 1, and with set-up. Seated in chair to wash face  Lower Extremity Dressing: Moderate Assistance, X 1, with verbal cues , and with increased time for completion. Oksana/pearl yi. Aura Gutierrez BALANCE:  Sitting Balance:  Supervision. Standing Balance: Stand By Assistance. RW,   BED MOBILITY:  Supine to Sit: Stand By Assistance, X 1, with head of bed flat, with rail, with increased time for completion      TRANSFERS:  Sit to Stand:  Stand By Assistance, X 1, with increased time for completion. 1 LOB with patient lowering self to chair requiring initially upon standing, no LOB on attempt 2   Stand to Sit: Contact Guard Assistance, X 1, to/from chair with arms. FUNCTIONAL MOBILITY:  Assistive Device: Rolling Walker  Assist Level:  Stand By Assistance and X 1. Distance:  EOB to bedside chair x 3 feet  Impulsive, assistance with O2 tubing/IV line management, no LOB     ASSESSMENT:     Activity Tolerance:  Patient tolerance of  treatment: good. Discharge Recommendations: Inpatient Therapy Stay prior to return to AL  Equipment Recommendations:  Other: RW use  Plan: Times Per Week: 6x  Current Treatment Recommendations: Functional mobility training, Balance training, Self-Care / ADL, Safety education & training, Endurance training    Patient Education  Patient Education: Role of OT, Plan of Care, ADL's, IADL's, Home Safety, Importance of Increasing Activity, and Assistive Device Safety    Goals  Short Term Goals  Time Frame for Short Term Goals: until discharge  Short Term Goal 1: Pt will complete various t/fs including BSC with CGA & min vcs for safe technique  Short Term Goal 2: Pt will tolerate standing 2-3 min with CGA for increased ease of sinkside grooming  Short Term Goal 3: Pt will complete mobility to [facility] or bedside chair with RW, CGA, & 0-2 vcs for safety  Short Term Goal 4: Pt will complete BADL routine with min A & min vcs for safety & sequencing  Long Term Goals  Time Frame for Long Term Goals : No LTG set d/t short ELOS    Following session, patient left in safe position with all fall risk precautions in place.

## 2023-01-08 NOTE — PROGRESS NOTES
Hospitalist Progress Note    Patient:  Monica Weathers      Unit/Bed:4A-16/016-A    YOB: 1965    MRN: [id number]       Acct: [de-identified]     PCP: Susan Echeverria MD    Date of Admission: 1/2/2023    Disposition: SNF on Monday if medically stable. DME O2 needed    Assessment/Plan:    Acute hypoxic respiratory failure: Secondary to influenza A. Patient does not require oxygen at baseline. Patient initially required nasal cannula that progressed to high settings of HFNC. Patient's oxygenation however is starting to improve and patient is currently requiring FiO2 only 21% and flow rate 0. Of note patient was placed on Solu-Medrol 1/3/23 as well as and duonebs to assist with lung function due to possible underlying COPD due to significant smoking history.   -Pulmonary hygiene    -Wean oxygen as tolerated   -Maintain oxygen saturation > 94%   -DC'd Solu-Medrol 1/7/2020    Influenza A: Patient finally positive in the emergency department upon arrival.  Started on Tamiflu.   -Continue Tamiflu for total 5 days. Left facial droop with dysarthria: Resolved. ?  TIA. 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 There is no LVO. Patient did receive TKNase 1/3/23. Repeat head CT 1/4/2028 showed stability. Patient had echocardiogram 1/3/2023 that showed EF of 55% and overall normal left ventricular function.    -Neurology following   -Continue neurovascular checks   -Patient can be started on chemical DVT prophylaxis once it has been  24 hours since TNKase administration.   -Patient will also be started on aspirin 1/4/2023 which is also 24 hours status post TNKase administration.   -Patient had MRI today, no e/o acute intracranial abnormality or bleed   -Patient remains n.p.o. until speech/swallow evaluation has been completed   -PT/OT   -Continue telemetry   -Continue statin therapy    Type 2 diabetes mellitus: 1/3/23 hemoglobin A1c 6.8.   Goal blood glucose while inpatient 140-180. While patient started to eat again blood glucose increased therefore placed patient on low-dose sliding scale as well as Lantus. Will likely need to dose adjust throughout patient's stay. And as diet progresses.   -Hold home meds   -Low dose sliding scale    -Lantus 7 units nightly (2/3 home dose of 10 units nightly)  -Hypoglycemic protocol  -Accu-Cheks  -Diabetic diet when able    Essential hypertension: Has been relatively controlled since arrival.  Permissive hypertension (up to 180/105) was allowed for 24 hours following thrombolytic administration.   -At this time patient's goal blood pressure  <130/80  -Home Lopressor resumed with holding parameters    Bipolar affective disorder: Continue home meds Clozapine, Pristiq, and Depakote    Current smoker: 1 pack/day   -Patient currently has nicotine patch   -Smoking cessation advised    Expected discharge date: To be determined    Disposition:    [] Home       [] TCU       [] Rehab       [] Psych       [] SNF       [x] [place]       [] Other-    Chief Complaint: Shortness of breath    Hospital Course:     81-year-old male presented to [facility] emergency department 1/2/2022 with a chief complaint of shortness of breath. Patient was experiencing productive cough and shortness of breath for 2 to 3 days prior to arrival and said that his cough is constant in nature he also said that he has been experiencing fever and chills. Patient has past medical history significant for diabetes mellitus, hypertension, bipolar disorder as well as current smoker of 1 pack/day. Of note patient does reside in assisted living facility. While in the emergency room department patient was found to be hypoxic on room air SPO2 of 89% subsequent placed on 2 L nasal cannula. Is found also to be influenza A positive.   Patient was started on Tamiflu and admitted to hospitalist team.  Unfortunately on 1/3/2022 code stroke was called and patient was found to have left internal carotid artery occlusion which he did receive TNKase and admitted to the ICU.  1/3/2023 patient's oxygenation continued to decline in which he required high flow nasal cannula.     This morning, 1/4/2023 patient remains relatively hemodynamically stable and on high flow however flow rate is 0 and FiO2 is only 21.  CT head 1/4/2023 also demonstrated Stable CT appearance the brain. No hemorrhage.  Patient was actually deemed stable for ICU transfer to ICU stepdown.    Hospitalist team will resume care of this patient.    Medications:  Reviewed    Infusion Medications    sodium chloride      dextrose       Scheduled Medications    calcium replacement protocol   Other RX Placeholder    calcium gluconate  2,000 mg IntraVENous Once    metoprolol succinate  25 mg Oral Daily    insulin glargine  7 Units SubCUTAneous Nightly    albuterol  2.5 mg Nebulization Once    sodium chloride flush  5-40 mL IntraVENous 2 times per day    insulin lispro  0-4 Units SubCUTAneous Q4H    aspirin  81 mg Oral Daily    atorvastatin  20 mg Oral Daily    cloZAPine  200 mg Oral Nightly    cloZAPine  50 mg Oral BID    desvenlafaxine succinate  50 mg Oral Daily    divalproex  500 mg Oral QAM    divalproex  1,000 mg Oral Nightly    docusate sodium  100 mg Oral Daily    enoxaparin  40 mg SubCUTAneous Daily     PRN Meds: ipratropium-albuterol, sodium chloride, ondansetron **OR** ondansetron, polyethylene glycol, albuterol, clotrimazole, glucose, dextrose bolus **OR** dextrose bolus, glucagon (rDNA), dextrose, sodium chloride flush, potassium chloride **OR** potassium alternative oral replacement **OR** potassium chloride, acetaminophen **OR** acetaminophen, magnesium sulfate      Intake/Output Summary (Last 24 hours) at 1/8/2023 1221  Last data filed at 1/8/2023 0647  Gross per 24 hour   Intake 5951.18 ml   Output 1770 ml   Net 4181.18 ml       Diet:  ADULT DIET; Dysphagia - Soft and Bite Sized; 4 carb choices (60  gm/meal)    Exam:  /61   Pulse 99   Temp 97.7 °F (36.5 °C) (Oral)   Resp 14   Ht 5' 9\" (1.753 m)   Wt 176 lb 8 oz (80.1 kg)   SpO2 91%   BMI 26.06 kg/m²     General appearance: No apparent distress, appears stated age and cooperative. HEENT: Normal cephalic, atraumatic without obvious deformity. Conjunctivae/corneas clear. Neck: Supple, with full range of motion. No jugular venous distention. Trachea midline. Respiratory: Diminished breath sounds throughout lung fields, notable expiratory wheezing throughout all lung fields. Patient does have tachypnea however is on room air and maintaining adequate oxygenation. Cardiovascular: Regular rate and rhythm with normal S1/S2  Abdomen: Soft, rounded, bowel sounds present. Musculoskeletal:  No clubbing, cyanosis or edema bilaterally. Skin: Warm and dry  Neurologic: Follows commands, does have slurring of speech/delayed response time  Peripheral Pulses: +1 palpable, equal bilaterally       Labs:   Recent Labs     01/06/23  0812 01/07/23  0333 01/08/23 0416   WBC 9.4 7.5 7.4   HGB 14.6 13.8* 13.8*   HCT 44.0 41.2* 41.7*    188 190     Recent Labs     01/06/23  0812 01/07/23  0333 01/08/23  0416    142 140   K 4.2 4.4 3.9    105 104   CO2 25 26 28   BUN 25* 18 16   CREATININE 0.5 0.4 0.4   CALCIUM 8.4* 8.2* 8.1*     Recent Labs     01/08/23  0927   AST 37   ALT 27   BILIDIR <0.2   BILITOT 0.2*   ALKPHOS 57     No results for input(s): INR in the last 72 hours. No results for input(s): [place] in the last 72 hours. Microbiology:      Urinalysis:      Lab Results   Component Value Date/Time    NITRU NEGATIVE 01/07/2020 03:00 PM    [address] 2-4 12/20/2016 01:00 PM    BACTERIA NONE 12/20/2016 01:00 PM    RBCUA 0-2 12/20/2016 01:00 PM    BLOODU NEGATIVE 01/07/2020 03:00 PM    SPECGRAV 1.015 08/21/2013 11:05 AM    GLUCOSEU >= 1000 01/07/2020 03:00 PM       Radiology:  MRI BRAIN WO CONTRAST   Final Result       1.  No evidence of acute intracranial abnormality. 2. Mild severity chronic microvascular angiopathy superimposed on mild volume loss which has progressed in the interval since prior MRI. 3. Mild mucosal inflammation of the paranasal sinuses. **This report has been created using voice recognition software. It may contain minor errors which are inherent in voice recognition technology. **      Final report electronically signed by Dr. Sapphire Malhotra MD on 1/5/2023 8:16 AM      CT HEAD WO CONTRAST   Final Result    Stable CT appearance the brain. No hemorrhage. **This report has been created using voice recognition software. It may contain minor errors which are inherent in voice recognition technology. **      Final report electronically signed by Dr. Juan Silveira on 1/4/2023 7:13 AM      XR CHEST PORTABLE   Final Result   Normal mobile chest.            **This report has been created using voice recognition software. It may contain minor errors which are inherent in voice recognition technology. **      Final report electronically signed by Dr. Starla Barlow on 1/3/2023 8:15 AM      CTA HEAD W WO CONTRAST (CODE STROKE)   Final Result   Left internal carotid artery is occluded within the neck and reconstituted    intracranially by collateral flow. No vessel occlusion intracranially. This document has been electronically signed by: Chai Land MD on    01/03/2023 05:28 AM      All CTs at this facility use dose modulation techniques and iterative    reconstructions, and/or weight-based dosing   when appropriate to reduce radiation to a low as reasonably achievable. 3D Post-processing was performed on this study. CTA NECK W WO CONTRAST (CODE STROKE)   Final Result   Left internal carotid artery is occluded. Right carotid and bilateral    vertebral arteries are patent.       This document has been electronically signed by: Chai Land MD on    01/03/2023 05:33 AM      All CTs at this facility use dose modulation techniques and iterative    reconstructions, and/or weight-based dosing   when appropriate to reduce radiation to a low as reasonably achievable. Carotid stenosis and measurements are in accordance with NASCET criteria. 3D Post-processing was performed on this study. CT HEAD WO CONTRAST   Final Result   Impression:   No acute intracranial pathology. This document has been electronically signed by: Byron Marr MD on    01/03/2023 04:42 AM      All CTs at this facility use dose modulation techniques and iterative    reconstructions, and/or weight-based dosing   when appropriate to reduce radiation to a low as reasonably achievable. XR CHEST PORTABLE   Final Result   Coarsened appearance to the pulmonary parenchyma which is likely chronic. No definite acute findings. **This report has been created using voice recognition software. It may contain minor errors which are inherent in voice recognition technology. **      Final report electronically signed by Dr. Jose Antonio Ríos on 1/2/2023 9:39 AM       [address]    (Results Pending)   CTA NECK W WO CONTRAST    (Results Pending)       DVT prophylaxis: [x] Lovenox                                 [] SCDs                                 [] SQ Heparin                                 [] Encourage ambulation           [] Already on Anticoagulation     Code Status: Full Code    PT/OT Eval Status: Consulted    Tele:   [x] yes             [] no    Active Hospital Problems    Diagnosis Date Noted    Influenza with respiratory manifestation other than pneumonia [J11.1] 01/03/2023     Priority: Medium    Cerebral infarction due to internal carotid artery occlusion [facility]) [I63.239] 01/03/2023     Priority: Medium    Acute respiratory failure with hypoxia [facility]) [J96.01] 01/02/2023     Priority: Medium       Electronically signed by Sonali Hernandez MD on 1/8/2023 at 12:21 PM

## 2023-01-09 LAB
ANION GAP SERPL CALCULATED.3IONS-SCNC: 10 MEQ/L (ref 8–16)
BASOPHILS # BLD: 0.7 %
BASOPHILS ABSOLUTE: 0.1 THOU/MM3 (ref 0–0.1)
BUN BLDV-MCNC: 17 MG/DL (ref 7–22)
CALCIUM SERPL-MCNC: 8 MG/DL (ref 8.5–10.5)
CHLORIDE BLD-SCNC: 102 MEQ/L (ref 98–111)
CO2: 25 MEQ/L (ref 23–33)
CREAT SERPL-MCNC: 0.4 MG/DL (ref 0.4–1.2)
EOSINOPHIL # BLD: 0.8 %
EOSINOPHILS ABSOLUTE: 0.1 THOU/MM3 (ref 0–0.4)
ERYTHROCYTE [DISTWIDTH] IN BLOOD BY AUTOMATED COUNT: 12.4 % (ref 11.5–14.5)
ERYTHROCYTE [DISTWIDTH] IN BLOOD BY AUTOMATED COUNT: 44.1 FL (ref 35–45)
GFR SERPL CREATININE-BSD FRML MDRD: > 60 ML/MIN/1.73M2
GLUCOSE BLD-MCNC: 123 MG/DL (ref 70–108)
GLUCOSE BLD-MCNC: 131 MG/DL (ref 70–108)
GLUCOSE BLD-MCNC: 139 MG/DL (ref 70–108)
GLUCOSE BLD-MCNC: 145 MG/DL (ref 70–108)
GLUCOSE BLD-MCNC: 187 MG/DL (ref 70–108)
GLUCOSE BLD-MCNC: 190 MG/DL (ref 70–108)
GLUCOSE BLD-MCNC: 226 MG/DL (ref 70–108)
HCT VFR BLD CALC: 43.6 % (ref 42–52)
HEMOGLOBIN: 14.3 GM/DL (ref 14–18)
IMMATURE GRANS (ABS): 0.21 THOU/MM3 (ref 0–0.07)
IMMATURE GRANULOCYTES: 2.8 %
LYMPHOCYTES # BLD: 29.3 %
LYMPHOCYTES ABSOLUTE: 2.2 THOU/MM3 (ref 1–4.8)
MCH RBC QN AUTO: 31.7 PG (ref 26–33)
MCHC RBC AUTO-ENTMCNC: 32.8 GM/DL (ref 32.2–35.5)
MCV RBC AUTO: 96.7 FL (ref 80–94)
MONOCYTES # BLD: 10.2 %
MONOCYTES ABSOLUTE: 0.8 THOU/MM3 (ref 0.4–1.3)
NUCLEATED RED BLOOD CELLS: 0 /100 WBC
PLATELET # BLD: 221 THOU/MM3 (ref 130–400)
PMV BLD AUTO: 8.7 FL (ref 9.4–12.4)
POTASSIUM REFLEX MAGNESIUM: 3.6 MEQ/L (ref 3.5–5.2)
RBC # BLD: 4.51 MILL/MM3 (ref 4.7–6.1)
SEG NEUTROPHILS: 56.2 %
SEGMENTED NEUTROPHILS ABSOLUTE COUNT: 4.2 THOU/MM3 (ref 1.8–7.7)
SODIUM BLD-SCNC: 137 MEQ/L (ref 135–145)
WBC # BLD: 7.5 THOU/MM3 (ref 4.8–10.8)

## 2023-01-09 PROCEDURE — 97530 THERAPEUTIC ACTIVITIES: CPT

## 2023-01-09 PROCEDURE — 82948 REAGENT STRIP/BLOOD GLUCOSE: CPT

## 2023-01-09 PROCEDURE — 2580000003 HC RX 258: Performed by: NURSE PRACTITIONER

## 2023-01-09 PROCEDURE — 80048 BASIC METABOLIC PNL TOTAL CA: CPT

## 2023-01-09 PROCEDURE — 6370000000 HC RX 637 (ALT 250 FOR IP)

## 2023-01-09 PROCEDURE — 6370000000 HC RX 637 (ALT 250 FOR IP): Performed by: INTERNAL MEDICINE

## 2023-01-09 PROCEDURE — 6360000002 HC RX W HCPCS

## 2023-01-09 PROCEDURE — 6370000000 HC RX 637 (ALT 250 FOR IP): Performed by: STUDENT IN AN ORGANIZED HEALTH CARE EDUCATION/TRAINING PROGRAM

## 2023-01-09 PROCEDURE — 99222 1ST HOSP IP/OBS MODERATE 55: CPT | Performed by: NURSE PRACTITIONER

## 2023-01-09 PROCEDURE — 36415 COLL VENOUS BLD VENIPUNCTURE: CPT

## 2023-01-09 PROCEDURE — 97110 THERAPEUTIC EXERCISES: CPT

## 2023-01-09 PROCEDURE — 2060000000 HC ICU INTERMEDIATE R&B

## 2023-01-09 PROCEDURE — 97116 GAIT TRAINING THERAPY: CPT

## 2023-01-09 PROCEDURE — 85025 COMPLETE CBC W/AUTO DIFF WBC: CPT

## 2023-01-09 RX ORDER — INSULIN LISPRO 100 [IU]/ML
0-4 INJECTION, SOLUTION INTRAVENOUS; SUBCUTANEOUS
Status: DISCONTINUED | OUTPATIENT
Start: 2023-01-09 | End: 2023-01-10 | Stop reason: HOSPADM

## 2023-01-09 RX ADMIN — ASPIRIN 81 MG: 81 TABLET, CHEWABLE ORAL at 08:15

## 2023-01-09 RX ADMIN — CLOZAPINE 50 MG: 25 TABLET ORAL at 08:16

## 2023-01-09 RX ADMIN — METOPROLOL SUCCINATE 25 MG: 25 TABLET, EXTENDED RELEASE ORAL at 08:16

## 2023-01-09 RX ADMIN — DESVENLAFAXINE 50 MG: 50 TABLET, EXTENDED RELEASE ORAL at 08:15

## 2023-01-09 RX ADMIN — ENOXAPARIN SODIUM 40 MG: 100 INJECTION SUBCUTANEOUS at 08:15

## 2023-01-09 RX ADMIN — ATORVASTATIN CALCIUM 20 MG: 20 TABLET, FILM COATED ORAL at 08:16

## 2023-01-09 RX ADMIN — INSULIN GLARGINE 7 UNITS: 100 INJECTION, SOLUTION SUBCUTANEOUS at 20:42

## 2023-01-09 RX ADMIN — CLOZAPINE 50 MG: 25 TABLET ORAL at 12:54

## 2023-01-09 RX ADMIN — DOCUSATE SODIUM 100 MG: 100 CAPSULE, LIQUID FILLED ORAL at 08:15

## 2023-01-09 RX ADMIN — DIVALPROEX SODIUM 1000 MG: 500 TABLET, EXTENDED RELEASE ORAL at 20:42

## 2023-01-09 RX ADMIN — SODIUM CHLORIDE, PRESERVATIVE FREE 10 ML: 5 INJECTION INTRAVENOUS at 20:42

## 2023-01-09 RX ADMIN — DIVALPROEX SODIUM 500 MG: 500 TABLET, EXTENDED RELEASE ORAL at 08:15

## 2023-01-09 RX ADMIN — CLOZAPINE 200 MG: 100 TABLET ORAL at 20:42

## 2023-01-09 RX ADMIN — SODIUM CHLORIDE, PRESERVATIVE FREE 10 ML: 5 INJECTION INTRAVENOUS at 08:17

## 2023-01-09 RX ADMIN — INSULIN LISPRO 1 UNITS: 100 INJECTION, SOLUTION INTRAVENOUS; SUBCUTANEOUS at 20:41

## 2023-01-09 ASSESSMENT — PAIN SCALES - GENERAL
PAINLEVEL_OUTOF10: 0

## 2023-01-09 NOTE — PROGRESS NOTES
[address]  Occupational Therapy  Daily Note  Time:   Time In: 09  Time Out: 1384  Timed Code Treatment Minutes: 23 Minutes  Minutes: 23          Date: 2023  Patient Name: Yohannes England,   Gender: male      Room: [facility]16/016-A  MRN: [id number]  : 1965  (65 y.o.)  Referring Practitioner: Juanjo Walker PA-C  Diagnosis: acute respiratory failure with hypoxia  Additional Pertinent Hx: Per ER note on 2023: 65 y.o. male who presents to the emergency department for evaluation of flu like symptoms. States that he has had fever, chills, and non-productive cough that has worsened over the last 3 days. Denies taking any medication for his symptoms. Received 800mL NS while en route to ED. Denies being on oxygen at home. Has been vaccinated for COVID and influenza. Per neuro note: At [phone] on 1/3/2023 stroke alert was called for this patient for left-sided facial droop and dysarthria which were noted by his nurse. Nurse states that at 2 AM the facial droop and slurred speech were not present. Patient taken to imaging for stat CT head which revealed no ICH, midline shift, mass-effect. 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 No LVO. Based on patient's symptoms and time since last known well decision was made for TNK to be given. 22 mg of TN K given at 5:05 AM    Restrictions/Precautions:  Restrictions/Precautions: Fall Risk, General Precautions  Position Activity Restriction  Other position/activity restrictions: impulsive     SUBJECTIVE: Pt lying supine upon arrival, agreeable to OT session. PAIN: None    Vitals: Oxygen: Patient on 2 L O2 via Nasal Canula  upon arrival to room- pt did have NC off and reminded to reapply. Patient O2 sats at 87% and able to recover >90% quickly after reapplying NC. Upon activity patient maintaining O2 at >90%. Pt requiring mod rest break(s) to recover.    Heart Rate: WNL    COGNITION: Slow Processing, Decreased Recall, Decreased Insight, Decreased Problem Solving, Decreased Safety Awareness, and Impulsive    ADL:   Grooming: Stand By Assistance. Standing sink side washing face/hands  Lower Extremity Dressing: Supervision. [place]/donning socks onto B feet while seated using cross leg tech. Toileting: Stand By Assistance. Standing at STS to void. BALANCE:  Sitting Balance:  Supervision. EOB  Standing Balance: Stand By Assistance. X3 minutes within ADL    BED MOBILITY:  Supine to Sit: Supervision    Scooting: Supervision EOB    TRANSFERS:  Sit to Stand:  Stand By Assistance. Stand to Sit: Stand By Assistance. Comment: TO/from various surfaces    FUNCTIONAL MOBILITY:  Assistive Device: None  Assist Level:  Stand By Assistance. Distance:   Completed functional mobility to/from BR and within pt room at fair pace, no LOB noted. Pt requires seated rest break after trial of mobility, mod fatigue noted. ADDITIONAL ACTIVITIES:  Patient identified a personal goal to increase UB strength and improve overall endurance so they can complete their toilet & shower transfers; skilled edu on UE strengthening. Completed BUE AROM exercises x10 reps x1 set in all joints/planes to increase strength and endurance required for ADLs. Pt required min rest break between each exercise and min v/c for proper technique. ASSESSMENT:     Activity Tolerance:  Patient tolerance of  treatment: fair. Discharge Recommendations: Patient would benefit from continued OT at discharge and Inpatient Therapy Stay  Equipment Recommendations:  Other: RW use  Plan: Times Per Week: 6x  Current Treatment Recommendations: Functional mobility training, Balance training, Self-Care / ADL, Safety education & training, Endurance training    Patient Education  Patient Education: ADL's, Energy Conservation, Home Exercise Program, Home Safety, Importance of Increasing Activity, and Safety with transfers and mobility. Goals  Short Term Goals  Time Frame for Short Term Goals: until discharge  Short Term Goal 1: Pt will complete various t/fs including BSC with CGA & min vcs for safe technique  Short Term Goal 2: Pt will tolerate standing 2-3 min with CGA for increased ease of sinkside grooming  Short Term Goal 3: Pt will complete mobility to [facility] or bedside chair with RW, CGA, & 0-2 vcs for safety  Short Term Goal 4: Pt will complete BADL routine with min A & min vcs for safety & sequencing  Long Term Goals  Time Frame for Long Term Goals : No LTG set d/t short ELOS    Following session, patient left in safe position with all fall risk precautions in place.

## 2023-01-09 NOTE — PROGRESS NOTES
Jack COORDINATOR CONSULT    Referral Type: internal    Patient Name: Dalton Barragan      MRN: [id number]    : 1965  (65 y.o.)  Gender: male   Race:White (non-)     Payor Source: Payor: MEDICARE / Plan: MEDICARE PART A AND B / Product Type: *No Product type* /   Secondary Payor Source:  MEDICAID [place]    Isolation Status: No active isolations       Type of Home: Assisted living  Home Layout: One level  Home Access: Level entry        Additional Comments: Pt reports using no AD PLOF. Faciliate completes IADLs & Pt can go down for meals. What is treatment plan? Disciplines Required upon Admission to Inpatient Rehabilitation: Physical Therapy and Occupational Therapy  Post operative: No  Fall: Yes  Dialysis: No  Diet: ADULT DIET; Dysphagia - Soft and Bite Sized; 4 carb choices (60 gm/meal)  Discussed patient with  and PM&R provider: Await medical work up completion. Medicare benefits indicate patient in [employer] [place] [employer]. Martina 80 requesting information on patient Medicaid benefit. Jessica Durham states that the patient has Traditional Medicaid benefits through the [place] [employer] [place]. CHARLENE Wood updated.

## 2023-01-09 NOTE — CARE COORDINATION
1/9/23, 2:19 PM EST    DISCHARGE ON GOING EVALUATION    Omayra Souza day: 6  Location: [facility]16/016-A Reason for admit: Influenza with respiratory manifestation other than pneumonia [J11.1]  Acute respiratory failure with hypoxia ([address] [J96.01]   Procedure:   1/2 CXR: Coarsened appearance to the pulmonary parenchyma which is likely chronic. 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 No vessel occlusion intracranially  1/3 Echo with EF 55%  1/4 CT Head: Stable CT appearance the brain. No hemorrhage  1/5 MRI Brain WO Contrast 1. No evidence of acute intracranial abnormality. 2. Mild severity chronic microvascular angiopathy superimposed on mild volume loss which has progressed in the interval since prior MRI. 3. Mild mucosal inflammation of the paranasal sinuses  Barriers to Discharge: PT/OT, diabetes management, nasal cannula oxygen at 2 liters with saturations at 95%. Albuterol nebs, Asa, Lipitor, Lovenox, Duonebs, electrolyte replacement protocols. Physiatry consult. PCP: Charlee [place] MD  Readmission Risk Score: 16.5%  Patient Goals/Plan/Treatment Preferences: Pt from [place] [facility], await Physiatry recommendations.

## 2023-01-09 NOTE — PROGRESS NOTES
Hospitalist Progress Note    Patient:  Federico Gimenez      Unit/Bed:4A-16/016-A    YOB: 1965    MRN: [id number]       Acct: [de-identified]     PCP: Edna Garcia MD    Date of Admission: 1/2/2023    Disposition: Patient likely eligible for inpatient rehabilitation awaiting speech therapy evaluation DME O2 needed    Assessment/Plan:    Acute hypoxic respiratory failure: Secondary to influenza A. Patient does not require oxygen at baseline. Patient initially required nasal cannula that progressed to high settings of HFNC. Patient's oxygenation however is starting to improve and patient is currently requiring FiO2 only 21% and flow rate 0. Of note patient was placed on Solu-Medrol 1/3/23 as well as and duonebs to assist with lung function due to possible underlying COPD due to significant smoking history.   -Pulmonary hygiene    -Wean oxygen as tolerated   -Maintain oxygen saturation > 94%   -DC'd Solu-Medrol 1/7/2020    Influenza A: Patient finally positive in the emergency department upon arrival.  Started on Tamiflu.   -Continue Tamiflu for total 5 days. Left facial droop with dysarthria: Resolved. ?  TIA. 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 There is no LVO. Patient did receive TKNase 1/3/23. Repeat head CT 1/4/2028 showed stability.   Patient had echocardiogram 1/3/2023 that showed EF of 55% and overall normal left ventricular function.    -Neurology following   -Continue neurovascular checks   -Patient can be started on chemical DVT prophylaxis once it has been  24 hours since TNKase administration.   -Patient will also be started on aspirin 1/4/2023 which is also 24 hours status post TNKase administration.   -Patient had MRI today, no e/o acute intracranial abnormality or bleed   -Patient remains n.p.o. until speech/swallow evaluation has been completed   -PT/OT   -Continue telemetry   -Continue statin therapy    Type 2 diabetes mellitus: 1/3/23 hemoglobin A1c 6.8. Goal blood glucose while inpatient 140-180. While patient started to eat again blood glucose increased therefore placed patient on low-dose sliding scale as well as Lantus. Will likely need to dose adjust throughout patient's stay. And as diet progresses.   -Hold home meds   -Low dose sliding scale    -Lantus 7 units nightly (2/3 home dose of 10 units nightly)  -Hypoglycemic protocol  -Accu-Cheks  -Diabetic diet when able    Essential hypertension: Has been relatively controlled since arrival.  Permissive hypertension (up to 180/105) was allowed for 24 hours following thrombolytic administration.   -At this time patient's goal blood pressure  <130/80  -Home Lopressor resumed with holding parameters    Bipolar affective disorder: Continue home meds Clozapine, Pristiq, and Depakote    Current smoker: 1 pack/day   -Patient currently has nicotine patch   -Smoking cessation advised    Expected discharge date: To be determined    Disposition:    [] Home       [] TCU       [] Rehab       [] Psych       [] SNF       [x] [place]       [] Other-    Chief Complaint: Shortness of breath    Hospital Course:     67-year-old male presented to [facility] emergency department 1/2/2022 with a chief complaint of shortness of breath. Patient was experiencing productive cough and shortness of breath for 2 to 3 days prior to arrival and said that his cough is constant in nature he also said that he has been experiencing fever and chills. Patient has past medical history significant for diabetes mellitus, hypertension, bipolar disorder as well as current smoker of 1 pack/day. Of note patient does reside in assisted living facility. While in the emergency room department patient was found to be hypoxic on room air SPO2 of 89% subsequent placed on 2 L nasal cannula. Is found also to be influenza A positive.   Patient was started on Tamiflu and admitted to hospitalist team.  Unfortunately on 1/3/2022 code stroke was called and patient was found to have left internal carotid artery occlusion which he did receive TNKase and admitted to the ICU.  1/3/2023 patient's oxygenation continued to decline in which he required high flow nasal cannula. This morning, 1/4/2023 patient remains relatively hemodynamically stable and on high flow however flow rate is 0 and FiO2 is only 21. CT head 1/4/2023 also demonstrated Stable CT appearance the brain. No hemorrhage. Patient was actually deemed stable for ICU transfer to ICU stepdown. Hospitalist team will resume care of this patient. Medications:  Reviewed    Infusion Medications    sodium chloride      dextrose       Scheduled Medications    calcium replacement protocol   Other RX Placeholder    metoprolol succinate  25 mg Oral Daily    insulin glargine  7 Units SubCUTAneous Nightly    albuterol  2.5 mg Nebulization Once    sodium chloride flush  5-40 mL IntraVENous 2 times per day    insulin lispro  0-4 Units SubCUTAneous Q4H    aspirin  81 mg Oral Daily    atorvastatin  20 mg Oral Daily    cloZAPine  200 mg Oral Nightly    cloZAPine  50 mg Oral BID    desvenlafaxine succinate  50 mg Oral Daily    divalproex  500 mg Oral QAM    divalproex  1,000 mg Oral Nightly    docusate sodium  100 mg Oral Daily    enoxaparin  40 mg SubCUTAneous Daily     PRN Meds: ipratropium-albuterol, sodium chloride, ondansetron **OR** ondansetron, polyethylene glycol, albuterol, clotrimazole, glucose, dextrose bolus **OR** dextrose bolus, glucagon (rDNA), dextrose, sodium chloride flush, potassium chloride **OR** potassium alternative oral replacement **OR** potassium chloride, acetaminophen **OR** acetaminophen, magnesium sulfate      Intake/Output Summary (Last 24 hours) at 1/9/2023 1752  Last data filed at 1/9/2023 0439  Gross per 24 hour   Intake 910 ml   Output 550 ml   Net 360 ml         Diet:  ADULT DIET;  Dysphagia - Soft and Bite Sized; 4 carb choices (60 gm/meal)    Exam:  /81   Pulse (!) 112   Temp 98 °F (36.7 °C) (Oral)   Resp 16   Ht 5' 9\" (1.753 m)   Wt 180 lb 5.4 oz (81.8 kg)   SpO2 96%   BMI 26.63 kg/m²     General appearance: No apparent distress, appears stated age and cooperative. HEENT: Normal cephalic, atraumatic without obvious deformity. Conjunctivae/corneas clear. Neck: Supple, with full range of motion. No jugular venous distention. Trachea midline. Respiratory: Diminished breath sounds throughout lung fields, notable expiratory wheezing throughout all lung fields. Patient does have tachypnea however is on room air and maintaining adequate oxygenation. Cardiovascular: Regular rate and rhythm with normal S1/S2  Abdomen: Soft, rounded, bowel sounds present. Musculoskeletal:  No clubbing, cyanosis or edema bilaterally. Skin: Warm and dry  Neurologic: Follows commands, does have slurring of speech/delayed response time  Peripheral Pulses: +1 palpable, equal bilaterally       Labs:   Recent Labs     01/07/23 0333 01/08/23 0416 01/09/23  0522   WBC 7.5 7.4 7.5   HGB 13.8* 13.8* 14.3   HCT 41.2* 41.7* 43.6    190 221       Recent Labs     01/07/23 0333 01/08/23 0416 01/09/23  0522    140 137   K 4.4 3.9 3.6    104 102   CO2 26 28 25   BUN 18 16 17   CREATININE 0.4 0.4 0.4   CALCIUM 8.2* 8.1* 8.0*       Recent Labs     01/08/23  0927   AST 37   ALT 27   BILIDIR <0.2   BILITOT 0.2*   ALKPHOS 57       No results for input(s): INR in the last 72 hours. No results for input(s): [place] Memory in the last 72 hours.     Microbiology:      Urinalysis:      Lab Results   Component Value Date/Time    NITRU NEGATIVE 01/08/2023 08:20 PM    WBCUA 2-4 12/20/2016 01:00 PM    BACTERIA NONE 12/20/2016 01:00 PM    RBCUA 0-2 12/20/2016 01:00 PM    BLOODU NEGATIVE 01/08/2023 08:20 PM    SPECGRAV 1.022 01/08/2023 08:20 PM    GLUCOSEU >= 1000 01/07/2020 03:00 PM       Radiology:  MRI BRAIN WO CONTRAST   Final Result 1. No evidence of acute intracranial abnormality. 2. Mild severity chronic microvascular angiopathy superimposed on mild volume loss which has progressed in the interval since prior MRI. 3. Mild mucosal inflammation of the paranasal sinuses. **This report has been created using voice recognition software. It may contain minor errors which are inherent in voice recognition technology. **      Final report electronically signed by Dr. Judith Ocasio MD on 1/5/2023 8:16 AM      CT HEAD WO CONTRAST   Final Result    Stable CT appearance the brain. No hemorrhage. **This report has been created using voice recognition software. It may contain minor errors which are inherent in voice recognition technology. **      Final report electronically signed by Dr. Carlos Capellan on 1/4/2023 7:13 AM      XR CHEST PORTABLE   Final Result   Normal mobile chest.            **This report has been created using voice recognition software. It may contain minor errors which are inherent in voice recognition technology. **      Final report electronically signed by Dr. Mary Garza on 1/3/2023 8:15 AM      CTA HEAD W WO CONTRAST (CODE STROKE)   Final Result   Left internal carotid artery is occluded within the neck and reconstituted    intracranially by collateral flow. No vessel occlusion intracranially. This document has been electronically signed by: Nikole Reddy MD on    01/03/2023 05:28 AM      All CTs at this facility use dose modulation techniques and iterative    reconstructions, and/or weight-based dosing   when appropriate to reduce radiation to a low as reasonably achievable. 3D Post-processing was performed on this study. CTA NECK W WO CONTRAST (CODE STROKE)   Final Result   Left internal carotid artery is occluded. Right carotid and bilateral    vertebral arteries are patent.       This document has been electronically signed by: Nikole Reddy MD on    01/03/2023 05:33 AM All CTs at this facility use dose modulation techniques and iterative    reconstructions, and/or weight-based dosing   when appropriate to reduce radiation to a low as reasonably achievable. Carotid stenosis and measurements are in accordance with NASCET criteria. 3D Post-processing was performed on this study. CT HEAD WO CONTRAST   Final Result   Impression:   No acute intracranial pathology. This document has been electronically signed by: Corky Samaniego MD on    01/03/2023 04:42 AM      All CTs at this facility use dose modulation techniques and iterative    reconstructions, and/or weight-based dosing   when appropriate to reduce radiation to a low as reasonably achievable. XR CHEST PORTABLE   Final Result   Coarsened appearance to the pulmonary parenchyma which is likely chronic. No definite acute findings. **This report has been created using voice recognition software. It may contain minor errors which are inherent in voice recognition technology. **      Final report electronically signed by Dr. Donnie Davis on 1/2/2023 9:39 AM       [address]    (Results Pending)   CTA NECK W WO CONTRAST    (Results Pending)       DVT prophylaxis: [x] Lovenox                                 [] SCDs                                 [] SQ Heparin                                 [] Encourage ambulation           [] Already on Anticoagulation     Code Status: Full Code    PT/OT Eval Status: Consulted    Tele:   [x] yes             [] no    Active Hospital Problems    Diagnosis Date Noted    Influenza with respiratory manifestation other than pneumonia [J11.1] 01/03/2023     Priority: Medium    Cerebral infarction due to internal carotid artery occlusion [facility]) [I63.239] 01/03/2023     Priority: Medium    Acute respiratory failure with hypoxia ([address] [J96.01] 01/02/2023     Priority: Medium       Electronically signed by Frankey Lagos, MD on 1/9/2023 at 5:52 PM

## 2023-01-09 NOTE — FLOWSHEET NOTE
01/09/23 1013   Safe Environment   Safety Measures Other (comment)  (vn rounding)   Call placed , pt responds to audio , permits video . Pt denied any voiced concerns or complaints at this time , call light within reach , no s/s distress noted .

## 2023-01-09 NOTE — CONSULTS
Physical Medicine & Rehabilitation   Consult Note      Admitting Physician: Kiko Faith MD    Primary Care Provider: Almita Holloway MD     Reason for Consult:  Deconditioning. Rehab needs. History of Present Illness:  Kaveh Ledesma is a 65 y.o. male admitted to [facility] on 1/2/2023. Patient with past medical history of DM, HTN, and bipolar disorder who presented to [facility] for productive cough, fever and SOB. This had become progressively worse and is worse with exertion. While in the ER, patient did test positive for influenza A. CXR revealed coarsened appearance to the pulmonary parenchyma ?chronic. Pulmonary vascularity is normal. Tamiflu was started. Patient was admitted to [facility] Med-Surgical Unit. On 1/3/23 CODE Stroke was called due to new onset left facial weakness, lethargy and dysarthria. NIH of 3. 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 Based on patient's symptoms and time since last known well decision was made for TNK to be given. 22 mg of TNK given at 5:05 AM.  Patient transferred to the ICU with thrombolytic precautions. Patient was placed on HFNC. MRI complete 1/4/23 without acute findings of CVA. echocardiogram 1/3/2023 that showed EF of 55% and overall normal left ventricular function. Patient was cleared for diet by SLP. Tamilfu completed 1/7/23. Solu-Medrol 1/7/23. Out of droplet isolation 1/9/23. Plan was for patient to return to Assisted Living once medically stable, but needs to be able to care for self except for housemaking tasks. Patient seen today, resting in bed. Patient is very pleasant and cooperative. Patient with mumbled speech, which he states is baseline for him. Patient is very independent at his Assisted Living. He is able to care for himself and is independent with walking to rec [facility] and [employer]. Discussed with patient about concerns with returning to A. L. with current level of mobility. Patient in agreement. Discussed IPR, patient in agreement with this. Patient denies any other questions or concerns at this time. Current Rehabilitation Assessments:  PT: 1/9/22  Bed Mobility:  Rolling to Left: Stand By Assistance   Supine to Sit: Contact Guard Assistance, with HOB flat and no rail - pt took extended time to complete   Sit to Supine: Stand By Assistance   Transfers:  Sit to Stand: [employer] however on a couple of occ took more than one attempt due to post lean and loss of balance and would sit back down   Stand to Sit:Contact Guard Assistance  Pt impulsive needing cues for O2 line management prior to sitting down   Ambulation:  Minimal Assistance  Distance: 50x1  Surface: Level Tile  Device:No Device  Gait Deviations:  Slow Luis with uneven step length and decreased heel strike but clearing right foot, noted left lean and path deviation to the left along with decreased wt shifting over right LE, needing assist for O2 line awareness and management - pts tinetti score is a 14 so an AD is recommended   Contact Guard Assistance  Distance: 50x1  Surface: Level Tile  Device:Rolling Walker  Gait Deviations:  slighlty improved luis and good step length however still noted decreased wt shifting to the right and still required cues and assist for O2 line management   Balance:  Dynamic balance activity w/o UE at support pt completed reaching task out of base of support with min to CGA he would have occ post loss of balance needing to take several steps to recover and at times needed min assist to recover   Exercise:  Patient was guided in 1 set(s) 10 reps of exercise to both lower extremities.   Ankle pumps, Long arc quads, Standing heel/toe raises, Standing marches, Standing hip abduction/adduction, Standing hip extension, Standing hamstring curls, Mini squats, and standing ex with UE at support and CGA for balance noted decreased strength/control at right LE and quad and hip control at right vs left LE during closed chain activity . Exercises were completed for increased independence with functional mobility. OT:  1/9/22  COGNITION: Slow Processing, Decreased Recall, Decreased Insight, Decreased Problem Solving, Decreased Safety Awareness, and Impulsive  ADL:   Grooming: Stand By Assistance. Standing sink side washing face/hands  Lower Extremity Dressing: Supervision. [place]/donning socks onto B feet while seated using cross leg tech. Toileting: Stand By Assistance. Standing at STS to void. BALANCE:  Sitting Balance:  Supervision. EOB  Standing Balance: Stand By Assistance. X3 minutes within ADL  BED MOBILITY:  Supine to Sit: Supervision    Scooting: Supervision EOB  TRANSFERS:  Sit to Stand:  Stand By Assistance. Stand to Sit: Stand By Assistance. Comment: TO/from various surfaces  FUNCTIONAL MOBILITY:  Assistive Device: None  Assist Level:  Stand By Assistance. Distance:   Completed functional mobility to/from BR and within pt room at fair pace, no LOB noted. Pt requires seated rest break after trial of mobility, mod fatigue noted. ADDITIONAL ACTIVITIES:  Patient identified a personal goal to increase UB strength and improve overall endurance so they can complete their toilet & shower transfers; skilled edu on UE strengthening. Completed BUE AROM exercises x10 reps x1 set in all joints/planes to increase strength and endurance required for ADLs. Pt required min rest break between each exercise and min v/c for proper technique. ST:    INTERVENTIONS: Completed skilled, advanced dietary analysis with toast and jelly as well as thin via straw. Oral phase with additional time needed to complete effective mastication, however, efficacious with additional time provided with no evidence for oral stasis given liquid wash - independently initiated.  Thin H20 via straw with controlled/single sips with no overt s/s aspiration; at least minimal cuing to utilize small sips. Certainly not able to r/o totality of airway invasion events and/or pharyngeal dysfunction at bedside alone; patient's swallow physiology appears functional to support goal for comfortable oral intake without distress. Recommendations for advancement to soft and bite size diet with thin liquids, strict adherence to identified compensatory strategies (SMALL sips) requiring intermittent supervision with meals. Ongoing dysphagia management services to be rendered with considerations for formal instrumentation via MBS should it become clinically indicated. INTERVENTIONS:   Orientation  Date: logical cuing  Month: independent  Year: independent  Place: independent  JAVIER: independent  Time: independent within ten minutes withOUT clock use    INTERVENTIONS:   Problem Solving               Compensatory Strategy Utilization: minimal cuing              Soft and Bite Size vs. Regular Diet: moderate cuing              Safety with Transfer: minimal cuing    Past Medical History:    History reviewed. No pertinent past medical history. Past Surgical History:    History reviewed. No pertinent surgical history.     Allergies:    No Known Allergies     Current Medications:   Current Facility-Administered Medications: calcium replacement protocol, , Other, RX Placeholder  metoprolol succinate (TOPROL XL) extended release tablet 25 mg, 25 mg, Oral, Daily  ipratropium-albuterol (DUONEB) nebulizer solution 1 ampule, 1 ampule, Inhalation, Q4H PRN  insulin glargine (LANTUS) injection vial 7 Units, 7 Units, SubCUTAneous, Nightly  albuterol (PROVENTIL) nebulizer solution 2.5 mg, 2.5 mg, Nebulization, Once  sodium chloride flush 0.9 % injection 5-40 mL, 5-40 mL, IntraVENous, 2 times per day  0.9 % sodium chloride infusion, , IntraVENous, PRN  ondansetron (ZOFRAN-ODT) disintegrating tablet 4 mg, 4 mg, Oral, Q8H PRN **OR** ondansetron (ZOFRAN) injection 4 mg, 4 mg, IntraVENous, Q6H PRN  polyethylene glycol (GLYCOLAX) packet 17 g, 17 g, Oral, Daily PRN  insulin lispro (HUMALOG) injection vial 0-4 Units, 0-4 Units, SubCUTAneous, Q4H  albuterol (PROVENTIL) nebulizer solution 2.5 mg, 2.5 mg, Nebulization, Q4H PRN  aspirin chewable tablet 81 mg, 81 mg, Oral, Daily  atorvastatin (LIPITOR) tablet 20 mg, 20 mg, Oral, Daily  clotrimazole (LOTRIMIN) 1 % cream, , Topical, BID PRN  cloZAPine (CLOZARIL) tablet 200 mg, 200 mg, Oral, Nightly  cloZAPine (CLOZARIL) tablet 50 mg, 50 mg, Oral, BID  desvenlafaxine succinate (PRISTIQ) extended release tablet 50 mg, 50 mg, Oral, Daily  divalproex (DEPAKOTE ER) extended release tablet 500 mg, 500 mg, Oral, QAM  divalproex (DEPAKOTE ER) extended release tablet 1,000 mg, 1,000 mg, Oral, Nightly  docusate sodium (COLACE) capsule 100 mg, 100 mg, Oral, Daily  glucose chewable tablet 16 g, 4 tablet, Oral, PRN  dextrose bolus 10% 125 mL, 125 mL, IntraVENous, PRN **OR** dextrose bolus 10% 250 mL, 250 mL, IntraVENous, PRN  glucagon (rDNA) injection 1 mg, 1 mg, SubCUTAneous, PRN  dextrose 10 % infusion, , IntraVENous, Continuous PRN  sodium chloride flush 0.9 % injection 5-40 mL, 5-40 mL, IntraVENous, PRN  potassium chloride (KLOR-CON M) extended release tablet 40 mEq, 40 mEq, Oral, PRN **OR** potassium bicarb-citric acid (EFFER-K) effervescent tablet 40 mEq, 40 mEq, Oral, PRN **OR** potassium chloride 10 mEq/100 mL IVPB (Peripheral Line), 10 mEq, IntraVENous, PRN  enoxaparin (LOVENOX) injection 40 mg, 40 mg, SubCUTAneous, Daily  acetaminophen (TYLENOL) tablet 650 mg, 650 mg, Oral, Q6H PRN **OR** acetaminophen (TYLENOL) suppository 650 mg, 650 mg, Rectal, Q6H PRN  magnesium sulfate 2000 mg in 50 mL IVPB premix, 2,000 mg, IntraVENous, PRN    Social History:  Social History     Socioeconomic History    Marital status: Single     Spouse name: Not on file    Number of children: Not on file    Years of education: Not on file    Highest education level: Not on file   Occupational History    Not on file Tobacco Use    Smoking status: Every Day     Packs/day: 1.00     Types: Cigarettes    Smokeless tobacco: Never   Substance and Sexual Activity    Alcohol use: No    Drug use: No    Sexual activity: Never   Other Topics Concern    Not on file   Social History Narrative    Not on file     Social Determinants of Health     Financial Resource Strain: Not on file   Food Insecurity: Not on file   Transportation Needs: Not on file   Physical Activity: Not on file   Stress: Not on file   Social Connections: Not on file   Intimate Partner Violence: Not on file   Housing Stability: Not on file     Type of Home: Assisted living  Home Layout: One level  Home Access: Level entry  Home Equipment: Cane   Bathroom Shower/Tub: Walk-in shower  Bathroom Toilet: Standard  Bathroom Equipment: Grab bars around toilet, Grab bars in shower     ADL Assistance: [address] [place] [address] Avenue: Needs assistance  Ambulation Assistance: Independent  Transfer Assistance: Independent  Active : No  Additional Comments: Pt reports using no AD PLOF. Faciliate completes IADLs & Pt can go down for meals. Family History:   History reviewed. No pertinent family history.     Review of Systems:  CONSTITUTIONAL:  positive for  fatigue  EYES:  negative for  double vision, blurred vision, blind spots, and visual disturbance  HEENT:  negative for  hearing loss  RESPIRATORY:  positive for  cough with sputum and dyspnea  CARDIOVASCULAR:  negative for  chest pain, palpitations  GASTROINTESTINAL:  +BM 1/4/23  GENITOURINARY:  negative  SKIN:  negative  HEMATOLOGIC/LYMPHATIC:  negative  MUSCULOSKELETAL:  positive for  muscle weakness  NEUROLOGICAL:  positive for speech problems, gait problems, and weakness  BEHAVIOR/PSYCH:  negative  System review otherwise negative    Physical Exam:  /74   Pulse 91   Temp 97.9 °F (36.6 °C) (Oral)   Resp 16   Ht 5' 9\" (1.753 m)   Wt 180 lb 5.4 oz (81.8 kg)   SpO2 95%   BMI 26.63 kg/m²   awake  Orientation: person, place, time  Mood: within normal limits  Affect: calm  General appearance: Patient is well nourished, well developed, well groomed and in no acute distress    Memory:   normal,   Attention/Concentration: normal  Language:  abnormal - chronic mumbled speech    Cranial Nerves:  cranial nerves II-XII appear intact  ROM:  normal  Motor Exam:  Motor exam is symmetrical 5 out of 5 all extremities bilaterally  Tone:  normal  Muscle bulk: within normal limits  Sensory:  Sensory intact  Coordination:   normal    Heart: normal rate, regular rhythm, normal S1, S2, no murmurs, rubs, clicks or gallops  Lungs: rhonchi throughout  Abdomen: soft, non-tender, non-distended, normal bowel sounds, no masses or organomegaly    Skin: warm and dry, no rash or erythema  Peripheral vascular: Pulses: Normal upper and lower extremity pulses; Edema: trace      Diagnostics:  Recent Results (from the past 24 hour(s))   POCT Glucose    Collection Time: 01/08/23  3:20 PM   Result Value Ref Range    POC Glucose 191 (H) 70 - 108 mg/dl   POCT Glucose    Collection Time: 01/08/23  5:33 PM   Result Value Ref Range    POC Glucose 256 (H) 70 - 108 mg/dl   Calcium, Ionized    Collection Time: 01/08/23  7:40 PM   Result Value Ref Range    Calcium, Ionized 1.16 1.12 - 1.32 mmol/L   Urinalysis    Collection Time: 01/08/23  8:20 PM   Result Value Ref Range    Glucose, Urine >= 1000 (A) NEGATIVE mg/dl    Bilirubin Urine NEGATIVE NEGATIVE    Ketones, Urine 15 (A) NEGATIVE    Specific Gravity, UA 1.022 1.002 - 1.030    Blood, Urine NEGATIVE NEGATIVE    pH, UA 7.5 5.0 - 9.0    Protein, UA NEGATIVE NEGATIVE mg/dl    Urobilinogen, Urine 1.0 0.0 - 1.0 eu/dl    Nitrite, Urine NEGATIVE NEGATIVE    Leukocytes, UA NEGATIVE NEGATIVE    Color, UA YELLOW YELLOW-STRAW    Character, Urine CLEAR [id number]   Urine Drug Screen    Collection Time: 01/08/23  8:20 PM   Result Value Ref Range    Amphetamine+Methamphetamine Urine Screen Negative NEGATIVE    Barbiturate Quant, Ur Negative NEGATIVE    Benzodiazepine Quant, Ur Negative NEGATIVE    Cannabinoid Quant, Ur Negative NEGATIVE    Cocaine Metab Quant, Ur Negative NEGATIVE    Opiates, Urine Negative NEGATIVE    Oxycodone Negative NEGATIVE    PCP Quant, Ur Negative NEGATIVE    Fentanyl Negative NEGATIVE   POCT Glucose    Collection Time: 01/08/23  8:21 PM   Result Value Ref Range    POC Glucose 168 (H) 70 - 108 mg/dl   POCT Glucose    Collection Time: 01/09/23 12:42 AM   Result Value Ref Range    POC Glucose 123 (H) 70 - 108 mg/dl   POCT Glucose    Collection Time: 01/09/23  4:37 AM   Result Value Ref Range    POC Glucose 131 (H) 70 - 108 mg/dl   Basic Metabolic Panel w/ Reflex to MG    Collection Time: 01/09/23  5:22 AM   Result Value Ref Range    Sodium 137 135 - 145 meq/L    Potassium reflex Magnesium 3.6 3.5 - 5.2 meq/L    Chloride 102 98 - 111 meq/L    CO2 25 23 - 33 meq/L    Glucose 145 (H) 70 - 108 mg/dL    BUN 17 7 - 22 mg/dL    Creatinine 0.4 0.4 - 1.2 mg/dL    Calcium 8.0 (L) 8.5 - 10.5 mg/dL   CBC with Auto Differential    Collection Time: 01/09/23  5:22 AM   Result Value Ref Range    WBC 7.5 4.8 - 10.8 thou/mm3    RBC 4.51 (L) 4.70 - 6.10 mill/mm3    Hemoglobin 14.3 14.0 - 18.0 gm/dl    Hematocrit 43.6 42.0 - 52.0 %    MCV 96.7 (H) 80.0 - 94.0 fL    MCH 31.7 26.0 - 33.0 pg    MCHC 32.8 32.2 - 35.5 gm/dl    RDW-CV 12.4 11.5 - 14.5 %    RDW-SD 44.1 35.0 - 45.0 fL    Platelets 221 130 - 400 thou/mm3    MPV 8.7 (L) 9.4 - 12.4 fL    Seg Neutrophils 56.2 %    Lymphocytes 29.3 %    Monocytes 10.2 %    Eosinophils 0.8 %    Basophils 0.7 %    Immature Granulocytes 2.8 %    Segs Absolute 4.2 1.8 - 7.7 thou/mm3    Lymphocytes Absolute 2.2 1.0 - 4.8 thou/mm3    Monocytes Absolute 0.8 0.4 - 1.3 thou/mm3    Eosinophils Absolute 0.1 0.0 - 0.4 thou/mm3    Basophils Absolute 0.1 0.0 - 0.1 thou/mm3    Immature Grans (Abs) 0.21 (H) 0.00 - 0.07 thou/mm3    nRBC 0 /100 wbc   Anion Gap    Collection Time: 01/09/23  5:22 AM   Result  Value Ref Range    Anion Gap 10.0 8.0 - 16.0 meq/L   Glomerular Filtration Rate, Estimated    Collection Time: 01/09/23  5:22 AM   Result Value Ref Range    Est, Glom Filt Rate >60 >60 ml/min/1.73m2   POCT Glucose    Collection Time: 01/09/23 10:06 AM   Result Value Ref Range    POC Glucose 190 (H) 70 - 108 mg/dl     MRI brain 1/4/23  Impression       1. No evidence of acute intracranial abnormality. 2. Mild severity chronic microvascular angiopathy superimposed on mild volume loss which has progressed in the interval since prior MRI. 3. Mild mucosal inflammation of the paranasal sinuses. Impression:  Acute respiratory failure  Influenza A  Moderate cognitive impairment  Diabetes Mellitis type 2  HTN  Bipolar disorder  Chronic tobacco use, not previously dx with chronic lung disease    Recommendations:  Continue current therapies  SLP for cognition, found [address] [place] [address] on 1/3/23 was 13/25  Home meds of clozapin, depakote, and Pristiq for bipolar  Continues on 2L NC for hypoxia   In need of pulmonary follow up for possible chronic lung disease  Plan admission to [facility] after SLP updated note on 1/10/23, discussed with SLP    It was my pleasure to evaluate Piyush Donna today. Please call with questions.     Vamsi Villegas, RAJNI - CNP

## 2023-01-09 NOTE — PROGRESS NOTES
[facility]  INPATIENT PHYSICAL THERAPY  DAILY NOTE  [facility] 4A - 4A-16/016-A      Time In: 1238  Time Out: 1003  Timed Code Treatment Minutes: 45 Minutes  Minutes: 38          Date: 2023  Patient Name: Nicholas Tapia,  Gender:  male        MRN: [id number]  : 1965  (65 y.o.)     Referring Practitioner: Holly Saul PA-C  Diagnosis: Influenza with respiratory manifestation other than pneumonia  Additional Pertinent Hx: Per ER note on 2023: 65 y.o. male who presents to the emergency department for evaluation of flu like symptoms. States that he has had fever, chills, and non-productive cough that has worsened over the last 3 days. Denies taking any medication for his symptoms. Received 800mL NS while en route to ED. Denies being on oxygen at home. Has been vaccinated for COVID and influenza. Per neuro note: At [phone] on 1/3/2023 stroke alert was called for this patient for left-sided facial droop and dysarthria which were noted by his nurse. Nurse states that at 2 AM the facial droop and slurred speech were not present. Patient taken to imaging for stat CT head which revealed no ICH, midline shift, mass-effect. 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 No LVO. Based on patient's symptoms and time since last known well decision was made for TNK to be given. 22 mg of TN K given at 5:05 AM     Prior Level of Function:  Type of Home: Assisted living  Home Layout: One level  Home Access: Level entry  Home Equipment: Cane   Bathroom Shower/Tub: Walk-in shower  Bathroom Toilet: Standard  Bathroom Equipment: Grab bars around toilet, Grab bars in shower    ADL Assistance: Independent  Homemaking Assistance: Needs assistance  Ambulation Assistance: Independent  Transfer Assistance: Independent  Active : No  Additional Comments: Pt reports using no AD PLOF.  Faciliate completes IADLs & Pt can go down for meals.    Restrictions/Precautions:  Restrictions/Precautions: Fall Risk, General Precautions  Position Activity Restriction  Other position/activity restrictions: impulsive       SUBJECTIVE: pt in bed on arrival and agreeable for therapy he asked to return to bed at end of session     PAIN: 0/10:       Vitals: Oxygen: pt on 2L o2 and sats 88% on arrival and he requested rest breaks at times- noted improved O2 sats w/ mobility when up on 2L he was 93% and above     OBJECTIVE:  Bed Mobility:  Rolling to Left: Stand By Assistance   Supine to Sit: Contact Guard Assistance, with HOB flat and no rail - pt took extended time to complete   Sit to Supine: Stand By Assistance     Transfers:  Sit to Stand: [employer] however on a couple of occ took more than one attempt due to post lean and loss of balance and would sit back down   Stand to Sit:Contact Guard Assistance  Pt impulsive needing cues for O2 line management prior to sitting down   Ambulation:  Minimal Assistance  Distance: 50x1  Surface: Level Tile  Device:No Device  Gait Deviations:  Slow Luis with uneven step length and decreased heel strike but clearing right foot, noted left lean and path deviation to the left along with decreased wt shifting over right LE, needing assist for O2 line awareness and management - pts tinetti score is a 14 so an AD is recommended   Contact Guard Assistance  Distance: 50x1  Surface: Level Tile  Device:Rolling Walker  Gait Deviations:  slighlty improved luis and good step length however still noted decreased wt shifting to the right and still required cues and assist for O2 line management       Balance:  Dynamic balance activity w/o UE at support pt completed reaching task out of base of support with min to CGA he would have occ post loss of balance needing to take several steps to recover and at times needed min assist to recover     Exercise:  Patient was guided in 1 set(s) 10 reps of exercise to both lower extremities. Ankle pumps, Long arc quads, Standing heel/toe raises, Standing marches, Standing hip abduction/adduction, Standing hip extension, Standing hamstring curls, Mini squats, and standing ex with UE at support and CGA for balance noted decreased strength/control at right LE and quad and hip control at right vs left LE during closed chain activity . Exercises were completed for increased independence with functional mobility. Functional Outcome Measures: Completed  Balance Score: 6  Gait Score: 8  Tinetti Total Score: 14  Risk Indicators:  Less than/equal to 18 = high risk  19-23 Moderate risk  Greater than/equal to 24 = low risk    AM-PAC Inpatient Mobility without Stair Climbing Raw Score : 15  AM-PAC Inpatient without Stair Climbing T-Scale Score : 43.03    ASSESSMENT:  Assessment:  Pt cont to demonstrate decreased safety awareness and decreased balance, strength and endurance from initial baseline. Repeated tinetti balance test he did improve his score from a few days ago however scored a 14/28 putting him at a high fall risk and recommended use of AD. Pt currently requiring O2 throughout session and he demonstrated decreased awareness of line and management. Pt would benefit from a therapy stay prior to return to A.[facility]. to cont to work on balance, endurance, strength and increased independence and safety with functional mobility   Activity Tolerance:  Patient tolerance of  treatment: fair. Equipment Recommendations: Other: 1/9- recommend rolling walker (pt reported that he had one in the past but is uncertain where it is)  Discharge Recommendations:  pt would benefit from a therapy stay prior to return to [facility]   Plan: Current Treatment Recommendations: Strengthening, Balance training, Functional mobility training, Gait training, Safety education & training, Home exercise program, Patient/Caregiver education & training, Transfer training, Endurance training, Therapeutic activities  General Plan: (6x N)    Patient Education  Patient Education: Plan of Care, O2 line management, use of walker     Goals:  Patient Goals : go home  Short Term Goals  Time Frame for Short Term Goals: at discharge  Short Term Goal 1: Pt to be Mod I for supine <> sit to get in/out of bed  Short Term Goal 2: Pt to be Mod I for sit <> stand to get up to ambulate  Short Term Goal 3: Pt to ambulate >80 ft with cane with Supervision for getting to dining room at [facility] Term Goals  Time Frame for Long Term Goals : not set due to short ELOS    Following session, patient left in safe position with all fall risk precautions in place.

## 2023-01-09 NOTE — PLAN OF CARE
Problem: Discharge Planning  Goal: Discharge to home or other facility with appropriate resources  1/9/2023 1158 by Claudia Pena RN  Outcome: Progressing  Flowsheets (Taken 1/9/2023 1158)  Discharge to home or other facility with appropriate resources:   Identify barriers to discharge with patient and caregiver   Arrange for needed discharge resources and transportation as appropriate   Refer to discharge planning if patient needs post-hospital services based on physician order or complex needs related to functional status, cognitive ability or social support system  1/8/2023 2255 by Nidia Lee RN  Outcome: Progressing  Flowsheets (Taken 1/8/2023 2255)  Discharge to home or other facility with appropriate resources:   Identify barriers to discharge with patient and caregiver   Identify discharge learning needs (meds, wound care, etc)   Refer to discharge planning if patient needs post-hospital services based on physician order or complex needs related to functional status, cognitive ability or social support system   Arrange for needed discharge resources and transportation as appropriate     Problem: Safety - Adult  Goal: Free from fall injury  1/9/2023 1158 by Claudia Pena RN  Outcome: Progressing  Flowsheets (Taken 1/8/2023 2255 by Nidia Lee RN)  Free From Fall Injury:   Instruct family/caregiver on patient safety   Based on caregiver fall risk screen, instruct family/caregiver to ask for assistance with transferring infant if caregiver noted to have fall risk factors  1/8/2023 2255 by Nidia Lee RN  Outcome: Progressing  Flowsheets (Taken 1/8/2023 2255)  Free From Fall Injury:   Instruct family/caregiver on patient safety   Based on caregiver fall risk screen, instruct family/caregiver to ask for assistance with transferring infant if caregiver noted to have fall risk factors     Problem: Respiratory - Adult  Goal: Achieves optimal ventilation and oxygenation  1/9/2023 1158 by Claudia Pena RN  Outcome: Progressing  Flowsheets (Taken 1/9/2023 1158)  Achieves optimal ventilation and oxygenation:   Assess for changes in respiratory status   Assess for changes in mentation and behavior   Position to facilitate oxygenation and minimize respiratory effort   Oxygen supplementation based on oxygen saturation or arterial blood gases   Assess and instruct to report shortness of breath or any respiratory difficulty  1/8/2023 2255 by Roman Riojas RN  Outcome: Progressing  Flowsheets (Taken 1/8/2023 2255)  Achieves optimal ventilation and oxygenation:   Assess for changes in respiratory status   Assess for changes in mentation and behavior   Position to facilitate oxygenation and minimize respiratory effort   Oxygen supplementation based on oxygen saturation or arterial blood gases   Initiate smoking cessation protocol as indicated   Encourage broncho-pulmonary hygiene including cough, deep breathe, incentive spirometry   Assess the need for suctioning and aspirate as needed   Assess and instruct to report shortness of breath or any respiratory difficulty   Respiratory therapy support as indicated     Problem: Pain  Goal: Verbalizes/displays adequate comfort level or baseline comfort level  1/9/2023 1158 by Milli Bowles RN  Outcome: Progressing  1/8/2023 2255 by Roman Riojas RN  Outcome: Progressing  Flowsheets (Taken 1/8/2023 2255)  Verbalizes/displays adequate comfort level or baseline comfort level:   Encourage patient to monitor pain and request assistance   Assess pain using appropriate pain scale   Administer analgesics based on type and severity of pain and evaluate response   Implement non-pharmacological measures as appropriate and evaluate response   Consider cultural and social influences on pain and pain management     Problem: ABCDS Injury Assessment  Goal: Absence of physical injury  1/9/2023 1158 by Milli Bowles RN  Outcome: Progressing  1/8/2023 2255 by Roman Riojas RN  Outcome: Progressing  Flowsheets (Taken 1/8/2023 2255)  Absence of Physical Injury: Implement safety measures based on patient assessment     Problem: Neurosensory - Adult  Goal: Achieves stable or improved neurological status  1/9/2023 1158 by Reji Andersen RN  Outcome: Progressing  Flowsheets (Taken 1/9/2023 1158)  Achieves stable or improved neurological status: Assess for and report changes in neurological status  1/8/2023 2255 by Claudette Mena RN  Outcome: Progressing  Flowsheets (Taken 1/8/2023 2255)  Achieves stable or improved neurological status:   Assess for and report changes in neurological status   Initiate measures to prevent increased intracranial pressure   Maintain blood pressure and fluid volume within ordered parameters to optimize cerebral perfusion and minimize risk of hemorrhage   Monitor temperature, glucose, and sodium.  Initiate appropriate interventions as ordered     Problem: Cardiovascular - Adult  Goal: Maintains optimal cardiac output and hemodynamic stability  1/9/2023 1158 by Reji Andersen RN  Outcome: Progressing  1/8/2023 2255 by Claudette Mena RN  Outcome: Progressing  Flowsheets (Taken 1/8/2023 2255)  Maintains optimal cardiac output and hemodynamic stability:   Monitor blood pressure and heart rate   Monitor urine output and notify Licensed Independent Practitioner for values outside of normal range   Assess for signs of decreased cardiac output     Problem: Skin/Tissue Integrity - Adult  Goal: Skin integrity remains intact  1/9/2023 1158 by Reji Andersen RN  Outcome: Progressing  1/8/2023 2255 by Claudette Mena RN  Outcome: Progressing  Flowsheets (Taken 1/8/2023 2255)  Skin Integrity Remains Intact: Monitor for areas of redness and/or skin breakdown     Problem: Musculoskeletal - Adult  Goal: Return mobility to safest level of function  1/9/2023 1158 by Reji Andersen RN  Outcome: Progressing  1/8/2023 2255 by Claudette Mena RN  Outcome: Progressing  Flowsheets (Taken 1/8/2023 2255)  Return Mobility to Safest Level of Function:   Assess patient stability and activity tolerance for standing, transferring and ambulating with or without assistive devices   Assist with transfers and ambulation using safe patient handling equipment as needed   Obtain physical therapy/occupational therapy consults as needed   Instruct patient/family in ordered activity level     Problem: Infection - Adult  Goal: Absence of infection during hospitalization  1/9/2023 1158 by Dodie Clements RN  Outcome: Progressing  1/8/2023 2255 by Teodora Gonzalez RN  Outcome: Progressing  Flowsheets (Taken 1/8/2023 2255)  Absence of infection during hospitalization:   Assess and monitor for signs and symptoms of infection   Monitor lab/diagnostic results   Monitor all insertion sites i.e., indwelling lines, tubes and drains   Administer medications as ordered   Instruct and encourage patient and family to use good hand hygiene technique   Identify and instruct in appropriate isolation precautions for identified infection/condition     Problem: Metabolic/Fluid and Electrolytes - Adult  Goal: Electrolytes maintained within normal limits  1/9/2023 1158 by Dodie Clements RN  Outcome: Progressing  1/8/2023 2255 by Teodora Gonzalez RN  Outcome: Progressing  Flowsheets (Taken 1/8/2023 2255)  Electrolytes maintained within normal limits:   Monitor labs and assess patient for signs and symptoms of electrolyte imbalances   Administer electrolyte replacement as ordered   Monitor response to electrolyte replacements, including repeat lab results as appropriate  Goal: Glucose maintained within prescribed range  1/9/2023 1158 by Dodie Clements RN  Outcome: Progressing  1/8/2023 2255 by Teodora Gonzalez RN  Outcome: Progressing  Flowsheets (Taken 1/8/2023 2255)  Glucose maintained within prescribed range:   Administer ordered medications to maintain glucose within target range   Assess barriers to adequate nutritional intake and initiate nutrition consult as needed   Instruct patient on self management of diabetes and initiate consult as needed   Assess for signs and symptoms of hyperglycemia and hypoglycemia   Monitor blood glucose as ordered

## 2023-01-09 NOTE — PLAN OF CARE
Problem: Discharge Planning  Goal: Discharge to home or other facility with appropriate resources  Outcome: Progressing  Flowsheets (Taken 1/8/2023 2255)  Discharge to home or other facility with appropriate resources:   Identify barriers to discharge with patient and caregiver   Identify discharge learning needs (meds, wound care, etc)   Refer to discharge planning if patient needs post-hospital services based on physician order or complex needs related to functional status, cognitive ability or social support system   Arrange for needed discharge resources and transportation as appropriate     Problem: Safety - Adult  Goal: Free from fall injury  Outcome: Progressing  Flowsheets (Taken 1/8/2023 2255)  Free From Fall Injury:   Instruct family/caregiver on patient safety   Based on caregiver fall risk screen, instruct family/caregiver to ask for assistance with transferring infant if caregiver noted to have fall risk factors     Problem: Respiratory - Adult  Goal: Achieves optimal ventilation and oxygenation  Outcome: Progressing  Flowsheets (Taken 1/8/2023 2255)  Achieves optimal ventilation and oxygenation:   Assess for changes in respiratory status   Assess for changes in mentation and behavior   Position to facilitate oxygenation and minimize respiratory effort   Oxygen supplementation based on oxygen saturation or arterial blood gases   Initiate smoking cessation protocol as indicated   Encourage broncho-pulmonary hygiene including cough, deep breathe, incentive spirometry   Assess the need for suctioning and aspirate as needed   Assess and instruct to report shortness of breath or any respiratory difficulty   Respiratory therapy support as indicated     Problem: Pain  Goal: Verbalizes/displays adequate comfort level or baseline comfort level  Outcome: Progressing  Flowsheets (Taken 1/8/2023 2255)  Verbalizes/displays adequate comfort level or baseline comfort level:   Encourage patient to monitor pain and request assistance   Assess pain using appropriate pain scale   Administer analgesics based on type and severity of pain and evaluate response   Implement non-pharmacological measures as appropriate and evaluate response   Consider cultural and social influences on pain and pain management     Problem: ABCDS Injury Assessment  Goal: Absence of physical injury  Outcome: Progressing  Flowsheets (Taken 1/8/2023 2255)  Absence of Physical Injury: Implement safety measures based on patient assessment     Problem: Neurosensory - Adult  Goal: Achieves stable or improved neurological status  Outcome: Progressing  Flowsheets (Taken 1/8/2023 2255)  Achieves stable or improved neurological status:   Assess for and report changes in neurological status   Initiate measures to prevent increased intracranial pressure   Maintain blood pressure and fluid volume within ordered parameters to optimize cerebral perfusion and minimize risk of hemorrhage   Monitor temperature, glucose, and sodium.  Initiate appropriate interventions as ordered     Problem: Cardiovascular - Adult  Goal: Maintains optimal cardiac output and hemodynamic stability  Outcome: Progressing  Flowsheets (Taken 1/8/2023 2255)  Maintains optimal cardiac output and hemodynamic stability:   Monitor blood pressure and heart rate   Monitor urine output and notify Licensed Independent Practitioner for values outside of normal range   Assess for signs of decreased cardiac output     Problem: Skin/Tissue Integrity - Adult  Goal: Skin integrity remains intact  Outcome: Progressing  Flowsheets (Taken 1/8/2023 2255)  Skin Integrity Remains Intact: Monitor for areas of redness and/or skin breakdown     Problem: Musculoskeletal - Adult  Goal: Return mobility to safest level of function  Outcome: Progressing  Flowsheets (Taken 1/8/2023 2255)  Return Mobility to Safest Level of Function:   Assess patient stability and activity tolerance for standing, transferring and ambulating with or without assistive devices   Assist with transfers and ambulation using safe patient handling equipment as needed   Obtain physical therapy/occupational therapy consults as needed   Instruct patient/family in ordered activity level     Problem: Infection - Adult  Goal: Absence of infection during hospitalization  Outcome: Progressing  Flowsheets (Taken 1/8/2023 2255)  Absence of infection during hospitalization:   Assess and monitor for signs and symptoms of infection   Monitor lab/diagnostic results   Monitor all insertion sites i.e., indwelling lines, tubes and drains   Administer medications as ordered   Instruct and encourage patient and family to use good hand hygiene technique   Identify and instruct in appropriate isolation precautions for identified infection/condition     Problem: Metabolic/Fluid and Electrolytes - Adult  Goal: Electrolytes maintained within normal limits  Outcome: Progressing  Flowsheets (Taken 1/8/2023 2255)  Electrolytes maintained within normal limits:   Monitor labs and assess patient for signs and symptoms of electrolyte imbalances   Administer electrolyte replacement as ordered   Monitor response to electrolyte replacements, including repeat lab results as appropriate  Goal: Glucose maintained within prescribed range  Outcome: Progressing  Flowsheets (Taken 1/8/2023 2255)  Glucose maintained within prescribed range:   Administer ordered medications to maintain glucose within target range   Assess barriers to adequate nutritional intake and initiate nutrition consult as needed   Instruct patient on self management of diabetes and initiate consult as needed   Assess for signs and symptoms of hyperglycemia and hypoglycemia   Monitor blood glucose as ordered     Care plan reviewed with patient and family. Patient and family verbalize understanding of the plan of care and contribute to goal setting.

## 2023-01-10 ENCOUNTER — HOSPITAL ENCOUNTER (INPATIENT)
Age: 58
LOS: 10 days | Discharge: NURSING, REHAB OR LONG-TERM CARE | End: 2023-01-20
Attending: FAMILY MEDICINE | Admitting: STUDENT IN AN ORGANIZED HEALTH CARE EDUCATION/TRAINING PROGRAM
Payer: MEDICARE

## 2023-01-10 VITALS
BODY MASS INDEX: 26.66 KG/M2 | OXYGEN SATURATION: 97 % | HEART RATE: 109 BPM | RESPIRATION RATE: 16 BRPM | WEIGHT: 180 LBS | TEMPERATURE: 97 F | HEIGHT: 69 IN | DIASTOLIC BLOOD PRESSURE: 64 MMHG | SYSTOLIC BLOOD PRESSURE: 101 MMHG

## 2023-01-10 DIAGNOSIS — G93.31 POST VIRAL SYNDROME: Primary | ICD-10-CM

## 2023-01-10 DIAGNOSIS — J11.1 INFLUENZA WITH RESPIRATORY MANIFESTATION OTHER THAN PNEUMONIA: ICD-10-CM

## 2023-01-10 DIAGNOSIS — J84.89 INTERSTITIAL PNEUMONITIS (HCC): ICD-10-CM

## 2023-01-10 PROBLEM — R53.81 DEBILITY: Status: ACTIVE | Noted: 2023-01-10

## 2023-01-10 LAB
ANION GAP SERPL CALCULATED.3IONS-SCNC: 9 MEQ/L (ref 8–16)
BASOPHILS # BLD: 0.3 %
BASOPHILS ABSOLUTE: 0 THOU/MM3 (ref 0–0.1)
BUN BLDV-MCNC: 14 MG/DL (ref 7–22)
CALCIUM SERPL-MCNC: 8.4 MG/DL (ref 8.5–10.5)
CHLORIDE BLD-SCNC: 103 MEQ/L (ref 98–111)
CO2: 28 MEQ/L (ref 23–33)
CREAT SERPL-MCNC: 0.7 MG/DL (ref 0.4–1.2)
EOSINOPHIL # BLD: 1.5 %
EOSINOPHILS ABSOLUTE: 0.1 THOU/MM3 (ref 0–0.4)
ERYTHROCYTE [DISTWIDTH] IN BLOOD BY AUTOMATED COUNT: 12.6 % (ref 11.5–14.5)
ERYTHROCYTE [DISTWIDTH] IN BLOOD BY AUTOMATED COUNT: 45 FL (ref 35–45)
GFR SERPL CREATININE-BSD FRML MDRD: > 60 ML/MIN/1.73M2
GLUCOSE BLD-MCNC: 124 MG/DL (ref 70–108)
GLUCOSE BLD-MCNC: 142 MG/DL (ref 70–108)
GLUCOSE BLD-MCNC: 178 MG/DL (ref 70–108)
GLUCOSE BLD-MCNC: 207 MG/DL (ref 70–108)
GLUCOSE BLD-MCNC: 218 MG/DL (ref 70–108)
HCT VFR BLD CALC: 42.7 % (ref 42–52)
HEMOGLOBIN: 13.9 GM/DL (ref 14–18)
IMMATURE GRANS (ABS): 0.2 THOU/MM3 (ref 0–0.07)
IMMATURE GRANULOCYTES: 2.7 %
LYMPHOCYTES # BLD: 34.2 %
LYMPHOCYTES ABSOLUTE: 2.5 THOU/MM3 (ref 1–4.8)
MCH RBC QN AUTO: 31.5 PG (ref 26–33)
MCHC RBC AUTO-ENTMCNC: 32.6 GM/DL (ref 32.2–35.5)
MCV RBC AUTO: 96.8 FL (ref 80–94)
MONOCYTES # BLD: 13.4 %
MONOCYTES ABSOLUTE: 1 THOU/MM3 (ref 0.4–1.3)
NUCLEATED RED BLOOD CELLS: 0 /100 WBC
PLATELET # BLD: 224 THOU/MM3 (ref 130–400)
PMV BLD AUTO: 8.7 FL (ref 9.4–12.4)
POTASSIUM REFLEX MAGNESIUM: 4.3 MEQ/L (ref 3.5–5.2)
RBC # BLD: 4.41 MILL/MM3 (ref 4.7–6.1)
SEG NEUTROPHILS: 47.9 %
SEGMENTED NEUTROPHILS ABSOLUTE COUNT: 3.5 THOU/MM3 (ref 1.8–7.7)
SODIUM BLD-SCNC: 140 MEQ/L (ref 135–145)
WBC # BLD: 7.4 THOU/MM3 (ref 4.8–10.8)

## 2023-01-10 PROCEDURE — 97530 THERAPEUTIC ACTIVITIES: CPT

## 2023-01-10 PROCEDURE — 6370000000 HC RX 637 (ALT 250 FOR IP): Performed by: STUDENT IN AN ORGANIZED HEALTH CARE EDUCATION/TRAINING PROGRAM

## 2023-01-10 PROCEDURE — 80048 BASIC METABOLIC PNL TOTAL CA: CPT

## 2023-01-10 PROCEDURE — 97129 THER IVNTJ 1ST 15 MIN: CPT

## 2023-01-10 PROCEDURE — 82948 REAGENT STRIP/BLOOD GLUCOSE: CPT

## 2023-01-10 PROCEDURE — 97535 SELF CARE MNGMENT TRAINING: CPT

## 2023-01-10 PROCEDURE — 97110 THERAPEUTIC EXERCISES: CPT

## 2023-01-10 PROCEDURE — 92526 ORAL FUNCTION THERAPY: CPT

## 2023-01-10 PROCEDURE — 97116 GAIT TRAINING THERAPY: CPT

## 2023-01-10 PROCEDURE — 6360000002 HC RX W HCPCS

## 2023-01-10 PROCEDURE — 1180000000 HC REHAB R&B

## 2023-01-10 PROCEDURE — 99223 1ST HOSP IP/OBS HIGH 75: CPT | Performed by: STUDENT IN AN ORGANIZED HEALTH CARE EDUCATION/TRAINING PROGRAM

## 2023-01-10 PROCEDURE — 2580000003 HC RX 258: Performed by: NURSE PRACTITIONER

## 2023-01-10 PROCEDURE — 6370000000 HC RX 637 (ALT 250 FOR IP)

## 2023-01-10 PROCEDURE — 85025 COMPLETE CBC W/AUTO DIFF WBC: CPT

## 2023-01-10 PROCEDURE — 36415 COLL VENOUS BLD VENIPUNCTURE: CPT

## 2023-01-10 RX ORDER — CLOZAPINE 25 MG/1
50 TABLET ORAL 2 TIMES DAILY
Status: DISCONTINUED | OUTPATIENT
Start: 2023-01-11 | End: 2023-01-20 | Stop reason: HOSPADM

## 2023-01-10 RX ORDER — ACETAMINOPHEN 325 MG/1
650 TABLET ORAL EVERY 4 HOURS PRN
Status: DISCONTINUED | OUTPATIENT
Start: 2023-01-10 | End: 2023-01-20 | Stop reason: HOSPADM

## 2023-01-10 RX ORDER — ACETAMINOPHEN 325 MG/1
650 TABLET ORAL EVERY 4 HOURS PRN
Status: CANCELLED | OUTPATIENT
Start: 2023-01-10

## 2023-01-10 RX ORDER — ONDANSETRON 4 MG/1
4 TABLET, ORALLY DISINTEGRATING ORAL EVERY 8 HOURS PRN
Status: CANCELLED | OUTPATIENT
Start: 2023-01-10

## 2023-01-10 RX ORDER — CLOTRIMAZOLE 1 %
CREAM (GRAM) TOPICAL 2 TIMES DAILY PRN
Status: DISCONTINUED | OUTPATIENT
Start: 2023-01-10 | End: 2023-01-20 | Stop reason: HOSPADM

## 2023-01-10 RX ORDER — DIVALPROEX SODIUM 500 MG/1
500 TABLET, EXTENDED RELEASE ORAL EVERY MORNING
Status: DISCONTINUED | OUTPATIENT
Start: 2023-01-11 | End: 2023-01-20 | Stop reason: HOSPADM

## 2023-01-10 RX ORDER — POLYETHYLENE GLYCOL 3350 17 G/17G
17 POWDER, FOR SOLUTION ORAL DAILY PRN
Status: DISCONTINUED | OUTPATIENT
Start: 2023-01-10 | End: 2023-01-20 | Stop reason: HOSPADM

## 2023-01-10 RX ORDER — CLOZAPINE 100 MG/1
200 TABLET ORAL NIGHTLY
Status: DISCONTINUED | OUTPATIENT
Start: 2023-01-10 | End: 2023-01-20 | Stop reason: HOSPADM

## 2023-01-10 RX ORDER — DIVALPROEX SODIUM 500 MG/1
500 TABLET, EXTENDED RELEASE ORAL EVERY MORNING
Status: CANCELLED | OUTPATIENT
Start: 2023-01-11

## 2023-01-10 RX ORDER — DOCUSATE SODIUM 100 MG/1
100 CAPSULE, LIQUID FILLED ORAL DAILY
Status: DISCONTINUED | OUTPATIENT
Start: 2023-01-11 | End: 2023-01-20 | Stop reason: HOSPADM

## 2023-01-10 RX ORDER — SODIUM CHLORIDE 9 MG/ML
INJECTION, SOLUTION INTRAVENOUS PRN
Status: DISCONTINUED | OUTPATIENT
Start: 2023-01-10 | End: 2023-01-10

## 2023-01-10 RX ORDER — METOPROLOL SUCCINATE 25 MG/1
25 TABLET, EXTENDED RELEASE ORAL DAILY
Status: CANCELLED | OUTPATIENT
Start: 2023-01-11

## 2023-01-10 RX ORDER — ALBUTEROL SULFATE 2.5 MG/3ML
2.5 SOLUTION RESPIRATORY (INHALATION) EVERY 4 HOURS PRN
Status: DISCONTINUED | OUTPATIENT
Start: 2023-01-10 | End: 2023-01-20 | Stop reason: HOSPADM

## 2023-01-10 RX ORDER — INSULIN LISPRO 100 [IU]/ML
0-4 INJECTION, SOLUTION INTRAVENOUS; SUBCUTANEOUS
Status: DISCONTINUED | OUTPATIENT
Start: 2023-01-10 | End: 2023-01-12

## 2023-01-10 RX ORDER — ENOXAPARIN SODIUM 100 MG/ML
40 INJECTION SUBCUTANEOUS DAILY
Status: CANCELLED | OUTPATIENT
Start: 2023-01-11

## 2023-01-10 RX ORDER — DOCUSATE SODIUM 100 MG/1
100 CAPSULE, LIQUID FILLED ORAL DAILY
Status: CANCELLED | OUTPATIENT
Start: 2023-01-11

## 2023-01-10 RX ORDER — POLYETHYLENE GLYCOL 3350 17 G/17G
17 POWDER, FOR SOLUTION ORAL DAILY PRN
Status: CANCELLED | OUTPATIENT
Start: 2023-01-10

## 2023-01-10 RX ORDER — DIVALPROEX SODIUM 500 MG/1
1000 TABLET, EXTENDED RELEASE ORAL NIGHTLY
Status: CANCELLED | OUTPATIENT
Start: 2023-01-10

## 2023-01-10 RX ORDER — IPRATROPIUM BROMIDE AND ALBUTEROL SULFATE 2.5; .5 MG/3ML; MG/3ML
1 SOLUTION RESPIRATORY (INHALATION) EVERY 4 HOURS PRN
Status: CANCELLED | OUTPATIENT
Start: 2023-01-10

## 2023-01-10 RX ORDER — ASPIRIN 81 MG/1
81 TABLET, CHEWABLE ORAL DAILY
Status: CANCELLED | OUTPATIENT
Start: 2023-01-11

## 2023-01-10 RX ORDER — CLOZAPINE 100 MG/1
200 TABLET ORAL NIGHTLY
Status: CANCELLED | OUTPATIENT
Start: 2023-01-10

## 2023-01-10 RX ORDER — INSULIN GLARGINE 100 [IU]/ML
7 INJECTION, SOLUTION SUBCUTANEOUS NIGHTLY
Status: CANCELLED | OUTPATIENT
Start: 2023-01-10

## 2023-01-10 RX ORDER — ONDANSETRON 4 MG/1
4 TABLET, ORALLY DISINTEGRATING ORAL EVERY 8 HOURS PRN
Status: DISCONTINUED | OUTPATIENT
Start: 2023-01-10 | End: 2023-01-20 | Stop reason: HOSPADM

## 2023-01-10 RX ORDER — SODIUM CHLORIDE 0.9 % (FLUSH) 0.9 %
5-40 SYRINGE (ML) INJECTION EVERY 12 HOURS SCHEDULED
Status: CANCELLED | OUTPATIENT
Start: 2023-01-10

## 2023-01-10 RX ORDER — IPRATROPIUM BROMIDE AND ALBUTEROL SULFATE 2.5; .5 MG/3ML; MG/3ML
1 SOLUTION RESPIRATORY (INHALATION) EVERY 4 HOURS PRN
Status: DISCONTINUED | OUTPATIENT
Start: 2023-01-10 | End: 2023-01-20 | Stop reason: HOSPADM

## 2023-01-10 RX ORDER — CLOTRIMAZOLE 1 %
CREAM (GRAM) TOPICAL 2 TIMES DAILY PRN
Status: CANCELLED | OUTPATIENT
Start: 2023-01-10

## 2023-01-10 RX ORDER — DIVALPROEX SODIUM 500 MG/1
1000 TABLET, EXTENDED RELEASE ORAL NIGHTLY
Status: DISCONTINUED | OUTPATIENT
Start: 2023-01-10 | End: 2023-01-20 | Stop reason: HOSPADM

## 2023-01-10 RX ORDER — SODIUM CHLORIDE 0.9 % (FLUSH) 0.9 %
5-40 SYRINGE (ML) INJECTION PRN
Status: DISCONTINUED | OUTPATIENT
Start: 2023-01-10 | End: 2023-01-20 | Stop reason: HOSPADM

## 2023-01-10 RX ORDER — ATORVASTATIN CALCIUM 20 MG/1
20 TABLET, FILM COATED ORAL DAILY
Status: CANCELLED | OUTPATIENT
Start: 2023-01-11

## 2023-01-10 RX ORDER — DESVENLAFAXINE 50 MG/1
50 TABLET, EXTENDED RELEASE ORAL DAILY
Status: CANCELLED | OUTPATIENT
Start: 2023-01-11

## 2023-01-10 RX ORDER — SODIUM CHLORIDE 9 MG/ML
INJECTION, SOLUTION INTRAVENOUS PRN
Status: CANCELLED | OUTPATIENT
Start: 2023-01-10

## 2023-01-10 RX ORDER — METOPROLOL SUCCINATE 25 MG/1
25 TABLET, EXTENDED RELEASE ORAL DAILY
Status: DISCONTINUED | OUTPATIENT
Start: 2023-01-11 | End: 2023-01-20 | Stop reason: HOSPADM

## 2023-01-10 RX ORDER — INSULIN GLARGINE 100 [IU]/ML
7 INJECTION, SOLUTION SUBCUTANEOUS NIGHTLY
Status: DISCONTINUED | OUTPATIENT
Start: 2023-01-10 | End: 2023-01-20 | Stop reason: HOSPADM

## 2023-01-10 RX ORDER — SODIUM CHLORIDE 0.9 % (FLUSH) 0.9 %
5-40 SYRINGE (ML) INJECTION EVERY 12 HOURS SCHEDULED
Status: DISCONTINUED | OUTPATIENT
Start: 2023-01-10 | End: 2023-01-10

## 2023-01-10 RX ORDER — SODIUM CHLORIDE 0.9 % (FLUSH) 0.9 %
5-40 SYRINGE (ML) INJECTION PRN
Status: CANCELLED | OUTPATIENT
Start: 2023-01-10

## 2023-01-10 RX ORDER — ENOXAPARIN SODIUM 100 MG/ML
40 INJECTION SUBCUTANEOUS DAILY
Status: DISCONTINUED | OUTPATIENT
Start: 2023-01-11 | End: 2023-01-20 | Stop reason: HOSPADM

## 2023-01-10 RX ORDER — CLOZAPINE 25 MG/1
50 TABLET ORAL 2 TIMES DAILY
Status: CANCELLED | OUTPATIENT
Start: 2023-01-10

## 2023-01-10 RX ORDER — ATORVASTATIN CALCIUM 20 MG/1
20 TABLET, FILM COATED ORAL DAILY
Status: DISCONTINUED | OUTPATIENT
Start: 2023-01-11 | End: 2023-01-20 | Stop reason: HOSPADM

## 2023-01-10 RX ORDER — DESVENLAFAXINE 50 MG/1
50 TABLET, EXTENDED RELEASE ORAL DAILY
Status: DISCONTINUED | OUTPATIENT
Start: 2023-01-11 | End: 2023-01-20 | Stop reason: HOSPADM

## 2023-01-10 RX ORDER — ASPIRIN 81 MG/1
81 TABLET, CHEWABLE ORAL DAILY
Status: DISCONTINUED | OUTPATIENT
Start: 2023-01-11 | End: 2023-01-20 | Stop reason: HOSPADM

## 2023-01-10 RX ORDER — ALBUTEROL SULFATE 2.5 MG/3ML
2.5 SOLUTION RESPIRATORY (INHALATION) EVERY 4 HOURS PRN
Status: CANCELLED | OUTPATIENT
Start: 2023-01-10

## 2023-01-10 RX ORDER — INSULIN LISPRO 100 [IU]/ML
0-4 INJECTION, SOLUTION INTRAVENOUS; SUBCUTANEOUS
Status: CANCELLED | OUTPATIENT
Start: 2023-01-10

## 2023-01-10 RX ADMIN — DIVALPROEX SODIUM 1000 MG: 500 TABLET, EXTENDED RELEASE ORAL at 21:10

## 2023-01-10 RX ADMIN — CLOZAPINE 50 MG: 25 TABLET ORAL at 14:09

## 2023-01-10 RX ADMIN — INSULIN GLARGINE 7 UNITS: 100 INJECTION, SOLUTION SUBCUTANEOUS at 21:22

## 2023-01-10 RX ADMIN — INSULIN LISPRO 1 UNITS: 100 INJECTION, SOLUTION INTRAVENOUS; SUBCUTANEOUS at 18:06

## 2023-01-10 RX ADMIN — CLOZAPINE 50 MG: 25 TABLET ORAL at 08:46

## 2023-01-10 RX ADMIN — SODIUM CHLORIDE, PRESERVATIVE FREE 10 ML: 5 INJECTION INTRAVENOUS at 08:49

## 2023-01-10 RX ADMIN — ASPIRIN 81 MG: 81 TABLET, CHEWABLE ORAL at 08:46

## 2023-01-10 RX ADMIN — METOPROLOL SUCCINATE 25 MG: 25 TABLET, EXTENDED RELEASE ORAL at 08:46

## 2023-01-10 RX ADMIN — DOCUSATE SODIUM 100 MG: 100 CAPSULE, LIQUID FILLED ORAL at 08:46

## 2023-01-10 RX ADMIN — DIVALPROEX SODIUM 500 MG: 500 TABLET, EXTENDED RELEASE ORAL at 08:46

## 2023-01-10 RX ADMIN — INSULIN LISPRO 1 UNITS: 100 INJECTION, SOLUTION INTRAVENOUS; SUBCUTANEOUS at 21:23

## 2023-01-10 RX ADMIN — ATORVASTATIN CALCIUM 20 MG: 20 TABLET, FILM COATED ORAL at 08:47

## 2023-01-10 RX ADMIN — CLOZAPINE 200 MG: 100 TABLET ORAL at 21:11

## 2023-01-10 RX ADMIN — ENOXAPARIN SODIUM 40 MG: 100 INJECTION SUBCUTANEOUS at 08:47

## 2023-01-10 RX ADMIN — DESVENLAFAXINE 50 MG: 50 TABLET, EXTENDED RELEASE ORAL at 08:46

## 2023-01-10 ASSESSMENT — PAIN SCALES - GENERAL
PAINLEVEL_OUTOF10: 0

## 2023-01-10 NOTE — CARE COORDINATION
1/10/23, 8:36 AM EST    DISCHARGE PLANNING EVALUATION    Spoke with Sapna Boland at [facility]. Made her aware patient is a potential discharge to [facility] today. She voiced understanding. Will update her if the plan changes. 1/10/23, 12:11 PM EST    Patient goals/plan/ treatment preferences discussed by  and . Patient goals/plan/ treatment preferences reviewed with patient/ family. Patient/ family verbalize understanding of discharge plan and are in agreement with goal/plan/treatment preferences. Understanding was demonstrated using the teach back method. AVS provided by RN at time of discharge, which includes all necessary medical information pertaining to the patients current course of illness, treatment, post-discharge goals of care, and treatment preferences. Services At/After Discharge: Inpatient rehab       IMM Letter  IMM Letter given to Patient/Family/Significant other/Guardian/POA/by[de-identified]   IMM Letter date given[de-identified] 01/10/23  IMM Letter time given[de-identified] 56     [place] will be discharged to [facility] today. He is agreeable to plan. Made him aware SW spoke with Sapna Boland at [facility] to make her aware he will be going to [facility].

## 2023-01-10 NOTE — PROGRESS NOTES
Report called to inpatient rehab. All questions answered. Pt transported via wheelchair with all belongings.

## 2023-01-10 NOTE — PROGRESS NOTES
Spoke with patient explained planned admission to [facility] today. Patient agrees with this plan.

## 2023-01-10 NOTE — PLAN OF CARE
Problem: Discharge Planning  Goal: Discharge to home or other facility with appropriate resources  Outcome: Progressing  Flowsheets (Taken 1/10/2023 1117)  Discharge to home or other facility with appropriate resources:   Identify barriers to discharge with patient and caregiver   Arrange for needed discharge resources and transportation as appropriate   Identify discharge learning needs (meds, wound care, etc)     Problem: Safety - Adult  Goal: Free from fall injury  Outcome: Progressing  Flowsheets (Taken 1/10/2023 1117)  Free From Fall Injury: Instruct family/caregiver on patient safety     Problem: Respiratory - Adult  Goal: Achieves optimal ventilation and oxygenation  Outcome: Progressing  Flowsheets (Taken 1/10/2023 1117)  Achieves optimal ventilation and oxygenation:   Assess for changes in respiratory status   Assess for changes in mentation and behavior   Position to facilitate oxygenation and minimize respiratory effort   Initiate smoking cessation protocol as indicated   Encourage broncho-pulmonary hygiene including cough, deep breathe, incentive spirometry   Assess and instruct to report shortness of breath or any respiratory difficulty     Problem: Pain  Goal: Verbalizes/displays adequate comfort level or baseline comfort level  Outcome: Progressing  Flowsheets (Taken 1/10/2023 1117)  Verbalizes/displays adequate comfort level or baseline comfort level:   Encourage patient to monitor pain and request assistance   Assess pain using appropriate pain scale   Administer analgesics based on type and severity of pain and evaluate response   Implement non-pharmacological measures as appropriate and evaluate response     Problem: ABCDS Injury Assessment  Goal: Absence of physical injury  Outcome: Progressing  Flowsheets (Taken 1/10/2023 1117)  Absence of Physical Injury: Implement safety measures based on patient assessment     Problem: Neurosensory - Adult  Goal: Achieves stable or improved neurological status  Outcome: Progressing  Flowsheets (Taken 1/10/2023 1117)  Achieves stable or improved neurological status:   Assess for and report changes in neurological status   Initiate measures to prevent increased intracranial pressure   Maintain blood pressure and fluid volume within ordered parameters to optimize cerebral perfusion and minimize risk of hemorrhage     Problem: Cardiovascular - Adult  Goal: Maintains optimal cardiac output and hemodynamic stability  Outcome: Progressing  Flowsheets (Taken 1/10/2023 1117)  Maintains optimal cardiac output and hemodynamic stability:   Monitor blood pressure and heart rate   Monitor urine output and notify Licensed Independent Practitioner for values outside of normal range   Assess for signs of decreased cardiac output   Administer fluid and/or volume expanders as ordered     Problem: Skin/Tissue Integrity - Adult  Goal: Skin integrity remains intact  Outcome: Progressing  Flowsheets (Taken 1/10/2023 1117)  Skin Integrity Remains Intact: Monitor for areas of redness and/or skin breakdown     Problem: Musculoskeletal - Adult  Goal: Return mobility to safest level of function  Outcome: Progressing  Flowsheets (Taken 1/10/2023 1117)  Return Mobility to Safest Level of Function:   Assess patient stability and activity tolerance for standing, transferring and ambulating with or without assistive devices   Assist with transfers and ambulation using safe patient handling equipment as needed   Ensure adequate protection for wounds/incisions during mobilization   Obtain physical therapy/occupational therapy consults as needed     Problem: Infection - Adult  Goal: Absence of infection during hospitalization  Outcome: Progressing  Flowsheets (Taken 1/10/2023 1117)  Absence of infection during hospitalization:   Assess and monitor for signs and symptoms of infection   Monitor lab/diagnostic results   [place] appropriate cooling/warming therapies per order   Administer medications as ordered   Instruct and encourage patient and family to use good hand hygiene technique   Identify and instruct in appropriate isolation precautions for identified infection/condition     Problem: Metabolic/Fluid and Electrolytes - Adult  Goal: Electrolytes maintained within normal limits  Outcome: Progressing  Flowsheets (Taken 1/10/2023 1117)  Electrolytes maintained within normal limits:   Monitor labs and assess patient for signs and symptoms of electrolyte imbalances   Administer electrolyte replacement as ordered   Monitor response to electrolyte replacements, including repeat lab results as appropriate     Problem: Metabolic/Fluid and Electrolytes - Adult  Goal: Glucose maintained within prescribed range  Outcome: Progressing  Flowsheets (Taken 1/10/2023 1117)  Glucose maintained within prescribed range:   Monitor blood glucose as ordered   Assess for signs and symptoms of hyperglycemia and hypoglycemia   Administer ordered medications to maintain glucose within target range     Care plan reviewed with patient. Patient verbalized understanding of the plan of care and contribute to goal setting.

## 2023-01-10 NOTE — H&P
Physical Medicine & Rehabilitation   History and Physical    Chief Complaint and Reason for Rehabilitation Admission: debility, impaired mobility and ADLs     History of Present Illness:  Laila Hollis is a 65 y.o. male admitted to [address] [place] [address] on 1/2/2023. Patient with past medical history of DM, HTN, and bipolar disorder who presented to [address] [place] [address] for productive cough, fever and SOB. This had become progressively worse and is worse with exertion. While in the ER, patient did test positive for influenza A. CXR revealed coarsened appearance to the pulmonary parenchyma ?chronic. Pulmonary vascularity is normal. Tamiflu was started. Patient was admitted to [address] [place] [address] Med-Surgical Unit. On 1/3/23 CODE Stroke was called due to new onset left facial weakness, lethargy and dysarthria. NIH of 3. 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 Based on patient's symptoms and time since last known well decision was made for TNK to be given. 22 mg of TNK given at 5:05 AM.  Patient transferred to the ICU with thrombolytic precautions. Patient was placed on HFNC. MRI complete 1/4/23 without acute findings of CVA. echocardiogram 1/3/2023 that showed EF of 55% and overall normal left ventricular function. Patient was cleared for diet by SLP. Tamilfu completed 1/7/23. Solu-Medrol 1/7/23. Out of droplet isolation 1/9/23. Plan was for patient to return to Assisted Living once medically stable, but needs to be able to care for self except for housemaking tasks. On day of consultation, patient seen resting in bed. Patient is very pleasant and cooperative. Patient with mumbled speech, which he states is baseline for him. Patient is very independent at his Assisted Living. He is able to care for himself and is independent with walking to [facility] and [employer]. Discussed with patient about concerns with returning to [facility]. with current level of mobility. Patient in agreement. Discussed IPR, patient in agreement with this. Patient denies any other questions or concerns at this time. On admission to inpatient rehabilitation unit, patient reports that he is doing well. He has been off supplemental O2 since this morning but utilized it overnight. He denies any CP or SOB. No cough. He is not in any pain at this time. He is looking forward to starting IPR program to regain strength and functional independence. Current Rehabilitation Assessments:  PT 01/10/2023:  Bed Mobility:  Supine to Sit: SBA- with HOB flat and no rail and he took extra time to complete   Sit to Supine: Supervision      Transfers:  Sit to Stand: Contact Guard Assistance  Stand to Sit:Contact Guard Assistance  ** on 2 occ upon standing he had a loss of balance post and sat back down- noted improved safety with transfers and backing upto surface   Ambulation:  Contact Guard Assistance with an occ lt min assist  Distance: 50x2  Surface: Level Tile  Device:No Device  Gait Deviations:  pt demonstrated decreased control at right LE for heel strike, pt completed divided attention task while carrying an object he cont to demonstrate path deviation and decreased balance needing occ min assist     Balance:  Pt completed advanced gait stepping over hurdles with min assist loss of balance to the left, pt standing on foam completed reaching task he needed min to CGA he did have a loss of balance post each time he reached over his head and stepped off the foam, pt reaching to floor needing min assist to CGA as he was unsteady when returning to standing position      Exercise:  Patient was guided in 1 set(s) 10 reps of exercise to both lower extremities. Standing heel/toe raises, Standing marches, Standing hip abduction/adduction, Standing hip extension, Standing hip flexion, Mini squats, and with UE at support noted decreased control and movement at jarda LEs  .   Exercises were completed for increased independence with functional mobility. Functional Outcome Measures: Completed  Balance Score: 8  Gait Score: 8  Tinetti Total Score: 16  AM-PAC Inpatient Mobility without Stair Climbing Raw Score : 15  AM-PAC Inpatient without Stair Climbing T-Scale Score : 43.03      OT 01/10/2023:  ADL:   Grooming: Stand By Assistance. For applying deodorant sitting at the EOB, pt refusing oral care  Bathing: Stand By Assistance. For completing sponge bath sitting at the EOB with sitting and standing  Upper Extremity Dressing: Stand By Assistance. For donning scrub shirt  Lower Extremity Dressing: Minimal Assistance. For donning brief with LLE due to difficulty threading through . BALANCE:  Standing Balance: Contact Guard Assistance. With no AE used for safety with no LOB Noted     BED MOBILITY:  Not Tested     TRANSFERS:  Sit to Stand:  Contact Guard Assistance. From the EOB  Stand to Sit: Contact Guard Assistance. To the recliner     FUNCTIONAL MOBILITY:  Assistive Device: None  Assist Level:  Contact Guard Assistance. Distance:  [place] distances with no LOB noted and good safety ntoed    SLP 01/10/2023:  Short-Term Goals:  SHORT TERM GOAL #1:  Goal 1:  Patient will safely consume soft and bite size diet with thin liquids (advanced texture trials as indicated) without overt s/s aspiration and with implementation of identified compensatory strategies to assist with nutrition/hydration measures. INTERVENTIONS: Thin coffee via cup with patient independently adhering to compensatory strategy for small sips; absence for overt s/s aspiration with significantly improved generalization exhibited for implementation of trained strategy to maximize safety of swallow function.       Overall gains exhibited with current dysphagia POC, likely attributed to some degree given improved medical stability and return to baseline pulmonary status (no further requirements for supplemental O2); suspect initial presentation of dysphagia to be an acute dx. Recommendations to maintain soft and bite size diet with thin liquids, ongoing services to be rendered to determine readiness to resume regular diet consistency. SHORT TERM GOAL #2:  Goal 2: Patient will complete structured speech drills/tasks (DDK rates, AMRs, multi-syllabic word repetition) with implementation of SOS speech strategies to improve intelligibility to 50% in all contexts to optimize communicative efficacy. GOAL MET, NO NEW GOAL  INTERVENTIONS: See subjective above; guardian with reports for baseline speech production skills. SHORT TERM GOAL #3:  Goal 3: Patient will complete functional immediate/delayed recall tasks with 60% accuracy, mod cues to improve retention of pertinent information. INTERVENTIONS: Orientation: 5/7 independent, 1/7 logical cues for date, 1/7 mod cues for time     4-unit recall   Topic: details relevant to treating ST  Strategies: written list, repetition/rehearsal (x3 trials)              -Immediate memory: 3/4 independent, 1/4 min cues              -Delay x5 minutes: 1/4 independent, 1/4 min cues, 1/4 F02, 1/4 max cues  *prior to admission, patient endorsed, \"writing things down\" to assist with functional recall measures; concerns for reading comprehension measures     SHORT TERM GOAL #4:  Goal 4: Patient will complete problem solving, verbal/visual reasoning, and executive functioning tasks (time, money/math/finances, medications) with 60% accuracy, mod cues to improve contributions within ADL/IADL completion.   INTERVENTIONS: Problem solving  -Call light justifications: 1/3 independent, 2/3 max cues to enhance awareness     Reasoning  -Telling time via analog clocks: 9/12 independent, 1/12 min cues, 1/12 mod cues, 1/12 max cues  *delayed processing noted      SHORT TERM GOAL #5:  Goal 5: Patient will complete sequencing and thought organization tasks with 70% accuracy, mod cues to improve mental flexibility for daily living scenarios. INTERVENTIONS: DNT d/t focus on additional STGS        Long-Term Goals:  No LTG established given short ELOS    Past Medical History:  No past medical history on file. Primary care provider: Brennan Da Silva MD     Past Surgical History:  No past surgical history on file. Allergies:    Patient has no known allergies.     Current Medications:    Current Facility-Administered Medications: [START ON 1/11/2023] aspirin chewable tablet 81 mg, 81 mg, Oral, Daily  [START ON 1/11/2023] atorvastatin (LIPITOR) tablet 20 mg, 20 mg, Oral, Daily  clotrimazole (LOTRIMIN) 1 % cream, , Topical, BID PRN  cloZAPine (CLOZARIL) tablet 200 mg, 200 mg, Oral, Nightly  [START ON 1/11/2023] cloZAPine (CLOZARIL) tablet 50 mg, 50 mg, Oral, BID  [START ON 1/11/2023] desvenlafaxine succinate (PRISTIQ) extended release tablet 50 mg, 50 mg, Oral, Daily  [START ON 1/11/2023] divalproex (DEPAKOTE ER) extended release tablet 500 mg, 500 mg, Oral, QAM  divalproex (DEPAKOTE ER) extended release tablet 1,000 mg, 1,000 mg, Oral, Nightly  [START ON 1/11/2023] docusate sodium (COLACE) capsule 100 mg, 100 mg, Oral, Daily  glucose chewable tablet 16 g, 4 tablet, Oral, PRN  glucagon (rDNA) injection 1 mg, 1 mg, SubCUTAneous, PRN  sodium chloride flush 0.9 % injection 5-40 mL, 5-40 mL, IntraVENous, PRN  [START ON 1/11/2023] enoxaparin (LOVENOX) injection 40 mg, 40 mg, SubCUTAneous, Daily  sodium chloride flush 0.9 % injection 5-40 mL, 5-40 mL, IntraVENous, 2 times per day  0.9 % sodium chloride infusion, , IntraVENous, PRN  polyethylene glycol (GLYCOLAX) packet 17 g, 17 g, Oral, Daily PRN  albuterol (PROVENTIL) nebulizer solution 2.5 mg, 2.5 mg, Nebulization, Q4H PRN  ipratropium-albuterol (DUONEB) nebulizer solution 1 ampule, 1 ampule, Inhalation, Q4H PRN  insulin glargine (LANTUS) injection vial 7 Units, 7 Units, SubCUTAneous, Nightly  [START ON 1/11/2023] metoprolol succinate (TOPROL XL) extended release tablet 25 mg, 25 mg, Oral, Daily  insulin lispro (HUMALOG) injection vial 0-4 Units, 0-4 Units, SubCUTAneous, 4x Daily AC & HS  acetaminophen (TYLENOL) tablet 650 mg, 650 mg, Oral, Q4H PRN  ondansetron (ZOFRAN-ODT) disintegrating tablet 4 mg, 4 mg, Oral, Q8H PRN     Social History:  Social History     Socioeconomic History    Marital status: Single     Spouse name: Not on file    Number of children: Not on file    Years of education: Not on file    Highest education level: Not on file   Occupational History    Not on file   Tobacco Use    Smoking status: Every Day     Packs/day: 1.00     Types: Cigarettes    Smokeless tobacco: Never   Substance and Sexual Activity    Alcohol use: No    Drug use: No    Sexual activity: Never   Other Topics Concern    Not on file   Social History Narrative    Not on file     Social Determinants of Health     Financial Resource Strain: Not on file   Food Insecurity: Not on file   Transportation Needs: Not on file   Physical Activity: Not on file   Stress: Not on file   Social Connections: Not on file   Intimate Partner Violence: Not on file   Housing Stability: Not on file     Type of Home: Assisted living  Home Layout: One level  Home Access: Level entry  Home Equipment: Cane   Bathroom Shower/Tub: Walk-in shower  Bathroom Toilet: Standard  Bathroom Equipment: Grab bars around toilet, Grab bars in shower     ADL Assistance: Independent  Homemaking Assistance: Needs assistance  Ambulation Assistance: Independent  Transfer Assistance: Independent  Active : No  Additional Comments: Pt reports using no AD PLOF. Faciliate completes IADLs & Pt can go down for meals. Family History:   No family history on file.     Review of Systems:  CONSTITUTIONAL:  negative for  fevers and chills  EYES:  negative for  glasses and visual disturbance  HEENT:  negative for  voice change  RESPIRATORY:  negative for  dyspnea and wheezing  CARDIOVASCULAR:  negative for  chest pain, palpitations  GASTROINTESTINAL:  negative for nausea, vomiting, and change in bowel habits  GENITOURINARY:  negative for dysuria and urinary incontinence  SKIN:  negative rash or wound   MUSCULOSKELETAL:  positive for  muscle weakness; negative for pain  NEUROLOGICAL:  positive for speech problems and gait problems  BEHAVIOR/PSYCH:  positive for history of bipolar disorder  10 point system review otherwise negative    Physical Exam:  /68   Pulse 96   Temp 97.8 °F (36.6 °C) (Oral)   Resp 16   Ht 5' 9\" (1.753 m)   SpO2 93%   BMI 26.58 kg/m²   Patient is awake and alert, no family at bedside   Orientation: oriented to person, place, time  Mood: within normal limits  Affect: calm  General appearance: Patient is well nourished, well developed, well groomed and in no acute distress     Memory:   not formally assess but able to answer questions regarding history   Attention/Concentration: follow conversation  Language:  abnormal - chronic dysarthria     Cranial Nerves:  cranial nerves II-XII appear intact  ROM:  normal  Motor Exam:  Motor exam is symmetrical 5 out of 5 all extremities bilaterally  Tone:  normal  Muscle bulk: within normal limits  Sensory:  Sensory intact  Coordination:   normal     Heart: normal rate, regular rhythm, normal S1, S2, no murmurs, rubs, clicks or gallops  Lungs: CTAB  Abdomen: soft, non-tender, non-distended, normal bowel sounds, no masses or organomegaly  Skin: warm and dry, no rash or erythema  Edema: none      Diagnostics:  Recent Results (from the past 24 hour(s))   POCT Glucose    Collection Time: 01/09/23  6:24 PM   Result Value Ref Range    POC Glucose 187 (H) 70 - 108 mg/dl   POCT Glucose    Collection Time: 01/09/23  7:43 PM   Result Value Ref Range    POC Glucose 226 (H) 70 - 108 mg/dl   Basic Metabolic Panel w/ Reflex to MG    Collection Time: 01/10/23  4:52 AM   Result Value Ref Range    Sodium 140 135 - 145 meq/L    Potassium reflex Magnesium 4.3 3.5 - 5.2 meq/L    Chloride 103 98 - 111 meq/L    CO2 28 23 - 33 meq/L    Glucose 142 (H) 70 - 108 mg/dL    BUN 14 7 - 22 mg/dL    Creatinine 0.7 0.4 - 1.2 mg/dL    Calcium 8.4 (L) 8.5 - 10.5 mg/dL   CBC with Auto Differential    Collection Time: 01/10/23  4:52 AM   Result Value Ref Range    WBC 7.4 4.8 - 10.8 thou/mm3    RBC 4.41 (L) 4.70 - 6.10 mill/mm3    Hemoglobin 13.9 (L) 14.0 - 18.0 gm/dl    Hematocrit 42.7 42.0 - 52.0 %    MCV 96.8 (H) 80.0 - 94.0 fL    MCH 31.5 26.0 - 33.0 pg    MCHC 32.6 32.2 - 35.5 gm/dl    RDW-CV 12.6 11.5 - 14.5 %    RDW-SD 45.0 35.0 - 45.0 fL    Platelets 018 784 - 822 thou/mm3    MPV 8.7 (L) 9.4 - 12.4 fL    Seg Neutrophils 47.9 %    Lymphocytes 34.2 %    Monocytes 13.4 %    Eosinophils 1.5 %    Basophils 0.3 %    Immature Granulocytes 2.7 %    Segs Absolute 3.5 1.8 - 7.7 thou/mm3    Lymphocytes Absolute 2.5 1.0 - 4.8 thou/mm3    Monocytes Absolute 1.0 0.4 - 1.3 thou/mm3    Eosinophils Absolute 0.1 0.0 - 0.4 thou/mm3    Basophils Absolute 0.0 0.0 - 0.1 thou/mm3    Immature Grans (Abs) 0.20 (H) 0.00 - 0.07 thou/mm3    nRBC 0 /100 wbc   Anion Gap    Collection Time: 01/10/23  4:52 AM   Result Value Ref Range    Anion Gap 9.0 8.0 - 16.0 meq/L   Glomerular Filtration Rate, Estimated    Collection Time: 01/10/23  4:52 AM   Result Value Ref Range    Est, Glom Filt Rate >60 >60 ml/min/1.73m2   POCT Glucose    Collection Time: 01/10/23  6:41 AM   Result Value Ref Range    POC Glucose 124 (H) 70 - 108 mg/dl   POCT Glucose    Collection Time: 01/10/23 11:31 AM   Result Value Ref Range    POC Glucose 178 (H) 70 - 108 mg/dl       Impression:  Acute respiratory failure  Influenza A  Moderate cognitive impairment  Diabetes Mellitis type 2 with hyperglycemia  HTN  Bipolar disorder  Chronic tobacco use, not previously dx with chronic lung disease  Impaired ADLs and mobility     Plan:   Admit to the inpatient rehabilitation unit.   The patient demonstrates good potential to participate in an inpatient rehabilitation program involving at least 3 hours per day, 5 days per week of intensive rehabilitation. Rehabilitation services will include PT, OT, and SLP/RT in order to improve functional status prior to discharge. Family education and training will be completed. Equipment evaluations and recommendations will be completed as appropriate. Rehabilitation nursing will be involved for bowel, bladder, skin, and pain management. Nursing will also provide education and training to patient and family. Dr. Eduardo Tiwari consulted for medical management  Acute Hypoxic Respiratory failure: 2/2 Influenza A. Not on oxygen at baseline. Now on supplemental O2 via nasal cannula- will continue to wean oxygen as tolerated. Patient has completed course of tamiflu and dolu-medrol. Continue   Episode of left facial droop: Possible TIA? . 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 MRI negative for any intracranial abnormality. Continue aspirin and statin  T2DM: Continue Lantus 7 units nightly and SSI  HTN: Continue home lopressor  Bipolar Disorder: Continue home Clozapine, Pristiq, and Depakote  Prophylaxis:  DVT: Lovenox. Pain: Tylenol   Nutrition:  Consultation to dietician for nutritional counseling and recommendations. Prealbumin will be checked on admission. Bladder: Per rehab nursing   Bowel: Continue docusate   and case management consultations for coordination of care and discharge planning    The main medical problem(s) and comorbidities being actively managed by the physicians and requiring 24 hour rehabilitation nursing care during this stay include acute respiratory failure, influenza, cognitive impairment, T2DM, HTN, bipolar disorder, tobacco dependence. The domains of functional impairment present in this patient which will require an intensive and interdisciplinary rehabilitation environment include self care, mobility, motor dysfunction, safety, and cognitive function.     Estimated length of stay for this admission 2-3 weeks     Anticipated disposition: Home, assisted living facility. The potential to achieve that is good. The post admission physician evaluation (VIDHYA) is consistent with the pre-admission assessment. See above findings to reflect the elements required in the VIDHYA. Patient's admitting condition is consistent with the findings of the preadmission assessment by the rehabilitation admissions coordinator.     Vicente Beaulieu,

## 2023-01-10 NOTE — PROGRESS NOTES
[facility]  INPATIENT PHYSICAL THERAPY  DAILY NOTE  [facility] 4A - 4A-16/016-A      Time In: 1020  Time Out: 1058  Timed Code Treatment Minutes: 38 Minutes  Minutes: 38          Date: 1/10/2023  Patient Name: Sage Felder,  Gender:  male        MRN: [id number]  : 1965  (65 y.o.)     Referring Practitioner: Scott Mcguire PA-C  Diagnosis: Influenza with respiratory manifestation other than pneumonia  Additional Pertinent Hx: Per ER note on 2023: 65 y.o. male who presents to the emergency department for evaluation of flu like symptoms. States that he has had fever, chills, and non-productive cough that has worsened over the last 3 days. Denies taking any medication for his symptoms. Received 800mL NS while en route to ED. Denies being on oxygen at home. Has been vaccinated for COVID and influenza. Per neuro note: At [phone] on 1/3/2023 stroke alert was called for this patient for left-sided facial droop and dysarthria which were noted by his nurse. Nurse states that at 2 AM the facial droop and slurred speech were not present. Patient taken to imaging for stat CT head which revealed no ICH, midline shift, mass-effect. 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 No LVO. Based on patient's symptoms and time since last known well decision was made for TNK to be given. 22 mg of TN K given at 5:05 AM     Prior Level of Function:  Type of Home: Assisted living  Home Layout: One level  Home Access: Level entry  Home Equipment: Cane   Bathroom Shower/Tub: Walk-in shower  Bathroom Toilet: Standard  Bathroom Equipment: Grab bars around toilet, Grab bars in shower    ADL Assistance: Independent  Homemaking Assistance: Needs assistance  Ambulation Assistance: Independent  Transfer Assistance: Independent  Active : No  Additional Comments: Pt reports using no AD PLOF.  Faciliate completes IADLs & Pt can go down for meals.    Restrictions/Precautions:  Restrictions/Precautions: Fall Risk, General Precautions  Position Activity Restriction  Other position/activity restrictions: impulsive       SUBJECTIVE: nursing ok'd therapy    PAIN: 0/10:       Vitals: Oxygen: pt on room air and he was 85% on arrival while in bed however after sitting up he recovered to 90% and stayed > 90% throughout rest of session- notified nursing of low O2 sats while in bed on arrival   Heart Rate: noted elevated HR with activity low 120's with rest breaks given     OBJECTIVE:  Bed Mobility:  Supine to Sit: SBA- with HOB flat and no rail and he took extra time to complete   Sit to Supine: Supervision     Transfers:  Sit to Stand: Contact Guard Assistance  Stand to Sit:Contact Guard Assistance  ** on 2 occ upon standing he had a loss of balance post and sat back down- noted improved safety with transfers and backing upto surface   Ambulation:  Contact Guard Assistance with an occ lt min assist  Distance: 50x2  Surface: Level Tile  Device:No Device  Gait Deviations:  pt demonstrated decreased control at right LE for heel strike, pt completed divided attention task while carrying an object he cont to demonstrate path deviation and decreased balance needing occ min assist    Balance:  Pt completed advanced gait stepping over hurdles with min assist loss of balance to the left, pt standing on foam completed reaching task he needed min to CGA he did have a loss of balance post each time he reached over his head and stepped off the foam, pt reaching to floor needing min assist to CGA as he was unsteady when returning to standing position     Exercise:  Patient was guided in 1 set(s) 10 reps of exercise to both lower extremities. Standing heel/toe raises, Standing marches, Standing hip abduction/adduction, Standing hip extension, Standing hip flexion, Mini squats, and with UE at support noted decreased control and movement at jarad LEs  .   Exercises were completed for increased independence with functional mobility. Functional Outcome Measures: Completed  Balance Score: 8  Gait Score: 8  Tinetti Total Score: 16  AM-PAC Inpatient Mobility without Stair Climbing Raw Score : 15  AM-PAC Inpatient without Stair Climbing T-Scale Score : 43.03    ASSESSMENT:  Assessment: Patient progressing toward established goals. Cont to improve tinetti test but still high fall risk   Activity Tolerance:  Patient tolerance of  treatment: fair. Rest breaks given due to elevated HR with activity      Equipment Recommendations: Other: 1/9- recommend rolling walker (pt reported that he had one in the past but is uncertain where it is)  Discharge Recommendations: Inpatient Therapy Stay  Plan: Current Treatment Recommendations: Strengthening, Balance training, Functional mobility training, Gait training, Safety education & training, Home exercise program, Patient/Caregiver education & training, Transfer training, Endurance training, Therapeutic activities  General Plan:  (6x N)    Patient Education  Patient Education: Plan of Care    Goals:  Patient Goals : go home  Short Term Goals  Time Frame for Short Term Goals: at discharge  Short Term Goal 1: Pt to be Mod I for supine <> sit to get in/out of bed  Short Term Goal 2: Pt to be Mod I for sit <> stand to get up to ambulate  Short Term Goal 3: Pt to ambulate >80 ft with cane with Supervision for getting to dining room at [facility] Term Goals  Time Frame for Long Term Goals : not set due to short ELOS    Following session, patient left in safe position with all fall risk precautions in place.

## 2023-01-10 NOTE — PROGRESS NOTES
[address] [address]  INPATIENT SPEECH THERAPY  [facility] NEUROSCIENCES 4A  DAILY NOTE    TIME   SLP Individual Minutes  Time In: 1581  Time Out: 1007  Minutes: 16  Timed Code Treatment Minutes: 8 Minutes  Dysphagia therapy: 8 minutes  Cognitive therapy: 8 minutes       Date: 1/10/2023  Patient Name: Jolie Saleh      CSN: [id number]   : 1965  (65 y.o.)  Gender: male   Referring Physician:  Nitish Thorne MD  Diagnosis: Influenza with respiratory manifestation other than pneumonia   Precautions: Fall risk, aspiration precautions, droplet precautions   Current Diet: Soft and bite size, thin liquids   Swallowing Strategies:  Full upright position, small bite/sip, slow rate for intake, pulmonary montioring, oral care after meals, alternating solids/liquids,   Date of Last MBS/FEES: Not Applicable    Pain:  No pain reported. Subjective:  ERMIAS Eason with approval to proceed with session; indications conveyed for potential transition to [facility] for intensive therapeutic needs. Upon arrival, patient resting EOB, alert and pleasant; brighter affect noted in comparison to previous sessions. Highly suspect current level of cognitive performance and speech production measures to be at baseline given patient's guardian report. Short-Term Goals:  SHORT TERM GOAL #1:  Goal 1:  Patient will safely consume soft and bite size diet with thin liquids (advanced texture trials as indicated) without overt s/s aspiration and with implementation of identified compensatory strategies to assist with nutrition/hydration measures. INTERVENTIONS: Thin coffee via cup with patient independently adhering to compensatory strategy for small sips; absence for overt s/s aspiration with significantly improved generalization exhibited for implementation of trained strategy to maximize safety of swallow function.      Overall gains exhibited with current dysphagia POC, likely attributed to some degree given improved medical stability and return to baseline pulmonary status (no further requirements for supplemental O2); suspect initial presentation of dysphagia to be an acute dx. Recommendations to maintain soft and bite size diet with thin liquids, ongoing services to be rendered to determine readiness to resume regular diet consistency. SHORT TERM GOAL #2:  Goal 2: Patient will complete structured speech drills/tasks (DDK rates, AMRs, multi-syllabic word repetition) with implementation of SOS speech strategies to improve intelligibility to 50% in all contexts to optimize communicative efficacy. GOAL MET, NO NEW GOAL  INTERVENTIONS: See subjective above; guardian with reports for baseline speech production skills. SHORT TERM GOAL #3:  Goal 3: Patient will complete functional immediate/delayed recall tasks with 60% accuracy, mod cues to improve retention of pertinent information. INTERVENTIONS: Orientation: 5/7 independent, 1/7 logical cues for date, 1/7 mod cues for time    4-unit recall   Topic: details relevant to treating ST  Strategies: written list, repetition/rehearsal (x3 trials)   -Immediate memory: 3/4 independent, 1/4 min cues   -Delay x5 minutes: 1/4 independent, 1/4 min cues, 1/4 F02, 1/4 max cues  *prior to admission, patient endorsed, \"writing things down\" to assist with functional recall measures; concerns for reading comprehension measures    SHORT TERM GOAL #4:  Goal 4: Patient will complete problem solving, verbal/visual reasoning, and executive functioning tasks (time, money/math/finances, medications) with 60% accuracy, mod cues to improve contributions within ADL/IADL completion.   INTERVENTIONS: Problem solving  -Call light justifications: 1/3 independent, 2/3 max cues to enhance awareness    Reasoning  -Telling time via analog clocks: 9/12 independent, 1/12 min cues, 1/12 mod cues, 1/12 max cues  *delayed processing noted     SHORT TERM GOAL #5:  Goal 5: Patient will complete sequencing and thought organization tasks with 70% accuracy, mod cues to improve mental flexibility for daily living scenarios. INTERVENTIONS: DNT d/t focus on additional STGS      Long-Term Goals:  No LTG established given short ELOS      EDUCATION:  Learner: Patient  Education:  Reviewed diet and strategies, Reviewed signs, symptoms and risks of aspiration, Reviewed ST goals and Plan of Care, and Reviewed recommendations for follow-up  Evaluation of Education: Verbalizes understanding, Demonstrates with assistance, Needs further instruction, and Family not present    ASSESSMENT/PLAN:  Activity Tolerance:  Patient tolerance of  treatment: good. Cooperative with ST and POC. Improvements with respiratory status. Assessment/Plan: Patient progressing toward established goals. Continues to require skilled care of licensed speech pathologist to progress toward achievement of established goals and plan of care. .     Plan for Next Session: cognitive rehabilitation, speech production, dysphagia management  Discharge Recommendations: SNF       Ana M Alston M.A., [address] [facility]

## 2023-01-10 NOTE — PROGRESS NOTES
Called patient legal guardian explained that patient was to be admitted to [facility] here at [address] [facility], explained length of stay and fact that patient is in life time reserve days for medicare benefits. Explained Medicaid benefit to the guardian. The guardian verbalized understanding and agreement with plan for patient to be admitted to [facility] and upon discharge return to [facility].

## 2023-01-10 NOTE — PROGRESS NOTES
[facility]  Acute Inpatient Rehab Preadmission Assessment     Patient Name: Perez Peoples        Ethnicity:Not of , [place] 61, or [place] Islander origin  Race:White  MRN:   [id number]    : 1965  (61 y.o.)  Gender: male      Admitted from:[address]  Initial Assessment     Date of admission to the hospital: 2023  8:49 AM  Date patient eligible for admission:1/10/2023     Primary Diagnosis: Debility      Did patient have surgery?  no     Physicians: Nitza West MD, Dr. Saintclair Bones, Dr. Bryon De La Rosa, Dr. Bettie Weeks for clinical complications/co-morbidities:   Past Medical History   History reviewed. No pertinent past medical history. Financial Information  Primary insurance: Medicare     Secondary Insurance:  Medicaid     Has the patient had two or more falls in the past year or any fall with injury in the past year? no     Did the patient have major surgery during the 100 days prior to admission? no     Precautions:      Restrictions/Precautions: Fall Risk, General Precautions  Other position/activity restrictions: impulsive       Isolation Precautions: None                  Physiatrist:  Dr. Saintclair Bones     Patients Occupation: Disabled     Reviewed Lab and Diagnostic reports from Current Admission: Yes     Patients Prior Functional  Level: Prior Function  ADL Assistance: Independent  Homemaking Assistance: Needs assistance  Ambulation Assistance: Independent  Transfer Assistance: Independent  Additional Comments: Pt reports using no AD PLOF. Faciliate completes IADLs & Pt can go down for meals. Current functional status for upper extremity ADLs: Grooming: Stand By Assistance. For applying deodorant sitting at the EOB, pt refusing oral care  Bathing: Stand By Assistance. For completing sponge bath sitting at the EOB with sitting and standing  Upper Extremity Dressing: Stand By Assistance.   For donning scrub shirt     Current functional status for lower extremity ADLs: Lower Extremity Dressing: Minimal Assistance. For donning brief with LLE due to difficulty threading through . Current functional status for bed, chair, wheelchair transfers: Sit to Stand:  [address] From the EOB  Stand to Sit: Contact Guard Assistance. To the recliner     Current functional status for toilet transfers: Stand by assistance     Current functional status for locomotion: Minimal Assistance  Distance: 50x1  Surface: Level Tile  Device:No Device  Gait Deviations:  Slow Luis with uneven step length and decreased heel strike but clearing right foot, noted left lean and path deviation to the left along with decreased wt shifting over right LE, needing assist for O2 line awareness and management - pts tinetti score is a 14 so an AD is recommended   Contact Guard Assistance  Distance: 50x1  Surface: Level Tile  Device:Rolling Walker  Gait Deviations:  slighlty improved luis and good step length however still noted decreased wt shifting to the right and still required cues and assist for O2 line management      Current functional status for bladder management: Minimal contact assistance     Current functional status for bowel management:Moderate assistance     Current functional status for comprehension: Moderate assistance based on patient MOCA score of 13/25. Current functional status for expression: Moderate assistance  based on patient MOCA score of 13/25. Current functional status for social interaction: Moderate assistance  based on patient MOCA score of 13/25. Current functional status for problem solving: Moderate assistance  based on patient MOCA score of 13/25. Current functional status for memory: Moderate assistance  based on patient MOCA score of 13/25.       Expected level of Improvement in Self-Care:  Modified independence     Expected level of Improvement in Sphincter Control:  Modified independence     Expected level of Improvement in Transfers: Modified independence     Expected level of Improvement in Locomotion:  Modified independence     Expected level of Improvement in Communication and Social Cognition: Modified independence     Expected length of time to achieve that level of improvement: 2 weeks     Current rehab issues: ADL dysfunction, bladder management, bowel management,carry over of therapy techniques, discharge planning, disease and co-morbidity management, gait/mobility dysfunction, medication management, nutrition and hydration management,Ongoing assessment of safety, Pain management, Patient and family education, Prevention of secondary complications, Skin Integrity, cognitive impairment, communication impairment. Required therapy: Physical Therapy, Occupational Therapy and Speech Therapy 3 hours per day, 5-6 days per week. Recreational Therapy 1 hour per week. Expected Discharge Destination: Home     Expected Post Discharge Treatments: Out Patient     Other information relevant to the care needs:   Type of Home: Assisted living  Home Layout: One level  Home Access: Level entry  Bathroom Shower/Tub: Walk-in shower  Bathroom Toilet: Standard  Bathroom Equipment: Grab bars around toilet, Grab bars in shower  Home Equipment: [employer]  ADL Assistance: Independent  Homemaking Assistance: Needs assistance  Ambulation Assistance: Independent  Transfer Assistance: Independent  Active : No  Patient's  Info: Assisted Living provides transport  Additional Comments: Pt reports using no AD PLOF. Faciliate completes IADLs & Pt can go down for meals. Acute Inpatient Rehabilitation Disclosure Statement provided to patient. Patient verbalized understanding. Patient requries PT/OT/SLP services in order to achieve goal of returning to [facility] and living independently.   Patient requires physician supervision for recovery form Acute respiratory failure; influenza A; patient with moderate cognitive impairment; management of type 2 diabetes, hypertension, bipolar disorder and chronic lung disease secondary to chronic tobacco use. I have reviewed and concur with the findings and results of the pre-admission screening assessment completed by the Inpatient Rehabilitation Admissions Coordinator.         Electronically signed by Nabila Vargas DO on 1/10/2023 at 12:34 PM                 Revision History  Date/Time User Provider Type Action   1/10/2023 12:34 PM Nabila Vargas DO Physician Sign   1/10/2023 12:34 PM Hellen Mora RN Registered Nurse Sign    View Details Report

## 2023-01-10 NOTE — PROGRESS NOTES
[facility]  Acute Inpatient Rehab Preadmission Assessment    Patient Name: Janae Cristina        Ethnicity:Not of , [place] 61, or [place] origin  Race:White  MRN: [id number]    : 1965  (61 y.o.)  Gender: male     Admitted from:[address]  Initial Assessment    Date of admission to the hospital: 2023  8:49 AM  Date patient eligible for admission:1/10/2023    Primary Diagnosis: Debility      Did patient have surgery?  no    Physicians: Stephanie Hall MD, Dr. Jax Colvin, Dr. Joaquin Edmondson, Dr. Mondragon for clinical complications/co-morbidities: History reviewed. No pertinent past medical history. Financial Information  Primary insurance: Medicare    Secondary Insurance:  Medicaid    Has the patient had two or more falls in the past year or any fall with injury in the past year? no    Did the patient have major surgery during the 100 days prior to admission? no    Precautions:     Restrictions/Precautions: Fall Risk, General Precautions  Other position/activity restrictions: impulsive      Isolation Precautions: None       Physiatrist:  Dr. Jax Colvin    Patients Occupation: Disabled    Reviewed Lab and Diagnostic reports from Current Admission: Yes    Patients Prior Functional  Level: Prior Function  ADL Assistance: Independent  Homemaking Assistance: Needs assistance  Ambulation Assistance: Independent  Transfer Assistance: Independent  Additional Comments: Pt reports using no AD PLOF. Faciliate completes IADLs & Pt can go down for meals. Current functional status for upper extremity ADLs: Grooming: Stand By Assistance. For applying deodorant sitting at the EOB, pt refusing oral care  Bathing: Stand By Assistance. For completing sponge bath sitting at the EOB with sitting and standing  Upper Extremity Dressing: Stand By Assistance. For donning scrub shirt    Current functional status for lower extremity ADLs: Lower Extremity Dressing: Minimal Assistance.   For donsalvador brief with LLE due to difficulty threading through . Current functional status for bed, chair, wheelchair transfers: Sit to Stand:  [employer]. From the EOB  Stand to Sit: Contact Guard Assistance. To the recliner    Current functional status for toilet transfers: Stand by assistance    Current functional status for locomotion: Minimal Assistance  Distance: 50x1  Surface: Level Tile  Device:No Device  Gait Deviations:  Slow May with uneven step length and decreased heel strike but clearing right foot, noted left lean and path deviation to the left along with decreased wt shifting over right LE, needing assist for O2 line awareness and management - pts tinetti score is a 14 so an AD is recommended   Contact Guard Assistance  Distance: 50x1  Surface: Level Tile  Device:Rolling Walker  Gait Deviations:  slighlty improved may and good step length however still noted decreased wt shifting to the right and still required cues and assist for O2 line management      Current functional status for bladder management: Minimal contact assistance    Current functional status for bowel management:Moderate assistance    Current functional status for comprehension: Moderate assistance based on patient MOCA score of 13/25. Current functional status for expression: Moderate assistance  based on patient MOCA score of 13/25. Current functional status for social interaction: Moderate assistance  based on patient MOCA score of 13/25. Current functional status for problem solving: Moderate assistance  based on patient MOCA score of 13/25. Current functional status for memory: Moderate assistance  based on patient MOCA score of 13/25.      Expected level of Improvement in Self-Care:  Modified independence    Expected level of Improvement in Sphincter Control:  Modified independence    Expected level of Improvement in Transfers: Modified independence    Expected level of Improvement in Locomotion:  Modified independence    Expected level of Improvement in Communication and Social Cognition: Modified independence    Expected length of time to achieve that level of improvement: 2 weeks    Current rehab issues: ADL dysfunction, bladder management, bowel management,carry over of therapy techniques, discharge planning, disease and co-morbidity management, gait/mobility dysfunction, medication management, nutrition and hydration management,Ongoing assessment of safety, Pain management, Patient and family education, Prevention of secondary complications, Skin Integrity, cognitive impairment, communication impairment. Required therapy: Physical Therapy, Occupational Therapy and Speech Therapy 3 hours per day, 5-6 days per week. Recreational Therapy 1 hour per week. Expected Discharge Destination: Home    Expected Post Discharge Treatments: Out Patient    Other information relevant to the care needs:   Type of Home: Assisted living  Home Layout: One level  Home Access: Level entry  Bathroom Shower/Tub: Walk-in shower  Bathroom Toilet: Standard  Bathroom Equipment: Grab bars around toilet, Grab bars in shower  Home Equipment: [employer]  ADL Assistance: Independent  Homemaking Assistance: Needs assistance  Ambulation Assistance: Independent  Transfer Assistance: Independent  Active : No  Patient's  Info: Assisted Living provides transport  Additional Comments: Pt reports using no AD PLOF. Faciliate completes IADLs & Pt can go down for meals. Acute Inpatient Rehabilitation Disclosure Statement provided to patient. Patient verbalized understanding. Patient requries PT/OT/SLP services in order to achieve goal of returning to [facility] and living independently.   Patient requires physician supervision for recovery form Acute respiratory failure; influenza A; patient with moderate cognitive impairment; management of type 2 diabetes, hypertension, bipolar disorder and chronic lung disease secondary to chronic tobacco use. I have reviewed and concur with the findings and results of the pre-admission screening assessment completed by the Inpatient Rehabilitation Admissions Coordinator.       Electronically signed by Bradford Sykes DO on 1/10/2023 at 12:34 PM

## 2023-01-10 NOTE — PROGRESS NOTES
[facility]  Individualized Disclosure Statement      Patient: Martina Hernandez      Scope of Service  [facility] provides 24 hour individualized service to patients with functional limitations due to, but not limited to: stroke, brain injury, spinal cord injury, major multiple trauma, fractures, amputation, and neurological disorders. The [address] [place] [address] provides rehabilitative nursing and medical services as well as physical, occupational, speech, and recreation therapies. [address] [facility] is fully accredited by the Commission on Accreditation of Rehabilitation Facilities (CARF) as a comprehensive provider of rehabilitation services. Patients admitted to the [address] [place] [address] receive a minimum of three hours of therapy per day, at least six days per week, with a revised therapy schedule on weekends and holidays. Physical therapy, occupational therapy, and speech therapy are provided seven days per week including holidays. Other therapeutic services are available on weekends and evenings as needed or scheduled. Intensity of Treatment  Your treatment program will consist of Nursing Care and:  1.5 hours of Physical Therapy, per day  1.5 hours of Occupational Therapy, per day   30-60 minutes of Speech Therapy, per day  1 hour of Recreational Therapy, per week    [facility] maintains contracts with most insurance plans. Depending on the type of coverage, the insurance may impose limits on the coverage for rehabilitation care. Coverage is based on the premise that you are able to fully participate in the rehabilitation program and show continued progress. Please verify your own insurance information A copy of this was given to the patient/ family on this date.   Insurance Coverage  Your insurance company has made the following determination relative to the length of your stay:   Your estimated length of stay is 14 days   Your insurance Coverage has been verified as follows:    Primary Insurance: Payor: Arthur December /  /  /    Deductible:   $1600.00   Coverage: Active  Secondary Insurance:MEDICAID [place]  secondary insurance policies often cover co-pay amounts, but to ensure payment please contact your insurance company.     Alternative Resources: Please ask the  for more information [phone]

## 2023-01-10 NOTE — PROGRESS NOTES
Admitted to the Inpatient Rehabilitation Unit via wheelchair. Patient was then oriented to room and unit. Education provided on the rehabilitation routine: three hours of therapy five days per week. Explained patients right to have family, representative or physician notified of their admission. Patient has Declined for physician to be notified. Patient has Declined for family/representative to be notified. Admitting medication orders compared with acute stay medications; home medication list reviewed with patient/family. Medication issues identified No  Medication issue: n/a  If yes, physician notified Dr Niurka Herzog. Transportation:   Has transportation kept you from medical appointments, meetings, work, or from getting things needed for daily living? (Check all that apply)  No. Assisted Living Staff takes patient to appointments. Health Literacy:   How often do you need to have someone help you when you read instructions, pamphlets, or other written material from your doctor or pharmacy? 1. - Rarely    Social Isolation:  How often do you feel lonely or isolated from those around you?  0. Never    Patient Mood Interview (PHQ-2 to 9) (from [employer]©)   Say to Patient: \"Over the last 2 weeks, have you been bothered by any of the following problems? \"   If symptom is present, enter yes in column 1 (Symptom Presence)  If yes in column 1, then ask the patient: About how often have you been bothered by this?  Indicate response in column 2, Symptom Frequency. Symptom Presence  No    Yes   9. No response  Symptom Frequency  Never or 1 day  2-6 days (several days)  7-11 days (half or more of the days)  12-14 days (nearly every day)    Symptom Presence Symptom Frequency   Little interest or pleasure in doing things 0. No 0. Never or 1 day   Feeling down, depressed, or hopeless 0. No 0. Never or 1 day   If either A or B above has symptom frequency coded 2 or 3, CONTINUE asking questions below.       If not, END the interview  and right click on next table to delete. Pain:  Pain Effect on Sleep    Ask patient: \"Over the past 5 days, how much of the time has pain made it hard for you to sleep at night?\" 1.  Rarely or not at all     If no pain is reported, Stop here. If pain is reported, continue with the additional questions. Pain Interference with Therapy Activities   Ask patient: Stephanie Romo the past 5 days, how often have you limited your participation in rehabilitation therapy sessions due to pain?\" 1.  Rarely or not at all   Pain Interference with Day to Day Activities   Ask patient: Stephanie Romo the past 5 days, how often have you limited your day to day activities (excluding rehabilitation therapy sessions) because of pain?\" 1.  Rarely or not at all         Bladder and Bowel Function Assessment:  Prior history of bladder problems: no problems with bladder  Number of pads used per day: 0  Frequency of night time voiding: once  Fluid intake volume and pattern: Adequate  Last BM: 1/10/23  Bowel problems (prior or current) No      Incontinence      Frequent diarrhea      No BM in 3 days this stay or history of constipation      Hemorrhoids      Diverticulitis      Bowel Surgery     Two nurse skin assessment performed by Lorene Heaton LPN  and Edna Oseguera LPN . [facility] was created with patient's input and goals were agreed upon. Admission folder provided with education regarding patients diagnoses, fall prevention, and skin care. \"Data Collection Information Summary for Patients in Inpatient Rehabilitation Facilities\" and \"Privacy Act Statement - Health Care Records\" provided. Please refer to the admission navigator for further information.

## 2023-01-10 NOTE — DISCHARGE SUMMARY
Hospital Medicine Discharge Summary      Patient Identification:   Lata Alvarado   : 1965  MRN: [id number]   Account: [de-identified]      Patient's PCP: Arnold Ortiz MD    Admit Date: 2023     Discharge Date:   1/10/23    Admitting Physician: An Julian MD     Discharge Physician: Charito Avilez MD     Disposition: Discharged to inpatient rehab. Discharge Diagnoses: The patient was seen and examined on day of discharge and this discharge summary is in conjunction with any daily progress note from day of discharge. Acute hypoxic respiratory failure: Secondary to influenza A. Patient does not require oxygen at baseline. Patient initially required nasal cannula that progressed to high settings of HFNC. Patient's oxygenation however is starting to improve and patient is currently requiring FiO2 only 21% and flow rate 0. Of note patient was placed on Solu-Medrol 1/3/23 as well as and duonebs to assist with lung function due to possible underlying COPD due to significant smoking history.              -Pulmonary hygiene                  -Wean oxygen as tolerated              -Maintain oxygen saturation > 94%              -DC'd Solu-Medrol 2020     Influenza A: Patient finally positive in the emergency department upon arrival.  Started on Tamiflu.              -Continue Tamiflu for total 5 days. Left facial droop with dysarthria: Resolved. ?  TIA. 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 There is no LVO. Patient did receive TKNase 1/3/23. Repeat head CT 2028 showed stability.   Patient had echocardiogram 1/3/2023 that showed EF of 55% and overall normal left ventricular function.               -Neurology following              -Continue neurovascular checks              -Patient can be started on chemical DVT prophylaxis once it has been  24 hours since TNKase administration.              -Patient will also be started on aspirin 1/4/2023 which is also 24 hours status post TNKase administration.              -Patient had MRI today, no e/o acute intracranial abnormality or bleed              -Patient remains n.p.o. until speech/swallow evaluation has been completed              -PT/OT              -Continue telemetry              -Continue statin therapy     Type 2 diabetes mellitus: 1/3/23 hemoglobin A1c 6.8. Goal blood glucose while inpatient 140-180. While patient started to eat again blood glucose increased therefore placed patient on low-dose sliding scale as well as Lantus. Will likely need to dose adjust throughout patient's stay. And as diet progresses.              -Hold home meds              -Low dose sliding scale              -Lantus 7 units nightly (2/3 home dose of 10 units nightly)  -Hypoglycemic protocol  -Accu-Cheks  -Diabetic diet when able     Essential hypertension: Has been relatively controlled since arrival.  Permissive hypertension (up to 180/105) was allowed for 24 hours following thrombolytic administration.   -At this time patient's goal blood pressure  <130/80  -Home Lopressor resumed with holding parameters     Bipolar affective disorder: Continue home meds Clozapine, Pristiq, and Depakote     Current smoker: 1 pack/day              -Patient currently has nicotine patch              -Smoking cessation advised    Hospital Course:   Mile Garces is a 65 y.o. male admitted to [address] [place] [address] on 1/2/2023 for sob. 52-year-old male presented to [facility] emergency department 1/2/2022 with a chief complaint of shortness of breath. Patient was experiencing productive cough and shortness of breath for 2 to 3 days prior to arrival and said that his cough is constant in nature he also said that he has been experiencing fever and chills. Patient has past medical history significant for diabetes mellitus, hypertension, bipolar disorder as well as current smoker of 1 pack/day.   Of note patient does reside in assisted living facility. While in the emergency room department patient was found to be hypoxic on room air SPO2 of 89% subsequent placed on 2 L nasal cannula. Is found also to be influenza A positive. Patient was started on Tamiflu and admitted to hospitalist team.  Unfortunately on 1/3/2022 code stroke was called and patient was found to have left internal carotid artery occlusion which he did receive TNKase and admitted to the ICU.  1/3/2023 patient's oxygenation continued to decline in which he required high flow nasal cannula. This morning, 1/4/2023 patient remains relatively hemodynamically stable and on high flow however flow rate is 0 and FiO2 is only 21. CT head 1/4/2023 also demonstrated Stable CT appearance the brain. No hemorrhage. Patient was actually deemed stable for ICU transfer to ICU stepdown. Patient was treated, medically cleared to be discharged to inpatient rehab. Exam:     Vitals:  Vitals:    01/09/23 2310 01/10/23 0335 01/10/23 0834 01/10/23 1140   BP: 129/78 112/76 119/83 101/64   Pulse: 85 100 (!) 114 (!) 109   Resp: 14 16 16 16   Temp: 97.5 °F (36.4 °C) 98.2 °F (36.8 °C) 99 °F (37.2 °C) 97 °F (36.1 °C)   TempSrc: Oral Oral Oral Axillary   SpO2: 95% 95% 93% 97%   Weight:  180 lb (81.6 kg)     Height:         Weight: Weight: 180 lb (81.6 kg)     24 hour intake/output:  Intake/Output Summary (Last 24 hours) at 1/10/2023 1404  Last data filed at 1/9/2023 2042  Gross per 24 hour   Intake 1510 ml   Output --   Net 1510 ml         General appearance: No apparent distress, appears stated age and cooperative. HEENT: Normal cephalic, atraumatic without obvious deformity. Conjunctivae/corneas clear. Neck: Supple, with full range of motion. No jugular venous distention. Trachea midline. Respiratory: Diminished breath sounds throughout lung fields, notable expiratory wheezing throughout all lung fields.   Patient does have tachypnea however is on room air and maintaining adequate oxygenation. Cardiovascular: Regular rate and rhythm with normal S1/S2  Abdomen: Soft, rounded, bowel sounds present. Musculoskeletal:  No clubbing, cyanosis or edema bilaterally. Skin: Warm and dry  Neurologic: Follows commands, does have slurring of speech/delayed response time  Peripheral Pulses: +1 palpable, equal bilaterally          Labs: For convenience and continuity at follow-up the following most recent labs are provided:      CBC:    Lab Results   Component Value Date/Time    WBC 7.4 01/10/2023 04:52 AM    HGB 13.9 01/10/2023 04:52 AM    HCT 42.7 01/10/2023 04:52 AM     01/10/2023 04:52 AM       Renal:    Lab Results   Component Value Date/Time     01/10/2023 04:52 AM    K 4.3 01/10/2023 04:52 AM     01/10/2023 04:52 AM    CO2 28 01/10/2023 04:52 AM    BUN 14 01/10/2023 04:52 AM    CREATININE 0.7 01/10/2023 04:52 AM    CALCIUM 8.4 01/10/2023 04:52 AM         Significant Diagnostic Studies    Radiology:   MRI BRAIN WO CONTRAST   Final Result       1. No evidence of acute intracranial abnormality. 2. Mild severity chronic microvascular angiopathy superimposed on mild volume loss which has progressed in the interval since prior MRI. 3. Mild mucosal inflammation of the paranasal sinuses. **This report has been created using voice recognition software. It may contain minor errors which are inherent in voice recognition technology. **      Final report electronically signed by Dr. Danny Tenorio MD on 1/5/2023 8:16 AM      CT HEAD WO CONTRAST   Final Result    Stable CT appearance the brain. No hemorrhage. **This report has been created using voice recognition software. It may contain minor errors which are inherent in voice recognition technology. **      Final report electronically signed by Dr. Saad Quach on 1/4/2023 7:13 AM      XR CHEST PORTABLE   Final Result   Normal mobile chest.            **This report has been created using voice recognition software. It may contain minor errors which are inherent in voice recognition technology. **      Final report electronically signed by Dr. Praveen Reece on 1/3/2023 8:15 AM      CTA HEAD W WO CONTRAST (CODE STROKE)   Final Result   Left internal carotid artery is occluded within the neck and reconstituted    intracranially by collateral flow. No vessel occlusion intracranially. This document has been electronically signed by: Tami Gauthier MD on    01/03/2023 05:28 AM      All CTs at this facility use dose modulation techniques and iterative    reconstructions, and/or weight-based dosing   when appropriate to reduce radiation to a low as reasonably achievable. 3D Post-processing was performed on this study. CTA NECK W WO CONTRAST (CODE STROKE)   Final Result   Left internal carotid artery is occluded. Right carotid and bilateral    vertebral arteries are patent. This document has been electronically signed by: Tami Gauthier MD on    01/03/2023 05:33 AM      All CTs at this facility use dose modulation techniques and iterative    reconstructions, and/or weight-based dosing   when appropriate to reduce radiation to a low as reasonably achievable. Carotid stenosis and measurements are in accordance with NASCET criteria. 3D Post-processing was performed on this study. CT HEAD WO CONTRAST   Final Result   Impression:   No acute intracranial pathology. This document has been electronically signed by: Antonieta Schmidt MD on    01/03/2023 04:42 AM      All CTs at this facility use dose modulation techniques and iterative    reconstructions, and/or weight-based dosing   when appropriate to reduce radiation to a low as reasonably achievable. XR CHEST PORTABLE   Final Result   Coarsened appearance to the pulmonary parenchyma which is likely chronic. No definite acute findings.                **This report has been created using voice recognition software. It may contain minor errors which are inherent in voice recognition technology. **      Final report electronically signed by Dr. Theodore Garland on 1/2/2023 9:39 AM      CTA HEAD W WO CONTRAST    (Results Pending)   CTA NECK W WO CONTRAST    (Results Pending)          Consults:     IP CONSULT TO SPIRITUAL SERVICES  IP CONSULT TO PHARMACY  PHARMACY TO CHANGE BASE FLUIDS  IP CONSULT TO NEUROLOGY  IP CONSULT TO SOCIAL WORK  IP CONSULT TO REHAB/TCU ADMISSION COORDINATOR  IP CONSULT TO PHYSICAL MEDICINE REHAB  IP CONSULT TO CASE MANAGEMENT  IP CONSULT TO DIETITIAN  IP CONSULT TO FAMILY MEDICINE    Disposition: Rehab  Condition at Discharge: Stable    Code Status:  Full Code     Patient Instructions:    Discharge lab work: n/a  Activity: activity as tolerated  Diet: ADULT DIET; Dysphagia - Soft and Bite Sized; 4 carb choices (60 gm/meal)      Follow-up visits:   Jimi Austin MD  [id number]  [phone]    Follow up in 6 month(s)  Follow up for carotid artery occlusion. Discharge Medications:        Medication List        CONTINUE taking these medications      glucose monitoring kit  1 kit by Does not apply route daily            ASK your doctor about these medications      acetaminophen 325 MG tablet  Commonly known as: TYLENOL     aspirin 81 MG tablet     atorvastatin 20 MG tablet  Commonly known as: LIPITOR     blood glucose test strips  Test 3 times a day & as needed for symptoms of irregular blood glucose. clotrimazole 1 % cream  Commonly known as: LOTRIMIN     * cloZAPine 50 MG tablet  Commonly known as: CLOZARIL  Ask about: Which instructions should I use?      * cloZAPine 100 MG tablet  Commonly known as: CLOZARIL  Ask about: Which instructions should I use?     desvenlafaxine succinate 50 MG Tb24 extended release tablet  Commonly known as: PRISTIQ     * divalproex 500 MG extended release tablet  Commonly known as: DEPAKOTE ER     * divalproex 500 MG extended release tablet  Commonly known as: DEPAKOTE ER     docusate sodium 100 MG capsule  Commonly known as: COLACE     guaiFENesin 100 MG/5ML syrup  Commonly known as: ROBITUSSIN     hydrocortisone 1 % cream     ibuprofen 600 MG tablet  Commonly known as: ADVIL;MOTRIN     Jardiance 25 MG tablet  Generic drug: empagliflozin     metFORMIN 500 MG tablet  Commonly known as: GLUCOPHAGE     metoprolol tartrate 50 MG tablet  Commonly known as: LOPRESSOR     nicotine 14 MG/24HR  Commonly known as: NICODERM CQ  Place 1 patch onto the skin nightly     pseudoephedrine 30 MG tablet  Commonly known as: SUDAFED     Trulicity 1.5 UK/2.2VQ SC injection  Generic drug: dulaglutide           * This list has 4 medication(s) that are the same as other medications prescribed for you. Read the directions carefully, and ask your doctor or other care provider to review them with you. Time Spent on discharge is more than 30 minutes in the examination, evaluation, counseling and review of medications and discharge plan. Signed: Thank you Nader Callahan MD for the opportunity to be involved in this patient's care.     Electronically signed by Kathryn Corbin MD on 1/10/2023 at 2:04 PM

## 2023-01-10 NOTE — PROGRESS NOTES
[address]  Occupational Therapy  Daily Note  Time:   Time In: 0848  Time Out: 0912  Timed Code Treatment Minutes: 24 Minutes  Minutes: 24          Date: 1/10/2023  Patient Name: Alfie Clifton,   Gender: male      Room: [facility]16/016-A  MRN: [id number]  : 1965  (65 y.o.)  Referring Practitioner: Chetna Lopez PA-C  Diagnosis: acute respiratory failure with hypoxia  Additional Pertinent Hx: Per ER note on 2023: 65 y.o. male who presents to the emergency department for evaluation of flu like symptoms. States that he has had fever, chills, and non-productive cough that has worsened over the last 3 days. Denies taking any medication for his symptoms. Received 800mL NS while en route to ED. Denies being on oxygen at home. Has been vaccinated for COVID and influenza. Per neuro note: At [phone] on 1/3/2023 stroke alert was called for this patient for left-sided facial droop and dysarthria which were noted by his nurse. Nurse states that at 2 AM the facial droop and slurred speech were not present. Patient taken to imaging for stat CT head which revealed no ICH, midline shift, mass-effect. 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 No LVO. Based on patient's symptoms and time since last known well decision was made for TNK to be given. 22 mg of TN K given at 5:05 AM    Restrictions/Precautions:  Restrictions/Precautions: Fall Risk, General Precautions  Position Activity Restriction  Other position/activity restrictions: impulsive     SUBJECTIVE: Pt sitting at the EOB upon arrival, pt agreeable to OT session, RN gave verbal approval for session    PAIN: 0/10:     Vitals: Oxygen: 92% on RA    COGNITION: Impulsive    ADL:   Grooming: Stand By Assistance. For applying deodorant sitting at the EOB, pt refusing oral care  Bathing: Stand By Assistance.   For completing sponge bath sitting at the EOB with sitting and standing  Upper Extremity Dressing: Stand By Assistance. For donning scrub shirt  Lower Extremity Dressing: Minimal Assistance. For donning brief with LLE due to difficulty threading through . BALANCE:  Standing Balance: Contact Guard Assistance. With no AE used for safety with no LOB Noted    BED MOBILITY:  Not Tested    TRANSFERS:  Sit to Stand:  Contact Guard Assistance. From the EOB  Stand to Sit: Contact Guard Assistance. To the recliner    FUNCTIONAL MOBILITY:  Assistive Device: None  Assist Level:  Contact Guard Assistance. Distance:  HH distances with no LOB noted and good safety ntoed    ASSESSMENT:     Activity Tolerance:  Patient tolerance of  treatment: good. Discharge Recommendations: Continue to assess pending progress  Equipment Recommendations: Other: RW use  Plan: Times Per Week: 6x  Current Treatment Recommendations: Functional mobility training, Balance training, Self-Care / ADL, Safety education & training, Endurance training    Patient Education  Patient Education: ADL's    Goals  Short Term Goals  Time Frame for Short Term Goals: until discharge  Short Term Goal 1: Pt will complete various t/fs including BSC with CGA & min vcs for safe technique  Short Term Goal 2: Pt will tolerate standing 2-3 min with CGA for increased ease of sinkside grooming  Short Term Goal 3: Pt will complete mobility to [facility] or bedside chair with RW, CGA, & 0-2 vcs for safety  Short Term Goal 4: Pt will complete BADL routine with min A & min vcs for safety & sequencing  Long Term Goals  Time Frame for Long Term Goals : No LTG set d/t short ELOS    Following session, patient left in safe position with all fall risk precautions in place.

## 2023-01-11 LAB
ALBUMIN SERPL-MCNC: 3.2 G/DL (ref 3.5–5.1)
ALP BLD-CCNC: 56 U/L (ref 38–126)
ALT SERPL-CCNC: 46 U/L (ref 11–66)
ANION GAP SERPL CALCULATED.3IONS-SCNC: 12 MEQ/L (ref 8–16)
AST SERPL-CCNC: 28 U/L (ref 5–40)
BASOPHILS # BLD: 0.4 %
BASOPHILS ABSOLUTE: 0 THOU/MM3 (ref 0–0.1)
BILIRUB SERPL-MCNC: 0.2 MG/DL (ref 0.3–1.2)
BUN BLDV-MCNC: 13 MG/DL (ref 7–22)
CALCIUM SERPL-MCNC: 8.4 MG/DL (ref 8.5–10.5)
CHLORIDE BLD-SCNC: 103 MEQ/L (ref 98–111)
CO2: 24 MEQ/L (ref 23–33)
CREAT SERPL-MCNC: 0.5 MG/DL (ref 0.4–1.2)
EOSINOPHIL # BLD: 1.6 %
EOSINOPHILS ABSOLUTE: 0.1 THOU/MM3 (ref 0–0.4)
ERYTHROCYTE [DISTWIDTH] IN BLOOD BY AUTOMATED COUNT: 12.6 % (ref 11.5–14.5)
ERYTHROCYTE [DISTWIDTH] IN BLOOD BY AUTOMATED COUNT: 44.2 FL (ref 35–45)
GFR SERPL CREATININE-BSD FRML MDRD: > 60 ML/MIN/1.73M2
GLUCOSE BLD-MCNC: 148 MG/DL (ref 70–108)
GLUCOSE BLD-MCNC: 167 MG/DL (ref 70–108)
GLUCOSE BLD-MCNC: 181 MG/DL (ref 70–108)
GLUCOSE BLD-MCNC: 195 MG/DL (ref 70–108)
GLUCOSE BLD-MCNC: 201 MG/DL (ref 70–108)
HCT VFR BLD CALC: 43.1 % (ref 42–52)
HEMOGLOBIN: 14.3 GM/DL (ref 14–18)
IMMATURE GRANS (ABS): 0.2 THOU/MM3 (ref 0–0.07)
IMMATURE GRANULOCYTES: 2.7 %
LYMPHOCYTES # BLD: 26.5 %
LYMPHOCYTES ABSOLUTE: 2 THOU/MM3 (ref 1–4.8)
MCH RBC QN AUTO: 31.9 PG (ref 26–33)
MCHC RBC AUTO-ENTMCNC: 33.2 GM/DL (ref 32.2–35.5)
MCV RBC AUTO: 96.2 FL (ref 80–94)
MONOCYTES # BLD: 14.1 %
MONOCYTES ABSOLUTE: 1.1 THOU/MM3 (ref 0.4–1.3)
NUCLEATED RED BLOOD CELLS: 0 /100 WBC
PLATELET # BLD: 224 THOU/MM3 (ref 130–400)
PMV BLD AUTO: 8.5 FL (ref 9.4–12.4)
POTASSIUM REFLEX MAGNESIUM: 4.1 MEQ/L (ref 3.5–5.2)
PREALBUMIN: 17 MG/DL (ref 20–40)
RBC # BLD: 4.48 MILL/MM3 (ref 4.7–6.1)
SEG NEUTROPHILS: 54.7 %
SEGMENTED NEUTROPHILS ABSOLUTE COUNT: 4.1 THOU/MM3 (ref 1.8–7.7)
SODIUM BLD-SCNC: 139 MEQ/L (ref 135–145)
TOTAL PROTEIN: 6.2 G/DL (ref 6.1–8)
WBC # BLD: 7.5 THOU/MM3 (ref 4.8–10.8)

## 2023-01-11 PROCEDURE — 97162 PT EVAL MOD COMPLEX 30 MIN: CPT

## 2023-01-11 PROCEDURE — 6370000000 HC RX 637 (ALT 250 FOR IP): Performed by: STUDENT IN AN ORGANIZED HEALTH CARE EDUCATION/TRAINING PROGRAM

## 2023-01-11 PROCEDURE — 82948 REAGENT STRIP/BLOOD GLUCOSE: CPT

## 2023-01-11 PROCEDURE — 97530 THERAPEUTIC ACTIVITIES: CPT

## 2023-01-11 PROCEDURE — 97110 THERAPEUTIC EXERCISES: CPT

## 2023-01-11 PROCEDURE — 92523 SPEECH SOUND LANG COMPREHEN: CPT

## 2023-01-11 PROCEDURE — 97535 SELF CARE MNGMENT TRAINING: CPT

## 2023-01-11 PROCEDURE — 84134 ASSAY OF PREALBUMIN: CPT

## 2023-01-11 PROCEDURE — 94640 AIRWAY INHALATION TREATMENT: CPT

## 2023-01-11 PROCEDURE — 97116 GAIT TRAINING THERAPY: CPT

## 2023-01-11 PROCEDURE — 6360000002 HC RX W HCPCS: Performed by: STUDENT IN AN ORGANIZED HEALTH CARE EDUCATION/TRAINING PROGRAM

## 2023-01-11 PROCEDURE — 36415 COLL VENOUS BLD VENIPUNCTURE: CPT

## 2023-01-11 PROCEDURE — 85025 COMPLETE CBC W/AUTO DIFF WBC: CPT

## 2023-01-11 PROCEDURE — 80053 COMPREHEN METABOLIC PANEL: CPT

## 2023-01-11 PROCEDURE — 97166 OT EVAL MOD COMPLEX 45 MIN: CPT

## 2023-01-11 PROCEDURE — 99232 SBSQ HOSP IP/OBS MODERATE 35: CPT | Performed by: STUDENT IN AN ORGANIZED HEALTH CARE EDUCATION/TRAINING PROGRAM

## 2023-01-11 PROCEDURE — 92610 EVALUATE SWALLOWING FUNCTION: CPT

## 2023-01-11 PROCEDURE — 1180000000 HC REHAB R&B

## 2023-01-11 RX ADMIN — CLOZAPINE 200 MG: 100 TABLET ORAL at 20:29

## 2023-01-11 RX ADMIN — DIVALPROEX SODIUM 500 MG: 500 TABLET, EXTENDED RELEASE ORAL at 08:16

## 2023-01-11 RX ADMIN — INSULIN GLARGINE 7 UNITS: 100 INJECTION, SOLUTION SUBCUTANEOUS at 20:57

## 2023-01-11 RX ADMIN — DOCUSATE SODIUM 100 MG: 100 CAPSULE, LIQUID FILLED ORAL at 08:17

## 2023-01-11 RX ADMIN — IPRATROPIUM BROMIDE AND ALBUTEROL SULFATE 1 AMPULE: .5; 3 SOLUTION RESPIRATORY (INHALATION) at 00:13

## 2023-01-11 RX ADMIN — ENOXAPARIN SODIUM 40 MG: 100 INJECTION SUBCUTANEOUS at 08:17

## 2023-01-11 RX ADMIN — METOPROLOL SUCCINATE 25 MG: 25 TABLET, EXTENDED RELEASE ORAL at 08:16

## 2023-01-11 RX ADMIN — DESVENLAFAXINE 50 MG: 50 TABLET, EXTENDED RELEASE ORAL at 08:17

## 2023-01-11 RX ADMIN — ATORVASTATIN CALCIUM 20 MG: 20 TABLET, FILM COATED ORAL at 08:16

## 2023-01-11 RX ADMIN — ASPIRIN 81 MG 81 MG: 81 TABLET ORAL at 08:17

## 2023-01-11 RX ADMIN — DIVALPROEX SODIUM 1000 MG: 500 TABLET, EXTENDED RELEASE ORAL at 20:29

## 2023-01-11 RX ADMIN — CLOZAPINE 50 MG: 25 TABLET ORAL at 13:50

## 2023-01-11 RX ADMIN — CLOZAPINE 50 MG: 25 TABLET ORAL at 08:17

## 2023-01-11 ASSESSMENT — PAIN SCALES - GENERAL: PAINLEVEL_OUTOF10: 0

## 2023-01-11 ASSESSMENT — 9 HOLE PEG TEST
TESTTIME_SECONDS: 41.3
TESTTIME_SECONDS: 41.1

## 2023-01-11 NOTE — PROGRESS NOTES
[address] [address]  EVALUATION    Time:    Time In: 0830  Time Out: 1000  Timed Code Treatment Minutes: 74 Minutes  Minutes: 90          Date: 2023  Patient Name: Ethan Gresham,   Gender: male      MRN: [id number]  : 1965  (65 y.o.)  Referring Practitioner: Dr. Teressa Crawley  Diagnosis: Debility  Additional Pertinent Hx: Ethan Gresham is a 65 y.o. male admitted to [address] [place] [address] on 2023. Patient with past medical history of DM, HTN, and bipolar disorder who presented to [address] [place] [address] for productive cough, fever and SOB. This had become progressively worse and is worse with exertion. While in the ER, patient did test positive for influenza A. CXR revealed coarsened appearance to the pulmonary parenchyma ?chronic. Pulmonary vascularity is normal. Tamiflu was started. Patient was admitted to [address] [place] [address] Med-Surgical Unit. On 1/3/23 CODE Stroke was called due to new onset left facial weakness, lethargy and dysarthria. NIH of 3. 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 Based on patient's symptoms and time since last known well decision was made for TNK to be given. 22 mg of TNK given at 5:05 AM.  Patient transferred to the ICU with thrombolytic precautions. Patient was placed on HFNC. MRI complete 23 without acute findings of CVA. echocardiogram 1/3/2023 that showed EF of 55% and overall normal left ventricular function. Patient was cleared for diet by SLP. Tamilfu completed 23. Solu-Medrol 23. Out of droplet isolation 23. Pt admitted to [facility] on 1/10 for therapy needs with eventual plan to return to Assisted Living once medically stable, but needs to be able to care for self except for housemaking tasks.     Restrictions/Precautions:  Restrictions/Precautions: Fall Risk, General Precautions, Modified Diet  Position Activity Restriction  Other position/activity restrictions: impulsive    Subjective  Chart Reviewed: Yes, Orders, Progress Notes, History and Physical, Previous Admission  Patient assessed for rehabilitation services?: Yes    Subjective: Pt finishing breakfast upon OT arrival. Agreeable to OT. Pt with mumbled speech throughout session, but understandable with repetition     Pain: Denies /10:      Vitals: Oxygen: > 91% on RA throughout session     Social/Functional History:  Lives With: Alone  Type of Home: Assisted living [facility])  Home Layout: One level  Home Access: Level entry  Home Equipment: None   Bathroom Shower/Tub: Walk-in shower  Bathroom Toilet: Standard  Bathroom Equipment: Grab bars around toilet, Grab bars in shower    ADL Assistance: Independent  Homemaking Assistance: Needs assistance  Ambulation Assistance: Independent  Transfer Assistance: Independent    Active : No  Patient's  Info: [address] provides transport     Additional Comments: Pt reports using no AD at assisted living. Pt reports an aide is sometimes present so supervise showers, but he completes dressing, grooming and toileting independently. Facility completes housekeeping, cooking and transportation. Pt usually ambulates to dining room for meals. VISION:WNL    HEARING:  WNL    COGNITION: Slow Processing, Decreased Problem Solving, and Impulsive and decreased sequencing     RANGE OF MOTION:  Bilateral Upper Extremity:  WFL    STRENGTH:  HAND ASSESSMENT  Hand Dominance: Right  Left Hand Strength -  (lbs)  Handle Setting 2: 25, 30, 32  Right Hand Strength -  (lbs)  Handle Setting 2: 40, 36, 39  Fine Motor Skills  Left 9 Hole Peg Test Time (secs): 41.1  Right 9 Hole Peg Test Time (secs): 41.3    SENSATION:   WFL    ADL:   EATING:Setup or clean-up assistance. Nicola Darling CARE Score: 5. ORAL HYGIENE:Supervision or touching assistance. CGA standing at sink with cues for sequencing. CARE Score: 4.      TOILETING HYGIENE:Supervision or touching assistance. CGA standing at toilet to urinate. CARE Score: 4. SHOWERING/BATHING:Supervision or touching assistance. Pt bathed self in shower, mod vcs needed for sequencing and slower processing speed. Perseveration on washing his RUE noted throughout shower. Pt SBA seated and required CGA while standing. Joaquina Mills CARE Score: 4.     UPPER BODY DRESSING:Supervision or touching assistance. doff and don t-shirt. CARE Score: 4. LOWER BODY DRESSING:Partial/moderate assistance. CGA for standing balance to doff clothing with 2 cues needed or sequencing. A to thread LLE into shorts, CGA for standing balance with A to tie. CARE Score: 3. FOOTWEAR:Partial/moderate assistance  Pt able ot cross LEs to doff socks, required total A to don socks. CARE Score: 3.     TOILET TRANSFER: Supervision or touching assistance. CGA. CARE Score: 4. SHOWER TRANSFER: contact guard assistance, walk in shower  Cues needed for sequencing shower ( adding soap to wash cl    BALANCE:  Sitting Balance:  Supervision. Standing Balance: Contact Guard Assistance. To close stand by assistance     BED MOBILITY:  Supine to Sit: Stand By Assistance      TRANSFERS:  Sit to Stand:  Contact Guard Assistance. Cues for hand placement, slow to initiate at times,  impulsive at times   Stand to Sit: [employer]. Cues to reach back     FUNCTIONAL MOBILITY:  Assistive Device: None  Assist Level:  Stand By Assistance. Distance: To and from bathroom and To and from shower room     IADL:   Patient completed IADL homemaking task this date of simple meal prep task - task was graded to challenge sequencing and initiation. Patient made coffee with CGA for standing balance with moderate verbal cues for initiation and sequencing and min AA for RUE to pour water during the retrieval and transportation of items. Patient required CGA throughout task with a standing endurance of 6 minutes.      Activity Tolerance:  Patient tolerance of  treatment: good. Assessment:  Pt presents to occupational therapy with debility with patient complaints of weakness, decreased sequencing, decreased initiation impacting patient's ability to complete self care at prior level of functioning. Previous to hospitalization, patient was previously independent with self care. Due to patient's performance deficits, patient would greatly benefit from continued occupational therapy for ADL remediation, endurance building, strengthening and adaptive strategies to return to prior level of functioning and safe return to home environment. Performance deficits / Impairments: Decreased functional mobility , Decreased ADL status, Decreased safe awareness, Decreased balance, Decreased cognition, Decreased strength, Decreased endurance  Decision Making: Medium Complexity    Treatment Initiated: Treatment and education initiated within context of evaluation. Evaluation time included review of current medical information, gathering information related to past medical, social and functional history, completion of standardized testing, formal and informal observation of tasks, assessment of data and development of plan of care and goals. Treatment time included skilled education and facilitation of tasks to increase safety and independence with ADL's for improved functional independence and quality of life. Discharge Recommendations:  Continue to assess pending progress    Patient Education:  Patient Education  Education Given To: Patient  Education Provided: Role of Therapy, Plan of Care, ADL Adaptive Strategies, Equipment  Education Method: Demonstration, Verbal  Barriers to Learning: Cognition  Education Outcome: Verbalized understanding, Demonstrated understanding, Continued education needed    Equipment Recommendations: Other: Pt will have a shower chair and grab bars to use in his AL apartment.     Plan:  Times Per Week: 5x/wk for 90 min  Current Treatment Recommendations: Functional mobility training, Balance training, Self-Care / ADL, Safety education & training, Endurance training, Equipment evaluation, education, & procurement, Patient/Caregiver education & training. See long-term goal time frame for expected duration of plan of care. If no long-term goals established, a short length of stay is anticipated. Goals:  Patient goals : Return home to AL,  Short Term Goals  Time Frame for Short Term Goals: 1 week  Short Term Goal 1: Pt will sequence and complete bathing tasks with SBA and 0 vcs for perseveration to improve indep with showering at home. Short Term Goal 2: Pt will tolerate standing 4 min with SBA for increased ease of sinkside grooming. Short Term Goal 3: Pt will gather appropriate clothing and dress self with SBA and min vcs for sequencing and initation to improve indep with self care. Short Term Goal 4: Pt will sequence and initate grooming tasks following toileting with SBA and min vcs to improve indep with self care. Long Term Goals  Time Frame for Long Term Goals : 2 weeks from IPR evaluation  Long Term Goal 1: Pt will ambulate within room with Mod I and no device to improve indep with self care. Long Term Goal 2: Pt will complete dressing tasks with mod I and no cues for sequencing to improve indep with self care at AL. Long Term Goal 3: Pt will complete grooming and toileting tasks with mod I to return to prior level of function at AL. Following session, patient left in safe position with all fall risk precautions in place.

## 2023-01-11 NOTE — PLAN OF CARE
Individualized Plan of [address] [facility] Inpatient Rehabilitation Unit    Rehabilitation physician: Dr. Chantelle Faust Date: 1/10/2023     Rehabilitation Diagnosis: Debility [R53.81]      Rehabilitation impairments: self care, mobility, motor dysfunction, safety, and cognitive function    Factors facilitating achievement of predicted outcomes: Cooperative and Pleasant  Barriers to the achievement of predicted outcomes: Cognitive deficit and Decreased sensation    Patient Goals: Improve independence with mobility, Improvement of mobility at a wheelchair level, Increase overall strength and endurance, Increase balance, Increase endurance, Increase independence with activities of daily living, Improve cognition, Increase self-awareness, Increase safety awareness, Increase community integration, Increase socialization, Functional communication with caregivers, Integrate appropriate pain management plan, Assure adequate nutritional option for discharge, Continence of bowel and bladder, and Provide appropriate patient and family education      NURSING:  Nursing goals for Jimi Lazaro while on the rehabilitation unit will include:  Continence of bowel and bladder, Adequate number of bowel movements, Urinate with no urinary retention >300ml in bladder, Maintain O2 SATs at an acceptable level during stay, Effective pain management while on the rehabilitation unit, Establish adequate pain control plan for discharge, Absence of skin breakdown while on the rehabilitation unit, Improved skin integrity via assessments including wound measurements, Avoidance of any hospital acquired infections, No signs/symptoms of infection at the wound site, Freedom from injury during hospitalization, and Complete education with patient/family with understanding demonstrated regarding disease process and resultant impairment     In order to achieve these goals, nursing interventions may include bowel/bladder training, education for medical assistive devices, medication education, O2 saturation management, energy conservation, stress management techniques, fall prevention, alarms protocol, seating and positioning, skin/wound care, pressure relief instruction, dressing changes, infection protection, DVT prophylaxis, assistance with safe transfers , and/or assistance with bathroom activities and hygiene. PHYSICAL THERAPY:  Goals:                Plan of Care: Patient to be seen by physical therapy services         Anticipated interventions may include therapeutic exercises, gait training, neuromuscular re-ed, transfer training, community reintegration, bed mobility, w/c mobility and training. OCCUPATIONAL THERAPY:  Goals:                     Plan of Care: Patient to be seen by occupational therapy services 5x/wk for 90 min and 1x/wk for 30 min     Anticipated interventions may include ADL and IADL retraining, strengthening, safety education and training, patient/caregiver education and training, equipment evaluation/ training/procurement, neuromuscular reeducation, wheelchair mobility training. SPEECH THERAPY:        Plan of Care: Pt to be seen by speech therapy services 30 minutes per day Monday through Friday . Anticipated interventions may include speech/language/communication therapy, cognitive training, group therapy, education, and/or dysphagia therapy based on the above goals. CASE MANAGEMENT:  Goals:   Assist patient/family with discharge planning, patient/family counseling,  and coordination with insurance during the inpatient rehabilitation stay. Other members of the multidisciplinary rehabilitation team that will be involved in the patient's plan of care include recreational therapy, dietary, respiratory therapy, and neuropsychology.     Medical issues being managed closely and that require 24 hour availability of a physician:  Swallowing precautions, Bowel/Bladder function, Weight bearing precautions, Wound care, Pain management, Infection protection, DVT prophylaxis, Fall precautions, Fluid/Electrolyte balance, Nutritional status, and Respiratory needs                                           Physician anticipated functional outcomes: Improved independence with functional measures   Estimated length of stay for this admission 2-3 weeks  Medical Prognosis: Good  Anticipated disposition: Home- assisted living facility. The potential to achieve the above medical and rehabilitative goals is good. This plan of care has been developed with the assistance and input of the multidisciplinary rehabilitation team.  The plan was reviewed with the patient. The patient has had the opportunity to provide input to the therapy team.    I have reviewed this Individualized Plan of Care and agree with its contents. Above documentation has been expanded, modified, adjusted to reflect the findings of my evaluations and goals for the patient. Physician:  Voncille Bernheim.  Bonnie Loaiza DO      Electronically signed by Brooks Heller DO on 1/11/2023 at 4:47 PM

## 2023-01-11 NOTE — PLAN OF CARE
Problem: Safety - Adult  Goal: Free from fall injury  Outcome: Progressing     Problem: ABCDS Injury Assessment  Goal: Absence of physical injury  Outcome: Progressing  Flowsheets (Taken 1/10/2023 1535 by Silke Jansen LPN)  Absence of Physical Injury: Implement safety measures based on patient assessment     Problem: Pain  Goal: Verbalizes/displays adequate comfort level or baseline comfort level  Outcome: Progressing  Flowsheets  Taken 1/10/2023 2306 by Vaughn Seaman RN  Verbalizes/displays adequate comfort level or baseline comfort level:   Encourage patient to monitor pain and request assistance   Assess pain using appropriate pain scale   Administer analgesics based on type and severity of pain and evaluate response  Taken 1/10/2023 1530 by Silke Jansen LPN  Verbalizes/displays adequate comfort level or baseline comfort level:   Encourage patient to monitor pain and request assistance   Implement non-pharmacological measures as appropriate and evaluate response

## 2023-01-11 NOTE — PROGRESS NOTES
Focus Note  Full Admission Note Completed: yes, [address]   Discharge Pain Assessment may be completed between 1/18/23 and 1/20/23 - Done 1/18/23AW  Education Focus: Disease process and treatment  Incorporating physical activity into lifestyle  Prevention, detection, and treatment of acute complications  Prevention, detection, and treatment of chronic complications  Develop strategies to address psychosocial concerns  Develop strategies to promote health/ change behaviors  Verbalized understanding and Needs reinforcement      Bladder Management: Continent: no   Management: Time Void initiated, patient is unable to be awaken at night to complete time voids.      Medication Education:   Home med list and hospital med list compared for new meds: {YES/NO:72225}  New medication identified: ***  Diabetes management education: Lantus @ HS and metformin BID        Discharge Needs:   Caregiver/Support identified: Wilmer Camacho   Equipment needs: home O2- eval set up for HS 1/18/23- AW    Barriers expressed by patient or family: ***   Follow up appointments needed: ***

## 2023-01-11 NOTE — PROGRESS NOTES
[address] [address]  INPATIENT PHYSICAL THERAPY  Daily Note  [facility] - [id number]    Time In: 1400  Time Out: 1430  Timed Code Treatment Minutes: 30 Minutes  Minutes: 30          Date: 2023  Patient Name: Benjie Chun,  Gender:  male        MRN: [id number]  : 1965  (65 y.o.)     Referring Practitioner: Alisa Antony DO  Diagnosis: Debility  Additional Pertinent Hx: Per H&P \"Chico Nicole is a 65 y.o. male admitted to [address] [place] [address] on 2023. Patient with past medical history of DM, HTN, and bipolar disorder who presented to [facility] for productive cough, fever and SOB. This had become progressively worse and is worse with exertion. While in the ER, patient did test positive for influenza A. CXR revealed coarsened appearance to the pulmonary parenchyma ?chronic. Pulmonary vascularity is normal. Tamiflu was started. Patient was admitted to [facility] Med-Surgical Unit. On 1/3/23 CODE Stroke was called due to new onset left facial weakness, lethargy and dysarthria. NIH of 3. 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 Based on patient's symptoms and time since last known well decision was made for TNK to be given. 22 mg of TNK given at 5:05 AM.  Patient transferred to the ICU with thrombolytic precautions. Patient was placed on HFNC. MRI complete 23 without acute findings of CVA. echocardiogram 1/3/2023 that showed EF of 55% and overall normal left ventricular function. Patient was cleared for diet by SLP. Tamilfu completed 23. Solu-Medrol 23. Out of droplet isolation 23. Plan was for patient to return to Assisted Living once medically stable, but needs to be able to care for self except for housemaking tasks. \"     Prior Level of Function:  Lives With: Alone  Type of Home: Assisted living [facility])  Home Layout: One level  Home Access: Level entry  Home Equipment: None   Bathroom Shower/Tub: Walk-in shower  Bathroom Toilet: Standard  Bathroom Equipment: Grab bars around toilet, Grab bars in shower    ADL Assistance: Independent  Homemaking Assistance: Needs assistance  Ambulation Assistance: Independent  Transfer Assistance: Independent  Active : No  Additional Comments: Pt reports using no AD at assisted living. Pt reports an aide is sometimes present so supervise showers, but he completes dressing, grooming and toileting independently. Facility completes housekeeping, cooking and transportation. Pt usually ambulates to dining room for meals. Restrictions/Precautions:  Restrictions/Precautions: Fall Risk, General Precautions, Modified Diet  Position Activity Restriction  Other position/activity restrictions: impulsive     SUBJECTIVE: Pt. Laying in his bed and pleasantly agrees to therapy session. PAIN: 0/10    Vitals:   Patient Vitals for the past 8 hrs:   BP Patient Position Temp Temp src Pulse Resp SpO2 O2 Device   01/11/23 0800 (!) 110/59 Right side 98.4 °F (36.9 °C) Oral 100 16 92 % None (Room air)        OBJECTIVE:  Bed Mobility:  Not Tested    Transfers:  Sit to Stand: Contact Guard Assistance  Stand to [facility] 68  *Patient instructed in sit to stand transfers from various surfaces including: edge of bed, recliner and wheelchair without AD with cueing for slowed eccentric controlled descent. Ambulation:  Contact Guard Assistance  Distance: 120 feet and various distances in rehab gym  Surface: Level Tile  Device: No Device  Gait Deviations: Forward Flexed Posture, Slow May, Decreased Step Length Bilaterally, Decreased Arm Swing, Decreased Trunk Rotation, Decreased Gait Speed, Decreased Heel Strike Bilaterally, and Narrow Base of Support    *Patient instructed in searching for cones placed around rehab gym at various heights without AD and CGA.  Patient required cueing for scanning environment due to missing several cones with cueing for scanning to find cones. Patient instructed in searching for cones in order to assist with obstacle negotiation and scanning environment in order to assist with safety with mobility in busy environments. Stairs:  None    Balance:  Dynamic Standing Balance: Contact Guard Assistance  *Patient instructed in forward/backward stepping while tossing bean bags with CGA. Patient required cueing for posture and increased step height and length while tossing bean bags. Patient demonstrated slight unsteadiness however no formal LOB. Patient instructed in standing dynamic balance in order to assist with safety with functional mobility. Neuromuscular Re-education  None    Exercise:  Patient was guided in 1 set(s) 20 reps of exercises: Glut sets, Seated marches, Seated hamstring curls, Seated heel/toe raises, Long arc quads, Seated isometric hip adduction, Seated abduction/adduction, and abdominal isometrics. Exercises were completed for increased independence with functional mobility. *Patient required cueing for proper form throughout     Functional Outcome Measures:   Not completed    ASSESSMENT:  Assessment: Patient progressing toward established goals. Activity Tolerance:  Patient tolerance of  treatment: good.      Equipment Recommendations:Equipment Needed: No (Continue to assess pending progress)  Discharge Recommendations: Continue to assess pending progress, Patient would benefit from continued therapy after discharge Continue to assess pending progress and Patient would benefit from continued PT at discharge    Plan: Current Treatment Recommendations: Strengthening, Balance training, Functional mobility training, Gait training, Safety education & training, Home exercise program, Patient/Caregiver education & training, Transfer training, Endurance training, Therapeutic activities, Neuromuscular re-education, Stair training  General Plan:  (5x/wk 90min)    Patient Education  Patient Education: Functional Mobility, Safety, Verbal Exercise Instruction, Transfers, Gait,  - Patient Verbalized Understanding, - Patient Requires Continued Education    Goals:  Patient Goals : None Stated  Short Term Goals  Time Frame for Short Term Goals: 1 week  Short Term Goal 1: Patient to perform bed mobility supine<>sit with Supervision in order to assist with getting into and out of bed. Short Term Goal 2: Patient to perform sit to stand transfers without AD with </=1 cue for hand placement from various surfaces in order to assist with safety with transfers in home. Short Term Goal 3: Patient to ambulate >/=75 feet without AD with SBA in order to assist with home mobility. Short Term Goal 4: Patient to score >/=38/56 on the [place] Balance Test in order to assist with reduced risk for falls. Long Term Goals  Time Frame for Long Term Goals : 2 weeks from IPR evaluation  Long Term Goal 1: Patient to perform bed mobility supine<>sit with Mod I in order to assist with getting into and out of bed. Long Term Goal 2: Patient to perform sit to stand transfers without use of Ad with Mod I in order to assist with safety with transfers in home. Long Term Goal 3: Patient to ambulate >/=200 feet without AD with Mod I in order to assist with ambulating to and  from dining room for all meals. Long Term Goal 4: Patient to perform car transfer with Supervision in order to assist with getting to and from appointments. Long Term Goal 5: Patient to perform TUG in </=12 seconds in order to assist with functional dynamic balance. Following session, patient left in safe position with all fall risk precautions in place.

## 2023-01-11 NOTE — CONSULTS
Department of Family Practice  Consult Note        Reason for Consult:  Medical management while on the Inpatient Rehab unit. Requesting Physician:  Dr Emely Navas:   The need to continue the intensive time with therapies following the acute hospital stay. History Obtained From:  patient,  EMR    HISTORY OF PRESENT ILLNESS:              The patient is a 65 y.o. male with significant past medical history of No past medical history on file. who presents with increased SOB from having influenza. He was treated with tamiflu but worsened to the point of needing high flow oxygen. He had stroke like symptoms and was given TNK following a CTA showing the left carotid was blocked. Now he has come to the Inpatient Rehab Unit to continue the time with therapies prior to a discharge disposition being made. Past Medical History:    No past medical history on file. Past Surgical History:    No past surgical history on file.   Current Medications:   Current Facility-Administered Medications: [START ON 1/11/2023] aspirin chewable tablet 81 mg, 81 mg, Oral, Daily  [START ON 1/11/2023] atorvastatin (LIPITOR) tablet 20 mg, 20 mg, Oral, Daily  clotrimazole (LOTRIMIN) 1 % cream, , Topical, BID PRN  cloZAPine (CLOZARIL) tablet 200 mg, 200 mg, Oral, Nightly  [START ON 1/11/2023] cloZAPine (CLOZARIL) tablet 50 mg, 50 mg, Oral, BID  [START ON 1/11/2023] desvenlafaxine succinate (PRISTIQ) extended release tablet 50 mg, 50 mg, Oral, Daily  [START ON 1/11/2023] divalproex (DEPAKOTE ER) extended release tablet 500 mg, 500 mg, Oral, QAM  divalproex (DEPAKOTE ER) extended release tablet 1,000 mg, 1,000 mg, Oral, Nightly  [START ON 1/11/2023] docusate sodium (COLACE) capsule 100 mg, 100 mg, Oral, Daily  glucose chewable tablet 16 g, 4 tablet, Oral, PRN  glucagon (rDNA) injection 1 mg, 1 mg, SubCUTAneous, PRN  sodium chloride flush 0.9 % injection 5-40 mL, 5-40 mL, IntraVENous, PRN  [START ON 1/11/2023] enoxaparin (LOVENOX) injection 40 mg, 40 mg, SubCUTAneous, Daily  polyethylene glycol (GLYCOLAX) packet 17 g, 17 g, Oral, Daily PRN  albuterol (PROVENTIL) nebulizer solution 2.5 mg, 2.5 mg, Nebulization, Q4H PRN  ipratropium-albuterol (DUONEB) nebulizer solution 1 ampule, 1 ampule, Inhalation, Q4H PRN  insulin glargine (LANTUS) injection vial 7 Units, 7 Units, SubCUTAneous, Nightly  [START ON 1/11/2023] metoprolol succinate (TOPROL XL) extended release tablet 25 mg, 25 mg, Oral, Daily  insulin lispro (HUMALOG) injection vial 0-4 Units, 0-4 Units, SubCUTAneous, 4x Daily AC & HS  acetaminophen (TYLENOL) tablet 650 mg, 650 mg, Oral, Q4H PRN  ondansetron (ZOFRAN-ODT) disintegrating tablet 4 mg, 4 mg, Oral, Q8H PRN  Allergies:  Patient has no known allergies. Social History:   MARITAL STATUS:  single    Family History:   No family history on file.   REVIEW OF SYSTEMS:    CONSTITUTIONAL:  positive for  fatigue  EYES:  negative for  eye discharge  HEENT:  negative for  epistaxis  RESPIRATORY:  positive for  cough with sputum  CARDIOVASCULAR:  negative for  chest pain  GASTROINTESTINAL:  negative for diarrhea  GENITOURINARY:  negative for dysuria  INTEGUMENT/BREAST:  negative for rash  HEMATOLOGIC/LYMPHATIC:  negative for petechiae  ALLERGIC/IMMUNOLOGIC:  negative for anaphylaxis  ENDOCRINE:  negative for diabetic symptoms including polydipsia  MUSCULOSKELETAL:  positive for  myalgias and arthralgias  NEUROLOGICAL:  negative for seizures  BEHAVIOR/PSYCH:  negative for increased agitation  PHYSICAL EXAM:      Vitals:    /66   Pulse 96   Temp 97.9 °F (36.6 °C)   Resp 16   Ht 5' 9\" (1.753 m)   Wt 181 lb 1 oz (82.1 kg)   SpO2 95%   BMI 26.74 kg/m²     Well developed well nourished white male who is awake alert and cooperative  Skin warm and dry  Membranes moist  Head normocephalic  Neck without mass  Chest symmetrical expansion  Heart S1S2 without murmur  Lungs CTA  Abd soft, non tender, normoactive BS and no mass  Ext without edema  Neuro weak  Psy pleasant    IMPRESSION/RECOMMENDATIONS:      Active Hospital Problems    Diagnosis Date Noted    Debility [R53.81] 01/10/2023     Priority: Medium    Influenza with respiratory manifestation other than pneumonia [J11.1] 01/03/2023     Priority: Medium    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

## 2023-01-11 NOTE — PROGRESS NOTES
[address] [address]  Recreational Therapy  Inpatient Rehabilitation Evaluation        Time Spent with Patient: 15 minutes    Date:  1/11/2023       Patient Name: Opal Herrera      MRN: [id number]       YOB: 1965 (61 y.o.)       Gender: male  Diagnosis: Debility  Referring Practitioner: Dr. Raine Hidalgo    RESTRICTIONS/PRECAUTIONS:  Restrictions/Precautions: Fall Risk, General Precautions, Modified Diet          PAIN: 0    SUBJECTIVE:  pt lives in [place] home\"-he has 5 brothers and 3 sisters-he has his own room Nuria Merritt)     VISION:  Within Normal Limit    HEARING: Within Normal Limit    LEISURE INTERESTS:   Pt states he likes to do word search puzzles, he likes to look at the newspaper and watch tv-he likes being outside when the weather is nice-does not initiate conversation-just got back to bed and ready to rest-pleasant    BARRIERS TO LEISURE INTERESTS:    Decreased endurance           Patient Education  New Education Provided: Importance of Leisure, RT Plan of Care    Plan:  Continue to follow patient through this admission  See patient individually    Electronically signed by: VIDAL Lopez  Date: 1/11/2023

## 2023-01-11 NOTE — PROGRESS NOTES
[facility]  Speech - Language - Cognitive Evaluation + Clinical Swallow Evaluation    SLP Individual Minutes  Time In: 1430  Time Out: 1425  Minutes: 25  Timed Code Treatment Minutes: 0 Minutes     Speech, Language, Cognitive Evaluation: 15 minutes   Clinical Swallow Evaluation: 10 minutes    Date: 2023  Patient Name: Opal Herrera      CSN: [id number]   : 1965  (65 y.o.)  Gender: male   Referring Physician:  Gabrielle Torrez DO  Diagnosis: Debility   Precautions: Fall Risk, Aspiration Risk   History of Present Illness/Injury: Patient admitted with above diagnosis. Per chart review, \"Chico Wilhelm is a 65 y.o. male admitted to [address] [place] [address] on 2023. Patient with past medical history of DM, HTN, and bipolar disorder who [facility] for productive cough, fever and SOB. This had become progressively worse and is worse with exertion. While in the ER, patient did test positive for influenza A. CXR revealed coarsened appearance to the pulmonary parenchyma ?chronic. Pulmonary vascularity is normal. Tamiflu was started. Patient was admitted to [facility]. On 1/3/23 CODE Stroke was called due to new onset left facial weakness, lethargy and dysarthria. NIH of 3. 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 Based on patient's symptoms and time since last known well decision was made for TNK to be given. 22 mg of TNK given at 5:05 AM.  Patient transferred to the ICU with thrombolytic precautions. Patient was placed on HFNC. MRI complete 23 without acute findings of CVA. echocardiogram 1/3/2023 that showed EF of 55% and overall normal left ventricular function. Patient was cleared for diet by SLP. Tamilfu completed 23. Solu-Medrol 23. Out of droplet isolation 23.  Plan was for patient to return to Assisted Living once medically stable, but needs to be able to care for self except for housemaking tasks. On day of consultation, patient seen resting in bed. Patient is very pleasant and cooperative. Patient with mumbled speech, which he states is baseline for him. Patient is very independent at his Assisted Living. He is able to care for himself and is independent with walking to rec [facility] and [employer]. Discussed with patient about concerns with returning to [facility]. [facility]. with current level of mobility. Patient in agreement. Discussed IPR, patient in agreement with this. Patient denies any other questions or concerns at this time. On admission to inpatient rehabilitation unit, patient reports that he is doing well. He has been off supplemental O2 since this morning but utilized it overnight. He denies any CP or SOB. No cough. He is not in any pain at this time. He is looking forward to starting IPR program to regain strength and functional independence. \"     ST consulted to complete crbgez-ndxaloer-xscympvob evaluation and clinical swallow evaluation to assess current function and update POC as clinically indicated. No past medical history on file. Pain: No pain reported. Subjective:  Patient awake in recliner upon ST arrival. Patient agreeable to skilled ST services. Pleasant and cooperative throughout.      SOCIAL HISTORY:   Living Arrangements: [facility] Kaveh L[facility]pez   Work History:  Disability   Education Level: 10th Grade, GED   Driving Status: Does not drive  Finance Management: Independent  Medication Management: Assistance Required - managed by [facility]   ADL's: Assistance Required - managed by [facility]   Hobbies: frisbee   Vision Status: WFL - no prescription lenses   Hearing: WFL - no assistive hearing devices     Type of Home: Assisted living [facility]  Home Layout: One level  Home Access: Level entry  Home Equipment: None    SPEECH / VOICE:  Speech: Patient with mild-moderate dysarthria characterized by blended word boundaries resulting in speech intelligibility approximating 60% within unknown context and 75% within known context. Per acute documentation, patient and family endorsing baseline speech. Voice: WFL - no dysphonia or aphonia    LANGUAGE:  Receptive:  Receptive language skills appear to be grossly intact for basic and complex daily communication. Expressive:  Expressive language skills appear to be grossly intact for basic and complex daily communication. COGNITION:  [place] Cognitive Assessment (MOCA) version 7.2 completed. Patient scored 13/30*. Normal is greater than or equal to 26/30.   *Inclusion of +1 point given highest level of education achieved less than/equal to 12th grade or GED with limited-0 post-secondary schooling     Orientation:    Immediate Recall: Trial 1: 3/5, Trial 2: 3   Short-Term Recall: 0/5 independently, 2/5 given categorical cue, 2/5 given FO2  Divergent Namin word/minute (target = 11 words/minute)  Reasonin/2  Thought Organization: 0/  Attention:   Math Computation: 1/3  Executive Functionin    SWALLOWING:    Respiratory Status: Room Air      Behavioral Observation: Alert and Oriented    CRANIAL NERVE ASSESSMENT   CN V (Trigeminal) Closes and Opens Mandible WFL    Rotary Jaw Movement Impaired - munch chew pattern      CN VII (Facial) Cheeks Hold Food out of Sulci WFL    Opens, Closes/Seals, Protrudes, Retracts Lips WFL    General Appearance WFL    Sensation WFL      CN X (Vagus - Pharyngeal) Raises Back of Tongue WFL      CN XI (Accessory) Lifts Soft Palate WFL      CN XII (Hypoglossal) Elevates Tongue Up and Back WFL    Protrusion   WFL    Lateralizes Tongue WFL    Sensation WFL      Other Observations Dentition Edentulous - patient reports ownership of dentures; however, not present at time of eval.    Vocal Quality WFL    Cough DNT     PATIENT WAS EVALUATED USING:  Thin Liquids and Coarse Solids    ORAL PHASE:  Impaired:  Impaired Mastication    PHARYNGEAL PHASE:  WFL:  Pharyngeal phase appears WFL but cannot rule out pharyngeal phase deficits from a bedside swallowing evaluation alone. SIGNS AND SYMPTOMS OF LARYNGEAL PENETRATION / ASPIRATION:  No signs/symptoms of aspiration evident in this evaluation, but cannot rule out silent aspiration. INSTRUMENTAL EVALUATION: Instrumental evaluation not indicated at this time. DIET RECOMMENDATIONS:  Soft and Bite-Size with Thin Liquids     STRATEGIES: Full Upright Position, Small Bite/Sip, Medications Whole with Thin, Alternate Solids and Liquids, and Limit Distractions     Comprehension: 5 - Patient understands basic needs (hungry/hot/pain)  Expression: 4 - Expresses basic ideas/needs 75-90%+ of the time  Social Interaction: 4 - Patient appropriate 75-90%+ of the time  Problem Solvin - Patient solves simple/routine tasks 75-90%+   Memory: 3 - Patient remembers 50%-74% of the time    RECOMMENDATIONS/ASSESSMENT:  DIAGNOSTIC IMPRESSIONS:    Clinical Swallow Evaluation: Patient presents with mild oral dysphagia with inability to fully discern potential presence of pharyngeal phase deficits without formal instrumentation. Patient with adequate bolus control and manipulation, slow mastication with munch chew pattern that is effective for adequate textural breakdown when given additional amount of time, and complete bolus clearance. Patient with evidence of consistent swallow initiation and no overt s/s of aspiration observed across all trials. Of course pharyngeal dysfunction and totality of airway invasion events cannot be formally assessed without presence of instrumental evaluation; however, instrumental evaluation is not indicated at this time. Recommend patient consume a soft and bite-size diet with thin liquids.      Speech, Language, Cognitive Evaluation: Patient presents with evidence of a moderate cognitive-linguistic impairment characterized by a score of 13/30 on the [facility] deficits noted within the identified domains listed above. Patient's expressive and receptive language appear WFL. Patient with mild-moderate dysarthria characterized by blended word boundaries with speech intelligibility approximating 65% within unknown contexts and 75% within known contexts. Per patient and family, this is patient's baseline speech intelligibility. Patient would benefit from continued skilled ST services to address aforementioned deficits in order to make safe/successful discharge to least restrictive environment and contribute to ADL completion. Rehabilitation Potential: good  Discharge Recommendations: Continue to Assess Pending Progress    EDUCATION:  Learner: Patient  Education:  Reviewed results and recommendations of this evaluation, Reviewed diet and strategies, Reviewed ST goals and Plan of Care, Reviewed recommendations for follow-up, Education Related to [employer] and Wellness, and Home Safety Education  Evaluation of Education: Verbalizes understanding, Needs further instruction, and Family not present    PLAN:  Skilled SLP intervention on IP Rehab 30 minutes per day 5 days per week. Specific interventions for next session may include: recall, finances, safety    PATIENT GOAL:    Return to prior level of function. SHORT TERM GOALS:  Short Term Goals  Time Frame for Short Term Goals: 1 week  Goal 1: Patient will consume a soft and bite-size diet and thin liquids and advanced PO trials as clinically indicated with ST only with stable pulmonary status and adequate endurance to assist with meeting nutrition/hydration needs  Goal 2: Patient will complete structured speech drills/tasks (DDK rates, AMRs, multi-syllabic word repetition) with implementation of SOS speech strategies to improve intelligibility to 70% in all contexts to optimize communicative efficacy.   Goal 3: Patient will complete functional recall tasks (immediate, delayed, working, orientation, biographics) with 70% accuracy, mod cues to improve retention of pertinent information. Goal 4: Patient will complete problem solving, verbal/visual reasoning, and executive functioning tasks (time, money/math/finances, medications) with 60% accuracy, mod cues to improve contributions within ADL/IADL completion. Goal 5: Patient will complete sequencing and thought organization tasks with 70% accuracy, mod cues to improve mental flexibility for daily living scenarios. LONG TERM GOALS:  Long Term Goals  Time Frame for Long Term Goals: 3 weeks from evaluation  Goal 1: Patient will consume a regular texture diet and thin liquids with stable pulmonary status and adequate endurance to assist with meeting nutrition/hydration needs. Goal 2: Patient will improve cognitive-linguistic skills to a min assist or higher upon discharge in order to make safe/successful discharge to least restrictive environment and resume ADL completion with least amount of supervision/assistance.       [facility]) [address]

## 2023-01-11 NOTE — PROGRESS NOTES
[facility] NOTE    Conference Date: 2023  Admit Date:  1/10/2023  3:26 PM  Patient Name: Lilly Palacios    MRN: [id number]    : 1965  (65 y.o.)  Rehabilitation Admitting Diagnosis:  Debility [R53.81]  Referring Practitioner: Rohit Michael DO      CASE MANAGEMENT  Current issues/needs regarding patient and family discharge status: Prior to admission, patient was living at [facility]. Patient was been a resident there for 5 years. Patient reports that he was independent in his ADLs and checking his blood sugars. Staff supervise his showers to make sure he maintains his safety. The staff manages patient's housekeeping, meal prep, medications and transportation. Patient's contact is Dahiana Farias, who is patient's legal guardian. Toby Jones manages patient's finances. Patient's family physician is Andres Camp MD. [facility] manages patient's prescriptions. Patient is motivated to participate in therapy and plans on returning to [facility]. PHYSICAL THERAPY  Patient is a 65 y.o male who presents due to weakness and deconditioning following influenza resulting in decreased functional mobility. Patient demonstrates decreased strength in BLE's and decreased endurance which limits his functional mobility. Patient requires SBA for bed mobility supine<>sit due to impulsivity resulting in decreased safety. Patient is able to perform sit to stand transfers wtihout AD with CGA and ambulate without AD with CGA with decreased arm swing, decreased step and stride length and unsteady gait pattern. Patient scored a 30/56 on the [place] Balance test indicating he is a moderate risk for falls. Patient overall can benefit from skilled PT treatment in order to assist with BLE strengthening, gait  training, transfer training, bed mobility, core strengthening and dynamic balance for increased functional mobility.   Equipment Needed: No (Continue to assess pending progress)    SPEECH THERAPY  Patient presents with mild oral dysphagia with inability to fully discern potential presence of pharyngeal phase deficits without formal instrumentation. Patient with adequate bolus control and manipulation, slow mastication with munch chew pattern that is effective for adequate textural breakdown when given additional amount of time, and complete bolus clearance. Patient with evidence of consistent swallow initiation and no overt s/s of aspiration observed across all trials. Of course pharyngeal dysfunction and totality of airway invasion events cannot be formally assessed without presence of instrumental evaluation; however, instrumental evaluation is not indicated at this time. Recommend patient consume a soft and bite-size diet with thin liquids. Patient presents with evidence of a moderate cognitive-linguistic impairment characterized by a score of 13/30 on the [facility] with deficits noted within the identified domains listed above. Patient's expressive and receptive language appear WFL. Patient with mild-moderate dysarthria characterized by blended word boundaries with speech intelligibility approximating 65% within unknown contexts and 75% within known contexts. Per patient and family, this is patient's baseline speech intelligibility. Patient would benefit from continued skilled ST services to address aforementioned deficits in order to make safe/successful discharge to least restrictive environment and contribute to ADL completion. OCCUPATIONAL THERAPY  Pt presents to occupational therapy with debility with patient complaints of weakness, decreased sequencing, decreased initiation impacting patient's ability to complete self care at prior level of functioning. Previous to hospitalization, patient was previously independent with self care.  Due to patient's performance deficits, patient would greatly benefit from continued occupational therapy for ADL remediation, endurance building, strengthening and adaptive strategies to return to prior level of functioning and safe return to home environment. Other: Pt will have a shower chair and grab bars to use in his AL apartment. RECREATIONAL THERAPY  Patient has been offered participation in recreational therapy activities and participates as able. Pt states he likes to do word search puzzles, he likes to look at the newspaper and watch tv-he likes being outside when the weather is nice-does not initiate conversation-just got back to bed and ready to rest-pleasant     NUTRITION  Weight: 181 lb 6.4 oz (82.3 kg) / Body mass index is 26.79 kg/m². Current diet: ADULT DIET; Dysphagia - Soft and Bite Sized; 4 carb choices (60 gm/meal)  ADULT ORAL NUTRITION SUPPLEMENT; Lunch; Diabetic Oral Supplement  Please see nutrition note for details. NURSING  Continent of Bowel: Yes  Colace   Continent of Bladder: No.  Time void initiated    Pain is Managed:  Yes. Management: tylenol. Frequency of Intervention: every 4 hours. Adequately Controlled: Yes  Sleep: Adequate  Signs and Symptoms of Infection:  No.   Signs and Symptoms of Skin Breakdown:  Yes. Site: red coccyx. Wound Care: turn, reposition, barrier cream.  Injury and/or Falls during Inpatient Rehabilitation Admission: No  Anticoagulants: ASA, Lovenox   Diabetic: Yes: Home Meds: Juardiance, Trulictiy, Metformin . Hospital Meds: Lantus, Humalog. Educational Needs: none- ECF .     Consultations/Labs/X-rays: none      Recent Labs     01/11/23  1603 01/11/23  2044 01/12/23  0802   POCGLU 195* 181* 154*       Lab Results   Component Value Date    LDLCALC 44 01/03/2023         Vitals:    01/12/23 0528 01/12/23 0629 01/12/23 0638 01/12/23 0830   BP: 100/68   124/78   Pulse: (!) 113 (!) 111 (!) 107 (!) 116   Resp: 18 28 24 24   Temp: 99.1 °F (37.3 °C)   97.5 °F (36.4 °C)   TempSrc:    Oral   SpO2: 91% (!) 88% 93% 90%   Weight:       Height:              Family Education: limited family available for education  Fall Risk:  Falling star program initiated  Is the patient appropriate for a stay in the functional apartment? no    Discharge Plan   Estimated Discharge Date:  01/20/2023    Destination:  home to assisted living facility  Services at Discharge: [address], Occupational Therapy, Speech Therapy 2-3x week  Is patient appropriate for an outpatient driving evaluation? No; patient should not return to driving at this time. Equipment at Discharge: Other: Pt will have a shower chair and grab bars to use in his AL apartment. Factors facilitating achievement of predicted outcomes: Cooperative and Pleasant  Barriers to the achievement of predicted outcomes: Impulsivity, Limited insight into deficits, and Decreased endurance  Follow up with physiatrist? no      Team Members Present at Conference:  [address], LSW, MSW   Occupational Therapist:Alonso Villasenor, OTR/L [id number]  Physical Therapist:Kraig Dai, [facility], [address], CCC-SLP 3602  Nurse:Gloria Mcknight RN      I approve the established interdisciplinary plan of care as documented within the medical record of [address].     Kate Limon,

## 2023-01-11 NOTE — PROGRESS NOTES
[facility]  INPATIENT PHYSICAL THERAPY  EVALUATION  [address] - 7E-[id number]/[id number]    Time In: 1030  Time Out: 1130  Timed Code Treatment Minutes: 45 Minutes  Minutes: 60          Date: 2023  Patient Name: Lindsey London,  Gender:  male        MRN: [id number]  : 1965  (65 y.o.)      Referring Practitioner: India Kohli DO  Diagnosis: Debility  Additional Pertinent Hx: Per H&P \"Chico Everett is a 65 y.o. male admitted to [facility] on 2023. Patient with past medical history of DM, HTN, and bipolar disorder who presented to [address] [place] [address] for productive cough, fever and SOB. This had become progressively worse and is worse with exertion. While in the ER, patient did test positive for influenza A. CXR revealed coarsened appearance to the pulmonary parenchyma ?chronic. Pulmonary vascularity is normal. Tamiflu was started. Patient was admitted to [address] [place] [address] Med-Surgical Unit. On 1/3/23 CODE Stroke was called due to new onset left facial weakness, lethargy and dysarthria. NIH of 3. 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 Based on patient's symptoms and time since last known well decision was made for TNK to be given. 22 mg of TNK given at 5:05 AM.  Patient transferred to the ICU with thrombolytic precautions. Patient was placed on HFNC. MRI complete 23 without acute findings of CVA. echocardiogram 1/3/2023 that showed EF of 55% and overall normal left ventricular function. Patient was cleared for diet by SLP. Tamilfu completed 23. Solu-Medrol 23. Out of droplet isolation 23. Plan was for patient to return to Assisted Living once medically stable, but needs to be able to care for self except for housemaking tasks. \"     Restrictions/Precautions:  Restrictions/Precautions: Fall Risk, General Precautions, Modified Diet  Position Activity Restriction  Other position/activity restrictions: impulsive    Subjective:  Chart Reviewed: Yes  Patient assessed for rehabilitation services?: Yes  Family / Caregiver Present: No  Subjective: Patient laying in bed upon arrival. Patient reporting fatigue from OT session however is pleasant and agreeable to therapy. General:     Vision: Within Functional Limits  Hearing: Within functional limits       Pain: 0/10    Vitals: Vitals not assessed per clinical judgement, see nursing flowsheet    Social/Functional History:    Lives With: Alone  Type of Home: Assisted living [facility])  Home Layout: One level  Home Access: Level entry  Home Equipment: None     Bathroom Shower/Tub: Walk-in shower  Bathroom Toilet: Standard  Bathroom Equipment: Grab bars around toilet, Grab bars in shower       ADL Assistance: Independent  Homemaking Assistance: Needs assistance  Ambulation Assistance: Independent  Transfer Assistance: Independent    Active : No     Additional Comments: Pt reports using no AD at assisted living. Pt reports an aide is sometimes present so supervise showers, but he completes dressing, grooming and toileting independently. Facility completes housekeeping, cooking and transportation. Pt usually ambulates to dining room for meals.     OBJECTIVE:  Range of Motion:  Right Lower Extremity: WFL  Left Lower Extremity: WFL    Strength:  Right Lower Extremity: Impaired - Ankle DF/PF 4/5, Hamstrings/Quadriceps 4-/5, Hip Flexor 3+/5  Left Lower Extremity: Impaired - Ankle DF/PF 4/5, Hamstrings/Quadriceps 4-/5, Hip Flexor 3+/5    Balance:  Static Sitting Balance:  Modified Independent  Dynamic Sitting Balance: Supervision  Static Standing Balance: Contact Guard Assistance  Dynamic Standing Balance: Contact Guard Assistance  *Patient instructed in Augustine Balance Test (see score below)  *Patient instructed in standing dynamic balance while standing unsupported and reaching outside DAVID for rings initially performed on level tile and progressed to standing on blue foam pad with CGA. Patient demonstrated unsteadiness however no formal LOB. Patient instructed in standing dynamic balance in order to assist with safety with  functional mobility. *Patient instructed in pod tapping without UE support with PT instruction on which LE to tap with and which pod with CGA to occasional minimal assistance. Patient demonstrated difficulty with following instruction at times. Patient instructed in pod tapping in order to assist with weight shifting and SL balance for safety with  functional mobility. Bed Mobility:  Rolling to Left: Modified Independent   Rolling to Right: Modified Independent   Supine to Sit: Stand By Assistance  Sit to Supine: Stand By Assistance   *Patient impulsive with bed mobility with cueing for slowed speed in order to assist with safety. Transfers:  Sit to Stand: Contact Guard Assistance  Stand to [facility] 68  *Patient instructed in sit to stand transfers from various surfaces including: edge of bed and wheelchair without AD. Patient required occasional cueing for hand placement and safety awareness due to impulsivity at times. To/From Bed and Chair: Contact Guard Assistance    Ambulation:  Contact Guard Assistance  Distance: 10 feet x4 and various short distances in rehab gym  Surface: Level Tile  Device:No Device  Gait Deviations:   Forward Flexed Posture, Decreased Step Length Bilaterally, Decreased Gait Speed, Decreased Heel Strike Bilaterally, Mild Path Deviations, and Unsteady Gait  *Patient demonstrates decreased B arm swing   *Patient instructed in  TUG (see score below)        Functional Outcome Measures: Completed  Fernandez Balance Score: 30  FERNANDEZ BALANCE TEST SCORING   Score of < 45 indicates a greater risk of falling  41-56= low fall risk  21-40= medium fall risk (recommendation of walking with assist at all times)  0-20= high fall risk       Timed Up & Go (TUG) Score & Normative Values:     Trial 1: Un-timed Practice Trial 2: 23.96 seconds   Trial 3: 22.75 seconds  Average: 23.3 seconds    An older adult who takes ? 12 seconds to complete the TUG is at risk for falling. Cut of scores indicating risk of falls by Population (in seconds):  Community dwelling adults - 13.5  Older stroke patients - 15  Frail elderly - 32.6  LE amputees - 19  PD - 11.5  Hip OA - 10   Vestibular disorders - 11    Normative Value Based on Age/Gender:  61-76 Male: 8 sec   61-76 Female: 8 sec     66-77 Male: 9 sec  66-77 Female: 9 sec     80-80 Male: 10 sec  80-80 Female: 11 sec       ASSESSMENT:  Activity Tolerance:  Patient tolerance of  treatment: good. Treatment Initiated: Treatment and education initiated within context of evaluation. Evaluation time included review of current medical information, gathering information related to past medical, social and functional history, completion of standardized testing, formal and informal observation of tasks, assessment of data and development of plan of care and goals. Treatment time included skilled education and facilitation of tasks to increase safety and independence with functional mobility for improved independence and quality of life. Assessment: Body Structures, Functions, Activity Limitations Requiring Skilled Therapeutic Intervention: Decreased functional mobility , Decreased endurance, Decreased balance, Decreased strength, Decreased safe awareness, Decreased body mechanics, Decreased posture  Assessment: Patient is a 65 y.o male who presents due to weakness and deconditioning following influenza resulting in decreased functional mobility. Patient demonstrates decreased strength in BLE's and decreased endurance which limits his functional mobility. Patient requires SBA for bed mobility supine<>sit due to impulsivity resulting in decreased safety.  Patient is able to perform sit to stand transfers wtihout AD with CGA and ambulate without AD with CGA with decreased arm swing, decreased step and stride length and unsteady gait pattern. Patient scored a 30/56 on the Augustine Balance test indicating he is a moderate risk for falls. Patient overall can benefit from skilled PT treatment in order to assist with BLE strengthening, gait  training, transfer training, bed mobility, core strengthening and dynamic balance for increased functional mobility. Therapy Prognosis: Good         Discharge Recommendations:  Discharge Recommendations: Continue to assess pending progress, Patient would benefit from continued therapy after discharge    Patient Education:  Education  Education Given To: Patient  Education Provided: Role of Therapy, Mobility Training, Plan of Care, Transfer Training, Precautions, Safety  Education Method: Demonstration, Verbal  Barriers to Learning: Cognition  Education Outcome: Verbalized understanding, Demonstrated understanding, Continued education needed   . Equipment Recommendations:  Equipment Needed: No (Continue to assess pending progress)    Plan:  Current Treatment Recommendations: Strengthening, Balance training, Functional mobility training, Gait training, Safety education & training, Home exercise program, Patient/Caregiver education & training, Transfer training, Endurance training, Therapeutic activities, Neuromuscular re-education, Stair training  General Plan:  (5x/wk 90min)    Goals:  Patient Goals : None Stated  Short Term Goals  Time Frame for Short Term Goals: 1 week  Short Term Goal 1: Patient to perform bed mobility supine<>sit with Supervision in order to assist with getting into and out of bed. Short Term Goal 2: Patient to perform sit to stand transfers without AD with </=1 cue for hand placement from various surfaces in order to assist with safety with transfers in home. Short Term Goal 3: Patient to ambulate >/=75 feet without AD with SBA in order to assist with home mobility.   Short Term Goal 4: Patient to score >/=38/56 on the [facility] Test in order to assist with reduced risk for falls. Long Term Goals  Time Frame for Long Term Goals : 2 weeks from IPR evaluation  Long Term Goal 1: Patient to perform bed mobility supine<>sit with Mod I in order to assist with getting into and out of bed. Long Term Goal 2: Patient to perform sit to stand transfers without use of Ad with Mod I in order to assist with safety with transfers in home. Long Term Goal 3: Patient to ambulate >/=200 feet without AD with Mod I in order to assist with ambulating to and  from dining room for all meals. Long Term Goal 4: Patient to perform car transfer with Supervision in order to assist with getting to and from appointments. Long Term Goal 5: Patient to perform TUG in </=12 seconds in order to assist with functional dynamic balance. Following session, patient left in safe position with all fall risk precautions in place.

## 2023-01-11 NOTE — PLAN OF CARE
Problem: Discharge Planning  Goal: Discharge to home or other facility with appropriate resources  Outcome: Progressing  Flowsheets  Taken 1/11/2023 2290 by Morgan Choi RN  Discharge to home or other facility with appropriate resources: Identify barriers to discharge with patient and caregiver  Taken 1/10/2023 2114 by Karen Adams RN  Discharge to home or other facility with appropriate resources: Identify barriers to discharge with patient and caregiver     Problem: Safety - Adult  Goal: Free from fall injury  1/11/2023 1054 by Morgan Choi RN  Outcome: Linus Walker (Taken 1/11/2023 1052)  Free From Fall Injury: Instruct family/caregiver on patient safety  1/10/2023 2306 by Karen Adams RN  Outcome: Progressing     Problem: ABCDS Injury Assessment  Goal: Absence of physical injury  1/11/2023 1054 by Morgan Choi RN  Outcome: Progressing  Flowsheets (Taken 1/11/2023 1052)  Absence of Physical Injury: Implement safety measures based on patient assessment  1/10/2023 2306 by Karen Adams RN  Outcome: Progressing  Flowsheets (Taken 1/10/2023 1535 by David Bustamante LPN)  Absence of Physical Injury: Implement safety measures based on patient assessment     Problem: Pain  Goal: Verbalizes/displays adequate comfort level or baseline comfort level  1/11/2023 1054 by Morgan Choi RN  Outcome: Progressing  1/10/2023 2306 by Karen Adams RN  Outcome: Progressing  Flowsheets  Taken 1/10/2023 2306 by Karen Adams RN  Verbalizes/displays adequate comfort level or baseline comfort level:   Encourage patient to monitor pain and request assistance   Assess pain using appropriate pain scale   Administer analgesics based on type and severity of pain and evaluate response  Taken 1/10/2023 1530 by David Bustamante LPN  Verbalizes/displays adequate comfort level or baseline comfort level:   Encourage patient to monitor pain and request assistance   Implement non-pharmacological measures as appropriate and evaluate response     Problem: Infection - Adult  Goal: Absence of infection at discharge  Outcome: Progressing  Flowsheets (Taken 1/11/2023 0812)  Absence of infection at discharge: Assess and monitor for signs and symptoms of infection  Goal: Absence of infection during hospitalization  Outcome: Progressing  Flowsheets  Taken 1/11/2023 0812 by Luke Flor RN  Absence of infection during hospitalization: Assess and monitor for signs and symptoms of infection  Taken 1/10/2023 2114 by Vaibhav Palma RN  Absence of infection during hospitalization: Assess and monitor for signs and symptoms of infection  Goal: Absence of fever/infection during anticipated neutropenic period  Outcome: Progressing  Flowsheets (Taken 1/11/2023 0812)  Absence of fever/infection during anticipated neutropenic period: Monitor white blood cell count     Problem: Metabolic/Fluid and Electrolytes - Adult  Goal: Electrolytes maintained within normal limits  Outcome: Progressing  Flowsheets  Taken 1/11/2023 1354 by Luke Flor RN  Electrolytes maintained within normal limits: Monitor labs and assess patient for signs and symptoms of electrolyte imbalances  Taken 1/10/2023 2114 by Vaibhav Palma RN  Electrolytes maintained within normal limits: Monitor labs and assess patient for signs and symptoms of electrolyte imbalances  Goal: Hemodynamic stability and optimal renal function maintained  Outcome: Progressing  Flowsheets (Taken 1/11/2023 0812)  Hemodynamic stability and optimal renal function maintained: Monitor labs and assess for signs and symptoms of volume excess or deficit  Goal: Glucose maintained within prescribed range  Outcome: Progressing  Flowsheets  Taken 1/11/2023 0231 by Luke Flor RN  Glucose maintained within prescribed range: Monitor blood glucose as ordered  Taken 1/10/2023 2114 by Vaibhav Palma RN  Glucose maintained within prescribed range: Monitor blood glucose as ordered     Problem: Hematologic - Adult  Goal: Maintains hematologic stability  Outcome: Progressing  Flowsheets  Taken 1/11/2023 6912 by Alonso Mckeon RN  Maintains hematologic stability: Assess for signs and symptoms of bleeding or hemorrhage  Taken 1/10/2023 2114 by Mitch Garcia RN  Maintains hematologic stability: Assess for signs and symptoms of bleeding or hemorrhage

## 2023-01-11 NOTE — PROGRESS NOTES
Physical Medicine & Rehabilitation   Progress Note    Chief Complaint:  debility, impaired mobility and ADLs     Subjective:  Patient seen and examined. NAEO. Patient reports some fatigue after morning therapies. Seen resting in bed. No reports of pain. No reports of CP or SOB. Rehabilitation: Awaiting IPR therapy assessments   PT 01/10/2023:  Bed Mobility:  Supine to Sit: SBA- with HOB flat and no rail and he took extra time to complete   Sit to Supine: Supervision      Transfers:  Sit to Stand: Contact Guard Assistance  Stand to Sit:Contact Guard Assistance  ** on 2 occ upon standing he had a loss of balance post and sat back down- noted improved safety with transfers and backing upto surface   Ambulation:  Contact Guard Assistance with an occ lt min assist  Distance: 50x2  Surface: Level Tile  Device:No Device  Gait Deviations:  pt demonstrated decreased control at right LE for heel strike, pt completed divided attention task while carrying an object he cont to demonstrate path deviation and decreased balance needing occ min assist     Balance:  Pt completed advanced gait stepping over hurdles with min assist loss of balance to the left, pt standing on foam completed reaching task he needed min to CGA he did have a loss of balance post each time he reached over his head and stepped off the foam, pt reaching to floor needing min assist to CGA as he was unsteady when returning to standing position      Exercise:  Patient was guided in 1 set(s) 10 reps of exercise to both lower extremities. Standing heel/toe raises, Standing marches, Standing hip abduction/adduction, Standing hip extension, Standing hip flexion, Mini squats, and with UE at support noted decreased control and movement at jarad LEs  . Exercises were completed for increased independence with functional mobility.      Functional Outcome Measures: Completed  Balance Score: 8  Gait Score: 8  Tinetti Total Score: 16  AM-PAC Inpatient Mobility without Stair Climbing Raw Score : 15  AM-PAC Inpatient without Stair Climbing T-Scale Score : 43.03        OT 01/10/2023:  ADL:   Grooming: Stand By Assistance. For applying deodorant sitting at the EOB, pt refusing oral care  Bathing: Stand By Assistance. For completing sponge bath sitting at the EOB with sitting and standing  Upper Extremity Dressing: Stand By Assistance. For donning scrub shirt  Lower Extremity Dressing: Minimal Assistance. For donning brief with LLE due to difficulty threading through . BALANCE:  Standing Balance: Contact Guard Assistance. With no AE used for safety with no LOB Noted     BED MOBILITY:  Not Tested     TRANSFERS:  Sit to Stand:  Contact Guard Assistance. From the EOB  Stand to Sit: Contact Guard Assistance. To the recliner     FUNCTIONAL MOBILITY:  Assistive Device: None  Assist Level:  Contact Guard Assistance. Distance:  [place] distances with no LOB noted and good safety ntoed     SLP 01/10/2023:  Short-Term Goals:  SHORT TERM GOAL #1:  Goal 1:  Patient will safely consume soft and bite size diet with thin liquids (advanced texture trials as indicated) without overt s/s aspiration and with implementation of identified compensatory strategies to assist with nutrition/hydration measures. INTERVENTIONS: Thin coffee via cup with patient independently adhering to compensatory strategy for small sips; absence for overt s/s aspiration with significantly improved generalization exhibited for implementation of trained strategy to maximize safety of swallow function. Overall gains exhibited with current dysphagia POC, likely attributed to some degree given improved medical stability and return to baseline pulmonary status (no further requirements for supplemental O2); suspect initial presentation of dysphagia to be an acute dx.       Recommendations to maintain soft and bite size diet with thin liquids, ongoing services to be rendered to determine readiness to resume regular diet consistency. SHORT TERM GOAL #2:  Goal 2: Patient will complete structured speech drills/tasks (DDK rates, AMRs, multi-syllabic word repetition) with implementation of SOS speech strategies to improve intelligibility to 50% in all contexts to optimize communicative efficacy. GOAL MET, NO NEW GOAL  INTERVENTIONS: See subjective above; guardian with reports for baseline speech production skills. SHORT TERM GOAL #3:  Goal 3: Patient will complete functional immediate/delayed recall tasks with 60% accuracy, mod cues to improve retention of pertinent information. INTERVENTIONS: Orientation: 5/7 independent, 1/7 logical cues for date, 1/7 mod cues for time     4-unit recall   Topic: details relevant to treating ST  Strategies: written list, repetition/rehearsal (x3 trials)              -Immediate memory: 3/4 independent, 1/4 min cues              -Delay x5 minutes: 1/4 independent, 1/4 min cues, 1/4 F02, 1/4 max cues  *prior to admission, patient endorsed, \"writing things down\" to assist with functional recall measures; concerns for reading comprehension measures     SHORT TERM GOAL #4:  Goal 4: Patient will complete problem solving, verbal/visual reasoning, and executive functioning tasks (time, money/math/finances, medications) with 60% accuracy, mod cues to improve contributions within ADL/IADL completion. INTERVENTIONS: Problem solving  -Call light justifications: 1/3 independent, 2/3 max cues to enhance awareness     Reasoning  -Telling time via analog clocks: 9/12 independent, 1/12 min cues, 1/12 mod cues, 1/12 max cues  *delayed processing noted      SHORT TERM GOAL #5:  Goal 5: Patient will complete sequencing and thought organization tasks with 70% accuracy, mod cues to improve mental flexibility for daily living scenarios.   INTERVENTIONS: DNT d/t focus on additional STGS        Long-Term Goals:  No LTG established given short ELOS    Review of Systems:  CONSTITUTIONAL:  negative for  fevers and chills  EYES:  negative for  glasses and visual disturbance  HEENT:  negative for  voice change  RESPIRATORY:  negative for  dyspnea and wheezing  CARDIOVASCULAR:  negative for  chest pain, palpitations  GASTROINTESTINAL:  negative for nausea, vomiting, and change in bowel habits  GENITOURINARY:  negative for dysuria and urinary incontinence  SKIN:  negative rash or wound   MUSCULOSKELETAL:  positive for  muscle weakness; negative for pain  NEUROLOGICAL:  positive for speech problems and gait problems  BEHAVIOR/PSYCH:  positive for history of bipolar disorder  10 point system review otherwise negative      Objective:  BP (!) 110/59   Pulse 100   Temp 98.4 °F (36.9 °C) (Oral)   Resp 16   Ht 5' 9\" (1.753 m)   Wt 181 lb 6.4 oz (82.3 kg)   SpO2 92%   BMI 26.79 kg/m²   Patient is awake and alert, no family at bedside   Mood: within normal limits  Affect: calm  General appearance: Patient is well nourished, well developed, well groomed and in no acute distress     Memory:   not formally assess but able to answer questions regarding history   Attention/Concentration: follow conversation  Language:  abnormal - chronic dysarthria     Cranial Nerves:  cranial nerves II-XII appear intact  ROM:  normal  Motor Exam: easily moves all extremities against gravity   Tone:  normal  Muscle bulk: within normal limits  Sensory:  Sensory intact  Coordination:   normal     Heart: well perfused extremities  Lungs: breathing comfortably on room air without any increased WOB  Abdomen: non-distended   Skin: warm and dry, no rash or erythema  Edema: none    Diagnostics:   Recent Results (from the past 24 hour(s))   POCT Glucose    Collection Time: 01/10/23  5:06 PM   Result Value Ref Range    POC Glucose 207 (H) 70 - 108 mg/dl   POCT Glucose    Collection Time: 01/10/23  8:54 PM   Result Value Ref Range    POC Glucose 218 (H) 70 - 108 mg/dl   CBC with Auto Differential    Collection Time: 01/11/23  6:17 AM   Result Value Ref Range WBC 7.5 4.8 - 10.8 thou/mm3    RBC 4.48 (L) 4.70 - 6.10 mill/mm3    Hemoglobin 14.3 14.0 - 18.0 gm/dl    Hematocrit 43.1 42.0 - 52.0 %    MCV 96.2 (H) 80.0 - 94.0 fL    MCH 31.9 26.0 - 33.0 pg    MCHC 33.2 32.2 - 35.5 gm/dl    RDW-CV 12.6 11.5 - 14.5 %    RDW-SD 44.2 35.0 - 45.0 fL    Platelets 762 291 - 037 thou/mm3    MPV 8.5 (L) 9.4 - 12.4 fL    Seg Neutrophils 54.7 %    Lymphocytes 26.5 %    Monocytes 14.1 %    Eosinophils 1.6 %    Basophils 0.4 %    Immature Granulocytes 2.7 %    Segs Absolute 4.1 1.8 - 7.7 thou/mm3    Lymphocytes Absolute 2.0 1.0 - 4.8 thou/mm3    Monocytes Absolute 1.1 0.4 - 1.3 thou/mm3    Eosinophils Absolute 0.1 0.0 - 0.4 thou/mm3    Basophils Absolute 0.0 0.0 - 0.1 thou/mm3    Immature Grans (Abs) 0.20 (H) 0.00 - 0.07 thou/mm3    nRBC 0 /100 wbc   Comprehensive Metabolic Panel w/ Reflex to MG    Collection Time: 01/11/23  6:17 AM   Result Value Ref Range    Glucose 148 (H) 70 - 108 mg/dL    Creatinine 0.5 0.4 - 1.2 mg/dL    BUN 13 7 - 22 mg/dL    Sodium 139 135 - 145 meq/L    Potassium reflex Magnesium 4.1 3.5 - 5.2 meq/L    Chloride 103 98 - 111 meq/L    CO2 24 23 - 33 meq/L    Calcium 8.4 (L) 8.5 - 10.5 mg/dL    AST 28 5 - 40 U/L    Alkaline Phosphatase 56 38 - 126 U/L    Total Protein 6.2 6.1 - 8.0 g/dL    Albumin 3.2 (L) 3.5 - 5.1 g/dL    Total Bilirubin 0.2 (L) 0.3 - 1.2 mg/dL    ALT 46 11 - 66 U/L   Prealbumin    Collection Time: 01/11/23  6:17 AM   Result Value Ref Range    Prealbumin 17.0 (L) 20.0 - 40.0 mg/dl   Anion Gap    Collection Time: 01/11/23  6:17 AM   Result Value Ref Range    Anion Gap 12.0 8.0 - 16.0 meq/L   Glomerular Filtration Rate, Estimated    Collection Time: 01/11/23  6:17 AM   Result Value Ref Range    Est, Glom Filt Rate >60 >60 ml/min/1.73m2   POCT Glucose    Collection Time: 01/11/23  7:49 AM   Result Value Ref Range    POC Glucose 201 (H) 70 - 108 mg/dl       Impression:  Acute respiratory failure  Influenza A  Moderate cognitive impairment  Diabetes Mellitis type 2 with hyperglycemia  HTN  Bipolar disorder  Chronic tobacco use, not previously dx with chronic lung disease  Impaired ADLs and mobility     Plan:  Continue inpatient rehabilitation program involving at least 3 hours per day, 5 days per week of intensive rehabilitation. Rehabilitation services will include PT, OT, and SLP/RT in order to improve functional status prior to discharge. Family education and training will be completed. Equipment evaluations and recommendations will be completed as appropriate. Rehabilitation nursing will be involved for bowel, bladder, skin, and pain management. Nursing will also provide education and training to patient and family. Dr. Jose Mallory consulted for medical management  Acute Hypoxic Respiratory failure: 2/2 Influenza A. Not on oxygen at baseline. Now on supplemental O2 via nasal cannula- will continue to wean oxygen as tolerated. Patient has completed course of tamiflu and dolu-medrol. Continue   Episode of left facial droop: Possible TIA? . 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 MRI negative for any intracranial abnormality. Continue aspirin and statin  T2DM: Continue Lantus 7 units nightly and SSI  HTN: Continue home lopressor  Bipolar Disorder: Continue home Clozapine, Pristiq, and Depakote  Prophylaxis:  DVT: Lovenox. Pain: Tylenol   Nutrition:  Consultation to dietician for nutritional counseling and recommendations. Prealbumin 17.0 on admission. Bladder: Per rehab nursing   Bowel: Continue docusate  Admission labs reviewed and stable.     and case management consultations for coordination of care and discharge planning    Missed Therapy Time:  None    Woodhull Riddles, DO

## 2023-01-11 NOTE — PLAN OF CARE
Problem: Discharge Planning  Goal: Discharge to home or other facility with appropriate resources  2023 1425 by DARON Alvarez  Note:   [facility]  Physical Medicine Case Management Assessment    [x] Inpatient Rehabilitation Unit    Patient Name: Yessy Kang        MRN: [id number]    : 1965  (65 y.o.)  Gender: male   Date of Admission: 1/10/2023  3:26 PM    Family/Social/Home Environment:   Prior to admission, patient was living at [facility]. Patient was been a resident there for 5 years. Patient reports that he was independent in his ADLs and checking his blood sugars. Staff supervise his showers to make sure he maintains his safety. The staff manages patient's housekeeping, meal prep, medications and transportation. Patient's contact is Martín Nettles, who is patient's legal guardian. Jessica Alicea manages patient's finances. Patient's family physician is Jean Ray MD. [facility] manages patient's prescriptions. Patient is motivated to participate in therapy and plans on returning to [facility]. Social/Functional History  Lives With: Alone  Type of Home: Assisted living [facility])  Home Layout: One level  Home Access: Level entry  Bathroom Shower/Tub: Walk-in shower  Bathroom Toilet: Standard  Bathroom Equipment: Grab bars around toilet, Grab bars in shower  Home Equipment: None  ADL Assistance: Independent  Homemaking Assistance: Needs assistance  Ambulation Assistance: Independent  Transfer Assistance: Independent  Active : No  Patient's  Info: Assisted Living provides transport  Additional Comments: Pt reports using no AD at assisted living. Pt reports an aide is sometimes present so supervise showers, but he completes dressing, grooming and toileting independently. Facility completes housekeeping, cooking and transportation. Pt usually ambulates to dining room for meals.     Contact/Guardian Information: Martín Nettles (guardian) [phone]    [employer] Utilized: Patient was not using community resources prior to admission. Sexuality/Intimacy: Patient did not disclose sexuality/intimacy concerns during SW assessment. Complementary Health Approaches: Patient did not disclose desires towards complementary health approaches during SW assessment. Anticipated Needs/Discharge Plans: SW will follow and maintain involvement in discharge planning. SW met with patient on this date to introduce self, complete SW assessment and initiate discharge planning. DAVE also spoke with Marylene Spice the director of nursing at [facility]. Prior to admission, patient was living at [facility]. Patient was been a resident there for 5 years. Patient reports that he was independent in his ADLs and checking his blood sugars. Staff supervise his showers to make sure he maintains his safety. The staff manages patient's housekeeping, meal prep, medications and transportation. Patient's contact is Maeve Martínez, who is patient's legal guardian. Juhi Dukes manages patient's finances. Patient's family physician is Karena Trivedi MD. [facility] manages patient's prescriptions. Patient is motivated to participate in therapy and plans on returning to [facility]. SW educated patient on Thursday, 1/12 care conference. SW left a message from Juhi Dukes to introduce self. DAVE spoke with Juhi Dukes to introduce self. She reported that it is their goal for patient to return to [facility]. Juhi Dukes had no outstanding needs or concerns regarding patient's care at this time. SW will follow and maintain involvement in discharge planning.        Read-Only, Retired: Discharge Planning  Living Arrangements: Other ([place]) ([address] [place] [address] Staff)  Support Systems: Other (Comment) (Assisted Living Staff)  Potential Assistance Needed: [address], [address] Purchasing Medications: No  Type of Home Care Services: None  Patient expects to be discharged to[de-identified] Assisted living  Expected Discharge Date:  (Undetermined)  Follow Up Appointment: Best Day/Time : Tuesday PM      DARON Hudson 1/11/2023 2:25 PM

## 2023-01-12 LAB
GLUCOSE BLD-MCNC: 154 MG/DL (ref 70–108)
GLUCOSE BLD-MCNC: 167 MG/DL (ref 70–108)
GLUCOSE BLD-MCNC: 198 MG/DL (ref 70–108)
GLUCOSE BLD-MCNC: 230 MG/DL (ref 70–108)

## 2023-01-12 PROCEDURE — 6360000002 HC RX W HCPCS: Performed by: STUDENT IN AN ORGANIZED HEALTH CARE EDUCATION/TRAINING PROGRAM

## 2023-01-12 PROCEDURE — 6370000000 HC RX 637 (ALT 250 FOR IP): Performed by: STUDENT IN AN ORGANIZED HEALTH CARE EDUCATION/TRAINING PROGRAM

## 2023-01-12 PROCEDURE — 92526 ORAL FUNCTION THERAPY: CPT

## 2023-01-12 PROCEDURE — 82948 REAGENT STRIP/BLOOD GLUCOSE: CPT

## 2023-01-12 PROCEDURE — 99233 SBSQ HOSP IP/OBS HIGH 50: CPT | Performed by: STUDENT IN AN ORGANIZED HEALTH CARE EDUCATION/TRAINING PROGRAM

## 2023-01-12 PROCEDURE — 97116 GAIT TRAINING THERAPY: CPT

## 2023-01-12 PROCEDURE — 1180000000 HC REHAB R&B

## 2023-01-12 PROCEDURE — 92507 TX SP LANG VOICE COMM INDIV: CPT

## 2023-01-12 PROCEDURE — 94760 N-INVAS EAR/PLS OXIMETRY 1: CPT

## 2023-01-12 PROCEDURE — 97530 THERAPEUTIC ACTIVITIES: CPT

## 2023-01-12 PROCEDURE — 94640 AIRWAY INHALATION TREATMENT: CPT

## 2023-01-12 PROCEDURE — 2700000000 HC OXYGEN THERAPY PER DAY

## 2023-01-12 PROCEDURE — 97112 NEUROMUSCULAR REEDUCATION: CPT

## 2023-01-12 PROCEDURE — 97110 THERAPEUTIC EXERCISES: CPT

## 2023-01-12 PROCEDURE — 97535 SELF CARE MNGMENT TRAINING: CPT

## 2023-01-12 RX ORDER — NICOTINE 21 MG/24HR
1 PATCH, TRANSDERMAL 24 HOURS TRANSDERMAL DAILY
Status: DISCONTINUED | OUTPATIENT
Start: 2023-01-12 | End: 2023-01-20 | Stop reason: HOSPADM

## 2023-01-12 RX ADMIN — ENOXAPARIN SODIUM 40 MG: 100 INJECTION SUBCUTANEOUS at 09:16

## 2023-01-12 RX ADMIN — CLOZAPINE 50 MG: 25 TABLET ORAL at 18:33

## 2023-01-12 RX ADMIN — METOPROLOL SUCCINATE 25 MG: 25 TABLET, EXTENDED RELEASE ORAL at 09:15

## 2023-01-12 RX ADMIN — DOCUSATE SODIUM 100 MG: 100 CAPSULE, LIQUID FILLED ORAL at 09:22

## 2023-01-12 RX ADMIN — ACETAMINOPHEN 650 MG: 325 TABLET ORAL at 05:56

## 2023-01-12 RX ADMIN — INSULIN LISPRO 1 UNITS: 100 INJECTION, SOLUTION INTRAVENOUS; SUBCUTANEOUS at 13:06

## 2023-01-12 RX ADMIN — IPRATROPIUM BROMIDE AND ALBUTEROL SULFATE 1 AMPULE: .5; 3 SOLUTION RESPIRATORY (INHALATION) at 06:29

## 2023-01-12 RX ADMIN — DIVALPROEX SODIUM 500 MG: 500 TABLET, EXTENDED RELEASE ORAL at 09:15

## 2023-01-12 RX ADMIN — CLOZAPINE 200 MG: 100 TABLET ORAL at 21:09

## 2023-01-12 RX ADMIN — DIVALPROEX SODIUM 1000 MG: 500 TABLET, EXTENDED RELEASE ORAL at 21:09

## 2023-01-12 RX ADMIN — ASPIRIN 81 MG 81 MG: 81 TABLET ORAL at 09:15

## 2023-01-12 RX ADMIN — CLOZAPINE 50 MG: 25 TABLET ORAL at 09:15

## 2023-01-12 RX ADMIN — DESVENLAFAXINE 50 MG: 50 TABLET, EXTENDED RELEASE ORAL at 09:15

## 2023-01-12 RX ADMIN — ATORVASTATIN CALCIUM 20 MG: 20 TABLET, FILM COATED ORAL at 21:09

## 2023-01-12 RX ADMIN — INSULIN GLARGINE 7 UNITS: 100 INJECTION, SOLUTION SUBCUTANEOUS at 21:11

## 2023-01-12 ASSESSMENT — PAIN SCALES - GENERAL
PAINLEVEL_OUTOF10: 0

## 2023-01-12 NOTE — PROGRESS NOTES
[facility]  INPATIENT PHYSICAL THERAPY  Daily Note  [facility] - [id number]    Time In: 0700  Time Out: 0800  Timed Code Treatment Minutes: 60 Minutes  Minutes: 60          Date: 2023  Patient Name: Jessica Ashford,  Gender:  male        MRN: [id number]  : 1965  (65 y.o.)     Referring Practitioner: Leonila Wu DO  Diagnosis: Debility  Additional Pertinent Hx: Per H&P \"Bruce Micael Dubin is a 65 y.o. male admitted to [facility] on 2023. Patient with past medical history of DM, HTN, and bipolar disorder who presented to [address] [place] [address] for productive cough, fever and SOB. This had become progressively worse and is worse with exertion. While in the ER, patient did test positive for influenza A. CXR revealed coarsened appearance to the pulmonary parenchyma ?chronic. Pulmonary vascularity is normal. Tamiflu was started. Patient was admitted to [address] [place] [address] Med-Surgical Unit. On 1/3/23 CODE Stroke was called due to new onset left facial weakness, lethargy and dysarthria. NIH of 3. 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 Based on patient's symptoms and time since last known well decision was made for TNK to be given. 22 mg of TNK given at 5:05 AM.  Patient transferred to the ICU with thrombolytic precautions. Patient was placed on HFNC. MRI complete 23 without acute findings of CVA. echocardiogram 1/3/2023 that showed EF of 55% and overall normal left ventricular function. Patient was cleared for diet by SLP. Tamilfu completed 23. Solu-Medrol 23. Out of droplet isolation 23. Plan was for patient to return to Assisted Living once medically stable, but needs to be able to care for self except for housemaking tasks. \"     Prior Level of Function:  Lives With: Alone  Type of Home: Assisted living [facility])  Home Layout: One level  Home Access: Level entry  Home Equipment: None   Bathroom Shower/Tub: Walk-in shower  Bathroom Toilet: Standard  Bathroom Equipment: Grab bars around toilet, Grab bars in shower    ADL Assistance: Independent  Homemaking Assistance: Needs assistance  Ambulation Assistance: Independent  Transfer Assistance: Independent  Active : No  Additional Comments: Pt reports using no AD at assisted living. Pt reports an aide is sometimes present so supervise showers, but he completes dressing, grooming and toileting independently. Facility completes housekeeping, cooking and transportation. Pt usually ambulates to dining room for meals. Restrictions/Precautions:  Restrictions/Precautions: Fall Risk, General Precautions, Modified Diet  Position Activity Restriction  Other position/activity restrictions: impulsive     SUBJECTIVE: Pt. Laying in his bed and pleasantly agrees to therapy session. PAIN: None indicated    Vitals:   Patient Vitals for the past 8 hrs:   BP Temp Pulse Resp SpO2 O2 Device O2 Flow Rate (L/min)   01/12/23 0638 -- -- (!) 107 24 93 % Nasal cannula 2 L/min   01/12/23 0629 -- -- (!) 111 28 (!) 88 % Nasal cannula 1 L/min   01/12/23 0528 100/68 99.1 °F (37.3 °C) (!) 113 18 91 % -- --        OBJECTIVE:  Bed Mobility:  Rolling to Right: Stand By Assistance   Supine to Sit: Stand By Assistance    Transfers:  Sit to Stand: Stand By Assistance, Contact Guard Assistance  Stand to Sit:Stand By Assistance, [employer], Pt. impulsive    Ambulation:  Stand By Assistance, [employer] Assistance  Distance: 70' x 2, 4' x 1  Surface: Level Tile  Device: Pt. Managing O2 cart  Gait Deviations:  Slow May, Decreased Step Length Bilaterally, Decreased Gait Speed, Decreased Heel Strike Bilaterally, and [employer] of Support    Stairs:  None    Balance:  Pt. Completed standing dynamic balance activity: ring toss with Normal DAVID on Airex balance pad and Intermittent UE support on O2 cart with Contact Guard Assistance, Minimal Assistance. Activity completed to improve balance, enhance functional mobility, and reduce risk of falls. Neuromuscular Re-education  Pt. Completed standing and Stepping activity tapping cones with feet while standing on A[employer]x  balance pad using Single UE support on O2 cart to improve coordination, hip/quad stability, and lateral weight shifting for improved functional mobility. Pt. Completed multi-tasking activities balancing tennis ball on cone while pinning clothespins in sequence using No UE support to improve coordination, postural awareness, and Environmental awareness for improved functional mobility. Exercise:  Patient was guided in 1 set(s) 10 reps of exercises: Glut sets, Seated marches, Seated hamstring curls, Seated heel/toe raises, Long arc quads, Seated isometric hip adduction, Seated abduction/adduction. Exercises were completed for increased independence with functional mobility. Functional Outcome Measures:   Not completed    ASSESSMENT:  Assessment: Patient progressing toward established goals. Activity Tolerance:  Patient tolerance of  treatment: good.      Equipment Recommendations:Equipment Needed: No (Continue to assess pending progress)  Discharge Recommendations: Continue to assess pending progress, Patient would benefit from continued therapy after discharge     Plan: Current Treatment Recommendations: Strengthening, Balance training, Functional mobility training, Gait training, Safety education & training, Home exercise program, Patient/Caregiver education & training, Transfer training, Endurance training, Therapeutic activities, Neuromuscular re-education, Stair training  General Plan:  (5x/wk 90min)    Patient Education  Patient Education: Plan of Care, Functional Mobility, Reviewed Prior Education, Health Promotion and Wellness Education, Safety, Verbal Exercise Instruction    Goals:  Patient Goals : None Stated  Short Term Goals  Time Frame for Short Term Goals: 1 week  Short Term Goal 1: Patient to perform bed mobility supine<>sit with Supervision in order to assist with getting into and out of bed. Short Term Goal 2: Patient to perform sit to stand transfers without AD with </=1 cue for hand placement from various surfaces in order to assist with safety with transfers in home. Short Term Goal 3: Patient to ambulate >/=75 feet without AD with SBA in order to assist with home mobility. Short Term Goal 4: Patient to score >/=38/56 on the Augustine Balance Test in order to assist with reduced risk for falls. Long Term Goals  Time Frame for Long Term Goals : 2 weeks from IPR evaluation  Long Term Goal 1: Patient to perform bed mobility supine<>sit with Mod I in order to assist with getting into and out of bed. Long Term Goal 2: Patient to perform sit to stand transfers without use of Ad with Mod I in order to assist with safety with transfers in home. Long Term Goal 3: Patient to ambulate >/=200 feet without AD with Mod I in order to assist with ambulating to and  from dining room for all meals. Long Term Goal 4: Patient to perform car transfer with Supervision in order to assist with getting to and from appointments. Long Term Goal 5: Patient to perform TUG in </=12 seconds in order to assist with functional dynamic balance. Following session, patient left in safe position with all fall risk precautions in place.

## 2023-01-12 NOTE — PROGRESS NOTES
[address] [facility] THERAPY  [facility]  DAILY NOTE    TIME   SLP Individual Minutes  Time In: 0830  Time Out: 0900  Minutes: 30  Timed Code Treatment Minutes: 0 Minutes       Date: 2023  Patient Name: Lindsey London      CSN: [id number]   : 1965  (65 y.o.)  Gender: male   Referring Physician:  India Kohli DO  Diagnosis: Debility  Precautions: Fall  Risk,  aspiration risk  Current Diet: Soft and Bite sized,thin liquids  Swallowing Strategies:  Full Upright Position, Small Bite/Sip, Medications Whole with Thin, Alternate Solids and Liquids, and Limit Distractions   Date of Last MBS/FEES: Not Applicable    Pain:  No pain reported. Subjective:  Patient was sitting in bed with his breakfast  tray upon ST arrival.  Patient was sitting at less than  90 degrees. The head of the patient's bed was raised to allow him  to  sit as upright  as possible. Short-Term Goals:  SHORT TERM GOAL #1:  Goal 1: Patient will consume a soft and bite-size diet and thin liquids and advanced PO trials as clinically indicated with ST only with stable pulmonary status and adequate endurance to assist with meeting nutrition/hydration needs  INTERVENTIONS:Skilled dietary analysis completed this date with patient's breakfast tray consisting of pancakes, sausage, hashbrowns and thin liquids. The patient demonstrated prolonged but functional mastication of soft and bite sized diet textures. He was able to completely clear his oral cavity. He did not demonstrate overt s/s of laryngeal penetration/aspiration with any trialed diet texture or thin liquids immediately following the swallow. He was noted to  have a cough during the meal but it did not appear to be directly related to oral intake. Continue to monitor and complete instrumental assessment of swallowing if indicated.      SHORT TERM GOAL #2:  Goal 2: Patient will complete structured speech drills/tasks (DDK rates, AMRs, multi-syllabic word repetition) with implementation of SOS speech strategies to improve intelligibility to 70% in all contexts to optimize communicative efficacy. INTERVENTIONS:Reviewed SOS speech strategies this date. Patient was able to produce 2-syllable  words  with 100%  accuracy independently using  these strategies. When complexity was increased to 3 syllable words, accuracy decreased  to  70% independently. .  With moderate  cues he was  able to utilize strategies in  100% of  opportunities. When complexity was increased to the  sentence level, the patient's accuracy dropped and he required moderate cues to produce using SOS strategies with only 40% accuracy. Patient was noted to speak in monotone with minimal movement of articulators and  low volume. Intelligibility of spontaneous conversation was poor. Continue SOS strategies  at multi-syllabic and  phrase levels to improve overall intelligibility. SHORT TERM GOAL #3:  Goal 3: Patient will complete functional recall tasks (immediate, delayed, working, orientation, biographics) with 70% accuracy, mod cues to improve retention of pertinent information. INTERVENTIONS:Goal  not addressed this date due to a focus on other goals. SHORT TERM GOAL #4:  Goal 4: Patient will complete problem solving, verbal/visual reasoning, and executive functioning tasks (time, money/math/finances, medications) with 60% accuracy, mod cues to improve contributions within ADL/IADL completion. INTERVENTIONS:Goal  not addressed this date due to a focus on other goals. SHORT TERM GOAL #5:  Goal 5: Patient will complete sequencing and thought organization tasks with 70% accuracy, mod cues to improve mental flexibility for daily living scenarios. INTERVENTIONS:Goal  not addressed this date due to a focus on other goals.        Long-Term Goals:  Time Frame for Long Term Goals: 3 weeks from evaluation    LONG TERM GOAL #1:  Goal 1: Patient will consume a regular texture diet and thin liquids with stable pulmonary status and adequate endurance to assist with meeting nutrition/hydration needs. LONG TERM GOAL #2:  Goal 2: Patient will improve cognitive-linguistic skills to a min assist or higher upon discharge in order to make safe/successful discharge to least restrictive environment and resume ADL completion with least amount of supervision/assistance. Comprehension: 5 - Patient understands basic needs (hungry/hot/pain)  Expression: 4 - Expresses basic ideas/needs 75-90%+ of the time  Social Interaction: 4 - Patient appropriate 75-90%+ of the time  Problem Solvin - Patient solves simple/routine tasks 75-90%+   Memory: 3 - Patient remembers 50%-74% of the time    EDUCATION:  Learner: Patient  Education:  Reviewed diet and strategies, Reviewed ST goals and Plan of Care, Reviewed recommendations for follow-up, and SOS intelligibility strategies  Evaluation of Education: Verbalizes understanding, Needs further instruction, and Family not present    ASSESSMENT/PLAN:  Activity Tolerance:  Patient tolerance of  treatment: good. Good participation in trialed tasks. Assessment/Plan: Patient progressing toward established goals. Continues to require skilled care of licensed speech pathologist to progress toward achievement of established goals and plan of care. .     Plan for Next Session: address cognitive goals  Discharge Recommendations: Continue to Assess Pending Progress     Emilie Hodge M.A.  CCC-SLP

## 2023-01-12 NOTE — PLAN OF CARE
Problem: Discharge Planning  Goal: Discharge to home or other facility with appropriate resources  Note: Team conference held Thursday, 1/12. Recommendations of the team were explained to the patient by Dr Alessandro Sotomayor and SW. Team is recommending that patient continue on acute inpatient rehab for PT, OT and ST, with expected discharge date of Friday, 1/20. Following discharge, team is recommending return to [facility] with home health services for PT, OT and [address] Care plan reviewed with patient. Patient verbalized understanding of the plan of care and contributed to goal setting. SW to follow and maintain involvement in discharge planning.

## 2023-01-12 NOTE — PROGRESS NOTES
[address] [address]  Recreational Therapy  Daily Note  [address]    Time Spent with Patient: 10 minutes    Date:  1/12/2023       Patient Name: Tala Daniel      MRN: [id number]      YOB: 1965 (65 y.o.)       Gender: male  Diagnosis: Debility  Referring Practitioner: Dr. Bruno Filter    RESTRICTIONS/PRECAUTIONS:  Restrictions/Precautions: Fall Risk, General Precautions, Modified Diet     Hearing: Within functional limits    PAIN: 0-no c/o pain     SUBJECTIVE:  I would like a hair cut    OBJECTIVE:  Pt would like to get his hair cut by our beautician tomorrow -states he has the money- he declined joining our painting group on Sunday         Patient Education  New Education Provided: Importance of Leisure,     Electronically signed by: VIDAL Serna  Date: 1/12/2023

## 2023-01-12 NOTE — PROGRESS NOTES
Comprehensive Nutrition Assessment    Type and Reason for Visit:  Initial, Consult (Oral Nutrition Supplements)    Nutrition Recommendations/Plan:   Recommend diet as tolerated - per SLP. Continue Glucerna once/day. Will monitor po intake, weights vs. Need for additional nutrition interventions. Malnutrition Assessment:  Malnutrition Status: At risk for malnutrition (Comment) (01/12/23 1650)    Context:  Acute Illness     Findings of the 6 clinical characteristics of malnutrition:  Energy Intake:  No significant decrease in energy intake  Weight Loss:   (-1.7% in 1 week if weights accurate; -4.5% in 4 months; -7.9% in 1 month)     Body Fat Loss:  No significant body fat loss     Muscle Mass Loss:  No significant muscle mass loss    Fluid Accumulation:  Unable to assess     Strength:  Not Performed    Nutrition Assessment:     Pt. nutritionally compromised AEB weight loss. At risk for further nutrition compromise r/t admit to rehab w/ debility s/p influenza, possible TIA and underlying medical condition (hx bipolar, HTN, DM). Nutrition Related Findings:      Wound Type: None     Pt. Report/Treatments/Miscellaneous: pt. Seen - getting ready to eat breakfast; reports good appetite and intake; states consumed 3 meals/day pta; reports weight loss; difficult to understand at times as mumbles; SLP following; reports acceptance of Glucerna  GI Status: BM 1/10  Pertinent Labs: 1/11: Glucose 148, BUn 13, Cr 0.5  Pertinent Meds: Colace, Glycolax, Insulin    Current Nutrition Intake & Therapies:    Average Meal Intake: %     ADULT DIET;  Dysphagia - Soft and Bite Sized; 4 carb choices (60 gm/meal)  ADULT ORAL NUTRITION SUPPLEMENT; Lunch; Diabetic Oral Supplement    Anthropometric Measures:  Height: 5' 9\" (175.3 cm)  Ideal Body Weight (IBW): 160 lbs (73 kg)    Admission Body Weight: 181 lb 1 oz (82.1 kg) (1/10 no edema)  Current Body Weight: 181 lb 6.4 oz (82.3 kg) (1/11 no edema),       Current BMI (kg/m2): 26.8  Usual Body Weight:  (per pt. 198#; per EMR ( office): 9/9/22: 190#; 12/8/22: 197#; EMR: 1/3/23: 184# 8 oz)                       BMI Categories: Overweight (BMI 25.0-29. 9)    Estimated Daily Nutrient Needs:  Energy Requirements Based On: Kcal/kg  Weight Used for Energy Requirements: Other (Comment) (82)  Energy (kcal/day): 7936-2444 kcals (20-25)  Weight Used for Protein Requirements: Ideal (73)  Protein (g/day): 95+ grams (1.3+)       Nutrition Diagnosis:   Unintended weight loss related to catabolic illness as evidenced by weight loss    Nutrition Interventions:   Food and/or Nutrient Delivery: Continue Current Diet, Continue Oral Nutrition Supplement  Nutrition Education/Counseling: Education initiated (1/12 Encouraged po, good nutrition at best efforts.)  Coordination of Nutrition Care: Continue to monitor while inpatient       Goals:     Goals: PO intake 75% or greater, by next RD assessment       Nutrition Monitoring and Evaluation:      Food/Nutrient Intake Outcomes: Diet Advancement/Tolerance, Food and Nutrient Intake, Supplement Intake  Physical Signs/Symptoms Outcomes: Biochemical Data, Chewing or Swallowing, GI Status, Fluid Status or Edema, Nutrition Focused Physical Findings, Skin, Weight    Discharge Planning:     Too soon to determine     Carmelina Montgomery RD, LD  Contact: [phone]

## 2023-01-12 NOTE — PROGRESS NOTES
[address]  Occupational Therapy  Daily Note  Time:   Time In: 09  Time Out: 1030  Timed Code Treatment Minutes: 60 Minutes  Minutes: 60          Date: 2023  Patient Name: Andrew Avina,   Gender: male      Room: [facility]70/070-A  MRN: [id number]  : 1965  (65 y.o.)  Referring Practitioner: Dr. Lalo Luna  Diagnosis: Debility  Additional Pertinent Hx: Andrew Avina is a 65 y.o. male admitted to [address] [place] [address] on 2023. Patient with past medical history of DM, HTN, and bipolar disorder who presented to [facility] for productive cough, fever and SOB. This had become progressively worse and is worse with exertion. While in the ER, patient did test positive for influenza A. CXR revealed coarsened appearance to the pulmonary parenchyma ?chronic. Pulmonary vascularity is normal. Tamiflu was started. Patient was admitted to [facility]. On 1/3/23 CODE Stroke was called due to new onset left facial weakness, lethargy and dysarthria. NIH of 3. 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 Based on patient's symptoms and time since last known well decision was made for TNK to be given. 22 mg of TNK given at 5:05 AM.  Patient transferred to the ICU with thrombolytic precautions. Patient was placed on HFNC. MRI complete 23 without acute findings of CVA. echocardiogram 1/3/2023 that showed EF of 55% and overall normal left ventricular function. Patient was cleared for diet by SLP. Tamilfu completed 23. Solu-Medrol 23. Out of droplet isolation 23. Pt admitted to [facility] on 1/10 for therapy needs with eventual plan to return to Assisted Living once medically stable, but needs to be able to care for self except for housemaking tasks.     Restrictions/Precautions:  Restrictions/Precautions: Fall Risk, General Precautions, Modified Diet  Position Activity Restriction  Other position/activity restrictions: impulsive       SUBJECTIVE: Pt. In room and agreeable to participate in OT session. Pt.'s NC for O2 off facing into his cheek upon entry required assistance to correctly place. Pt. Impulsive at times requires vcs for safety. After completing ADL in room Pt. O2 checked and HR at 125BPM, RN notified and addressing. PAIN: Denies    Vitals: Blood Pressure: 105/78  Oxygen: 98% on 2L NC at end of session, RN removed O2 to RA to trial  Heart Rate: 120-125BPM    COGNITION: Decreased Recall, Decreased Insight, Decreased Problem Solving, Decreased Safety Awareness, Difficulty Following Commands, and Impulsive    ADL:   Grooming: Contact Guard Assistance. To stand at sink and complete rinsing mouth out with mouth wash  Bathing: Stand By Assistance, [address], with set-up, and with verbal cues . To complete sponge bath   Upper Extremity Dressing: Minimal Assistance. To don gown  Lower Extremity Dressing: Minimal Assistance, with set-up, and with increased time for completion. To don shorts, had difficulty threading LLE into the leg of shorts increased time required to don undergarment   Footwear Management: Stand By Assistance. To don and doff socks  Toileting: Contact Guard Assistance. Toilet Transfer: Contact Guard Assistance. Isaac Bustos BALANCE:  Sitting Balance:  Supervision. Seated on EOB  Standing Balance: Contact Guard Assistance. Stood in 3 min increments x  trials. BED MOBILITY:  Rolling to Left: Stand By Assistance n  Rolling to Right: Stand By Assistance    Supine to Sit: Stand By Assistance    Sit to Supine: Stand By Assistance    Scooting: Stand By Assistance      TRANSFERS:  Sit to Stand:  [address], cues for hand placement, with verbal cues. From toilet and EOB  Stand to Sit: [address], cues for hand placement.  To toilet and EOB    FUNCTIONAL MOBILITY:  Assistive Device: None  Assist Level:  Contact Guard Assistance. Distance: To and from bathroom and within room impulsive at times demo increased fall risk. ADDITIONAL ACTIVITIES:  Pt. Engaged in [address] reaching activity at counter height level incorporting Bilateral and unilateral reaching while standing with Contact Guard Assistance provided in attempt to challenge balance and standing tolerance to support basic Adl tasks such as taking a shower. No LOB noted although Pt. At times impulsive and demo decreased safety awareness. ASSESSMENT:     Activity Tolerance:  Patient tolerance of  treatment: fair. Discharge Recommendations: Continue to assess pending progress and Patient would benefit from continued OT at discharge  Equipment Recommendations: Other: Pt will have a shower chair and grab bars to use in his AL apartment. Plan: Times Per Week: 5x/wk for 90 min  Current Treatment Recommendations: Functional mobility training, Balance training, Self-Care / ADL, Safety education & training, Endurance training, Equipment evaluation, education, & procurement, Patient/Caregiver education & training    Patient Education  Patient Education: ADL's and Importance of Increasing Activity and safety with functional mobility and transfers. Goals  Short Term Goals  Time Frame for Short Term Goals: 1 week  Short Term Goal 1: Pt will sequence and complete bathing tasks with SBA and 0 vcs for perseveration to improve indep with showering at home. Short Term Goal 2: Pt will tolerate standing 4 min with SBA for increased ease of sinkside grooming. Short Term Goal 3: Pt will gather appropriate clothing and dress self with SBA and min vcs for sequencing and initation to improve indep with self care. Short Term Goal 4: Pt will sequence and initate grooming tasks following toileting with SBA and min vcs to improve indep with self care.   Long Term Goals  Time Frame for Long Term Goals : 2 weeks from IPR evaluation  Long Term Goal 1: Pt will ambulate within room with Mod I and no device to improve indep with self care. Long Term Goal 2: Pt will complete dressing tasks with mod I and no cues for sequencing to improve indep with self care at AL. Long Term Goal 3: Pt will complete grooming and toileting tasks with mod I to return to prior level of function at AL. Following session, patient left in safe position with all fall risk precautions in place.

## 2023-01-12 NOTE — PROGRESS NOTES
Received report from [employer], [facility].

## 2023-01-12 NOTE — PROGRESS NOTES
Alert and oriented to person, place and time. [email or web address] mm to 1 mm, brisk. Speech clear. Mucous membranes pink and moist. Tongue midline. Smile symmetrical. Lung sounds wheezes in upper lobes bilaterally, clear remaining lung fields. Respirations easy, irregular and shallow. Heart sounds strong and regular. Occasional non-productive cough noted. Bowel sounds active in all 4 quadrants. Abdomen soft and round. Last bowel movement 1-10-23. States no difficulty with urination. Radial pulses strong and equal. Hand grasps strong and equal. Arm drift negative bilaterally. Capillary refill less than 3 seconds. Skin turgor brisk. Pedal pulses faint bilaterally. Pedal push and pull strong and equal. Rafia sign negative bilaterally. No edema noted. Leg lift strong and equal. Denies pain. Denies numbness and tingling. Bed in low position and alarmed. Wheels locked. Side rails up times 1. Call light and over bed table within reach.  Denies any present needs at this time

## 2023-01-12 NOTE — PLAN OF CARE
Problem: Safety - Adult  Goal: Free from fall injury  Outcome: Progressing  Note: Patient will continue to use call light for assistance to prevent falls and promote patient safety. Problem: Pain  Goal: Verbalizes/displays adequate comfort level or baseline comfort level  Outcome: Progressing  Note:   Patient denies pain at this time, will continue to assess. Problem: Metabolic/Fluid and Electrolytes - Adult  Goal: Glucose maintained within prescribed range  Outcome: Progressing  Note: Patients blood sugar regularly stays below 200 with Lantus, patients last 3 doses of Humalog held d/t not meeting order parameters.

## 2023-01-12 NOTE — PROGRESS NOTES
Physical Medicine & Rehabilitation   Progress Note  Chief Complaint:  debility, impaired mobility and ADLs      Subjective:  Patient seen this morning on rounds with ALEJANDRA Engle MSW, following patient's inpatient Rehab Team Conference. Patient continues to tolerate therapies and is making functional progress. He denies any pain today. No reports of CP or SOB. No reports of any significant changes to bowel or bladder. Rehabilitation: PT, OT, and SLP updates reviewed during team rounds. See separate note.          Review of Systems:  CONSTITUTIONAL:  negative for  fevers and chills  EYES:  negative for  glasses and visual disturbance  HEENT:  negative for  voice change  RESPIRATORY:  negative for  dyspnea and wheezing  CARDIOVASCULAR:  negative for  chest pain, palpitations  GASTROINTESTINAL:  negative for nausea, vomiting, and change in bowel habits  GENITOURINARY:  negative for dysuria and urinary incontinence  SKIN:  negative rash or wound   MUSCULOSKELETAL:  positive for  muscle weakness; negative for pain  NEUROLOGICAL:  positive for speech problems and gait problems  BEHAVIOR/PSYCH:  positive for history of bipolar disorder  10 point system review otherwise negative        Objective:  /78   Pulse (!) 116   Temp 97.5 °F (36.4 °C) (Oral)   Resp 24   Ht 5' 9\" (1.753 m)   Wt 181 lb 6.4 oz (82.3 kg)   SpO2 90%   BMI 26.79 kg/m²   Patient is awake and alert, no family at bedside   Mood: within normal limits  Affect: calm  General appearance: Patient is well nourished, well developed, well groomed and in no acute distress     Memory:   not formally assess but able to answer questions regarding history   Attention/Concentration: follow conversation  Language:  abnormal - chronic dysarthria     Cranial Nerves:  cranial nerves II-XII appear intact  ROM:  normal  Motor Exam: Full strength in BUE and BLE  Tone:  normal  Muscle bulk: within normal limits  Sensory:  Sensory intact  Coordination: normal     Heart: well perfused extremities  Lungs: breathing comfortably on room air without any increased WOB  Abdomen: non-distended   Skin: warm and dry, no rash or erythema  Edema: none     Diagnostics:   Recent Results         Recent Results (from the past 24 hour(s))   POCT Glucose     Collection Time: 01/11/23 12:16 PM   Result Value Ref Range     POC Glucose 167 (H) 70 - 108 mg/dl   POCT Glucose     Collection Time: 01/11/23  4:03 PM   Result Value Ref Range     POC Glucose 195 (H) 70 - 108 mg/dl   POCT Glucose     Collection Time: 01/11/23  8:44 PM   Result Value Ref Range     POC Glucose 181 (H) 70 - 108 mg/dl   POCT Glucose     Collection Time: 01/12/23  8:02 AM   Result Value Ref Range     POC Glucose 154 (H) 70 - 108 mg/dl            Impression:  Acute respiratory failure  Influenza A  Moderate cognitive impairment  Diabetes Mellitis type 2 with hyperglycemia  HTN  Bipolar disorder  Chronic tobacco use, not previously dx with chronic lung disease  Impaired ADLs and mobility      Plan:  Continue inpatient rehabilitation program involving at least 3 hours per day, 5 days per week of intensive rehabilitation. Rehabilitation services will include PT, OT, and SLP/RT in order to improve functional status prior to discharge. Family education and training will be completed. Equipment evaluations and recommendations will be completed as appropriate. Rehabilitation nursing will be involved for bowel, bladder, skin, and pain management. Nursing will also provide education and training to patient and family. Dr. Gautam Prater consulted for medical management  Acute Hypoxic Respiratory failure: 2/2 Influenza A. Not on oxygen at baseline. Now on supplemental O2 via nasal cannula- will continue to wean oxygen as tolerated. Patient has completed course of tamiflu and dolu-medrol. Continue   Episode of left facial droop: Possible TIA? . 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  MRI negative for any intracranial abnormality. Continue aspirin and statin  T2DM: Continue Lantus 7 units nightly and SSI  HTN: Continue home lopressor  Bipolar Disorder: Continue home Clozapine, Pristiq, and Depakote  Prophylaxis:  DVT: Lovenox. Pain: Tylenol   Nutrition:  Consultation to dietician for nutritional counseling and recommendations. Prealbumin 17.0 on admission. Bladder: Per rehab nursing   Bowel: Continue docusate   and case management consultations for coordination of care and discharge planning. Team conference held today with anticipated DC back to assisted living on 1/20/2023.      Missed Therapy Time:  None     Duval Moellers, DO      Duval Moellers, DO

## 2023-01-12 NOTE — PROGRESS NOTES
[address] [place] [address]  Occupational Therapy  Daily Note  Time:   Time In: 1400  Time Out: 1430  Timed Code Treatment Minutes: 30 Minutes  Minutes: 30          Date: 2023  Patient Name: Lillian Blanc,   Gender: male      Room: [facility]70/070-A  MRN: [id number]  : 1965  (65 y.o.)  Referring Practitioner: Dr. Neema Garcia  Diagnosis: Debility  Additional Pertinent Hx: Lillian Blanc is a 65 y.o. male admitted to [address] [place] [address] on 2023. Patient with past medical history of DM, HTN, and bipolar disorder who presented to [facility] for productive cough, fever and SOB. This had become progressively worse and is worse with exertion. While in the ER, patient did test positive for influenza A. CXR revealed coarsened appearance to the pulmonary parenchyma ?chronic. Pulmonary vascularity is normal. Tamiflu was started. Patient was admitted to [facility] Med-Surgical Unit. On 1/3/23 CODE Stroke was called due to new onset left facial weakness, lethargy and dysarthria. NIH of 3. 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 Based on patient's symptoms and time since last known well decision was made for TNK to be given. 22 mg of TNK given at 5:05 AM.  Patient transferred to the ICU with thrombolytic precautions. Patient was placed on HFNC. MRI complete 23 without acute findings of CVA. echocardiogram 1/3/2023 that showed EF of 55% and overall normal left ventricular function. Patient was cleared for diet by SLP. Tamilfu completed 23. Solu-Medrol 23. Out of droplet isolation 23. Pt admitted to [facility] on 1/10 for therapy needs with eventual plan to return to Assisted Living once medically stable, but needs to be able to care for self except for housemaking tasks.     Restrictions/Precautions:  Restrictions/Precautions: Fall Risk, General Precautions, Modified Diet  Position Activity Restriction  Other position/activity restrictions: impulsive     SUBJECTIVE: Pt. In bed agreeable to participate. PAIN: Denies    Vitals: Oxygen: RA 93%  Heart Rate: 108    COGNITION: Decreased Recall, Decreased Insight, Decreased Problem Solving, Decreased Safety Awareness, Difficulty Following Commands, and Impulsive    ADL:   No ADL's completed this session. Lilibeth [place] BALANCE:  Sitting Balance:  Supervision. Standing Balance: Contact Guard Assistance. BED MOBILITY:  Supine to Sit: Stand By Assistance      TRANSFERS:  Sit to Stand:  Contact Guard Assistance. From EOB and chair  Stand to Sit: Contact Guard Assistance. To chair and recliner    FUNCTIONAL MOBILITY:  Assistive Device: None  Assist Level:  Contact Guard Assistance. Distance: To and from therapy apartment  Fatigue noted seated rest break required. ADDITIONAL ACTIVITIES:  .Pt completed B UE exs with moderate resistance band x 12 reps, x 1 set, with good tolerance of exs, with  RBs throughout, and visual  and verbal cues for tech with resistance band to improve strength and activity tolerance to support Adls. Pt. Completed IADL task of dynamic Bilateral UE reaching with access and transport of items simulating simple task in kitchen such as retrieving a drink and accessing microwave and refrigerator. Pt. Demo some difficulty transporting cup spilling water on floor. CGA provided with vcs for safety to slow down. Pt. Stood x 4 mins, no LOB noted. ASSESSMENT:     Activity Tolerance:  Patient tolerance of  treatment: fair. Discharge Recommendations: Continue to assess pending progress and Patient would benefit from continued OT at discharge  Equipment Recommendations: Other: Pt will have a shower chair and grab bars to use in his AL apartment.   Plan: Times Per Week: 5x/wk for 90 min  Current Treatment Recommendations: Functional mobility training, Balance training, Self-Care / ADL, Safety education & training, Endurance training, Equipment evaluation, education, & procurement, Patient/Caregiver education & training    Patient Education  Patient Education: IADL's, Home Exercise Program, and Importance of Increasing Activity    Goals  Short Term Goals  Time Frame for Short Term Goals: 1 week  Short Term Goal 1: Pt will sequence and complete bathing tasks with SBA and 0 vcs for perseveration to improve indep with showering at home. Short Term Goal 2: Pt will tolerate standing 4 min with SBA for increased ease of sinkside grooming. Short Term Goal 3: Pt will gather appropriate clothing and dress self with SBA and min vcs for sequencing and initation to improve indep with self care. Short Term Goal 4: Pt will sequence and initate grooming tasks following toileting with SBA and min vcs to improve indep with self care. Long Term Goals  Time Frame for Long Term Goals : 2 weeks from IPR evaluation  Long Term Goal 1: Pt will ambulate within room with Mod I and no device to improve indep with self care. Long Term Goal 2: Pt will complete dressing tasks with mod I and no cues for sequencing to improve indep with self care at AL. Long Term Goal 3: Pt will complete grooming and toileting tasks with mod I to return to prior level of function at AL. Following session, patient left in safe position with all fall risk precautions in place.

## 2023-01-13 ENCOUNTER — APPOINTMENT (OUTPATIENT)
Dept: GENERAL RADIOLOGY | Age: 58
End: 2023-01-13
Attending: FAMILY MEDICINE
Payer: MEDICARE

## 2023-01-13 LAB
ANION GAP SERPL CALCULATED.3IONS-SCNC: 11 MEQ/L (ref 8–16)
BASOPHILS # BLD: 0.2 %
BASOPHILS ABSOLUTE: 0 THOU/MM3 (ref 0–0.1)
BILIRUBIN URINE: NEGATIVE
BLOOD, URINE: NEGATIVE
BUN BLDV-MCNC: 12 MG/DL (ref 7–22)
CALCIUM SERPL-MCNC: 8.3 MG/DL (ref 8.5–10.5)
CHARACTER, URINE: CLEAR
CHLORIDE BLD-SCNC: 100 MEQ/L (ref 98–111)
CO2: 26 MEQ/L (ref 23–33)
COLOR: YELLOW
CREAT SERPL-MCNC: 0.6 MG/DL (ref 0.4–1.2)
EOSINOPHIL # BLD: 0.6 %
EOSINOPHILS ABSOLUTE: 0.1 THOU/MM3 (ref 0–0.4)
ERYTHROCYTE [DISTWIDTH] IN BLOOD BY AUTOMATED COUNT: 13 % (ref 11.5–14.5)
ERYTHROCYTE [DISTWIDTH] IN BLOOD BY AUTOMATED COUNT: 46.2 FL (ref 35–45)
GFR SERPL CREATININE-BSD FRML MDRD: > 60 ML/MIN/1.73M2
GLUCOSE BLD-MCNC: 176 MG/DL (ref 70–108)
GLUCOSE BLD-MCNC: 180 MG/DL (ref 70–108)
GLUCOSE BLD-MCNC: 242 MG/DL (ref 70–108)
GLUCOSE URINE: >= 1000 MG/DL
HCT VFR BLD CALC: 43.6 % (ref 42–52)
HEMOGLOBIN: 14.1 GM/DL (ref 14–18)
IMMATURE GRANS (ABS): 0.09 THOU/MM3 (ref 0–0.07)
IMMATURE GRANULOCYTES: 0.8 %
KETONES, URINE: ABNORMAL
LEUKOCYTE ESTERASE, URINE: NEGATIVE
LYMPHOCYTES # BLD: 11 %
LYMPHOCYTES ABSOLUTE: 1.2 THOU/MM3 (ref 1–4.8)
MCH RBC QN AUTO: 31.7 PG (ref 26–33)
MCHC RBC AUTO-ENTMCNC: 32.3 GM/DL (ref 32.2–35.5)
MCV RBC AUTO: 98 FL (ref 80–94)
MONOCYTES # BLD: 10.8 %
MONOCYTES ABSOLUTE: 1.2 THOU/MM3 (ref 0.4–1.3)
NITRITE, URINE: NEGATIVE
NUCLEATED RED BLOOD CELLS: 0 /100 WBC
PH UA: 6.5 (ref 5–9)
PLATELET # BLD: 209 THOU/MM3 (ref 130–400)
PMV BLD AUTO: 8.6 FL (ref 9.4–12.4)
POTASSIUM SERPL-SCNC: 4.6 MEQ/L (ref 3.5–5.2)
PROTEIN UA: NEGATIVE
RBC # BLD: 4.45 MILL/MM3 (ref 4.7–6.1)
SEG NEUTROPHILS: 76.6 %
SEGMENTED NEUTROPHILS ABSOLUTE COUNT: 8.6 THOU/MM3 (ref 1.8–7.7)
SODIUM BLD-SCNC: 137 MEQ/L (ref 135–145)
SPECIFIC GRAVITY, URINE: > 1.03 (ref 1–1.03)
UROBILINOGEN, URINE: 4 EU/DL (ref 0–1)
WBC # BLD: 11.2 THOU/MM3 (ref 4.8–10.8)

## 2023-01-13 PROCEDURE — 82948 REAGENT STRIP/BLOOD GLUCOSE: CPT

## 2023-01-13 PROCEDURE — 97110 THERAPEUTIC EXERCISES: CPT

## 2023-01-13 PROCEDURE — 80048 BASIC METABOLIC PNL TOTAL CA: CPT

## 2023-01-13 PROCEDURE — 1180000000 HC REHAB R&B

## 2023-01-13 PROCEDURE — 99232 SBSQ HOSP IP/OBS MODERATE 35: CPT | Performed by: STUDENT IN AN ORGANIZED HEALTH CARE EDUCATION/TRAINING PROGRAM

## 2023-01-13 PROCEDURE — 97530 THERAPEUTIC ACTIVITIES: CPT

## 2023-01-13 PROCEDURE — 6370000000 HC RX 637 (ALT 250 FOR IP): Performed by: FAMILY MEDICINE

## 2023-01-13 PROCEDURE — 97112 NEUROMUSCULAR REEDUCATION: CPT

## 2023-01-13 PROCEDURE — 97535 SELF CARE MNGMENT TRAINING: CPT

## 2023-01-13 PROCEDURE — 94640 AIRWAY INHALATION TREATMENT: CPT

## 2023-01-13 PROCEDURE — 85025 COMPLETE CBC W/AUTO DIFF WBC: CPT

## 2023-01-13 PROCEDURE — 6370000000 HC RX 637 (ALT 250 FOR IP): Performed by: STUDENT IN AN ORGANIZED HEALTH CARE EDUCATION/TRAINING PROGRAM

## 2023-01-13 PROCEDURE — 71045 X-RAY EXAM CHEST 1 VIEW: CPT

## 2023-01-13 PROCEDURE — 97130 THER IVNTJ EA ADDL 15 MIN: CPT

## 2023-01-13 PROCEDURE — 36415 COLL VENOUS BLD VENIPUNCTURE: CPT

## 2023-01-13 PROCEDURE — 94760 N-INVAS EAR/PLS OXIMETRY 1: CPT

## 2023-01-13 PROCEDURE — 81003 URINALYSIS AUTO W/O SCOPE: CPT

## 2023-01-13 PROCEDURE — 6360000002 HC RX W HCPCS: Performed by: STUDENT IN AN ORGANIZED HEALTH CARE EDUCATION/TRAINING PROGRAM

## 2023-01-13 PROCEDURE — 97129 THER IVNTJ 1ST 15 MIN: CPT

## 2023-01-13 PROCEDURE — 97116 GAIT TRAINING THERAPY: CPT

## 2023-01-13 RX ADMIN — ENOXAPARIN SODIUM 40 MG: 100 INJECTION SUBCUTANEOUS at 09:33

## 2023-01-13 RX ADMIN — CLOZAPINE 200 MG: 100 TABLET ORAL at 22:58

## 2023-01-13 RX ADMIN — DIVALPROEX SODIUM 500 MG: 500 TABLET, EXTENDED RELEASE ORAL at 09:34

## 2023-01-13 RX ADMIN — DOCUSATE SODIUM 100 MG: 100 CAPSULE, LIQUID FILLED ORAL at 09:34

## 2023-01-13 RX ADMIN — METOPROLOL SUCCINATE 25 MG: 25 TABLET, EXTENDED RELEASE ORAL at 09:34

## 2023-01-13 RX ADMIN — INSULIN GLARGINE 7 UNITS: 100 INJECTION, SOLUTION SUBCUTANEOUS at 22:59

## 2023-01-13 RX ADMIN — METFORMIN HYDROCHLORIDE 500 MG: 500 TABLET ORAL at 18:42

## 2023-01-13 RX ADMIN — ATORVASTATIN CALCIUM 20 MG: 20 TABLET, FILM COATED ORAL at 22:58

## 2023-01-13 RX ADMIN — DESVENLAFAXINE 50 MG: 50 TABLET, EXTENDED RELEASE ORAL at 09:35

## 2023-01-13 RX ADMIN — CLOZAPINE 50 MG: 25 TABLET ORAL at 18:42

## 2023-01-13 RX ADMIN — DIVALPROEX SODIUM 1000 MG: 500 TABLET, EXTENDED RELEASE ORAL at 22:59

## 2023-01-13 RX ADMIN — ASPIRIN 81 MG 81 MG: 81 TABLET ORAL at 09:33

## 2023-01-13 RX ADMIN — METFORMIN HYDROCHLORIDE 500 MG: 500 TABLET ORAL at 09:34

## 2023-01-13 RX ADMIN — POLYETHYLENE GLYCOL 3350 17 G: 17 POWDER, FOR SOLUTION ORAL at 09:33

## 2023-01-13 RX ADMIN — IPRATROPIUM BROMIDE AND ALBUTEROL SULFATE 1 AMPULE: .5; 3 SOLUTION RESPIRATORY (INHALATION) at 21:17

## 2023-01-13 RX ADMIN — CLOZAPINE 50 MG: 25 TABLET ORAL at 09:33

## 2023-01-13 ASSESSMENT — PAIN SCALES - GENERAL
PAINLEVEL_OUTOF10: 0

## 2023-01-13 NOTE — PROGRESS NOTES
[facility]  Recreational Therapy  Daily Note  [address]    Time Spent with Patient: 25 minutes    Date:  1/13/2023       Patient Name: Lilly Palacios      MRN: [id number]      YOB: 1965 (65 y.o.)       Gender: male  Diagnosis: Debility  Referring Practitioner: Dr. Jorge Alberto Scruggs    RESTRICTIONS/PRECAUTIONS:  Restrictions/Precautions: Fall Risk, General Precautions, Modified Diet     Hearing: Within functional limits    PAIN: 0    SUBJECTIVE:  Thank you    OBJECTIVE:  Pt came to our recreational therapy room to get his hair and beard cut by our beautician-he wanted it all off and looks like a different person-affect brightened when staff made over him-sit to stand from w/c with SBA-ambulated 3 ft to recliner with no AD and SBA-pt appreciative          Patient Education  New Education Provided: Importance of Leisure,     Electronically signed by: Merlinda Gosling, CTRS  Date: 1/13/2023

## 2023-01-13 NOTE — PROGRESS NOTES
[address]  Occupational Therapy  Daily Note  Time:   Time In: 730  Time Out: 0830  Timed Code Treatment Minutes: 60 Minutes  Minutes: 60          Date: 2023  Patient Name: Amalia Schmitt,   Gender: male      Room: [facility]70/070-A  MRN: [id number]  : 1965  (65 y.o.)  Referring Practitioner: Dr. Caleb Sarabia  Diagnosis: Debility  Additional Pertinent Hx: Amalia Schmitt is a 65 y.o. male admitted to [address] [place] [address] on 2023. Patient with past medical history of DM, HTN, and bipolar disorder who presented to [facility] for productive cough, fever and SOB. This had become progressively worse and is worse with exertion. While in the ER, patient did test positive for influenza A. CXR revealed coarsened appearance to the pulmonary parenchyma ?chronic. Pulmonary vascularity is normal. Tamiflu was started. Patient was admitted to [facility] Med-Surgical Unit. On 1/3/23 CODE Stroke was called due to new onset left facial weakness, lethargy and dysarthria. NIH of 3. 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 Based on patient's symptoms and time since last known well decision was made for TNK to be given. 22 mg of TNK given at 5:05 AM.  Patient transferred to the ICU with thrombolytic precautions. Patient was placed on HFNC. MRI complete 23 without acute findings of CVA. echocardiogram 1/3/2023 that showed EF of 55% and overall normal left ventricular function. Patient was cleared for diet by SLP. Tamilfu completed 23. Solu-Medrol 23. Out of droplet isolation 23. Pt admitted to [facility] on 1/10 for therapy needs with eventual plan to return to Assisted Living once medically stable, but needs to be able to care for self except for housemaking tasks.     Restrictions/Precautions:  Restrictions/Precautions: Fall Risk, General Precautions, Modified Diet  Position Activity Restriction  Other position/activity restrictions: impulsive     SUBJECTIVE: Patient supine in bed upon arrival; agreeable to therapy this date. Patient pleasant and cooperative throughout session. PAIN: 0/10:     Vitals: Blood Pressure: 136/80  Oxygen: 92%  Heart Rate: 123bpm finishing with dressing activity, 113bpm throughout remainder of session    COGNITION: Decreased Problem Solving and Decreased Safety Awareness    ADL:   Upper Extremity Dressing: Minimal Assistance. Buttons on shirt, verbal cues with problem solving to harsh shirt  Lower Extremity Dressing: Stand By Assistance and with increased time for completion. Harsh shirt/tie  Footwear Management: Stand By Assistance, with set-up, and with increased time for completion. Yancy Matthew BALANCE:  Sitting Balance:  Supervision. Standing Balance: Contact Guard Assistance. Verbal cues for body positioning with dynamic standing activity, demonstrating understanding, no LOB x 2 episodes of unsteadiness requiring increased assistance to prevent falls    Patient completed dynamic standing task that facilitated balance, activity tolerance, strength, problem solving, sequencing completing IADL activity standing at sink to wash dishes. Patient required CGA, and demo'ed an endurance of x10 minutes x 2 trials with seated rest break between each trial x 5 min with verbal cues to initiate rest break d/t noted increased unsteadiness. Demo good tolerance. Standing task completed to challenge endurance and balance required for ADL and IADL skills. BED MOBILITY:  Supine to Sit: Supervision, X 1, with head of bed flat      TRANSFERS:  Sit to Stand:  Stand By Assistance, X 1.    Stand to Sit: Stand By Assistance, X 1.      FUNCTIONAL MOBILITY:  Assistive Device: None  Assist Level:  Contact Guard Assistance. Distance:  To and from therapy apartment  Slow pace, no LOB, verbal cues to look forward for safety     ADDITIONAL ACTIVITIES:  Patient completed safety awareness activity during IADL dish washing activity with patient able to locate and identify 1/4 hazards within therapy kitchen. Patient able to identify oven door open and appropriately fix hazard however requires maximal verbal/visual cues to locate coffee pot cord in sink as patient turned on water with cord in sink, minimal visual cues for wash cloth and plastic utensils placed on stove burners with patient able to appropriately fix and identify hazard effect. ASSESSMENT:     Activity Tolerance:  Patient tolerance of  treatment: good. Discharge Recommendations: Continue to assess pending progress and Patient would benefit from continued OT at discharge  Equipment Recommendations: Other: Pt will have a shower chair and grab bars to use in his AL apartment. Plan: Times Per Week: 5x/wk for 90 min  Current Treatment Recommendations: Functional mobility training, Balance training, Self-Care / ADL, Safety education & training, Endurance training, Equipment evaluation, education, & procurement, Patient/Caregiver education & training    Patient Education  Patient Education: Role of OT, Plan of Care, ADL's, IADL's, Home Safety, and Importance of Increasing Activity    Goals  Short Term Goals  Time Frame for Short Term Goals: 1 week  Short Term Goal 1: Pt will sequence and complete bathing tasks with SBA and 0 vcs for perseveration to improve indep with showering at home. Short Term Goal 2: Pt will tolerate standing 4 min with SBA for increased ease of sinkside grooming. Short Term Goal 3: Pt will gather appropriate clothing and dress self with SBA and min vcs for sequencing and initation to improve indep with self care. Short Term Goal 4: Pt will sequence and initate grooming tasks following toileting with SBA and min vcs to improve indep with self care.   Long Term Goals  Time Frame for Long Term Goals : 2 weeks from IPR evaluation  Long Term Goal 1: Pt will ambulate within room with Mod I and no device to improve indep with self care. Long Term Goal 2: Pt will complete dressing tasks with mod I and no cues for sequencing to improve indep with self care at AL. Long Term Goal 3: Pt will complete grooming and toileting tasks with mod I to return to prior level of function at AL. Following session, patient left in safe position with all fall risk precautions in place.

## 2023-01-13 NOTE — PROGRESS NOTES
In chair finishing lunch. Alert. Oriented to person, place, time. TESS 3mm to 2mm, sluggish. Speech clear. Mucous membranes pink, moist. Smile symmetrical, tongue midline. Apical pulse strong, regular. Respirations shallow, easy. Lung sounds clear anterior, poster, lateral. Non-productive cough noted. Bowel sounds active upper and lower right and left quadrants. Hypoactive upper and lower right and left quadrants. Abdomen round, firm. Denies tenderness with palpation. Denies trouble urinating. Radial pulses strong and equal bilaterally. Hand grasps strong and equal bilaterally. Arm drift negative bilaterally. Capillary refill less than 3 seconds. Skin turgor brisk. Pedal pulses faint, regular. Pedal push and pull strong bilaterally. Rafia's sign negative bilaterally. Leg lift strong and equal bilaterally. No edema noted. Denies pain, numbness or tingling. Back to bed, resting with eyes closed. Bed alarm on. Call light and bedside table within reach. Voiced no further needs at this time.

## 2023-01-13 NOTE — PROGRESS NOTES
[address] [place] [address]  Occupational Therapy  Daily Note  Time:   Time In: 1100  Time Out: 1130  Timed Code Treatment Minutes: 30 Minutes  Minutes: 30          Date: 2023  Patient Name: Lillian Blanc,   Gender: male      Room: [facility]70/070-A  MRN: [id number]  : 1965  (65 y.o.)  Referring Practitioner: Dr. Neema Garcia  Diagnosis: Debility  Additional Pertinent Hx: Lillian Blanc is a 65 y.o. male admitted to [address] [place] [address] on 2023. Patient with past medical history of DM, HTN, and bipolar disorder who presented to [facility] for productive cough, fever and SOB. This had become progressively worse and is worse with exertion. While in the ER, patient did test positive for influenza A. CXR revealed coarsened appearance to the pulmonary parenchyma ?chronic. Pulmonary vascularity is normal. Tamiflu was started. Patient was admitted to [facility] Med-Surgical Unit. On 1/3/23 CODE Stroke was called due to new onset left facial weakness, lethargy and dysarthria. NIH of 3. 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 Based on patient's symptoms and time since last known well decision was made for TNK to be given. 22 mg of TNK given at 5:05 AM.  Patient transferred to the ICU with thrombolytic precautions. Patient was placed on HFNC. MRI complete 23 without acute findings of CVA. echocardiogram 1/3/2023 that showed EF of 55% and overall normal left ventricular function. Patient was cleared for diet by SLP. Tamilfu completed 23. Solu-Medrol 23. Out of droplet isolation 23. Pt admitted to [facility] on 1/10 for therapy needs with eventual plan to return to Assisted Living once medically stable, but needs to be able to care for self except for housemaking tasks.     Restrictions/Precautions:  Restrictions/Precautions: Fall Risk, General Precautions, Modified Diet  Position Activity Restriction  Other position/activity restrictions: impulsive     SUBJECTIVE: Up in chair upon arrival, agreeable to OT session, cooperative, IMPULSIVE    PAIN: 0/10:     Vitals: Vitals not assessed per clinical judgement, see nursing flowsheet    COGNITION: Slow Processing, Decreased Insight, Impaired Memory, Decreased Problem Solving, Decreased Safety Awareness, and Impulsive    ADL:   No ADL's completed this session. .    BED MOBILITY:  Not Tested    TRANSFERS:  Sit to Stand:  Stand By Assistance. Bedside chair and couch  Stand to Sit: Stand By Assistance. FUNCTIONAL MOBILITY:  Assistive Device: Rolling Walker  Assist Level:  Stand By Assistance. Distance: To and from therapy apartment       ADDITIONAL ACTIVITIES:  Patient completed BUE strengthening exercises with skilled education on HEP: completed x10 reps x1 set with a resistive band in all joints and all planes in order to improve UE strength and activity tolerance required for BADL routine and toilet / shower transfers. Patient tolerated well, requiring minimal rest breaks. Patient also required minimal cues for technique. ASSESSMENT:     Activity Tolerance:  Patient tolerance of  treatment: good. Discharge Recommendations: Continue to assess pending progress  Equipment Recommendations: Other: Pt will have a shower chair and grab bars to use in his AL apartment. Plan: Times Per Week: 5x/wk for 90 min  Current Treatment Recommendations: Functional mobility training, Balance training, Self-Care / ADL, Safety education & training, Endurance training, Equipment evaluation, education, & procurement, Patient/Caregiver education & training    Patient Education  Patient Education: Home Exercise Program and Orientation    Goals  Short Term Goals  Time Frame for Short Term Goals: 1 week  Short Term Goal 1: Pt will sequence and complete bathing tasks with SBA and 0 vcs for perseveration to improve indep with showering at home.   Short Term Goal 2: Pt will tolerate standing 4 min with SBA for increased ease of sinkside grooming. Short Term Goal 3: Pt will gather appropriate clothing and dress self with SBA and min vcs for sequencing and initation to improve indep with self care. Short Term Goal 4: Pt will sequence and initate grooming tasks following toileting with SBA and min vcs to improve indep with self care. Long Term Goals  Time Frame for Long Term Goals : 2 weeks from IPR evaluation  Long Term Goal 1: Pt will ambulate within room with Mod I and no device to improve indep with self care. Long Term Goal 2: Pt will complete dressing tasks with mod I and no cues for sequencing to improve indep with self care at AL. Long Term Goal 3: Pt will complete grooming and toileting tasks with mod I to return to prior level of function at AL. Following session, patient left in safe position with all fall risk precautions in place.

## 2023-01-13 NOTE — PROGRESS NOTES
[address] THER[facility]  [facility]  DAILY NOTE    TIME   SLP Individual Minutes  Time In: 1304  Time Out: 1400  Minutes: 31  Timed Code Treatment Minutes: 31 Minutes       Date: 2023  Patient Name: Bobbi León      CSN: [id number]   : 1965  (65 y.o.)  Gender: male   Referring Physician:  Bill Guerrier DO  Diagnosis: Debility  Precautions: Fall  Risk,  aspiration risk  Current Diet: Soft and Bite sized,thin liquids  Swallowing Strategies:  Full Upright Position, Small Bite/Sip, Medications Whole with Thin, Alternate Solids and Liquids, and Limit Distractions   Date of Last MBS/FEES: Not Applicable    Pain:  No pain reported. Subjective:  Patient was sitting in bed resting upon ST arrival.  He was pleasant and agreeable to ST services this date  He reported that he had gotten his hair cut and beard shaved this morning. Short-Term Goals:  SHORT TERM GOAL #1:  Goal 1: Patient will consume a soft and bite-size diet and thin liquids and advanced PO trials as clinically indicated with ST only with stable pulmonary status and adequate endurance to assist with meeting nutrition/hydration needs  INTERVENTIONS:  Not addressed this date due to a focus on other objectives. SHORT TERM GOAL #2:  Goal 2: Patient will complete structured speech drills/tasks (DDK rates, AMRs, multi-syllabic word repetition) with implementation of SOS speech strategies to improve intelligibility to 70% in all contexts to optimize communicative efficacy. INTERVENTIONS:  Not addressed this date due to a focus on other objectives. SHORT TERM GOAL #3:  Goal 3: Patient will complete functional recall tasks (immediate, delayed, working, orientation, biographics) with 70% accuracy, mod cues to improve retention of pertinent information.   INTERVENTIONS:  3 sentence paragraph - 3 unit recall  Immediate: 1/3 independently increasing to 2/3 with repetition of paragraph  Delayed 1/3 independent, increasing to 3/3 with moderate verbal cues. SHORT TERM GOAL #4:  Goal 4: Patient will complete problem solving, verbal/visual reasoning, and executive functioning tasks (time, money/math/finances, medications) with 60% accuracy, mod cues to improve contributions within ADL/IADL completion. INTERVENTIONS:  Visual Problem Solving Picture Cards  100% in identifying problem and providing solution, independently. Verbal Problem Solving - SNF/ECF environment  100% independently. SHORT TERM GOAL #5:  Goal 5: Patient will complete sequencing and thought organization tasks with 70% accuracy, mod cues to improve mental flexibility for daily living scenarios. INTERVENTIONS:Goal  not addressed this date due to a focus on other goals. Long-Term Goals:  Time Frame for Long Term Goals: 3 weeks from evaluation    LONG TERM GOAL #1:  Goal 1: Patient will consume a regular texture diet and thin liquids with stable pulmonary status and adequate endurance to assist with meeting nutrition/hydration needs. LONG TERM GOAL #2:  Goal 2: Patient will improve cognitive-linguistic skills to a min assist or higher upon discharge in order to make safe/successful discharge to least restrictive environment and resume ADL completion with least amount of supervision/assistance.          Comprehension: 5 - Patient understands basic needs (hungry/hot/pain)  Expression: 4 - Expresses basic ideas/needs 75-90%+ of the time  Social Interaction: 4 - Patient appropriate 75-90%+ of the time  Problem Solvin - Patient solves simple/routine tasks 75-90%+   Memory: 3 - Patient remembers 50%-74% of the time    EDUCATION:  Learner: Patient  Education:  Reviewed ST goals and Plan of Care, Reviewed recommendations for follow-up, Home Safety Education, and SOS intelligibility strategies  Evaluation of Education: Verbalizes understanding, Needs further instruction, and Family not present    ASSESSMENT/PLAN:  Activity Tolerance:  Patient tolerance of  treatment: good. Good participation in trialed tasks. Assessment/Plan: Patient progressing toward established goals. Continues to require skilled care of licensed speech pathologist to progress toward achievement of established goals and plan of care. .     Plan for Next Session: SOS strategies, trial advanced diet textures  Discharge Recommendations: Continue to Assess Pending Progress     Rodney Boyd M.A.  CCC-SLP

## 2023-01-13 NOTE — PROGRESS NOTES
Patient: Amalia Mobile  Unit/Bed: 7E-70/070-A  YOB: 1965  MRN: [id number] Acct: [de-identified]   Admitting Diagnosis: Debility [R53.81]  Admit Date:  1/10/2023  Hospital Day: 2    Assessment:     Principal Problem:    Debility  Active Problems:    Acute respiratory failure with hypoxia (HCC)    Influenza with respiratory manifestation other than pneumonia    Cerebral infarction due to internal carotid artery occlusion (HCC)  Resolved Problems:    * No resolved hospital problems. *      Plan: Add metformin and follow the CS        Subjective:     Patient has no complaint of CP or SOB. .   Medication side effects: none    Scheduled Meds:   nicotine  1 patch TransDERmal Daily    [START ON 1/13/2023] metFORMIN  500 mg Oral BID WC    aspirin  81 mg Oral Daily    atorvastatin  20 mg Oral Daily    cloZAPine  200 mg Oral Nightly    cloZAPine  50 mg Oral BID    desvenlafaxine succinate  50 mg Oral Daily    divalproex  500 mg Oral QAM    divalproex  1,000 mg Oral Nightly    docusate sodium  100 mg Oral Daily    enoxaparin  40 mg SubCUTAneous Daily    insulin glargine  7 Units SubCUTAneous Nightly    metoprolol succinate  25 mg Oral Daily     Continuous Infusions:  PRN Meds:clotrimazole, glucose, glucagon (rDNA), sodium chloride flush, polyethylene glycol, albuterol, ipratropium-albuterol, acetaminophen, ondansetron    Review of Systems  Pertinent items are noted in HPI. Objective:     Patient Vitals for the past 8 hrs:   BP Temp Temp src Pulse Resp SpO2   01/12/23 1946 107/62 98.4 °F (36.9 °C) Axillary (!) 102 18 95 %     I/O last 3 completed shifts: In: 1903 [P.O.:1903]  Out: -   No intake/output data recorded.     /62   Pulse (!) 102   Temp 98.4 °F (36.9 °C) (Axillary)   Resp 18   Ht 5' 9\" (1.753 m)   Wt 181 lb 6.4 oz (82.3 kg)   SpO2 95%   BMI 26.79 kg/m²     General appearance: alert, appears stated age, and cooperative  Head: Normocephalic, without obvious abnormality, atraumatic  Lungs: clear to auscultation bilaterally  Chest wall: no tenderness  Heart: regular rate and rhythm, S1, S2 normal, no murmur, click, rub or gallop  Abdomen: soft, non-tender; bowel sounds normal; no masses,  no organomegaly  Extremities: extremities normal, atraumatic, no cyanosis or edema  Skin: Skin color, texture, turgor normal. No rashes or lesions  Neurologic: weak    Electronically signed by Lakeshia Andino MD on 1/12/2023 at 9:26 PM

## 2023-01-13 NOTE — PROGRESS NOTES
[address] [address]  INPATIENT PHYSICAL THERAPY  DAILY NOTE  [facility] - 7E-70/070-A    Time In: 1400  Time Out: 1430  Timed Code Treatment Minutes: 30 Minutes  Minutes: 30          Date: 2023  Patient Name: Courtney Henning,  Gender:  male        MRN: [id number]  : 1965  (65 y.o.)     Referring Practitioner: Jacob Mendes DO  Diagnosis: Debility  Additional Pertinent Hx: Per H&P \"Bruce Dal Nageotte is a 65 y.o. male admitted to [address] [address] on 2023. Patient with past medical history of DM, HTN, and bipolar disorder who presented to [facility] for productive cough, fever and SOB. This had become progressively worse and is worse with exertion. While in the ER, patient did test positive for influenza A. CXR revealed coarsened appearance to the pulmonary parenchyma ?chronic. Pulmonary vascularity is normal. Tamiflu was started. Patient was admitted to [facility] Med-Surgical Unit. On 1/3/23 CODE Stroke was called due to new onset left facial weakness, lethargy and dysarthria. NIH of 3. 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 Based on patient's symptoms and time since last known well decision was made for TNK to be given. 22 mg of TNK given at 5:05 AM.  Patient transferred to the ICU with thrombolytic precautions. Patient was placed on HFNC. MRI complete 23 without acute findings of CVA. echocardiogram 1/3/2023 that showed EF of 55% and overall normal left ventricular function. Patient was cleared for diet by SLP. Tamilfu completed 23. Solu-Medrol 23. Out of droplet isolation 23. Plan was for patient to return to Assisted Living once medically stable, but needs to be able to care for self except for housemaking tasks. \"     Prior Level of Function:  Lives With: Alone  Type of Home: Assisted living [facility])  Home Layout: One level  Home Access: Level entry  Home Equipment: None   Bathroom Shower/Tub: Walk-in shower  Bathroom Toilet: Standard  Bathroom Equipment: Grab bars around toilet, Grab bars in shower    ADL Assistance: Independent  Homemaking Assistance: Needs assistance  Ambulation Assistance: Independent  Transfer Assistance: Independent  Active : No  Additional Comments: Pt reports using no AD at assisted living. Pt reports an aide is sometimes present so supervise showers, but he completes dressing, grooming and toileting independently. Facility completes housekeeping, cooking and transportation. Pt usually ambulates to dining room for meals. Restrictions/Precautions:  Restrictions/Precautions: Fall Risk, General Precautions, Modified Diet  Position Activity Restriction  Other position/activity restrictions: impulsive     SUBJECTIVE: Patient laying in bed upon arrival, just finished with Speech Therapy session and agreeable to therapy. PAIN: denies    Vitals: Vitals not assessed per clinical judgement, see nursing flowsheet    OBJECTIVE:  Bed Mobility:  Supine to Sit: Stand By Assistance, with head of bed raised, with rail    Transfers:  Sit to Stand: Stand By Assistance, [employer], X 1  Stand to Sit:Stand By Assistance    Ambulation:  Stand By Assistance, [employer], X 1  Distance: 160ft  Surface: Level Tile  Device:Rolling Walker  Gait Deviations: Forward Flexed Posture, Slow May, Decreased Step Length Bilaterally, Decreased Gait Speed, and Decreased Heel Strike Bilaterally    Balance:  Dynamic gait: CGA            -stepping forward over hurdles leading R/L and side-stepping over hurdles, using no UE support.  Required cues to complete sequencing task, difficulty following sequences to completion without cues, able to remember sequence ~5/6 hurdles.             -ambulation up/down ramp, while balancing ball on cone, no device            -ambulation while balancing ball on cone in each hand, no device, mild path deviations, dropped ball from RUE 3x, slow gait, moreso unsteady when instructed to increase step length/speed    Exercise:  Patient was guided in 1 set(s) 15 reps of exercise to both lower extremities. Standing heel/toe raises, Standing marches, Standing hip abduction/adduction, Standing hamstring curls, Standing hip flexion, and Mini squats with 2# ankle weights applied. Exercises were completed for increased independence with functional mobility. Functional Outcome Measures: Not completed       ASSESSMENT:  Assessment: Patient progressing toward established goals. Activity Tolerance:  Patient tolerance of  treatment: good. Equipment Recommendations:Equipment Needed: No (Continue to assess pending progress)  Discharge Recommendations: Home with Home Health PT  Plan: Current Treatment Recommendations: Strengthening, Balance training, Functional mobility training, Gait training, Safety education & training, Home exercise program, Patient/Caregiver education & training, Transfer training, Endurance training, Therapeutic activities, Neuromuscular re-education, Stair training  General Plan:  (5x/wk 90min)    Patient Education  Patient Education: Plan of Care, Bed Mobility, Transfers, Gait, Up in Chair for All Meals, Verbal Exercise Instruction    Goals:  Patient Goals : None Stated  Short Term Goals  Time Frame for Short Term Goals: 1 week  Short Term Goal 1: Patient to perform bed mobility supine<>sit with Supervision in order to assist with getting into and out of bed. Short Term Goal 2: Patient to perform sit to stand transfers without AD with </=1 cue for hand placement from various surfaces in order to assist with safety with transfers in home. Short Term Goal 3: Patient to ambulate >/=75 feet without AD with SBA in order to assist with home mobility. Short Term Goal 4: Patient to score >/=38/56 on the Augustine Balance Test in order to assist with reduced risk for falls.   Long Term Goals  Time Frame for Long Term Goals : 2 weeks from IPR evaluation  Long Term Goal 1: Patient to perform bed mobility supine<>sit with Mod I in order to assist with getting into and out of bed. Long Term Goal 2: Patient to perform sit to stand transfers without use of Ad with Mod I in order to assist with safety with transfers in home. Long Term Goal 3: Patient to ambulate >/=200 feet without AD with Mod I in order to assist with ambulating to and  from dining room for all meals. Long Term Goal 4: Patient to perform car transfer with Supervision in order to assist with getting to and from appointments. Long Term Goal 5: Patient to perform TUG in </=12 seconds in order to assist with functional dynamic balance. Following session, patient left in safe position with all fall risk precautions in place.

## 2023-01-13 NOTE — PROGRESS NOTES
Alert. Oriented to person, place, time. TESS 3mm to 2mm, sluggish. Speech clear. Mucous membranes pink, moist. Smile symmetrical, tongue midline. Apical pulse strong, regular. Respirations shallow, easy. Lung sounds clear anterior, poster, lateral. Non-productive cough noted. Bowel sounds active upper and lower right and left quadrants. Hypoactive upper and lower right and left quadrants. Abdomen round, firm. Denies tenderness with palpation. Denies trouble urinating. Radial pulses strong and equal bilaterally. Hand grasps strong and equal bilaterally. Arm drift negative bilaterally. Capillary refill less than 3 seconds. Skin turgor brisk. Pedal pulses faint, regular. Pedal push and pull strong bilaterally. Rafia's sign negative bilaterally. Leg lift strong and equal bilaterally. No edema noted. Denies pain, numbness or tingling. In chair eating breakfast. Chair alarm on. Call light and bedside table within reach. Voiced no further needs at this time.

## 2023-01-13 NOTE — PLAN OF CARE
Problem: ABCDS Injury Assessment  Goal: Absence of physical injury  Outcome: Progressing  No injury noted     Problem: Pain  Goal: Verbalizes/displays adequate comfort level or baseline comfort level  Outcome: Progressing  Denies pain this shift     Problem: Infection - Adult  Goal: Absence of infection at discharge  Outcome: Progressing  Monitor VS, pt is afebrile.

## 2023-01-13 NOTE — PLAN OF CARE
Problem: Discharge Planning  Goal: Discharge to home or other facility with appropriate resources  1/12/2023 2235 by Tiffany Santiago RN  Outcome: Progressing  Note: Patient plans to discharge back [facility] with home health. Will continue to assess discharge needs and barriers     Problem: Safety - Adult  Goal: Free from fall injury  Outcome: Progressing  Note: Patient will continue to use call light for assistance to prevent falls and promote patient safety. Problem: Pain  Goal: Verbalizes/displays adequate comfort level or baseline comfort level  1/12/2023 2235 by Tiffany Santiago RN  Outcome: Progressing  Note:   Patient denies pain at this time, will continue to assess.

## 2023-01-13 NOTE — PROGRESS NOTES
[address] [address]  INPATIENT PHYSICAL THERAPY  Daily Note  [facility] - 7E-70/070-A    Time In: 1000  Time Out: 1100  Timed Code Treatment Minutes: 60 Minutes  Minutes: 60          Date: 2023  Patient Name: Kristy Sims,  Gender:  male        MRN: [id number]  : 1965  (65 y.o.)     Referring Practitioner: Brooks Heller DO  Diagnosis: Debility  Additional Pertinent Hx: Per H&P \"Chico Wilde is a 65 y.o. male admitted to [address] [place] [address] on 2023. Patient with past medical history of DM, HTN, and bipolar disorder who presented to [facility] for productive cough, fever and SOB. This had become progressively worse and is worse with exertion. While in the ER, patient did test positive for influenza A. CXR revealed coarsened appearance to the pulmonary parenchyma ?chronic. Pulmonary vascularity is normal. Tamiflu was started. Patient was admitted to [facility] Med-Surgical Unit. On 1/3/23 CODE Stroke was called due to new onset left facial weakness, lethargy and dysarthria. NIH of 3. 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 Based on patient's symptoms and time since last known well decision was made for TNK to be given. 22 mg of TNK given at 5:05 AM.  Patient transferred to the ICU with thrombolytic precautions. Patient was placed on HFNC. MRI complete 23 without acute findings of CVA. echocardiogram 1/3/2023 that showed EF of 55% and overall normal left ventricular function. Patient was cleared for diet by SLP. Tamilfu completed 23. Solu-Medrol 23. Out of droplet isolation 23. Plan was for patient to return to Assisted Living once medically stable, but needs to be able to care for self except for housemaking tasks. \"     Prior Level of Function:  Lives With: Alone  Type of Home: Assisted living [facility])  Home Layout: One level  Home Access: Level entry  Home Equipment: None   Bathroom Shower/Tub: Walk-in shower  Bathroom Toilet: Standard  Bathroom Equipment: Grab bars around toilet, Grab bars in shower    ADL Assistance: Independent  Homemaking Assistance: Needs assistance  Ambulation Assistance: Independent  Transfer Assistance: Independent  Active : No  Additional Comments: Pt reports using no AD at assisted living. Pt reports an aide is sometimes present so supervise showers, but he completes dressing, grooming and toileting independently. Facility completes housekeeping, cooking and transportation. Pt usually ambulates to dining room for meals. Restrictions/Precautions:  Restrictions/Precautions: Fall Risk, General Precautions, Modified Diet  Position Activity Restriction  Other position/activity restrictions: impulsive     SUBJECTIVE: Pt. Laying in his bed and pleasantly agrees to therapy session. Pt. Impulsive throughout session. PAIN: None indicated    Vitals:   Patient Vitals for the past 8 hrs:   BP Patient Position Temp Temp src Pulse Resp SpO2 O2 Device   01/13/23 0934 122/62 -- -- -- (!) 110 -- -- --   01/13/23 0830 122/62 Sitting 98.6 °F (37 °C) Oral (!) 110 18 92 % None (Room air)        OBJECTIVE:  Bed Mobility:  Rolling to Right: Stand By Assistance   Supine to Sit: Stand By Assistance    Transfers:  Sit to Stand: Stand By Assistance, [employer] Assistance  Stand to Sit:Stand By Assistance    Ambulation:  Contact Guard Assistance  Distance: 60' x 2, 20' x 4, 12' x 3, 5' x 1  Surface: Level Tile  Device: No Device  Gait Deviations:  Slow May, Decreased Step Length Bilaterally, Decreased Gait Speed, Decreased Heel Strike Bilaterally, and Mild Path Deviations    Balance:  Pt. Completed standing dynamic balance activity: pinning clothespins on yardstick in sequence with Normal DAVID on level tile and Intermittent UE support on Rolling Walker with Stand By Assistance.  Activity completed to improve balance, enhance functional mobility, and reduce risk of falls. Neuromuscular Re-education  Pt. Completed standing, Stepping, and multi-tasking activity tapping cones with feet as instructed by therapist while balancing tennis balls on cones using Intermittent UE support on Rolling Walker to improve coordination, gait mechanics, hip/quad stability, and lateral weight shifting for improved functional mobility. Pt. Also completed lateral step ups onto 6\" steps with B UE support to improve lateral weight shifting and hip stability with mobility. Exercise:  Patient was guided in 1 set(s) 10 reps of exercises: Glut sets, Seated marches, Seated hamstring curls, Seated heel/toe raises, Long arc quads, Seated isometric hip adduction, Seated abduction/adduction, and abdominal isometrics. Exercises were completed for increased independence with functional mobility. Functional Outcome Measures:   Not completed    ASSESSMENT:  Assessment: Patient progressing toward established goals. Activity Tolerance:  Patient tolerance of  treatment: good.      Equipment Recommendations:Equipment Needed: No (Continue to assess pending progress)  Discharge Recommendations: Continue to assess pending progress, Patient would benefit from continued therapy after discharge     Plan: Current Treatment Recommendations: Strengthening, Balance training, Functional mobility training, Gait training, Safety education & training, Home exercise program, Patient/Caregiver education & training, Transfer training, Endurance training, Therapeutic activities, Neuromuscular re-education, Stair training  General Plan:  (5x/wk 90min)    Patient Education  Patient Education: Plan of Care, Functional Mobility, Reviewed Prior Education, Health Promotion and Wellness Education, Safety, Verbal Exercise Instruction    Goals:  Patient Goals : None Stated  Short Term Goals  Time Frame for Short Term Goals: 1 week  Short Term Goal 1: Patient to perform bed mobility supine<>sit with Supervision in order to assist with getting into and out of bed. Short Term Goal 2: Patient to perform sit to stand transfers without AD with </=1 cue for hand placement from various surfaces in order to assist with safety with transfers in home. Short Term Goal 3: Patient to ambulate >/=75 feet without AD with SBA in order to assist with home mobility. Short Term Goal 4: Patient to score >/=38/56 on the Augustine Balance Test in order to assist with reduced risk for falls. Long Term Goals  Time Frame for Long Term Goals : 2 weeks from IPR evaluation  Long Term Goal 1: Patient to perform bed mobility supine<>sit with Mod I in order to assist with getting into and out of bed. Long Term Goal 2: Patient to perform sit to stand transfers without use of Ad with Mod I in order to assist with safety with transfers in home. Long Term Goal 3: Patient to ambulate >/=200 feet without AD with Mod I in order to assist with ambulating to and  from dining room for all meals. Long Term Goal 4: Patient to perform car transfer with Supervision in order to assist with getting to and from appointments. Long Term Goal 5: Patient to perform TUG in </=12 seconds in order to assist with functional dynamic balance. Following session, patient left in safe position with all fall risk precautions in place.

## 2023-01-13 NOTE — PROGRESS NOTES
In chair reading newspaper. Chair alarm on, bedside table and call light within reach. Denies further needs.

## 2023-01-13 NOTE — PROGRESS NOTES
Physical Medicine & Rehabilitation   Progress Note    Chief Complaint:  debility, impaired mobility and ADLs     Subjective:  Patient seen and examined. NAEO. Patient with good sleep overnight. He denies any CP or  SOB. Vitals reviewed with some tachycardia. Labs obtained. He does have mild bump in his WBC to 11.2. States that he is feeling okay, however, when reviewing for any infectious symptoms, he reporting increased urinary frequency but no dysuria. He reports a cough which has been present \"for awhile\". He states that his cough is dry with no sputum production. Rehabilitation:   PT 01/12/2023:  Bed Mobility:  Rolling to Right: Supervision   Supine to Sit: Supervision     Transfers:  Sit to Stand: Stand By Assistance  Stand to Sit:Stand By Assistance     Ambulation:  Contact Guard Assistance  Distance: 70' x 2, multiple shorter distances. Surface: Level Tile  Device: No Device  Gait Deviations:  Slow May, Decreased Step Length Bilaterally, Decreased Gait Speed, Decreased Heel Strike Bilaterally, Mild Path Deviations, and mild lateral trunk sway  Pt. Completed dynamic gait activities weaving through cones and tapping cones with feet to improve coordination and gait mechanics for improved functional mobility. Stairs:  None     Balance:  Pt. Completed standing dynamic balance activity: balloon volleyball with Normal DAVID on level tile and No UE support  with Supervision. Activity completed to improve balance, enhance functional mobility, and reduce risk of falls. Pt. Completed standing dynamic balance activity: tapping numbered pods with feet with Normal DAVID on level tile and No UE support with Contact Guard Assistance. Activity completed to improve balance, enhance functional mobility, and reduce risk of falls.       Exercise:  None     Functional Outcome Measures:   Not completed       OT 01/12/2023:  COGNITION: Decreased Recall, Decreased Insight, Decreased Problem Solving, Decreased Safety Awareness, Difficulty Following Commands, and Impulsive     ADL:   No ADL's completed this session. Julius Rodriguez BALANCE:  Sitting Balance:  Supervision. Standing Balance: Contact Guard Assistance. BED MOBILITY:  Supine to Sit: Stand By Assistance       TRANSFERS:  Sit to Stand:  Contact Guard Assistance. From EOB and chair  Stand to Sit: Contact Guard Assistance. To chair and recliner     FUNCTIONAL MOBILITY:  Assistive Device: None  Assist Level:  Contact Guard Assistance. Distance: To and from therapy apartment  Fatigue noted seated rest break required. ADDITIONAL ACTIVITIES:  .Pt completed B UE exs with moderate resistance band x 12 reps, x 1 set, with good tolerance of exs, with  RBs throughout, and visual  and verbal cues for tech with resistance band to improve strength and activity tolerance to support Adls. Pt. Completed IADL task of dynamic Bilateral UE reaching with access and transport of items simulating simple task in kitchen such as retrieving a drink and accessing microwave and refrigerator. Pt. Demo some difficulty transporting cup spilling water on floor. CGA provided with vcs for safety to slow down. Pt. Stood x 4 mins, no LOB noted. SLP 01/12/2023:  Short-Term Goals:  SHORT TERM GOAL #1:  Goal 1: Patient will consume a soft and bite-size diet and thin liquids and advanced PO trials as clinically indicated with ST only with stable pulmonary status and adequate endurance to assist with meeting nutrition/hydration needs  INTERVENTIONS:Skilled dietary analysis completed this date with patient's breakfast tray consisting of pancakes, sausage, hashbrowns and thin liquids. The patient demonstrated prolonged but functional mastication of soft and bite sized diet textures. He was able to completely clear his oral cavity. He did not demonstrate overt s/s of laryngeal penetration/aspiration with any trialed diet texture or thin liquids immediately following the swallow.   He was noted to  have a cough during the meal but it did not appear to be directly related to oral intake. Continue to monitor and complete instrumental assessment of swallowing if indicated. SHORT TERM GOAL #2:  Goal 2: Patient will complete structured speech drills/tasks (DDK rates, AMRs, multi-syllabic word repetition) with implementation of SOS speech strategies to improve intelligibility to 70% in all contexts to optimize communicative efficacy. INTERVENTIONS:Reviewed SOS speech strategies this date. Patient was able to produce 2-syllable  words  with 100%  accuracy independently using  these strategies. When complexity was increased to 3 syllable words, accuracy decreased  to  70% independently. .  With moderate  cues he was  able to utilize strategies in  100% of  opportunities. When complexity was increased to the  sentence level, the patient's accuracy dropped and he required moderate cues to produce using SOS strategies with only 40% accuracy. Patient was noted to speak in monotone with minimal movement of articulators and  low volume. Intelligibility of spontaneous conversation was poor. Continue SOS strategies  at multi-syllabic and  phrase levels to improve overall intelligibility. SHORT TERM GOAL #3:  Goal 3: Patient will complete functional recall tasks (immediate, delayed, working, orientation, biographics) with 70% accuracy, mod cues to improve retention of pertinent information. INTERVENTIONS:Goal  not addressed this date due to a focus on other goals. SHORT TERM GOAL #4:  Goal 4: Patient will complete problem solving, verbal/visual reasoning, and executive functioning tasks (time, money/math/finances, medications) with 60% accuracy, mod cues to improve contributions within ADL/IADL completion. INTERVENTIONS:Goal  not addressed this date due to a focus on other goals.       SHORT TERM GOAL #5:  Goal 5: Patient will complete sequencing and thought organization tasks with 70% accuracy, mod cues to improve mental flexibility for daily living scenarios. INTERVENTIONS:Goal  not addressed this date due to a focus on other goals. Long-Term Goals:  Time Frame for Long Term Goals: 3 weeks from evaluation     LONG TERM GOAL #1:  Goal 1: Patient will consume a regular texture diet and thin liquids with stable pulmonary status and adequate endurance to assist with meeting nutrition/hydration needs. LONG TERM GOAL #2:  Goal 2: Patient will improve cognitive-linguistic skills to a min assist or higher upon discharge in order to make safe/successful discharge to least restrictive environment and resume ADL completion with least amount of supervision/assistance.      Comprehension: 5 - Patient understands basic needs (hungry/hot/pain)  Expression: 4 - Expresses basic ideas/needs 75-90%+ of the time  Social Interaction: 4 - Patient appropriate 75-90%+ of the time  Problem Solvin - Patient solves simple/routine tasks 75-90%+   Memory: 3 - Patient remembers 50%-74% of the time      Review of Systems:  CONSTITUTIONAL:  negative for  fevers and chills  EYES:  negative for  glasses and visual disturbance  HEENT:  negative for  voice change  RESPIRATORY:  negative for  dyspnea and wheezing, +cough  CARDIOVASCULAR:  negative for  chest pain, palpitations  GASTROINTESTINAL:  negative for nausea, vomiting, and change in bowel habits  GENITOURINARY:  negative for dysuria and urinary incontinence, +urinary frequency  SKIN:  negative rash or wound   MUSCULOSKELETAL:  positive for  muscle weakness; negative for pain  NEUROLOGICAL:  positive for speech problems and gait problems  BEHAVIOR/PSYCH:  positive for history of bipolar disorder  10 point system review otherwise negative      Objective:  /68   Pulse (!) 102   Temp 98.8 °F (37.1 °C) (Oral)   Resp 16   Ht 5' 9\" (1.753 m)   Wt 187 lb (84.8 kg)   SpO2 91%   BMI 27.62 kg/m²   Patient is awake and alert, no family at bedside   Mood: within normal limits  Affect: calm  General appearance: Patient is well nourished, well developed, well groomed and in no acute distress     Memory:   not formally assess but able to answer questions regarding history   Attention/Concentration: follow conversation  Language:  abnormal - chronic dysarthria     Cranial Nerves:  cranial nerves II-XII appear intact  ROM:  normal  Motor Exam: Full strength in BUE and BLE  Tone:  normal  Muscle bulk: within normal limits  Sensory:  Sensory intact  Coordination:   normal     Heart: RRR, no m/r/g noted   Lungs: CTAB, no wheezing, rales, or rhonchi noted he is breathing comfortably on room air without any increased WOB  Abdomen: non-distended   Skin: warm and dry, no rash or erythema  Edema: none    Diagnostics:   Recent Results (from the past 24 hour(s))   POCT Glucose    Collection Time: 01/12/23  4:38 PM   Result Value Ref Range    POC Glucose 167 (H) 70 - 108 mg/dl   POCT Glucose    Collection Time: 01/12/23  7:38 PM   Result Value Ref Range    POC Glucose 198 (H) 70 - 108 mg/dl   POCT Glucose    Collection Time: 01/13/23  7:28 AM   Result Value Ref Range    POC Glucose 180 (H) 70 - 108 mg/dl   CBC with Auto Differential    Collection Time: 01/13/23  9:45 AM   Result Value Ref Range    WBC 11.2 (H) 4.8 - 10.8 thou/mm3    RBC 4.45 (L) 4.70 - 6.10 mill/mm3    Hemoglobin 14.1 14.0 - 18.0 gm/dl    Hematocrit 43.6 42.0 - 52.0 %    MCV 98.0 (H) 80.0 - 94.0 fL    MCH 31.7 26.0 - 33.0 pg    MCHC 32.3 32.2 - 35.5 gm/dl    RDW-CV 13.0 11.5 - 14.5 %    RDW-SD 46.2 (H) 35.0 - 45.0 fL    Platelets 497 090 - 208 thou/mm3    MPV 8.6 (L) 9.4 - 12.4 fL    Seg Neutrophils 76.6 %    Lymphocytes 11.0 %    Monocytes 10.8 %    Eosinophils 0.6 %    Basophils 0.2 %    Immature Granulocytes 0.8 %    Segs Absolute 8.6 (H) 1.8 - 7.7 thou/mm3    Lymphocytes Absolute 1.2 1.0 - 4.8 thou/mm3    Monocytes Absolute 1.2 0.4 - 1.3 thou/mm3    Eosinophils Absolute 0.1 0.0 - 0.4 thou/mm3    Basophils Absolute 0.0 0.0 - 0.1 thou/mm3    Immature Grans (Abs) 0.09 (H) 0.00 - 0.07 thou/mm3    nRBC 0 /100 wbc   Basic Metabolic Panel    Collection Time: 01/13/23  9:45 AM   Result Value Ref Range    Sodium 137 135 - 145 meq/L    Potassium 4.6 3.5 - 5.2 meq/L    Chloride 100 98 - 111 meq/L    CO2 26 23 - 33 meq/L    Glucose 242 (H) 70 - 108 mg/dL    BUN 12 7 - 22 mg/dL    Creatinine 0.6 0.4 - 1.2 mg/dL    Calcium 8.3 (L) 8.5 - 10.5 mg/dL   Anion Gap    Collection Time: 01/13/23  9:45 AM   Result Value Ref Range    Anion Gap 11.0 8.0 - 16.0 meq/L   Glomerular Filtration Rate, Estimated    Collection Time: 01/13/23  9:45 AM   Result Value Ref Range    Est, Glom Filt Rate >60 >60 ml/min/1.73m2   POCT Glucose    Collection Time: 01/13/23 11:41 AM   Result Value Ref Range    POC Glucose 176 (H) 70 - 108 mg/dl   Urinalysis with Reflex to Culture    Collection Time: 01/13/23 12:00 PM    Specimen: Urine   Result Value Ref Range    Glucose, Ur >= 1000 (A) NEGATIVE mg/dl    Bilirubin Urine NEGATIVE NEGATIVE    Ketones, Urine TRACE (A) NEGATIVE    Specific Gravity, Urine > 1.030 (A) 1.002 - 1.030    Blood, Urine NEGATIVE NEGATIVE    pH, UA 6.5 5.0 - 9.0    Protein, UA NEGATIVE NEGATIVE    Urobilinogen, Urine 4.0 (A) 0.0 - 1.0 eu/dl    Nitrite, Urine NEGATIVE NEGATIVE    Leukocyte Esterase, Urine NEGATIVE NEGATIVE    Color, UA YELLOW STRAW-YELLOW    Character, Urine CLEAR CLEAR-SL CLOUD     CXR 01/13/2023:        Impression:       1. Borderline heart size. No effusion. 2. Very subtle mild scattered interstitial infiltrates in both central lung fields and right lung base, consistent with interstitial pneumonitis.         Impression:  Acute respiratory failure  Influenza A  Moderate cognitive impairment  Diabetes Mellitis type 2 with hyperglycemia  HTN  Bipolar disorder  Chronic tobacco use, not previously dx with chronic lung disease  Impaired ADLs and mobility     Plan:  Continue inpatient rehabilitation program involving at least 3 hours per day, 5 days per week of intensive rehabilitation. Rehabilitation services will include PT, OT, and SLP/RT in order to improve functional status prior to discharge. Family education and training will be completed. Equipment evaluations and recommendations will be completed as appropriate. Rehabilitation nursing will be involved for bowel, bladder, skin, and pain management. Nursing will also provide education and training to patient and family. Dr. Phan Lisa consulted for medical management  Acute Hypoxic Respiratory failure: 2/2 Influenza A. Not on oxygen at baseline. Now on supplemental O2 via nasal cannula- will continue to wean oxygen as tolerated. Patient has completed course of tamiflu and dolu-medrol. Leukocytosis: WBC 11.2 today. UA obtained due to increased urinary frequency  which is negative for infection. CXR obtained due to reports of dry cough- xray with  \"Very subtle mild scattered interstitial infiltrates in both central lung fields and right lung base, consistent with interstitial pneumonitis\". No findings to suggest acute PNA. Will plan to repeat CBC tomorrow- continue to monitor for signs or symptoms of infection   Episode of left facial droop: Possible TIA? . 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 MRI negative for any intracranial abnormality. Continue aspirin and statin  T2DM: Continue Lantus 7 units nightly- Metformin 500 mg BID added by Dr. Phan Lisa   HTN: Continue home lopressor  Bipolar Disorder: Continue home Clozapine, Pristiq, and Depakote  Prophylaxis:  DVT: Lovenox. Pain: Tylenol   Tobacco dependence: Nicotine patch added   Nutrition:  Consultation to dietician for nutritional counseling and recommendations. Prealbumin 17.0 on admission. Bladder: Per rehab nursing   Bowel: Continue docusate   and case management consultations for coordination of care and discharge planning.  Team conference held Thursday with anticipated DC back to assisted living on 1/20/2023.     Missed Therapy Time:  None    Milderd Sparrow, DO

## 2023-01-13 NOTE — PROGRESS NOTES
[facility]  Diagnosis List for Inpatient Rehab facility (IRF) - Patient Assessment Instrument (BOB)    Patient Name: Martina Hernandez        MRN: [id number]    : 1965  (65 y.o.)  Gender: male     Primary impairment requiring rehabilitation: 12 Debility      Etiologic Diagnosis that led to the condition: Acute respiratory failure    Comorbid conditions affecting rehabilitation:  Acute respiratory failure  Influenza A  Post viral syndrome  Moderate cognitive impairment  *Diabetes Mellitis type 2 with hyperglycemia  HTN  Bipolar disorder  Chronic tobacco use, not previously dx with chronic lung disease  Impaired ADLs and mobility

## 2023-01-14 ENCOUNTER — APPOINTMENT (OUTPATIENT)
Dept: CT IMAGING | Age: 58
End: 2023-01-14
Attending: FAMILY MEDICINE
Payer: MEDICARE

## 2023-01-14 LAB
ALLEN TEST: POSITIVE
ANION GAP SERPL CALCULATED.3IONS-SCNC: 10 MEQ/L (ref 8–16)
BASE EXCESS (CALCULATED): 2.8 MMOL/L (ref -2.5–2.5)
BASOPHILS # BLD: 0.3 %
BASOPHILS ABSOLUTE: 0 THOU/MM3 (ref 0–0.1)
BUN BLDV-MCNC: 8 MG/DL (ref 7–22)
CALCIUM SERPL-MCNC: 8.6 MG/DL (ref 8.5–10.5)
CHLORIDE BLD-SCNC: 104 MEQ/L (ref 98–111)
CO2: 25 MEQ/L (ref 23–33)
COLLECTED BY:: ABNORMAL
CREAT SERPL-MCNC: 0.4 MG/DL (ref 0.4–1.2)
DEVICE: ABNORMAL
EOSINOPHIL # BLD: 0.6 %
EOSINOPHILS ABSOLUTE: 0 THOU/MM3 (ref 0–0.4)
ERYTHROCYTE [DISTWIDTH] IN BLOOD BY AUTOMATED COUNT: 13.2 % (ref 11.5–14.5)
ERYTHROCYTE [DISTWIDTH] IN BLOOD BY AUTOMATED COUNT: 45.9 FL (ref 35–45)
GFR SERPL CREATININE-BSD FRML MDRD: > 60 ML/MIN/1.73M2
GLUCOSE BLD-MCNC: 140 MG/DL (ref 70–108)
GLUCOSE BLD-MCNC: 172 MG/DL (ref 70–108)
HCO3: 28 MMOL/L (ref 23–28)
HCT VFR BLD CALC: 41.6 % (ref 42–52)
HEMOGLOBIN: 13.4 GM/DL (ref 14–18)
IFIO2: 44
IMMATURE GRANS (ABS): 0.05 THOU/MM3 (ref 0–0.07)
IMMATURE GRANULOCYTES: 0.7 %
LACTIC ACID: 1.4 MMOL/L (ref 0.5–2)
LYMPHOCYTES # BLD: 27.8 %
LYMPHOCYTES ABSOLUTE: 1.9 THOU/MM3 (ref 1–4.8)
MCH RBC QN AUTO: 31 PG (ref 26–33)
MCHC RBC AUTO-ENTMCNC: 32.2 GM/DL (ref 32.2–35.5)
MCV RBC AUTO: 96.3 FL (ref 80–94)
MONOCYTES # BLD: 13.5 %
MONOCYTES ABSOLUTE: 0.9 THOU/MM3 (ref 0.4–1.3)
NUCLEATED RED BLOOD CELLS: 0 /100 WBC
O2 SATURATION: 96 %
PCO2: 43 MMHG (ref 35–45)
PH BLOOD GAS: 7.42 (ref 7.35–7.45)
PLATELET # BLD: 200 THOU/MM3 (ref 130–400)
PMV BLD AUTO: 8.4 FL (ref 9.4–12.4)
PO2: 80 MMHG (ref 71–104)
POTASSIUM SERPL-SCNC: 4.3 MEQ/L (ref 3.5–5.2)
PRO-BNP: 114.8 PG/ML (ref 0–124)
PROCALCITONIN: 0.06 NG/ML (ref 0.01–0.09)
RBC # BLD: 4.32 MILL/MM3 (ref 4.7–6.1)
SEG NEUTROPHILS: 57.1 %
SEGMENTED NEUTROPHILS ABSOLUTE COUNT: 3.9 THOU/MM3 (ref 1.8–7.7)
SODIUM BLD-SCNC: 139 MEQ/L (ref 135–145)
SOURCE, BLOOD GAS: ABNORMAL
WBC # BLD: 6.8 THOU/MM3 (ref 4.8–10.8)

## 2023-01-14 PROCEDURE — 6370000000 HC RX 637 (ALT 250 FOR IP): Performed by: FAMILY MEDICINE

## 2023-01-14 PROCEDURE — 6370000000 HC RX 637 (ALT 250 FOR IP): Performed by: STUDENT IN AN ORGANIZED HEALTH CARE EDUCATION/TRAINING PROGRAM

## 2023-01-14 PROCEDURE — 97530 THERAPEUTIC ACTIVITIES: CPT

## 2023-01-14 PROCEDURE — 94640 AIRWAY INHALATION TREATMENT: CPT

## 2023-01-14 PROCEDURE — 2700000000 HC OXYGEN THERAPY PER DAY

## 2023-01-14 PROCEDURE — 97116 GAIT TRAINING THERAPY: CPT

## 2023-01-14 PROCEDURE — 80048 BASIC METABOLIC PNL TOTAL CA: CPT

## 2023-01-14 PROCEDURE — 2580000003 HC RX 258: Performed by: STUDENT IN AN ORGANIZED HEALTH CARE EDUCATION/TRAINING PROGRAM

## 2023-01-14 PROCEDURE — 84145 PROCALCITONIN (PCT): CPT

## 2023-01-14 PROCEDURE — 83880 ASSAY OF NATRIURETIC PEPTIDE: CPT

## 2023-01-14 PROCEDURE — 97535 SELF CARE MNGMENT TRAINING: CPT

## 2023-01-14 PROCEDURE — 83605 ASSAY OF LACTIC ACID: CPT

## 2023-01-14 PROCEDURE — 6360000002 HC RX W HCPCS: Performed by: STUDENT IN AN ORGANIZED HEALTH CARE EDUCATION/TRAINING PROGRAM

## 2023-01-14 PROCEDURE — 36600 WITHDRAWAL OF ARTERIAL BLOOD: CPT

## 2023-01-14 PROCEDURE — 97110 THERAPEUTIC EXERCISES: CPT

## 2023-01-14 PROCEDURE — 82803 BLOOD GASES ANY COMBINATION: CPT

## 2023-01-14 PROCEDURE — 97112 NEUROMUSCULAR REEDUCATION: CPT

## 2023-01-14 PROCEDURE — 85025 COMPLETE CBC W/AUTO DIFF WBC: CPT

## 2023-01-14 PROCEDURE — 82948 REAGENT STRIP/BLOOD GLUCOSE: CPT

## 2023-01-14 PROCEDURE — 71275 CT ANGIOGRAPHY CHEST: CPT

## 2023-01-14 PROCEDURE — 36415 COLL VENOUS BLD VENIPUNCTURE: CPT

## 2023-01-14 PROCEDURE — 6360000004 HC RX CONTRAST MEDICATION: Performed by: FAMILY MEDICINE

## 2023-01-14 PROCEDURE — 1180000000 HC REHAB R&B

## 2023-01-14 PROCEDURE — 94760 N-INVAS EAR/PLS OXIMETRY 1: CPT

## 2023-01-14 RX ADMIN — DOCUSATE SODIUM 100 MG: 100 CAPSULE, LIQUID FILLED ORAL at 08:48

## 2023-01-14 RX ADMIN — ATORVASTATIN CALCIUM 20 MG: 20 TABLET, FILM COATED ORAL at 21:07

## 2023-01-14 RX ADMIN — METOPROLOL SUCCINATE 25 MG: 25 TABLET, EXTENDED RELEASE ORAL at 08:48

## 2023-01-14 RX ADMIN — ALBUTEROL SULFATE 2.5 MG: 2.5 SOLUTION RESPIRATORY (INHALATION) at 04:36

## 2023-01-14 RX ADMIN — METFORMIN HYDROCHLORIDE 500 MG: 500 TABLET ORAL at 16:08

## 2023-01-14 RX ADMIN — SODIUM CHLORIDE, PRESERVATIVE FREE 5 ML: 5 INJECTION INTRAVENOUS at 19:30

## 2023-01-14 RX ADMIN — INSULIN GLARGINE 7 UNITS: 100 INJECTION, SOLUTION SUBCUTANEOUS at 21:08

## 2023-01-14 RX ADMIN — CLOZAPINE 50 MG: 25 TABLET ORAL at 16:08

## 2023-01-14 RX ADMIN — DESVENLAFAXINE 50 MG: 50 TABLET, EXTENDED RELEASE ORAL at 08:48

## 2023-01-14 RX ADMIN — CLOZAPINE 200 MG: 100 TABLET ORAL at 21:07

## 2023-01-14 RX ADMIN — ASPIRIN 81 MG 81 MG: 81 TABLET ORAL at 08:48

## 2023-01-14 RX ADMIN — DIVALPROEX SODIUM 500 MG: 500 TABLET, EXTENDED RELEASE ORAL at 08:48

## 2023-01-14 RX ADMIN — METFORMIN HYDROCHLORIDE 500 MG: 500 TABLET ORAL at 08:48

## 2023-01-14 RX ADMIN — ENOXAPARIN SODIUM 40 MG: 100 INJECTION SUBCUTANEOUS at 08:48

## 2023-01-14 RX ADMIN — DIVALPROEX SODIUM 1000 MG: 500 TABLET, EXTENDED RELEASE ORAL at 21:06

## 2023-01-14 RX ADMIN — CLOZAPINE 50 MG: 25 TABLET ORAL at 08:48

## 2023-01-14 RX ADMIN — IOPAMIDOL 80 ML: 755 INJECTION, SOLUTION INTRAVENOUS at 07:24

## 2023-01-14 ASSESSMENT — PAIN SCALES - GENERAL
PAINLEVEL_OUTOF10: 0

## 2023-01-14 NOTE — PROGRESS NOTES
[address] [address]  INPATIENT PHYSICAL THERAPY  Daily Note  [facility] - [id number]/070-A    Time In: 6830  Time Out: 9386  Timed Code Treatment Minutes: 60 Minutes  Minutes: 60          Date: 2023  Patient Name: Sherice Sharp,  Gender:  male        MRN: [id number]  : 1965  (65 y.o.)     Referring Practitioner: Rebecca Lucero DO  Diagnosis: Debility  Additional Pertinent Hx: Per H&P \"Chico Hanson is a 65 y.o. male admitted to [address] [address] on 2023. Patient with past medical history of DM, HTN, and bipolar disorder who presented to [facility] for productive cough, fever and SOB. This had become progressively worse and is worse with exertion. While in the ER, patient did test positive for influenza A. CXR revealed coarsened appearance to the pulmonary parenchyma ?chronic. Pulmonary vascularity is normal. Tamiflu was started. Patient was admitted to [facility]. On 1/3/23 CODE Stroke was called due to new onset left facial weakness, lethargy and dysarthria. NIH of 3. 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 Based on patient's symptoms and time since last known well decision was made for TNK to be given. 22 mg of TNK given at 5:05 AM.  Patient transferred to the ICU with thrombolytic precautions. Patient was placed on HFNC. MRI complete 23 without acute findings of CVA. echocardiogram 1/3/2023 that showed EF of 55% and overall normal left ventricular function. Patient was cleared for diet by SLP. Tamilfu completed 23. Solu-Medrol 23. Out of droplet isolation 23. Plan was for patient to return to Assisted Living once medically stable, but needs to be able to care for self except for housemaking tasks. \"     Prior Level of Function:  Lives With: Alone  Type of Home: Assisted living [facility])  Home Layout: One level  Home Access: Level entry  Home Equipment: None   Bathroom Shower/Tub: Walk-in shower  Bathroom Toilet: Standard  Bathroom Equipment: Grab bars around toilet, Grab bars in shower    ADL Assistance: Independent  Homemaking Assistance: Needs assistance  Ambulation Assistance: Independent  Transfer Assistance: Independent  Active : No  Additional Comments: Pt reports using no AD at assisted living. Pt reports an aide is sometimes present so supervise showers, but he completes dressing, grooming and toileting independently. Facility completes housekeeping, cooking and transportation. Pt usually ambulates to dining room for meals. Restrictions/Precautions:  Restrictions/Precautions: Fall Risk, General Precautions, Modified Diet  Position Activity Restriction  Other position/activity restrictions: impulsive     SUBJECTIVE: Pt. Seated in his WC and pleasantly agrees to therapy session. Pt on 2L 02, just returned from chest CT. Assisted patient to don shoes. Resting  in wheelchair, Sp02=96%. After short walk, HR increased to 109-112bpm and Sp02 unchanged. Needed frequent rest breaks during session due to reported decreased endurance. PAIN: 0/10: No pain reported. Vitals:   Patient Vitals for the past 8 hrs:   Pulse Resp SpO2 O2 Device O2 Flow Rate (L/min)   01/14/23 0630 -- -- -- Nasal cannula 6 L/min   01/14/23 0437 98 20 91 % Nasal cannula 6 L/min        OBJECTIVE:  Bed Mobility:  Not Tested    Transfers:  Sit to Stand: Stand By Assistance  Stand to [address] [place] [id number]  To/From Bed and Chair: Contact Guard Assistance  Pt slightly impulsive when turning to sit down. Cues to step all the way back prior to initiating the stand to sit. Patient was asked to lock brakes on wheelchair prior to standing--yet did not and stood without locking brakes.       Ambulation:  Contact Guard Assistance  Distance: 100 feet x 2 without an AD + 75 feet x 2 without AD  Surface: Level Tile  Device: No Device  Gait Deviations:  Patient with slow luis overall, cues to avoid looking down when walking. Shorted steps lengths with decreased heel strike bilaterally and widened DAVID. Decreased to absent arm swing noted. Min path deviation. Stairs:  None    Balance/Neuromuscular Re-education:  Pt. Completed standing dynamic balance activity:   Patient completed walking without an AD with focus on speed to challenge balance. Despite multiple cues and even min assist to increased gait speed, patient unable to ambulate much faster than selected speed. Patient standing at 6\" step, completed LE tapping of colored/numbered pods. Tapped with both R and LLE's, progressing from using Bilateral UE support on railings to no UE support. Noted forceful extension of L knee when in stance phase. Pt required cues for improved grading of muscle forces and to \"tap lightly\". Needed CGA to Min A for activity. Progressed activity by adding colored pods on 2nd step. Patient walking without an AD to weave in/out of multiple cones on the floor. Cues to increase walking speed and needing CGA for balance. Progressed activity by having patient tap tops of each cone with both R and LLE without knocking cone over. Decreased grading of muscle forces noted with CGA needed for balance. Noted the forceful extension of L knee in stance phase again. Progressed activity by patient completing same LE tapping, but now balancing an object in LLE while tapping cones. Pt has difficulty balancing object while tapping cones. Dropped object twice and noted further decline in grading of muscle forces and accuracy of toe tapping on cone. Assisted patient to use bathroom during session. CGA for balance while patient pushed/pulled heavy door to open bathroom. Close SBA-CGA for standing balance while urinating and washing/drying hands at sink. Activities completed to improve balance, enhance functional mobility, and reduce risk of falls.        Exercise:  None    Functional Outcome Measures:   Not completed    ASSESSMENT:  Assessment: Patient progressing toward established goals. Worked on higher level balance this session with a focus on accuracy of movement and grading of muscle forces. Fatigued quickly and needed multiple rest breaks. Decreased safety awareness noted and impulsive at times. Activity Tolerance:  Patient tolerance of  treatment: fair. Equipment Recommendations:Equipment Needed: No (Continue to assess pending progress)  Discharge Recommendations: Continue to assess pending progress, Patient would benefit from continued therapy after discharge Continue to assess pending progress    Plan: Current Treatment Recommendations: Strengthening, Balance training, Functional mobility training, Gait training, Safety education & training, Home exercise program, Patient/Caregiver education & training, Transfer training, Endurance training, Therapeutic activities, Neuromuscular re-education, Stair training  General Plan:  (5x/wk 90min)    Patient Education  Patient Education: Plan of Care, Functional Mobility, Reviewed Prior Education, Health Promotion and Wellness Education, Safety, Verbal Exercise Instruction, Transfers    Goals:  Patient Goals : None Stated  Short Term Goals  Time Frame for Short Term Goals: 1 week  Short Term Goal 1: Patient to perform bed mobility supine<>sit with Supervision in order to assist with getting into and out of bed. Short Term Goal 2: Patient to perform sit to stand transfers without AD with </=1 cue for hand placement from various surfaces in order to assist with safety with transfers in home. Short Term Goal 3: Patient to ambulate >/=75 feet without AD with SBA in order to assist with home mobility. Short Term Goal 4: Patient to score >/=38/56 on the [place] Balance Test in order to assist with reduced risk for falls.   Long Term Goals  Time Frame for Long Term Goals : 2 weeks from IPR evaluation  Long Term Goal 1: Patient to perform bed mobility supine<>sit with Mod I in order to assist with getting into and out of bed. Long Term Goal 2: Patient to perform sit to stand transfers without use of Ad with Mod I in order to assist with safety with transfers in home. Long Term Goal 3: Patient to ambulate >/=200 feet without AD with Mod I in order to assist with ambulating to and  from dining room for all meals. Long Term Goal 4: Patient to perform car transfer with Supervision in order to assist with getting to and from appointments. Long Term Goal 5: Patient to perform TUG in </=12 seconds in order to assist with functional dynamic balance. Following session, patient left in safe position with all fall risk precautions in place.

## 2023-01-14 NOTE — PROGRESS NOTES
[address] [place] [address]  Occupational Therapy  Daily Note  Time:   Time In: 1000  Time Out: 1130  Timed Code Treatment Minutes: 90 Minutes  Minutes: 90          Date: 2023  Patient Name: Jose Peraza,   Gender: male      Room: [facility]/070-A  MRN: [id number]  : 1965  (65 y.o.)  Referring Practitioner: Dr. Rosi Nick  Diagnosis: Debility  Additional Pertinent Hx: Jose Peraza is a 65 y.o. male admitted to [address] [place] [address] on 2023. Patient with past medical history of DM, HTN, and bipolar disorder who presented to [facility] for productive cough, fever and SOB. This had become progressively worse and is worse with exertion. While in the ER, patient did test positive for influenza A. CXR revealed coarsened appearance to the pulmonary parenchyma ?chronic. Pulmonary vascularity is normal. Tamiflu was started. Patient was admitted to [facility] Med-S[facility] Unit. On 1/3/23 CODE Stroke was called due to new onset left facial weakness, lethargy and dysarthria. NIH of 3. 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 Based on patient's symptoms and time since last known well decision was made for TNK to be given. 22 mg of TNK given at 5:05 AM.  Patient transferred to the ICU with thrombolytic precautions. Patient was placed on HFNC. MRI complete 23 without acute findings of CVA. echocardiogram 1/3/2023 that showed EF of 55% and overall normal left ventricular function. Patient was cleared for diet by SLP. Tamilfu completed 23. Solu-Medrol 23. Out of droplet isolation 23. Pt admitted to [facility] on 1/10 for therapy needs with eventual plan to return to Assisted Living once medically stable, but needs to be able to care for self except for housemaking tasks.     Restrictions/Precautions:  Restrictions/Precautions: Fall Risk, General Precautions, Modified Diet  Position Activity Restriction  Other position/activity restrictions: impulsive     SUBJECTIVE: In bed upon arrival, agreeable to OT session, cooperative    PAIN: 0/10:     Vitals: Vitals not assessed per clinical judgement, see nursing flowsheet    COGNITION: Slow Processing, Decreased Recall, Decreased Insight, Impaired Memory, Decreased Problem Solving, Decreased Safety Awareness, and Impulsive    ADL:   Feeding: Independent. Able to open containers  Grooming: Stand By Assistance. Rinsed mouth standing sinkside  Bathing: Stand By Assistance. Completed bath sitting in w/c   Upper Extremity Dressing: Minimal Assistance. A for buttons on shirt to match up  Lower Extremity Dressing: Stand By Assistance. Completed sitting in bedside chair  Footwear Management: Stand By Assistance. Increased time  Toileting: Stand By Assistance. Stood at toilet for urination . BED MOBILITY:  Supine to Sit: Supervision HOB flat, used bedrail    TRANSFERS:  Sit to Stand:  Stand By Assistance. EOB and bedside chair  Stand to Sit: Stand By Assistance. FUNCTIONAL MOBILITY:  Assistive Device: None  Assist Level:  Contact Guard Assistance. Distance: To and from bathroom  unsteady     ADDITIONAL ACTIVITIES:  Patient completed BUE strengthening exercises with skilled education on HEP: completed x10 reps x1 set with a resistive band in all joints and all planes in order to improve UE strength and activity tolerance required for BADL routine and toilet / shower transfers. Patient tolerated well, requiring minimal rest breaks. Patient also required minimal cues for technique. Patient required several cues for sequencing and recall    ASSESSMENT:     Activity Tolerance:  Patient tolerance of  treatment: good. Discharge Recommendations: Continue to assess pending progress  Equipment Recommendations: Other: Pt will have a shower chair and grab bars to use in his AL apartment.   Plan: Times Per Week: 5x/wk for 90 min  Current Treatment Recommendations: Functional mobility training, Balance training, Self-Care / ADL, Safety education & training, Endurance training, Equipment evaluation, education, & procurement, Patient/Caregiver education & training    Patient Education  Patient Education: Home Exercise Program and Precautions    Goals  Short Term Goals  Time Frame for Short Term Goals: 1 week  Short Term Goal 1: Pt will sequence and complete bathing tasks with SBA and 0 vcs for perseveration to improve indep with showering at home. Short Term Goal 2: Pt will tolerate standing 4 min with SBA for increased ease of sinkside grooming. Short Term Goal 3: Pt will gather appropriate clothing and dress self with SBA and min vcs for sequencing and initation to improve indep with self care. Short Term Goal 4: Pt will sequence and initate grooming tasks following toileting with SBA and min vcs to improve indep with self care. Long Term Goals  Time Frame for Long Term Goals : 2 weeks from IPR evaluation  Long Term Goal 1: Pt will ambulate within room with Mod I and no device to improve indep with self care. Long Term Goal 2: Pt will complete dressing tasks with mod I and no cues for sequencing to improve indep with self care at AL. Long Term Goal 3: Pt will complete grooming and toileting tasks with mod I to return to prior level of function at AL. Following session, patient left in safe position with all fall risk precautions in place.

## 2023-01-14 NOTE — PROGRESS NOTES
[facility] THERAPY  [facility]  DAILY NOTE    TIME   SLP Individual Minutes  Time In: 3391  Time Out: [address]  Minutes: 30  Timed Code Treatment Minutes: 30 Minutes       Date: 2023  Patient Name: Peewee Zuluaga      CSN: [id number]   : 1965  (65 y.o.)  Gender: male   Referring Physician:  Leila Zee DO  Diagnosis: Debility  Precautions: Fall  Risk,  aspiration risk  Current Diet: Soft and Bite sized,thin liquids  Swallowing Strategies:  Full Upright Position, Small Bite/Sip, Medications Whole with Thin, Alternate Solids and Liquids, and Limit Distractions   Date of Last MBS/FEES: Not Applicable    Pain:  No pain reported. Subjective:  Patient was sitting in bed resting upon ST arrival.  He was pleasant and agreeable to ST services this date  He reported that he had gotten his hair cut and beard shaved this morning. Short-Term Goals:  SHORT TERM GOAL #1:  Goal 1: Patient will consume a soft and bite-size diet and thin liquids and advanced PO trials as clinically indicated with ST only with stable pulmonary status and adequate endurance to assist with meeting nutrition/hydration needs  INTERVENTIONS:  Not addressed this date due to a focus on other objectives. SHORT TERM GOAL #2:  Goal 2: Patient will complete structured speech drills/tasks (DDK rates, AMRs, multi-syllabic word repetition) with implementation of SOS speech strategies to improve intelligibility to 70% in all contexts to optimize communicative efficacy.   INTERVENTIONS:  Patient completed 2 syllable \"puthukuh\" x10 with moderate cues, benefited from slowing down and repeating after ST model; fair success   Patient completed 3 syllable \"puhtuhkuh\" x10 with moderate cues, benefited from slowing down and repeating after ST model; fair success  *ST with instruction/education r/t encouragement of moving patient mouth more and slowing down    SHORT TERM GOAL #3:  Goal 3: Patient will complete functional recall tasks (immediate, delayed, working, orientation, biographics) with 70% accuracy, mod cues to improve retention of pertinent information. INTERVENTIONS:  3 unit ST ID: introduction of memory strategy WRAP (write, repeat, associate, picture)  Immediate: 4indep  Orientation: 6indep  Name:indep  *patient benefits from visuals    SHORT TERM GOAL #4:  Goal 4: Patient will complete problem solving, verbal/visual reasoning, and executive functioning tasks (time, money/math/finances, medications) with 60% accuracy, mod cues to improve contributions within ADL/IADL completion. INTERVENTIONS:  Call light use: indep awareness of getting up with assistance  3 reason for call light 1/3indep, 1/3min, 1/3mod  *increased difficulty with reasoning     -patient completed word search 5 words in 3 minutes indep    SHORT TERM GOAL #5:  Goal 5: Patient will complete sequencing and thought organization tasks with 70% accuracy, mod cues to improve mental flexibility for daily living scenarios. INTERVENTIONS:Sequencing task x4, 4 steps each, patient with x1 error  *increased difficulties for reasoning of order       Long-Term Goals:  Time Frame for Long Term Goals: 3 weeks from evaluation    LONG TERM GOAL #1:  Goal 1: Patient will consume a regular texture diet and thin liquids with stable pulmonary status and adequate endurance to assist with meeting nutrition/hydration needs. LONG TERM GOAL #2:  Goal 2: Patient will improve cognitive-linguistic skills to a min assist or higher upon discharge in order to make safe/successful discharge to least restrictive environment and resume ADL completion with least amount of supervision/assistance.        Comprehension: 5 - Patient understands basic needs (hungry/hot/pain)  Expression: 4 - Expresses basic ideas/needs 75-90%+ of the time  Social Interaction: 4 - Patient appropriate 75-90%+ of the time  Problem Solvin - Patient solves simple/routine tasks 75-90%+   Memory: 3 - Patient remembers 50%-74% of the time    EDUCATION:  Learner: Patient  Education:  Reviewed ST goals and Plan of Care, Reviewed recommendations for follow-up, Home Safety Education, and SOS intelligibility strategies  Evaluation of Education: Verbalizes understanding, Needs further instruction, and Family not present    ASSESSMENT/PLAN:  Activity Tolerance:  Patient tolerance of  treatment: good. Good participation in trialed tasks. Assessment/Plan: Patient progressing toward established goals. Continues to require skilled care of licensed speech pathologist to progress toward achievement of established goals and plan of care. .     Plan for Next Session: SOS strategies, trial advanced diet textures  Discharge Recommendations: Continue to Assess Pending Progress    Mushtaq Villegas M.A. CF-SLP

## 2023-01-14 NOTE — PLAN OF CARE
Problem: Safety - Adult  Goal: Free from fall injury  Outcome: Progressing  Free From Fall Injury: Instruct family/caregiver on patient safety     Problem: ABCDS Injury Assessment  Goal: Absence of physical injury  Outcome: Progressing  Absence of Physical Injury: Implement safety measures based on patient assessment     Problem: Pain  Goal: Verbalizes/displays adequate comfort level or baseline comfort level  Outcome: Progressing  Verbalizes/displays adequate comfort level or baseline comfort level:   Encourage patient to monitor pain and request assistance   Assess pain using appropriate pain scale   Administer analgesics based on type and severity of pain and evaluate response     Problem: Skin/Tissue Integrity  Goal: Absence of new skin breakdown  Description: 1. Monitor for areas of redness and/or skin breakdown  2. Assess vascular access sites hourly  3. Every 4-6 hours minimum:  Change oxygen saturation probe site  4. Every 4-6 hours:  If on nasal continuous positive airway pressure, respiratory therapy assess nares and determine need for appliance change or resting period.   Outcome: Progressing

## 2023-01-14 NOTE — PLAN OF CARE
Problem: Discharge Planning  Goal: Discharge to home or other facility with appropriate resources  Recent Flowsheet Documentation  Taken 1/13/2023 2144 by Gabriel Bonds RN  Discharge to home or other facility with appropriate resources: Identify barriers to discharge with patient and caregiver     Problem: Safety - Adult  Goal: Free from fall injury  Outcome: Progressing     Problem: ABCDS Injury Assessment  Goal: Absence of physical injury  1/14/2023 0002 by Gabriel Bonds RN  Outcome: Progressing     Problem: Pain  Goal: Verbalizes/displays adequate comfort level or baseline comfort level  1/14/2023 0002 by Gabriel Bonds RN  Outcome: Progressing     Problem: Infection - Adult  Goal: Absence of infection at discharge  Outcome: Progressing  Flowsheets (Taken 1/13/2023 2144)  Absence of infection at discharge:   Assess and monitor for signs and symptoms of infection   Monitor lab/diagnostic results     Problem: Infection - Adult  Goal: Absence of infection during hospitalization  Outcome: Progressing  Flowsheets (Taken 1/13/2023 2144)  Absence of infection during hospitalization:   Assess and monitor for signs and symptoms of infection   Monitor lab/diagnostic results     Problem: Infection - Adult  Goal: Absence of fever/infection during anticipated neutropenic period  Recent Flowsheet Documentation  Taken 1/13/2023 2144 by Gabriel Bonds RN  Absence of fever/infection during anticipated neutropenic period: Monitor white blood cell count     Problem: Metabolic/Fluid and Electrolytes - Adult  Goal: Electrolytes maintained within normal limits  Outcome: Progressing  Flowsheets (Taken 1/13/2023 2144)  Electrolytes maintained within normal limits:   Monitor labs and assess patient for signs and symptoms of electrolyte imbalances   Administer electrolyte replacement as ordered     Problem: Metabolic/Fluid and Electrolytes - Adult  Goal: Hemodynamic stability and optimal renal function maintained  Recent Flowsheet Documentation  Taken 1/13/2023 2144 by Karen Adams RN  Hemodynamic stability and optimal renal function maintained: Monitor labs and assess for signs and symptoms of volume excess or deficit     Problem: Metabolic/Fluid and Electrolytes - Adult  Goal: Electrolytes maintained within normal limits  Outcome: Progressing  Flowsheets (Taken 1/13/2023 2144)  Electrolytes maintained within normal limits:   Monitor labs and assess patient for signs and symptoms of electrolyte imbalances   Administer electrolyte replacement as ordered     Problem: Hematologic - Adult  Goal: Maintains hematologic stability  Outcome: Progressing  Flowsheets (Taken 1/13/2023 2144)  Maintains hematologic stability: Assess for signs and symptoms of bleeding or hemorrhage     Problem: Skin/Tissue Integrity  Goal: Absence of new skin breakdown  Description: 1. Monitor for areas of redness and/or skin breakdown  2. Assess vascular access sites hourly  3. Every 4-6 hours minimum:  Change oxygen saturation probe site  4. Every 4-6 hours:  If on nasal continuous positive airway pressure, respiratory therapy assess nares and determine need for appliance change or resting period.   1/14/2023 0002 by Karen Adams RN  Outcome: Progressing

## 2023-01-14 NOTE — PROGRESS NOTES
Buster Merlin is a 64 yo gentleman who- per chart- has a mild cognitive impairment. He lives in a group home and does have a legal guardian. He reports he has not had visitors and that his family Pankaj Hightower have their own lives. \"   He enjoyed having a visitor . Let him know that if he would like a  to visit, to let his nurse know.

## 2023-01-14 NOTE — PROGRESS NOTES
RCP attempted left radial ABG twice and was unable to obtain sample. Sammi Esparza called to assist with next ABG attempt. RN notified.

## 2023-01-14 NOTE — PLAN OF CARE
Problem: ABCDS Injury Assessment  Goal: Absence of physical injury  Outcome: Progressing  No physical injury noted     Problem: Pain  Goal: Verbalizes/displays adequate comfort level or baseline comfort level  Outcome: Progressing  Reposition frequently to alleviate pain. Pt denies pain      Problem: Skin/Tissue Integrity  Goal: Absence of new skin breakdown  Description: 1. Monitor for areas of redness and/or skin breakdown  2. Assess vascular access sites hourly  3. Every 4-6 hours minimum:  Change oxygen saturation probe site  4. Every 4-6 hours:  If on nasal continuous positive airway pressure, respiratory therapy assess nares and determine need for appliance change or resting period. Outcome: Progressing  Skin assessment every shift. No new areas of skin breakdown.

## 2023-01-14 NOTE — PROGRESS NOTES
[facility]  INPATIENT PHYSICAL THERAPY  DAILY NOTE  [address] [facility],4Th Floor - 7E-70/070-A    Time In: 1410  Time Out: 1440  Timed Code Treatment Minutes: 30 Minutes  Minutes: 30        Date: 2023  Patient Name: Lindsey London,  Gender:  male        MRN: [id number]  : 1965  (65 y.o.)     Referring Practitioner: India Kohli DO  Diagnosis: Debility  Additional Pertinent Hx: Per H&P \"Chico Everett is a 65 y.o. male admitted to [facility] on 2023. Patient with past medical history of DM, HTN, and bipolar disorder who presented to [facility] for productive cough, fever and SOB. This had become progressively worse and is worse with exertion. While in the ER, patient did test positive for influenza A. CXR revealed coarsened appearance to the pulmonary parenchyma ?chronic. Pulmonary vascularity is normal. Tamiflu was started. Patient was admitted to [facility] Med-Surgical Unit. On 1/3/23 CODE Stroke was called due to new onset left facial weakness, lethargy and dysarthria. NIH of 3. 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 Based on patient's symptoms and time since last known well decision was made for TNK to be given. 22 mg of TNK given at 5:05 AM.  Patient transferred to the ICU with thrombolytic precautions. Patient was placed on HFNC. MRI complete 23 without acute findings of CVA. echocardiogram 1/3/2023 that showed EF of 55% and overall normal left ventricular function. Patient was cleared for diet by SLP. Tamilfu completed 23. Solu-Medrol 23. Out of droplet isolation 23. Plan was for patient to return to Assisted Living once medically stable, but needs to be able to care for self except for housemaking tasks. \"     Prior Level of Function:  Lives With: Alone  Type of Home: Assisted living [facility])  Home Layout: One level  Home Access: Level entry  Home Equipment: None   Bathroom Shower/Tub: Walk-in shower  Bathroom Toilet: Standard  Bathroom Equipment: Grab bars around toilet, Grab bars in shower    ADL Assistance: Independent  Homemaking Assistance: Needs assistance  Ambulation Assistance: Independent  Transfer Assistance: Independent  Active : No  Additional Comments: Pt reports using no AD at assisted living. Pt reports an aide is sometimes present so supervise showers, but he completes dressing, grooming and toileting independently. Facility completes housekeeping, cooking and transportation. Pt usually ambulates to dining room for meals. Restrictions/Precautions:  Restrictions/Precautions: Fall Risk, General Precautions, Modified Diet  Position Activity Restriction  Other position/activity restrictions: impulsive     SUBJECTIVE: Patient up in bathroom by himself, RN stated patient had gotten up without putting call light on. RN states she did not put bed alarm on as patient wanted to sit EOB and alarm would not stopping sounding if patient sat there. Educated patient on safety concerns and overall fall risk of getting up without someone's assistance. Pt with flat affect overall. Was able to repeat to therapist at end of session that he would use call light if he had to get up. PAIN: 0/10    Vitals: [id number] throughout session. RN made aware. Sp02 95% on room air. OBJECTIVE:  Bed Mobility:  Rolling to Left: Modified Independent   Rolling to Right: Modified Independent   Supine to Sit: Supervision  Sit to Supine: Supervision   Completed on mat table. Pt very close to edge of mat table when sitting up, needed supervision for safety awareness. Transfers:  Sit to Stand: Contact Guard Assistance, Minimal Assistance  [facility]  Stood from [employer] and chair with arms in therapy gym. Had one LOB in therapy gym, patient losing balance to the right and kept walking despite unsteadiness.  Patient impulsive to sit quickly and stand and start walking quickly despite balance deficits. Ambulation:  Contact Guard Assistance  Distance: 120 feet x 2  Surface: Level Tile  Device:No Device  Gait Deviations:  Patient with very slow luis, head down position with minimal to absent arm swing. Pt refused to wear tennis shoes during this session and wanted to keep house slippers on. Right slipper appeared too big for patient's foot and kept slipping off heel during swing phase. Attempted to have patient increase gait speed both verbally and with light manual assistance at pelvis, but pt unable to change gait speed. Stated he did not walk this slow prior, but unable to increase speed. Wide DAVID and decreased heel strike. Balance:  Sit to/from stands from edge of mat table without use of arms. Noted posterior lean upon standing with patient's weight in heels. Despite cues and different positioning, patient relying on mat table for support upon initial stand (back of legs touching mat table). Completed 5 reps total.       Exercise:  Supine bridging with 5 sec hold and arms over chest, supine bridging with legs extended and feet on therapy ball with 5 sec hold. Assisted patient into quadriped position and completed alternating LE extensions sliding toe on mat table. Patient only tolerated about 5 reps and stated he was dizzy and wanted to get out of the position. Sidelying hip abduction x 10 reps, sidelying hip abduction with same side UE elevated to engage core x 10 reps. Needed assist for LLE, poor proprioception/coordination of limb noted. Exercises were completed for increased independence with functional mobility. Functional Outcome Measures: Not completed       ASSESSMENT:  Assessment: Patient progressing toward established goals. Patient with decreased safety awareness overall. Activity Tolerance:  Patient tolerance of  treatment: good.       Equipment Recommendations:Equipment Needed: No (Continue to assess pending progress)  Discharge Recommendations: Continue to assess pending progress  Plan: Current Treatment Recommendations: Strengthening, Balance training, Functional mobility training, Gait training, Safety education & training, Home exercise program, Patient/Caregiver education & training, Transfer training, Endurance training, Therapeutic activities, Neuromuscular re-education, Stair training  General Plan:  (5x/wk 90min)    Patient Education  Patient Education: Transfers, safety awareness with using call light prior to getting up in room    Goals:  Patient Goals : None Stated  Short Term Goals  Time Frame for Short Term Goals: 1 week  Short Term Goal 1: Patient to perform bed mobility supine<>sit with Supervision in order to assist with getting into and out of bed. Short Term Goal 2: Patient to perform sit to stand transfers without AD with </=1 cue for hand placement from various surfaces in order to assist with safety with transfers in home. Short Term Goal 3: Patient to ambulate >/=75 feet without AD with SBA in order to assist with home mobility. Short Term Goal 4: Patient to score >/=38/56 on the Augustine Balance Test in order to assist with reduced risk for falls. Long Term Goals  Time Frame for Long Term Goals : 2 weeks from IPR evaluation  Long Term Goal 1: Patient to perform bed mobility supine<>sit with Mod I in order to assist with getting into and out of bed. Long Term Goal 2: Patient to perform sit to stand transfers without use of Ad with Mod I in order to assist with safety with transfers in home. Long Term Goal 3: Patient to ambulate >/=200 feet without AD with Mod I in order to assist with ambulating to and  from dining room for all meals. Long Term Goal 4: Patient to perform car transfer with Supervision in order to assist with getting to and from appointments. Long Term Goal 5: Patient to perform TUG in </=12 seconds in order to assist with functional dynamic balance.     Following session, patient left in safe position with all fall risk precautions in place.

## 2023-01-15 LAB — GLUCOSE BLD-MCNC: 143 MG/DL (ref 70–108)

## 2023-01-15 PROCEDURE — 6360000002 HC RX W HCPCS: Performed by: STUDENT IN AN ORGANIZED HEALTH CARE EDUCATION/TRAINING PROGRAM

## 2023-01-15 PROCEDURE — 2700000000 HC OXYGEN THERAPY PER DAY

## 2023-01-15 PROCEDURE — 6370000000 HC RX 637 (ALT 250 FOR IP): Performed by: STUDENT IN AN ORGANIZED HEALTH CARE EDUCATION/TRAINING PROGRAM

## 2023-01-15 PROCEDURE — 94640 AIRWAY INHALATION TREATMENT: CPT

## 2023-01-15 PROCEDURE — 94760 N-INVAS EAR/PLS OXIMETRY 1: CPT

## 2023-01-15 PROCEDURE — 6370000000 HC RX 637 (ALT 250 FOR IP): Performed by: FAMILY MEDICINE

## 2023-01-15 PROCEDURE — 82948 REAGENT STRIP/BLOOD GLUCOSE: CPT

## 2023-01-15 PROCEDURE — 1180000000 HC REHAB R&B

## 2023-01-15 RX ADMIN — INSULIN GLARGINE 7 UNITS: 100 INJECTION, SOLUTION SUBCUTANEOUS at 20:59

## 2023-01-15 RX ADMIN — CLOZAPINE 50 MG: 25 TABLET ORAL at 17:28

## 2023-01-15 RX ADMIN — METFORMIN HYDROCHLORIDE 500 MG: 500 TABLET ORAL at 17:28

## 2023-01-15 RX ADMIN — CLOZAPINE 200 MG: 100 TABLET ORAL at 21:02

## 2023-01-15 RX ADMIN — DIVALPROEX SODIUM 500 MG: 500 TABLET, EXTENDED RELEASE ORAL at 08:57

## 2023-01-15 RX ADMIN — METOPROLOL SUCCINATE 25 MG: 25 TABLET, EXTENDED RELEASE ORAL at 08:57

## 2023-01-15 RX ADMIN — CLOZAPINE 50 MG: 25 TABLET ORAL at 08:57

## 2023-01-15 RX ADMIN — IPRATROPIUM BROMIDE AND ALBUTEROL SULFATE 1 AMPULE: .5; 3 SOLUTION RESPIRATORY (INHALATION) at 01:08

## 2023-01-15 RX ADMIN — DESVENLAFAXINE 50 MG: 50 TABLET, EXTENDED RELEASE ORAL at 08:57

## 2023-01-15 RX ADMIN — DOCUSATE SODIUM 100 MG: 100 CAPSULE, LIQUID FILLED ORAL at 08:57

## 2023-01-15 RX ADMIN — ENOXAPARIN SODIUM 40 MG: 100 INJECTION SUBCUTANEOUS at 08:57

## 2023-01-15 RX ADMIN — ATORVASTATIN CALCIUM 20 MG: 20 TABLET, FILM COATED ORAL at 21:02

## 2023-01-15 RX ADMIN — DIVALPROEX SODIUM 1000 MG: 500 TABLET, EXTENDED RELEASE ORAL at 21:02

## 2023-01-15 RX ADMIN — METFORMIN HYDROCHLORIDE 500 MG: 500 TABLET ORAL at 08:57

## 2023-01-15 RX ADMIN — ASPIRIN 81 MG 81 MG: 81 TABLET ORAL at 08:57

## 2023-01-15 ASSESSMENT — PAIN SCALES - GENERAL
PAINLEVEL_OUTOF10: 0

## 2023-01-15 NOTE — PLAN OF CARE
Problem: Discharge Planning  Goal: Discharge to home or other facility with appropriate resources  Recent Flowsheet Documentation  Taken 1/14/2023 2215 by Sylvester Day RN  Discharge to home or other facility with appropriate resources: Identify barriers to discharge with patient and caregiver     Problem: Safety - Adult  Goal: Free from fall injury  1/15/2023 0127 by Sylvester Day RN  Outcome: Progressing  Flowsheets (Taken 1/14/2023 1416 by Rama Kamara LPN)  Free From Fall Injury: Adeola Patterson family/caregiver on patient safety     Problem: ABCDS Injury Assessment  Goal: Absence of physical injury  1/15/2023 0127 by Sylvester Day RN  Outcome: Progressing  Flowsheets (Taken 1/14/2023 1416 by Rama Kamara LPN)  Absence of Physical Injury: Implement safety measures based on patient assessment     Problem: Pain  Goal: Verbalizes/displays adequate comfort level or baseline comfort level  1/15/2023 0127 by Sylvester Day RN  Outcome: Progressing     Problem: Infection - Adult  Goal: Absence of infection at discharge  Outcome: Progressing  Flowsheets (Taken 1/14/2023 2215)  Absence of infection at discharge:   Assess and monitor for signs and symptoms of infection   Monitor lab/diagnostic results     Problem: Infection - Adult  Goal: Absence of infection during hospitalization  Outcome: Progressing  Flowsheets (Taken 1/14/2023 2215)  Absence of infection during hospitalization:   Assess and monitor for signs and symptoms of infection   Monitor lab/diagnostic results     Problem: Infection - Adult  Goal: Absence of fever/infection during anticipated neutropenic period  Outcome: Progressing  Flowsheets (Taken 1/14/2023 2215)  Absence of fever/infection during anticipated neutropenic period: Monitor white blood cell count     Problem: Metabolic/Fluid and Electrolytes - Adult  Goal: Electrolytes maintained within normal limits  Outcome: Progressing  Flowsheets (Taken 1/14/2023 2215)  Electrolytes maintained within normal limits:   Monitor labs and assess patient for signs and symptoms of electrolyte imbalances   Administer electrolyte replacement as ordered     Problem: Metabolic/Fluid and Electrolytes - Adult  Goal: Glucose maintained within prescribed range  Outcome: Progressing  Flowsheets (Taken 1/14/2023 2215)  Glucose maintained within prescribed range: Monitor blood glucose as ordered     Problem: Hematologic - Adult  Goal: Maintains hematologic stability  Outcome: Progressing  Flowsheets (Taken 1/14/2023 2215)  Maintains hematologic stability: Assess for signs and symptoms of bleeding or hemorrhage     Problem: Skin/Tissue Integrity  Goal: Absence of new skin breakdown  Description: 1. Monitor for areas of redness and/or skin breakdown  2. Assess vascular access sites hourly  3. Every 4-6 hours minimum:  Change oxygen saturation probe site  4. Every 4-6 hours:  If on nasal continuous positive airway pressure, respiratory therapy assess nares and determine need for appliance change or resting period.   1/15/2023 0127 by Fuentes Sanchez RN  Outcome: Progressing

## 2023-01-15 NOTE — PLAN OF CARE
Problem: Safety - Adult  Goal: Free from fall injury  1/15/2023 1327 by Franko Wayne LPN  Outcome: Progressing  Flowsheets (Taken 1/15/2023 1123)  Free From Fall Injury: Instruct family/caregiver on patient safety  1/15/2023 0127 by Nataliya Jackson RN  Outcome: Progressing  Flowsheets (Taken 1/14/2023 1416 by Franko Wayne LPN)  Free From Fall Injury: Owen Kapoor family/caregiver on patient safety     Problem: ABCDS Injury Assessment  Goal: Absence of physical injury  1/15/2023 1327 by Franko Wayne LPN  Outcome: Progressing  Flowsheets (Taken 1/15/2023 1123)  Absence of Physical Injury: Implement safety measures based on patient assessment  1/15/2023 0127 by Nataliya Jackson RN  Outcome: Progressing  Flowsheets (Taken 1/14/2023 1416 by Franko Wayne LPN)  Absence of Physical Injury: Implement safety measures based on patient assessment     Problem: Pain  Goal: Verbalizes/displays adequate comfort level or baseline comfort level  1/15/2023 1327 by Franko Wayne LPN  Outcome: Julio Ramirez (Taken 1/10/2023 2306 by Nataliya Jackson RN)  Verbalizes/displays adequate comfort level or baseline comfort level:   Encourage patient to monitor pain and request assistance   Assess pain using appropriate pain scale   Administer analgesics based on type and severity of pain and evaluate response  1/15/2023 0127 by Nataliya Jackson RN  Outcome: Progressing     Problem: Infection - Adult  Goal: Absence of infection at discharge  1/15/2023 1327 by Franko Wayne LPN  Outcome: Progressing  Flowsheets (Taken 1/14/2023 2215 by Nataliya Jackson RN)  Absence of infection at discharge:   Assess and monitor for signs and symptoms of infection   Monitor lab/diagnostic results  1/15/2023 0127 by Nataliya Jackson RN  Outcome: Progressing  Flowsheets (Taken 1/14/2023 2215)  Absence of infection at discharge:   Assess and monitor for signs and symptoms of infection   Monitor lab/diagnostic results  Goal: Absence of infection during hospitalization  Outcome: Progressing  Absence of infection during hospitalization:   Assess and monitor for signs and symptoms of infection   Monitor lab/diagnostic results    Goal: Absence of fever/infection during anticipated neutropenic period  1/15/2023 1327 by Edson Dietrich LPN  Outcome: Progressing  Absence of fever/infection during anticipated neutropenic period: Monitor white blood cell count  Outcome: Progressing  Flowsheets (Taken 1/14/2023 2215)  Absence of fever/infection during anticipated neutropenic period: Monitor white blood cell count     Problem: Metabolic/Fluid and Electrolytes - Adult  Goal: Electrolytes maintained within normal limits    Outcome: Progressing  Electrolytes maintained within normal limits:   Monitor labs and assess patient for signs and symptoms of electrolyte imbalances   Administer electrolyte replacement as ordered  Goal: Hemodynamic stability and optimal renal function maintained  Outcome: Progressing  Hemodynamic stability and optimal renal function maintained: Monitor labs and assess for signs and symptoms of volume excess or deficit  Goal: Glucose maintained within prescribed range  Outcome: Progressing    Glucose maintained within prescribed range: Monitor blood glucose as ordered     Problem: Hematologic - Adult  Goal: Maintains hematologic stability  Outcome: Progressing  Maintains hematologic stability: Assess for signs and symptoms of bleeding or hemorrhage     Problem: Skin/Tissue Integrity  Goal: Absence of new skin breakdown  Description: 1. Monitor for areas of redness and/or skin breakdown  2. Assess vascular access sites hourly  3. Every 4-6 hours minimum:  Change oxygen saturation probe site  4. Every 4-6 hours:  If on nasal continuous positive airway pressure, respiratory therapy assess nares and determine need for appliance change or resting period.   Outcome: Progressing

## 2023-01-16 LAB — GLUCOSE BLD-MCNC: 164 MG/DL (ref 70–108)

## 2023-01-16 PROCEDURE — 6360000002 HC RX W HCPCS: Performed by: STUDENT IN AN ORGANIZED HEALTH CARE EDUCATION/TRAINING PROGRAM

## 2023-01-16 PROCEDURE — 97110 THERAPEUTIC EXERCISES: CPT

## 2023-01-16 PROCEDURE — 99232 SBSQ HOSP IP/OBS MODERATE 35: CPT | Performed by: STUDENT IN AN ORGANIZED HEALTH CARE EDUCATION/TRAINING PROGRAM

## 2023-01-16 PROCEDURE — 97129 THER IVNTJ 1ST 15 MIN: CPT

## 2023-01-16 PROCEDURE — 97535 SELF CARE MNGMENT TRAINING: CPT

## 2023-01-16 PROCEDURE — 82948 REAGENT STRIP/BLOOD GLUCOSE: CPT

## 2023-01-16 PROCEDURE — 1180000000 HC REHAB R&B

## 2023-01-16 PROCEDURE — 6370000000 HC RX 637 (ALT 250 FOR IP): Performed by: FAMILY MEDICINE

## 2023-01-16 PROCEDURE — 97116 GAIT TRAINING THERAPY: CPT

## 2023-01-16 PROCEDURE — 97530 THERAPEUTIC ACTIVITIES: CPT

## 2023-01-16 PROCEDURE — 92526 ORAL FUNCTION THERAPY: CPT

## 2023-01-16 PROCEDURE — 6370000000 HC RX 637 (ALT 250 FOR IP): Performed by: STUDENT IN AN ORGANIZED HEALTH CARE EDUCATION/TRAINING PROGRAM

## 2023-01-16 RX ADMIN — METOPROLOL SUCCINATE 25 MG: 25 TABLET, EXTENDED RELEASE ORAL at 09:09

## 2023-01-16 RX ADMIN — DIVALPROEX SODIUM 1000 MG: 500 TABLET, EXTENDED RELEASE ORAL at 20:45

## 2023-01-16 RX ADMIN — DOCUSATE SODIUM 100 MG: 100 CAPSULE, LIQUID FILLED ORAL at 09:08

## 2023-01-16 RX ADMIN — ASPIRIN 81 MG 81 MG: 81 TABLET ORAL at 09:08

## 2023-01-16 RX ADMIN — ENOXAPARIN SODIUM 40 MG: 100 INJECTION SUBCUTANEOUS at 09:09

## 2023-01-16 RX ADMIN — DESVENLAFAXINE 50 MG: 50 TABLET, EXTENDED RELEASE ORAL at 09:08

## 2023-01-16 RX ADMIN — CLOZAPINE 200 MG: 100 TABLET ORAL at 22:32

## 2023-01-16 RX ADMIN — CLOZAPINE 50 MG: 25 TABLET ORAL at 17:43

## 2023-01-16 RX ADMIN — ATORVASTATIN CALCIUM 20 MG: 20 TABLET, FILM COATED ORAL at 20:45

## 2023-01-16 RX ADMIN — DIVALPROEX SODIUM 500 MG: 500 TABLET, EXTENDED RELEASE ORAL at 09:08

## 2023-01-16 RX ADMIN — METFORMIN HYDROCHLORIDE 500 MG: 500 TABLET ORAL at 09:08

## 2023-01-16 RX ADMIN — CLOZAPINE 50 MG: 25 TABLET ORAL at 09:08

## 2023-01-16 RX ADMIN — METFORMIN HYDROCHLORIDE 1000 MG: 500 TABLET ORAL at 17:43

## 2023-01-16 RX ADMIN — INSULIN GLARGINE 7 UNITS: 100 INJECTION, SOLUTION SUBCUTANEOUS at 20:45

## 2023-01-16 ASSESSMENT — PAIN SCALES - GENERAL
PAINLEVEL_OUTOF10: 0
PAINLEVEL_OUTOF10: 0

## 2023-01-16 NOTE — PLAN OF CARE
Care plan reviewed with patient. Patient verbalizes understanding of the plan of care and contribute to goal setting.       Problem: Discharge Planning  Goal: Discharge to home or other facility with appropriate resources  Outcome: Progressing     Problem: Safety - Adult  Goal: Free from fall injury  1/16/2023 0058 by Jayshree Smith LPN  Outcome: Progressing  1/15/2023 1327 by Almita Hendrickson LPN  Outcome: Fabiana Villegas (Taken 1/15/2023 1123)  Free From Fall Injury: Instruct family/caregiver on patient safety     Problem: ABCDS Injury Assessment  Goal: Absence of physical injury  1/16/2023 0058 by Jayshree Smith LPN  Outcome: Progressing  1/15/2023 1327 by Almita Hendrickson LPN  Outcome: Progressing  Flowsheets (Taken 1/15/2023 1123)  Absence of Physical Injury: Implement safety measures based on patient assessment     Problem: Pain  Goal: Verbalizes/displays adequate comfort level or baseline comfort level  1/16/2023 0058 by Jayshree Smith LPN  Outcome: Progressing  1/15/2023 1327 by Almita Hendrickson LPN  Outcome: Fabiana Bakari (Taken 1/10/2023 2306 by Pierce Grider RN)  Verbalizes/displays adequate comfort level or baseline comfort level:   Encourage patient to monitor pain and request assistance   Assess pain using appropriate pain scale   Administer analgesics based on type and severity of pain and evaluate response     Problem: Infection - Adult  Goal: Absence of infection at discharge  1/16/2023 0058 by Jayshree Smith LPN  Outcome: Progressing  1/15/2023 1327 by Almita Hendrickson LPN  Outcome: Progressing  [address] Mustapha Woo (Taken 1/14/2023 2215 by Pierce Grider, RN)  Absence of infection at discharge:   Assess and monitor for signs and symptoms of infection   Monitor lab/diagnostic results  Goal: Absence of infection during hospitalization  1/16/2023 0058 by Jayshree Smith LPN  Outcome: Progressing  1/15/2023 1327 by Almita Hendrickson LPN  Outcome: Progressing  Flowsheets (Taken 1/14/2023 2215 by Maxine Cisse RN)  Absence of infection during hospitalization:   Assess and monitor for signs and symptoms of infection   Monitor lab/diagnostic results  Goal: Absence of fever/infection during anticipated neutropenic period  1/16/2023 0058 by Lilibeth Santoor LPN  Outcome: Progressing  1/15/2023 1327 by Alaina Frazier LPN  Outcome: Progressing  Flowsheets (Taken 1/14/2023 2215 by Maxine Cisse RN)  Absence of fever/infection during anticipated neutropenic period: Monitor white blood cell count     Problem: Metabolic/Fluid and Electrolytes - Adult  Goal: Electrolytes maintained within normal limits  Outcome: Progressing  Goal: Hemodynamic stability and optimal renal function maintained  Outcome: Progressing  Goal: Glucose maintained within prescribed range  Outcome: Progressing     Problem: Hematologic - Adult  Goal: Maintains hematologic stability  Outcome: Progressing     Problem: Skin/Tissue Integrity  Goal: Absence of new skin breakdown  Description: 1. Monitor for areas of redness and/or skin breakdown  2. Assess vascular access sites hourly  3. Every 4-6 hours minimum:  Change oxygen saturation probe site  4. Every 4-6 hours:  If on nasal continuous positive airway pressure, respiratory therapy assess nares and determine need for appliance change or resting period.   Outcome: Progressing     Problem: Nutrition Deficit:  Goal: Optimize nutritional status  Outcome: Progressing

## 2023-01-16 NOTE — PROGRESS NOTES
[address]  Occupational Therapy  Daily Note  Time:   Time In: 0700  Time Out: 0730  Timed Code Treatment Minutes: 30 Minutes  Minutes: 30          Date: 2023  Patient Name: Kimberly Bahena,   Gender: male      Room: [facility]70/070-A  MRN: [id number]  : 1965  (65 y.o.)  Referring Practitioner: Dr. Ivan Turner  Diagnosis: Debility  Additional Pertinent Hx: Kimberly Bahena is a 65 y.o. male admitted to [address] [place] [address] on 2023. Patient with past medical history of DM, HTN, and bipolar disorder who presented to [facility] for productive cough, fever and SOB. This had become progressively worse and is worse with exertion. While in the ER, patient did test positive for influenza A. CXR revealed coarsened appearance to the pulmonary parenchyma ?chronic. Pulmonary vascularity is normal. Tamiflu was started. Patient was admitted to [facility] Med-Surgical Unit. On 1/3/23 CODE Stroke was called due to new onset left facial weakness, lethargy and dysarthria. NIH of 3. 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 Based on patient's symptoms and time since last known well decision was made for TNK to be given. 22 mg of TNK given at 5:05 AM.  Patient transferred to the ICU with thrombolytic precautions. Patient was placed on HFNC. MRI complete 23 without acute findings of CVA. echocardiogram 1/3/2023 that showed EF of 55% and overall normal left ventricular function. Patient was cleared for diet by SLP. Tamilfu completed 23. Solu-Medrol 23. Out of droplet isolation 23. Pt admitted to [facility] on 1/10 for therapy needs with eventual plan to return to Assisted Living once medically stable, but needs to be able to care for self except for housemaking tasks.     Restrictions/Precautions:  Restrictions/Precautions: Fall Risk, General Precautions, Modified Diet  Position Activity Restriction  Other position/activity restrictions: impulsive     SUBJECTIVE: Up in chair upon arrival, agreeable to OT session, cooperative    PAIN: 0/10:     Vitals: Vitals not assessed per clinical judgement, see nursing flowsheet    COGNITION: Slow Processing, Impaired Memory, Decreased Problem Solving, and Impulsive    ADL:   Footwear Management: with set-up. Donned slippers sitting in chair    BED MOBILITY:  Not Tested    TRANSFERS:  Sit to Stand:  Stand By Assistance. Bedside chair  Stand to Sit: Stand By Assistance. FUNCTIONAL MOBILITY:  Assistive Device: None  Assist Level:  Contact Guard Assistance. Distance:  to/from [facility]  Unsteady at times     ADDITIONAL ACTIVITIES:  Patient completed BUE strengthening exercises with skilled education on HEP: completed x10 reps x1 set with a yellow band in all joints and all planes in order to improve UE strength and activity tolerance required for BADL routine and toilet / shower transfers. Patient tolerated well, requiring minimal rest breaks. Patient also required minimal cues for technique. Patient was able to go to [facility] to prepare cup of coffee, tolerated well and able to carry it back to his room all with close SBA    ASSESSMENT:     Activity Tolerance:  Patient tolerance of  treatment: good. Discharge Recommendations: 24 hour assistance or supervision  Equipment Recommendations: Other: Pt will have a shower chair and grab bars to use in his AL apartment.   Plan: Times Per Week: 5x/wk for 90 min  Current Treatment Recommendations: Functional mobility training, Balance training, Self-Care / ADL, Safety education & training, Endurance training, Equipment evaluation, education, & procurement, Patient/Caregiver education & training    Patient Education  Patient Education: IADL's and Home Exercise Program    Goals  Short Term Goals  Time Frame for Short Term Goals: 1 week  Short Term Goal 1: Pt will sequence and complete bathing tasks with SBA and 0 vcs for perseveration to improve indep with showering at home. Short Term Goal 2: Pt will tolerate standing 4 min with SBA for increased ease of sinkside grooming. Short Term Goal 3: Pt will gather appropriate clothing and dress self with SBA and min vcs for sequencing and initation to improve indep with self care. Short Term Goal 4: Pt will sequence and initate grooming tasks following toileting with SBA and min vcs to improve indep with self care. Long Term Goals  Time Frame for Long Term Goals : 2 weeks from IPR evaluation  Long Term Goal 1: Pt will ambulate within room with Mod I and no device to improve indep with self care. Long Term Goal 2: Pt will complete dressing tasks with mod I and no cues for sequencing to improve indep with self care at AL. Long Term Goal 3: Pt will complete grooming and toileting tasks with mod I to return to prior level of function at AL. Following session, patient left in safe position with all fall risk precautions in place.

## 2023-01-16 NOTE — PROGRESS NOTES
[address] [place] [address]  INPATIENT PHYSICAL THERAPY  DAILY NOTE  [facility] - [id number]-[id number]/070-A    Time In: 1100  Time Out: 1200  Timed Code Treatment Minutes: 60 Minutes  Minutes: 60          Date: 2023  Patient Name: Farshad Jones,  Gender:  male        MRN: [id number]  : 1965  (65 y.o.)     Referring Practitioner: Katie Moreno DO  Diagnosis: Debility  Additional Pertinent Hx: Per H&P \"Chico Bedoya is a 65 y.o. male admitted to [address] [place] [address] on 2023. Patient with past medical history of DM, HTN, and bipolar disorder who presented to [facility] for productive cough, fever and SOB. This had become progressively worse and is worse with exertion. While in the ER, patient did test positive for influenza A. CXR revealed coarsened appearance to the pulmonary parenchyma ?chronic. Pulmonary vascularity is normal. Tamiflu was started. Patient was admitted to [facility] Med-Surgical Unit. On 1/3/23 CODE Stroke was called due to new onset left facial weakness, lethargy and dysarthria. NIH of 3. 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 Based on patient's symptoms and time since last known well decision was made for TNK to be given. 22 mg of TNK given at 5:05 AM.  Patient transferred to the ICU with thrombolytic precautions. Patient was placed on HFNC. MRI complete 23 without acute findings of CVA. echocardiogram 1/3/2023 that showed EF of 55% and overall normal left ventricular function. Patient was cleared for diet by SLP. Tamilfu completed 23. Solu-Medrol 23. Out of droplet isolation 23. Plan was for patient to return to Assisted Living once medically stable, but needs to be able to care for self except for housemaking tasks. \"     Prior Level of Function:  Lives With: Alone  Type of Home: Assisted living [facility])  Home Layout: One level  Home Access: Level entry  Home Equipment: None   Bathroom Shower/Tub: Walk-in shower  Bathroom Toilet: Standard  Bathroom Equipment: Grab bars around toilet, Grab bars in shower    ADL Assistance: Independent  Homemaking Assistance: Needs assistance  Ambulation Assistance: Independent  Transfer Assistance: Independent  Active : No  Additional Comments: Pt reports using no AD at assisted living. Pt reports an aide is sometimes present so supervise showers, but he completes dressing, grooming and toileting independently. Facility completes housekeeping, cooking and transportation. Pt usually ambulates to dining room for meals. Restrictions/Precautions:  Restrictions/Precautions: Fall Risk, General Precautions, Modified Diet  Position Activity Restriction  Other position/activity restrictions: impulsive     SUBJECTIVE: Patient laying in bed upon arrival and agreeable to therapy. Patient requested to use restroom during session. Patient required frequent rest breaks throughout session. PAIN: denies    Vitals: Vitals not assessed per clinical judgement, see nursing flowsheet    OBJECTIVE:  Bed Mobility:  Rolling to Right: Stand By Assistance, with head of bed flat,     Supine to Sit: Stand By Assistance, with head of bed flat  Sit to Supine: Stand By Assistance, with head of bed flat   **Completed one trial with rail and one without    Transfers:  Sit to Stand: Stand By Assistance  Stand to Sit:Stand By Assistance    Ambulation:  Stand By Assistance, [employer] Assistance, X 1, with verbal cues   Distance: 140 ft, 250 ft  Surface: Level Tile  Device:No Device  Gait Deviations:   Forward Flexed Posture, Slow May, Decreased Step Length Bilaterally, Decreased Arm Swing, Decreased Gait Speed, and Decreased Heel Strike Bilaterally  **Cues for increased step length and speed with no carry over    Balance:  Dynamic Standing Balance: Contact Guard Assistance  -rockerboard: forward/lateral taps 5 reps x2, balance 10 sec x2, in parallel bars, using BUE support  **Difficulty with no UE support due to Mod to mild unsteadiness.  -Standing ball toss on level floor and black foam: no UE support, CGA, tossing outside DAVID, 2.5 min x1, 1.5 min x1, 1.25 min x1, No LOB      Exercise:  Patient was guided in 1 set(s) 15 reps of exercise to both lower extremities. Standing heel/toe raises, Standing marches, Standing hip abduction/adduction, Standing hamstring curls, and Standing hip flexion. Exercises were completed for increased independence with functional mobility. **Exercises performed with 2# ankle weights in parallel bars  **Core/postural exercises: x10 reps  -upper trunk rotations, holding physio ball shoulder flex/ext, trunk flexion reach with physio ball, weighted ball chop/lift      Functional Outcome Measures: Not completed       ASSESSMENT:  Assessment: Patient progressing toward established goals. Activity Tolerance:  Patient tolerance of  treatment: good. Equipment Recommendations:Equipment Needed: No (Continue to assess pending progress)  Discharge Recommendations: Home with Home Health PT and Patient would benefit from continued PT at discharge  Plan: Current Treatment Recommendations: Strengthening, Balance training, Functional mobility training, Gait training, Safety education & training, Home exercise program, Patient/Caregiver education & training, Transfer training, Endurance training, Therapeutic activities, Neuromuscular re-education, Stair training  General Plan:  (5x/wk 90min)    Patient Education  Patient Education: Plan of Care, Bed Mobility, Transfers, Gait, Verbal Exercise Instruction    Goals:  Patient Goals : None Stated  Short Term Goals  Time Frame for Short Term Goals: 1 week  Short Term Goal 1: Patient to perform bed mobility supine<>sit with Supervision in order to assist with getting into and out of bed.   Short Term Goal 2: Patient to perform sit to stand transfers without AD with </=1 cue for hand placement from various surfaces in order to assist with safety with transfers in home. Short Term Goal 3: Patient to ambulate >/=75 feet without AD with SBA in order to assist with home mobility. Short Term Goal 4: Patient to score >/=38/56 on the Augustine Balance Test in order to assist with reduced risk for falls. Long Term Goals  Time Frame for Long Term Goals : 2 weeks from IPR evaluation  Long Term Goal 1: Patient to perform bed mobility supine<>sit with Mod I in order to assist with getting into and out of bed. Long Term Goal 2: Patient to perform sit to stand transfers without use of Ad with Mod I in order to assist with safety with transfers in home. Long Term Goal 3: Patient to ambulate >/=200 feet without AD with Mod I in order to assist with ambulating to and  from dining room for all meals. Long Term Goal 4: Patient to perform car transfer with Supervision in order to assist with getting to and from appointments. Long Term Goal 5: Patient to perform TUG in </=12 seconds in order to assist with functional dynamic balance. Following session, patient left in safe position with all fall risk precautions in place. Therapy session performed by Apolinar AMIN under supervision of credentialed therapist Ottoniel Del Cid PTA.

## 2023-01-16 NOTE — PROGRESS NOTES
[address] [place] [address]  Occupational Therapy  Daily Note  Time:   Time In: 0900  Time Out: 1000  Timed Code Treatment Minutes: 60 Minutes  Minutes: 60          Date: 2023  Patient Name: Mandie Madrid,   Gender: male      Room: [facility]70/070-A  MRN: [id number]  : 1965  (65 y.o.)  Referring Practitioner: Dr. Kassandra Gottron  Diagnosis: Debility  Additional Pertinent Hx: Mandie Madrid is a 65 y.o. male admitted to [address] [place] [address] on 2023. Patient with past medical history of DM, HTN, and bipolar disorder who presented to [facility] for productive cough, fever and SOB. This had become progressively worse and is worse with exertion. While in the ER, patient did test positive for influenza A. CXR revealed coarsened appearance to the pulmonary parenchyma ?chronic. Pulmonary vascularity is normal. Tamiflu was started. Patient was admitted to [facility] Med-Surgical Unit. On 1/3/23 CODE Stroke was called due to new onset left facial weakness, lethargy and dysarthria. NIH of 3. 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 Based on patient's symptoms and time since last known well decision was made for TNK to be given. 22 mg of TNK given at 5:05 AM.  Patient transferred to the ICU with thrombolytic precautions. Patient was placed on HFNC. MRI complete 23 without acute findings of CVA. echocardiogram 1/3/2023 that showed EF of 55% and overall normal left ventricular function. Patient was cleared for diet by SLP. Tamilfu completed 23. Solu-Medrol 23. Out of droplet isolation 23. Pt admitted to [facility] on 1/10 for therapy needs with eventual plan to return to Assisted Living once medically stable, but needs to be able to care for self except for housemaking tasks.     Restrictions/Precautions:  Restrictions/Precautions: Fall Risk, General Precautions, Modified Diet  Position Activity Restriction  Other position/activity restrictions: impulsive     SUBJECTIVE: Up in chair upon arrival, agreeable to OT session, flat affect    PAIN: 0/10:     Vitals: Vitals not assessed per clinical judgement, see nursing flowsheet    COGNITION: Slow Processing, Impaired Memory, Decreased Problem Solving, and Impulsive    ADL:   No ADL's completed this session. .    BED MOBILITY:  Sit to Supine: Modified Independent HOB flat, used bedrail    TRANSFERS:  Sit to Stand:  Stand By Assistance. Bedside chair, arm chair, chair without arms and park bench  Stand to Sit: Stand By Assistance. FUNCTIONAL MOBILITY:  Assistive Device: None  Assist Level:  Stand By Assistance and Contact Guard Assistance. Distance:  to/from easy street and around [address] over various surfaces  Unstedy at times     ADDITIONAL ACTIVITIES:  Patient completed grocery task of writing a list, finding items in store, bagging items, transporting to car, taking items back to store and putting away all items, tolerated well, completed with CGA/SBA    Completed task of standing greater than 4 minutes while watering plants at various levels with SBA    Stood x 3 minutes while preparing PB toast, gathered items for task at various levels in kitchen, tolerated well    ASSESSMENT:     Activity Tolerance:  Patient tolerance of  treatment: good. Discharge Recommendations: 24 hour assistance or supervision  Equipment Recommendations: Other: Pt will have a shower chair and grab bars to use in his AL apartment.   Plan: Times Per Week: 5x/wk for 90 min  Current Treatment Recommendations: Functional mobility training, Balance training, Self-Care / ADL, Safety education & training, Endurance training, Equipment evaluation, education, & procurement, Patient/Caregiver education & training    Patient Education  Patient Education: IADL's    Goals  Short Term Goals  Time Frame for Short Term Goals: 1 week  Short Term Goal 1: Pt will sequence and complete bathing tasks with SBA and 0 vcs for perseveration to improve indep with showering at home. Short Term Goal 2: Pt will tolerate standing 4 min with SBA for increased ease of sinkside grooming. Short Term Goal 3: Pt will gather appropriate clothing and dress self with SBA and min vcs for sequencing and initation to improve indep with self care. Short Term Goal 4: Pt will sequence and initate grooming tasks following toileting with SBA and min vcs to improve indep with self care. Long Term Goals  Time Frame for Long Term Goals : 2 weeks from IPR evaluation  Long Term Goal 1: Pt will ambulate within room with Mod I and no device to improve indep with self care. Long Term Goal 2: Pt will complete dressing tasks with mod I and no cues for sequencing to improve indep with self care at AL. Long Term Goal 3: Pt will complete grooming and toileting tasks with mod I to return to prior level of function at AL. Following session, patient left in safe position with all fall risk precautions in place.

## 2023-01-16 NOTE — PROGRESS NOTES
[address] [facility]  [facility]  PROGRESS NOTE    TIME   SLP Individual Minutes  Time In: 8925  Time Out: 0095  Minutes: 32  Timed Code Treatment Minutes: 18 Minutes       Date: 2023  Patient Name: Janay Madrigal      CSN: [id number]   : 1965  (65 y.o.)  Gender: male   Referring Physician:  Carmen Mullins DO  Diagnosis: Debility  Precautions: Fall  Risk,  aspiration risk  Current Diet: Soft and Bite sized,thin liquids  Swallowing Strategies:  Full Upright Position, Small Bite/Sip, Medications Whole with Thin, Alternate Solids and Liquids, and Limit Distractions   Date of Last MBS/FEES: Not Applicable    Pain:  No pain reported. Subjective:  Patient was sitting upright in recliner with breakfast tray present upon ST entry. Patient agreeable to ST services this date. Patient reported he has planned discharge back to [facility] on Friday. Short-Term Goals:  SHORT TERM GOAL #1:  Goal 1: Patient will consume a soft and bite-size diet and thin liquids and advanced PO trials as clinically indicated with ST only with stable pulmonary status and adequate endurance to assist with meeting nutrition/hydration needs -GOAL NOT MET, CONTINUE GOAL   INTERVENTIONS:  Skilled dietary analysis completed with patient's breakfast tray consisting of scrambled eggs, pancakes, sausage and orange juice. Patient was noted to rapidly take several bites at once and then set his fork down to chew. He was able to demonstrate thorough mastication of soft and bite sized solids and clear oral cavity independently or with a liquid wash. With the last bite of sausage, patient was noted to [facility] AND [facility] for several minutes. When clinician questioned if he was having difficulty he stated 'I have no teeth'. Patient reports that he has dentures at his F but he does not wear them because they do not fit.  He continued to masticate the bite of sausage and demonstrate piecemeal deglutition and utilized liquid washes before it finally cleared after more than 5 minutes. Patient reported that he was on a regular diet and thin liquids at his ECF. It is recommended that a regular diet tray be trialed prior to discharge to allow analysis for regular textured solids, patient's use of strategies and to determine the patient's safest diet and liquid level. SHORT TERM GOAL #2:  Goal 2: Patient will complete structured speech drills/tasks (DDK rates, AMRs, multi-syllabic word repetition) with implementation of SOS speech strategies to improve intelligibility to 70% in all contexts to optimize communicative efficacy. - GOAL NOT MET, CONTINUE GOAL  INTERVENTIONS:  Goal not addressed this date due to a focus on other objectives. SHORT TERM GOAL #3:  Goal 3: Patient will complete functional recall tasks (immediate, delayed, working, orientation, biographics) with 70% accuracy, mod cues to improve retention of pertinent information. - GOAL NOT MET, CONTINUE GOAL  INTERVENTIONS:  Goal not addressed this date due to a focus on other goals. SHORT TERM GOAL #4:  Goal 4: Patient will complete problem solving, verbal/visual reasoning, and executive functioning tasks (time, money/math/finances, medications) with 60% accuracy, mod cues to improve contributions within ADL/IADL completion.- GOAL MET  NEW GOAL: Patient will complete problem solving, verbal/visual reasoning and executive functioning tasks (time, basic math/finances) with 80% accuracy with min cues to improve contributions to ADL/IADL completion. INTERVENTIONS:  Verbal Problem solving - ECF environment  100% independent.     Time - Verbal Problem Solving  100% independent    SHORT TERM GOAL #5:  Goal 5: Patient will complete sequencing and thought organization tasks with 70% accuracy, mod cues to improve mental flexibility for daily living scenarios.- GOAL MET  NEW GOAL: Patient will complete sequencing and thought organization tasks with 70% accuracy, min cues to improve mental flexibility for daily living scenarios. INTERVENTIONS:  Goal not addressed this date due to a focus on other objectives. Long-Term Goals:  Time Frame for Long Term Goals: 3 weeks from evaluation    LONG TERM GOAL #1:  Goal 1: Patient will consume a regular texture diet and thin liquids with stable pulmonary status and adequate endurance to assist with meeting nutrition/hydration needs. - GOAL NOT MET    LONG TERM GOAL #2:  Goal 2: Patient will improve cognitive-linguistic skills to a min assist or higher upon discharge in order to make safe/successful discharge to least restrictive environment and resume ADL completion with least amount of supervision/assistance. -GOAL NOT MET       Comprehension: 5 - Patient understands basic needs (hungry/hot/pain)  Expression: 4 - Expresses basic ideas/needs 75-90%+ of the time  Social Interaction: 4 - Patient appropriate 75-90%+ of the time  Problem Solvin - Patient solves simple/routine tasks 75-90%+   Memory: 3 - Patient remembers 50%-74% of the time    EDUCATION:  Learner: Patient  Education:  Reviewed ST goals and Plan of Care, Reviewed recommendations for follow-up, Home Safety Education, and SOS intelligibility strategies  Evaluation of Education: Verbalizes understanding, Needs further instruction, and Family not present    ASSESSMENT/PLAN:  SUMMARY: Trevor Malin, 65, has been seen for speech therapy services 5x/week. He has met 2/5 short term goals and 0/2 long term goals. Patient continues to present with mild-moderate oral phase dysphagia characterized by prolonged mastication with eventual complete bolus clearance given additional time. Patient is noted to be impulsive and was noted to quickly take multiple bites before taking a break to masticate the large bolus in his oral cavity.   No overt s/s of laryngeal penetration/aspiration have been noted although pharyngeal impairments cannot be ruled out without instrumental assessment. Instrumental assessment is not indicated at this time. Patient presents with moderate cognitive-linguistic impairments  with deficits in immediate/delayed recall, working memory, thought organization, executive function and math computation. Patient resides at [facility] and has low cognitive demand but is responsible for basic finances and ADLs. Patient also presents with mild-moderate dysarthria and conversational speech was judged to be less than 75% intelligible. Patient would benefit from continued skilled ST services to address the above deficits to promote successful return to least restrictive environment and contribution to ADL completion. Activity Tolerance:  Patient tolerance of  treatment: good. Good participation in trialed tasks. Assessment/Plan: Patient progressing toward established goals. Continues to require skilled care of licensed speech pathologist to progress toward achievement of established goals and plan of care. .     Plan for Next Session: Trial advanced diet textures, sequencing/thought organization  Discharge Recommendations: Santhosh Parson M.A.  CCC-SLP

## 2023-01-16 NOTE — PROGRESS NOTES
Physical Medicine & Rehabilitation   Progress Note    Chief Complaint:  debility, impaired mobility and ADLs     Subjective:  Patient seen and examined. NAEO. Reports a good weekend. Reports improved cough. No CP or SOB. No reports of n/v/d. He is inquiring about when he can  upgrade his diet to a regular diet. Rehabilitation:   PT 01/14/2023:  Bed Mobility:  Rolling to Left: Modified Independent   Rolling to Right: Modified Independent   Supine to Sit: Supervision  Sit to Supine: Supervision   Completed on mat table. Pt very close to edge of mat table when sitting up, needed supervision for safety awareness. Transfers:  Sit to Stand: Contact Guard Assistance, Minimal Assistance  Stand to  Corporation  Stood from [employer] and chair with arms in therapy gym. Had one LOB in therapy gym, patient losing balance to the right and kept walking despite unsteadiness. Patient impulsive to sit quickly and stand and start walking quickly despite balance deficits. Ambulation:  Contact Guard Assistance  Distance: 120 feet x 2  Surface: Level Tile  Device:No Device  Gait Deviations:  Patient with very slow luis, head down position with minimal to absent arm swing. Pt refused to wear tennis shoes during this session and wanted to keep house slippers on. Right slipper appeared too big for patient's foot and kept slipping off heel during swing phase. Attempted to have patient increase gait speed both verbally and with light manual assistance at pelvis, but pt unable to change gait speed. Stated he did not walk this slow prior, but unable to increase speed. Wide DAVID and decreased heel strike. Balance:  Sit to/from stands from edge of mat table without use of arms. Noted posterior lean upon standing with patient's weight in heels. Despite cues and different positioning, patient relying on mat table for support upon initial stand (back of legs touching mat table).  Completed 5 reps total. Exercise:  Supine bridging with 5 sec hold and arms over chest, supine bridging with legs extended and feet on therapy ball with 5 sec hold. Assisted patient into quadriped position and completed alternating LE extensions sliding toe on mat table. Patient only tolerated about 5 reps and stated he was dizzy and wanted to get out of the position. Sidelying hip abduction x 10 reps, sidelying hip abduction with same side UE elevated to engage core x 10 reps. Needed assist for LLE, poor proprioception/coordination of limb noted. Exercises were completed for increased independence with functional mobility. Functional Outcome Measures: Not completed       OT 01/16/2023:  TRANSFERS:  Sit to Stand:  Stand By Assistance. Bedside chair  Stand to Sit: Stand By Assistance. FUNCTIONAL MOBILITY:  Assistive Device: None  Assist Level:  Contact Guard Assistance. Distance:  to/from kitchenette  Unsteady at times      ADDITIONAL ACTIVITIES:  Patient completed BUE strengthening exercises with skilled education on HEP: completed x10 reps x1 set with a yellow band in all joints and all planes in order to improve UE strength and activity tolerance required for BADL routine and toilet / shower transfers. Patient tolerated well, requiring minimal rest breaks. Patient also required minimal cues for technique. Patient was able to go to [facility] to prepare cup of coffee, tolerated well and able to carry it back to his room all with close SBA         SLP 01/14/2023:  Short-Term Goals:  SHORT TERM GOAL #1:  Goal 1: Patient will consume a soft and bite-size diet and thin liquids and advanced PO trials as clinically indicated with ST only with stable pulmonary status and adequate endurance to assist with meeting nutrition/hydration needs  INTERVENTIONS:  Not addressed this date due to a focus on other objectives.       SHORT TERM GOAL #2:  Goal 2: Patient will complete structured speech drills/tasks (DDK rates, AMRs, multi-syllabic word repetition) with implementation of SOS speech strategies to improve intelligibility to 70% in all contexts to optimize communicative efficacy. INTERVENTIONS:  Patient completed 2 syllable \"puthukuh\" x10 with moderate cues, benefited from slowing down and repeating after ST model; fair success   Patient completed 3 syllable \"puhtuhkuh\" x10 with moderate cues, benefited from slowing down and repeating after ST model; fair success  *ST with instruction/education r/t encouragement of moving patient mouth more and slowing down     SHORT TERM GOAL #3:  Goal 3: Patient will complete functional recall tasks (immediate, delayed, working, orientation, biographics) with 70% accuracy, mod cues to improve retention of pertinent information. INTERVENTIONS:  3 unit ST ID: introduction of memory strategy WRAP (write, repeat, associate, picture)  Immediate: 4/4indep  Orientation: 6/6indep  Name:indep  *patient benefits from visuals     SHORT TERM GOAL #4:  Goal 4: Patient will complete problem solving, verbal/visual reasoning, and executive functioning tasks (time, money/math/finances, medications) with 60% accuracy, mod cues to improve contributions within ADL/IADL completion. INTERVENTIONS:  Call light use: indep awareness of getting up with assistance  3 reason for call light 1/3indep, 1/3min, 1/3mod  *increased difficulty with reasoning      -patient completed word search 5 words in 3 minutes indep     SHORT TERM GOAL #5:  Goal 5: Patient will complete sequencing and thought organization tasks with 70% accuracy, mod cues to improve mental flexibility for daily living scenarios.   INTERVENTIONS:Sequencing task x4, 4 steps each, patient with x1 error  *increased difficulties for reasoning of order         Long-Term Goals:  Time Frame for Long Term Goals: 3 weeks from evaluation     LONG TERM GOAL #1:  Goal 1: Patient will consume a regular texture diet and thin liquids with stable pulmonary status and adequate endurance to assist with meeting nutrition/hydration needs. LONG TERM GOAL #2:  Goal 2: Patient will improve cognitive-linguistic skills to a min assist or higher upon discharge in order to make safe/successful discharge to least restrictive environment and resume ADL completion with least amount of supervision/assistance.      Comprehension: 5 - Patient understands basic needs (hungry/hot/pain)  Expression: 4 - Expresses basic ideas/needs 75-90%+ of the time  Social Interaction: 4 - Patient appropriate 75-90%+ of the time  Problem Solvin - Patient solves simple/routine tasks 75-90%+   Memory: 3 - Patient remembers 50%-74% of the time         Review of Systems:  CONSTITUTIONAL:  negative for  fevers and chills  EYES:  negative for  glasses and visual disturbance  HEENT:  negative for  voice change  RESPIRATORY:  negative for  dyspnea and wheezing, +cough  CARDIOVASCULAR:  negative for  chest pain, palpitations  GASTROINTESTINAL:  negative for nausea, vomiting, and change in bowel habits  GENITOURINARY:  negative for dysuria and urinary incontinence  SKIN:  negative rash or wound   MUSCULOSKELETAL:  positive for  muscle weakness; negative for pain  NEUROLOGICAL:  positive for speech problems and gait problems  BEHAVIOR/PSYCH:  positive for history of bipolar disorder  10 point system review otherwise negative      Objective:  /75   Pulse (!) 107   Temp 98.4 °F (36.9 °C) (Oral)   Resp 16   Ht 5' 9\" (1.753 m)   Wt 183 lb 7 oz (83.2 kg)   SpO2 92%   BMI 27.09 kg/m²   Patient is awake and alert, no family at bedside   Mood: within normal limits  Affect: calm  General appearance: Patient is well nourished, well developed, well groomed and in no acute distress     Memory:   not formally assess but able to answer questions regarding history   Attention/Concentration: follow conversation  Language:  abnormal - chronic dysarthria     Cranial Nerves:  cranial nerves II-XII appear intact  ROM: normal  Motor Exam: Full strength in BUE and BLE  Tone:  normal  Muscle bulk: within normal limits  Sensory:  Sensory intact  Coordination:   normal     Heart: RRR, no m/r/g noted   Lungs: CTAB, no wheezing, rales, or rhonchi noted; he is breathing comfortably on room air without any increased WOB  Abdomen: non-distended   Skin: warm and dry, no rash or erythema  Edema: none    Diagnostics:   Recent Results (from the past 24 hour(s))   POCT Glucose    Collection Time: 01/16/23  8:15 AM   Result Value Ref Range    POC Glucose 164 (H) 70 - 108 mg/dl     CXR 01/13/2023:        Impression:       1. Borderline heart size. No effusion. 2. Very subtle mild scattered interstitial infiltrates in both central lung fields and right lung base, consistent with interstitial pneumonitis. Impression:  Acute respiratory failure  Influenza A  Moderate cognitive impairment  Diabetes Mellitis type 2 with hyperglycemia  HTN  Bipolar disorder  Chronic tobacco use, not previously dx with chronic lung disease  Impaired ADLs and mobility     Plan:  Continue inpatient rehabilitation program involving at least 3 hours per day, 5 days per week of intensive rehabilitation. Rehabilitation services will include PT, OT, and SLP/RT in order to improve functional status prior to discharge. Family education and training will be completed. Equipment evaluations and recommendations will be completed as appropriate. Rehabilitation nursing will be involved for bowel, bladder, skin, and pain management. Nursing will also provide education and training to patient and family. Dr. Ramiro De Leon consulted for medical management  Acute Hypoxic Respiratory failure: 2/2 Influenza A. Not on oxygen at baseline. Now on supplemental O2 via nasal cannula- will continue to wean oxygen as tolerated. Patient has completed course of tamiflu and dolu-medrol. Leukocytosis: WBC 11.2 today.  UA obtained due to increased urinary frequency  which is negative for infection. CXR obtained due to reports of dry cough- xray with  \"Very subtle mild scattered interstitial infiltrates in both central lung fields and right lung base, consistent with interstitial pneumonitis\". No findings to suggest acute PNA. Continue to monitor for signs or symptoms of infection   1/14: WBC WNL  Episode of left facial droop: Possible TIA? . 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 MRI negative for any intracranial abnormality. Continue aspirin and statin  T2DM: Continue Lantus 7 units nightly and Metformin 500 mg BID   HTN: Continue home lopressor  Bipolar Disorder: Continue home Clozapine, Pristiq, and Depakote  Prophylaxis:  DVT: Lovenox. Pain: Tylenol   Dysphagia: On soft and bite sized diet. Diet to be upgraded when appropriate per SLP  Tobacco dependence: Nicotine patch added   Nutrition:  Consultation to dietician for nutritional counseling and recommendations. Prealbumin 17.0 on admission. Bladder: Per rehab nursing   Bowel: Continue docusate   and case management consultations for coordination of care and discharge planning. Team conference held Thursday with anticipated DC back to assisted living on 1/20/2023.     Missed Therapy Time:  None    Vicente Beaulieu,

## 2023-01-16 NOTE — PROGRESS NOTES
[address] [address]  INPATIENT PHYSICAL THERAPY  DAILY NOTE  [facility] - 7E-70/070-A    Time In: 1330  Time Out: 1400  Timed Code Treatment Minutes: 30 Minutes  Minutes: 30          Date: 2023  Patient Name: Opal Herrera,  Gender:  male        MRN: [id number]  : 1965  (65 y.o.)     Referring Practitioner: Gabrielle Torrez DO  Diagnosis: Debility  Additional Pertinent Hx: Per H&P \"Chico Wilhelm is a 65 y.o. male admitted to [address] [address] on 2023. Patient with past medical history of DM, HTN, and bipolar disorder who presented to [facility] for productive cough, fever and SOB. This had become progressively worse and is worse with exertion. While in the ER, patient did test positive for influenza A. CXR revealed coarsened appearance to the pulmonary parenchyma ?chronic. Pulmonary vascularity is normal. Tamiflu was started. Patient was admitted to [facility] Med-Surgical Unit. On 1/3/23 CODE Stroke was called due to new onset left facial weakness, lethargy and dysarthria. NIH of 3. 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 Based on patient's symptoms and time since last known well decision was made for TNK to be given. 22 mg of TNK given at 5:05 AM.  Patient transferred to the ICU with thrombolytic precautions. Patient was placed on HFNC. MRI complete 23 without acute findings of CVA. echocardiogram 1/3/2023 that showed EF of 55% and overall normal left ventricular function. Patient was cleared for diet by SLP. Tamilfu completed 23. Solu-Medrol 23. Out of droplet isolation 23. Plan was for patient to return to Assisted Living once medically stable, but needs to be able to care for self except for housemaking tasks. \"     Prior Level of Function:  Lives With: Alone  Type of Home: Assisted living [facility])  Home Layout: One level  Home Access: Level entry  Home Equipment: None   Bathroom Shower/Tub: Walk-in shower  Bathroom Toilet: Standard  Bathroom Equipment: Grab bars around toilet, Grab bars in shower    ADL Assistance: Independent  Homemaking Assistance: Needs assistance  Ambulation Assistance: Independent  Transfer Assistance: Independent  Active : No  Additional Comments: Pt reports using no AD at assisted living. Pt reports an aide is sometimes present so supervise showers, but he completes dressing, grooming and toileting independently. Facility completes housekeeping, cooking and transportation. Pt usually ambulates to dining room for meals. Restrictions/Precautions:  Restrictions/Precautions: Fall Risk, General Precautions, Modified Diet  Position Activity Restriction  Other position/activity restrictions: impulsive     SUBJECTIVE: Patient laying in bed upon arrival and agreeable to therapy. Patient required rest breaks throughout session. PAIN: denies    Vitals: Vitals not assessed per clinical judgement, see nursing flowsheet    OBJECTIVE:  Bed Mobility:  Rolling to Right: Stand By Assistance   Supine to Sit: Stand By Assistance  Sit to Supine: Stand By Assistance     Transfers:  Sit to Stand: Stand By Assistance  Stand to Sit:Stand By Assistance    Ambulation:  Stand By Assistance, [employer], with increased time for completion  Distance: 170 ft x2  Surface: Level Tile  Device:No Device  Gait Deviations: Forward Flexed Posture, Slow May, Decreased Step Length Bilaterally, Decreased Arm Swing, Decreased Gait Speed, and Decreased Heel Strike Bilaterally  **Cues to avoid shuffling feet    Exercise:  Patient was guided in 1 set(s) 10 reps of exercise to both lower extremities. Seated marches and Long arc quads with 2# ankle weights. Exercises were completed for increased independence with functional mobility.   Patient completed 10 min on Nu-Step machine on L 0 using BUE/BLE to improve strength and endurance for improved functional mobility. **Patient required lengthy rest breaks 4x      Functional Outcome Measures: Not completed       ASSESSMENT:  Assessment: Patient progressing toward established goals. Activity Tolerance:  Patient tolerance of  treatment: fair. Equipment Recommendations:Equipment Needed: No (Continue to assess pending progress)  Discharge Recommendations: Home with Home Health PT and Patient would benefit from continued PT at discharge  Plan: Current Treatment Recommendations: Strengthening, Balance training, Functional mobility training, Gait training, Safety education & training, Home exercise program, Patient/Caregiver education & training, Transfer training, Endurance training, Therapeutic activities, Neuromuscular re-education, Stair training  General Plan:  (5x/wk 90min)    Patient Education  Patient Education: Plan of Care, Bed Mobility, Transfers, Gait, Verbal Exercise Instruction    Goals:  Patient Goals : None Stated  Short Term Goals  Time Frame for Short Term Goals: 1 week  Short Term Goal 1: Patient to perform bed mobility supine<>sit with Supervision in order to assist with getting into and out of bed. Short Term Goal 2: Patient to perform sit to stand transfers without AD with </=1 cue for hand placement from various surfaces in order to assist with safety with transfers in home. Short Term Goal 3: Patient to ambulate >/=75 feet without AD with SBA in order to assist with home mobility. Short Term Goal 4: Patient to score >/=38/56 on the Augustine Balance Test in order to assist with reduced risk for falls. Long Term Goals  Time Frame for Long Term Goals : 2 weeks from IPR evaluation  Long Term Goal 1: Patient to perform bed mobility supine<>sit with Mod I in order to assist with getting into and out of bed. Long Term Goal 2: Patient to perform sit to stand transfers without use of Ad with Mod I in order to assist with safety with transfers in home.   Long Term Goal 3: Patient to ambulate >/=200 feet without AD with Mod I in order to assist with ambulating to and  from dining room for all meals. Long Term Goal 4: Patient to perform car transfer with Supervision in order to assist with getting to and from appointments. Long Term Goal 5: Patient to perform TUG in </=12 seconds in order to assist with functional dynamic balance. Following session, patient left in safe position with all fall risk precautions in place. Therapy session performed by Con AMIN under supervision of credentialed therapist Ernst Ulloa PTA.

## 2023-01-16 NOTE — PLAN OF CARE
Problem: Discharge Planning  Goal: Discharge to home or other facility with appropriate resources  1/16/2023 1412 by DARON Gomez  Flowsheets (Taken 1/16/2023 0801 by Bárbara Buckley LPN)  Discharge to home or other facility with appropriate resources:   Identify barriers to discharge with patient and caregiver   Arrange for needed discharge resources and transportation as appropriate  Note: DAVE spoke with patient's guardian, Toy Thayer, on this date to check in on discharge planning. DAVE educated her on patient's discharge date of Friday, 1/20 back to [facility]. The plan is for continued therapy at home. Toy Thayer reports that patient will need transport arranged for discharged. DAVE spoke with Talbot Runner at [facility] to update her on patient's progress and plan for discharge on Friday, 1/20. She reported understanding. Talbot Runner reports that they use [facility] for therapy services. DAVE spoke with Keiko Okeefe with [facility] to make a referral for PT, OT and ST services. DAVE will follow and maintain involvement in discharge planning.

## 2023-01-17 LAB — GLUCOSE BLD-MCNC: 165 MG/DL (ref 70–108)

## 2023-01-17 PROCEDURE — 97530 THERAPEUTIC ACTIVITIES: CPT

## 2023-01-17 PROCEDURE — 6370000000 HC RX 637 (ALT 250 FOR IP): Performed by: FAMILY MEDICINE

## 2023-01-17 PROCEDURE — 1180000000 HC REHAB R&B

## 2023-01-17 PROCEDURE — 82948 REAGENT STRIP/BLOOD GLUCOSE: CPT

## 2023-01-17 PROCEDURE — 97110 THERAPEUTIC EXERCISES: CPT

## 2023-01-17 PROCEDURE — 97535 SELF CARE MNGMENT TRAINING: CPT

## 2023-01-17 PROCEDURE — 92526 ORAL FUNCTION THERAPY: CPT

## 2023-01-17 PROCEDURE — 99232 SBSQ HOSP IP/OBS MODERATE 35: CPT | Performed by: STUDENT IN AN ORGANIZED HEALTH CARE EDUCATION/TRAINING PROGRAM

## 2023-01-17 PROCEDURE — 97116 GAIT TRAINING THERAPY: CPT

## 2023-01-17 PROCEDURE — 6370000000 HC RX 637 (ALT 250 FOR IP): Performed by: STUDENT IN AN ORGANIZED HEALTH CARE EDUCATION/TRAINING PROGRAM

## 2023-01-17 PROCEDURE — 97112 NEUROMUSCULAR REEDUCATION: CPT

## 2023-01-17 PROCEDURE — 6360000002 HC RX W HCPCS: Performed by: STUDENT IN AN ORGANIZED HEALTH CARE EDUCATION/TRAINING PROGRAM

## 2023-01-17 RX ADMIN — CLOZAPINE 200 MG: 100 TABLET ORAL at 21:06

## 2023-01-17 RX ADMIN — METFORMIN HYDROCHLORIDE 1000 MG: 500 TABLET ORAL at 09:47

## 2023-01-17 RX ADMIN — METOPROLOL SUCCINATE 25 MG: 25 TABLET, EXTENDED RELEASE ORAL at 09:48

## 2023-01-17 RX ADMIN — ENOXAPARIN SODIUM 40 MG: 100 INJECTION SUBCUTANEOUS at 09:48

## 2023-01-17 RX ADMIN — CLOZAPINE 50 MG: 25 TABLET ORAL at 18:47

## 2023-01-17 RX ADMIN — ASPIRIN 81 MG 81 MG: 81 TABLET ORAL at 09:47

## 2023-01-17 RX ADMIN — CLOZAPINE 50 MG: 25 TABLET ORAL at 09:48

## 2023-01-17 RX ADMIN — DESVENLAFAXINE 50 MG: 50 TABLET, EXTENDED RELEASE ORAL at 09:47

## 2023-01-17 RX ADMIN — DIVALPROEX SODIUM 500 MG: 500 TABLET, EXTENDED RELEASE ORAL at 09:48

## 2023-01-17 RX ADMIN — METFORMIN HYDROCHLORIDE 1000 MG: 500 TABLET ORAL at 18:47

## 2023-01-17 RX ADMIN — ATORVASTATIN CALCIUM 20 MG: 20 TABLET, FILM COATED ORAL at 21:06

## 2023-01-17 RX ADMIN — DOCUSATE SODIUM 100 MG: 100 CAPSULE, LIQUID FILLED ORAL at 09:49

## 2023-01-17 RX ADMIN — INSULIN GLARGINE 7 UNITS: 100 INJECTION, SOLUTION SUBCUTANEOUS at 21:08

## 2023-01-17 RX ADMIN — DIVALPROEX SODIUM 1000 MG: 500 TABLET, EXTENDED RELEASE ORAL at 21:06

## 2023-01-17 ASSESSMENT — PAIN SCALES - GENERAL
PAINLEVEL_OUTOF10: 0
PAINLEVEL_OUTOF10: 0

## 2023-01-17 NOTE — PROGRESS NOTES
[facility]  INPATIENT PHYSICAL THERAPY  Daily Note  [address] [facility] - 7E-70/070-A    Time In: 1030  Time Out: 1100  Timed Code Treatment Minutes: 30 Minutes  Minutes: 30          Date: 2023  Patient Name: Bhanu Silver,  Gender:  male        MRN: [id number]  : 1965  (65 y.o.)     Referring Practitioner: Layton Naranjo DO  Diagnosis: Debility  Additional Pertinent Hx: Per H&P \"Chico Haddad is a 65 y.o. male admitted to [facility] on 2023. Patient with past medical history of DM, HTN, and bipolar disorder who presented to [facility] for productive cough, fever and SOB. This had become progressively worse and is worse with exertion. While in the ER, patient did test positive for influenza A. CXR revealed coarsened appearance to the pulmonary parenchyma ?chronic. Pulmonary vascularity is normal. Tamiflu was started. Patient was admitted to [facility] Med-Surgical Unit. On 1/3/23 CODE Stroke was called due to new onset left facial weakness, lethargy and dysarthria. NIH of 3. 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 Based on patient's symptoms and time since last known well decision was made for TNK to be given. 22 mg of TNK given at 5:05 AM.  Patient transferred to the ICU with thrombolytic precautions. Patient was placed on HFNC. MRI complete 23 without acute findings of CVA. echocardiogram 1/3/2023 that showed EF of 55% and overall normal left ventricular function. Patient was cleared for diet by SLP. Tamilfu completed 23. Solu-Medrol 23. Out of droplet isolation 23. Plan was for patient to return to Assisted Living once medically stable, but needs to be able to care for self except for housemaking tasks. \"     Prior Level of Function:  Lives With: Alone  Type of Home: Assisted living [facility])  Home Layout: One level  Home Access: Level entry  Home Equipment: None   Bathroom Shower/Tub: Walk-in shower  Bathroom Toilet: Standard  Bathroom Equipment: Grab bars around toilet, Grab bars in shower    ADL Assistance: Independent  Homemaking Assistance: Needs assistance  Ambulation Assistance: Independent  Transfer Assistance: Independent  Active : No  Additional Comments: Pt reports using no AD at assisted living. Pt reports an aide is sometimes present so supervise showers, but he completes dressing, grooming and toileting independently. Facility completes housekeeping, cooking and transportation. Pt usually ambulates to dining room for meals. Restrictions/Precautions:  Restrictions/Precautions: Fall Risk, General Precautions, Modified Diet  Position Activity Restriction  Other position/activity restrictions: impulsive     SUBJECTIVE: Pt. Laying in his bed and pleasantly agrees to therapy session. Patient requesting to use bathroom at beginning of session. PAIN: 0/10    Vitals:   Patient Vitals for the past 8 hrs:   BP Patient Position Temp Temp src Pulse Resp SpO2 O2 Device O2 Flow Rate (L/min)   01/17/23 0944 114/71 Sitting 98.8 °F (37.1 °C) Oral (!) 109 16 93 % None (Room air) --   01/17/23 0554 -- -- -- -- -- -- 92 % Nasal cannula 4 L/min   01/17/23 0545 -- -- -- -- -- -- (!) 86 % Nasal cannula 2 L/min        OBJECTIVE:  Bed Mobility:  Supine to Sit: Modified Independent    Transfers:  Sit to Stand: Stand By Assistance  Stand to Sit:Stand By Assistance  *Patient instructed in sit to stand transfers from various surfaces including: edge of bed, recliner and standard chair with arm rests without AD. Patient impulsive at times with cueing for safety awareness. Ambulation:  Stand By Assistance  Distance: 130 feet x2 and 10 feet x 3  Surface: Level Tile  Device: No Device  Gait Deviations:   Forward Flexed Posture, Slow May, Decreased Step Length Bilaterally, Decreased Gait Speed, Decreased Heel Strike Bilaterally, and Unsteady Gait  *Patient required cueing for increased gait speed throughout ambulation due to unsteadiness. *Patient instructed in ambulation 10 feet to cone while PT timed his walk and PT instructed patient to decrease his time with each ambulation distance. Patient first ambulation was 19.89 seconds, second 14.59 and third 12.98 seconds. Stairs:  None    Balance:  Dynamic Standing Balance: Stand By Assistance  *Patient stood in bathroom to perform toileting with slight unsteadiness and cueing for safety awareness    Neuromuscular Re-education  None    Exercise:  Patient was guided in 1 set(s) 15 reps of exercises: Standing heel/toe raises, Standing marches, Standing hip abduction/adduction. Exercises were completed for increased independence with functional mobility. *Patient required cueing for proper form throughout    *Patient performed NuStep x 5 minutes on level 3 with cueing for increased speed throughout. Patient required one seated rest break at 3 minutes. Patient instructed in NuStep in order to assist with endurance and BLE strengthening for increased functional mobility. Functional Outcome Measures:   Not completed    ASSESSMENT:  Assessment: Patient progressing toward established goals. Activity Tolerance:  Patient tolerance of  treatment: good.      Equipment Recommendations:Equipment Needed: No (Continue to assess pending progress)  Discharge Recommendations: Continue to assess pending progress, Patient would benefit from continued therapy after discharge Continue to assess pending progress and Patient would benefit from continued PT at discharge    Plan: Current Treatment Recommendations: Strengthening, Balance training, Functional mobility training, Gait training, Safety education & training, Home exercise program, Patient/Caregiver education & training, Transfer training, Endurance training, Therapeutic activities, Neuromuscular re-education, Stair training  General Plan:  (5x/wk 90min)    Patient Education  Patient Education: Plan of Care, Functional Mobility, Reviewed Prior Education, Health Promotion and Wellness Education, Safety, Verbal Exercise Instruction,  - Patient Verbalized Understanding, - Patient Requires Continued Education    Goals:  Patient Goals : None Stated  Short Term Goals  Time Frame for Short Term Goals: 1 week  Short Term Goal 1: Patient to perform bed mobility supine<>sit with Supervision in order to assist with getting into and out of bed. Short Term Goal 2: Patient to perform sit to stand transfers without AD with </=1 cue for hand placement from various surfaces in order to assist with safety with transfers in home. Short Term Goal 3: Patient to ambulate >/=75 feet without AD with SBA in order to assist with home mobility. Short Term Goal 4: Patient to score >/=38/56 on the Augustine Balance Test in order to assist with reduced risk for falls. Long Term Goals  Time Frame for Long Term Goals : 2 weeks from IPR evaluation  Long Term Goal 1: Patient to perform bed mobility supine<>sit with Mod I in order to assist with getting into and out of bed. Long Term Goal 2: Patient to perform sit to stand transfers without use of Ad with Mod I in order to assist with safety with transfers in home. Long Term Goal 3: Patient to ambulate >/=200 feet without AD with Mod I in order to assist with ambulating to and  from dining room for all meals. Long Term Goal 4: Patient to perform car transfer with Supervision in order to assist with getting to and from appointments. Long Term Goal 5: Patient to perform TUG in </=12 seconds in order to assist with functional dynamic balance. Following session, patient left in safe position with all fall risk precautions in place.

## 2023-01-17 NOTE — PROGRESS NOTES
[facility] THERAPY  [facility]  DAILY NOTE    TIME   SLP Individual Minutes  Time In: 1486  Time Out: 1300  Minutes: 30  Timed Code Treatment Minutes: 0 Minutes       Date: 2023  Patient Name: Peewee Zuluaga      CSN: [id number]   : 1965  (65 y.o.)  Gender: male   Referring Physician:  Leila Zee DO  Diagnosis: Debility  Precautions: Fall  Risk,  aspiration risk  Current Diet: Soft and Bite sized,thin liquids  Swallowing Strategies:  Full Upright Position, Small Bite/Sip, Medications Whole with Thin, Alternate Solids and Liquids, and Limit Distractions   Date of Last MBS/FEES: Not Applicable    Pain:  No pain reported. Subjective:  Patient sitting upright in recliner, awake with lunch tray present upon ST arrival. Patient agreeable to advanced meal tray dietary analysis. Pleasant throughout. Short-Term Goals:  SHORT TERM GOAL #1:  Goal 1: Patient will consume a soft and bite-size diet and thin liquids and advanced PO trials as clinically indicated with ST only with stable pulmonary status and adequate endurance to assist with meeting nutrition/hydration needs   INTERVENTIONS: ST completed skilled dietary analysis of advanced lunch tray which consisted of a cheeseburger, mashed potatoes, ice cream, and thin liquids via cup. Patient with overall suspected adequate bolus control and manipulation; however, concern for decreased bolus control x1 resulting in cough during mastication of regular texture solid. Patient with prolonged rotary mastication of regular texture solid with effective textural breakdown when given EXTENDED amount of time likely s/t edentulous state and absence of dentures. Patient with consistent swallow initiation achieved across all trials. No overt s/s of aspiration observed with liquid trials. Patient required max verbal cues to utilize small, single bites.  Patient consistently taking multiple bites of cheeseburger that filled oral cavity. Patient independently utilizing alternation of bites/sips or bites of puree/ice cream to assist with breakdown/clearance of regular texture trial. At this time, it is recommended patient continues with a soft and bite-size diet with thin liquids. SHORT TERM GOAL #2:  Goal 2: Patient will complete structured speech drills/tasks (DDK rates, AMRs, multi-syllabic word repetition) with implementation of SOS speech strategies to improve intelligibility to 70% in all contexts to optimize communicative efficacy. INTERVENTIONS:  Did not address this date d/t focus on other goals. SHORT TERM GOAL #3:  Goal 3: Patient will complete functional recall tasks (immediate, delayed, working, orientation, biographics) with 70% accuracy, mod cues to improve retention of pertinent information. INTERVENTIONS: Did not address this date d/t focus on other goals. SHORT TERM GOAL #4:  NEW GOAL: Patient will complete problem solving, verbal/visual reasoning and executive functioning tasks (time, basic math/finances) with 80% accuracy with min cues to improve contributions to ADL/IADL completion. INTERVENTIONS: Did not address this date d/t focus on other goals. SHORT TERM GOAL #5:  NEW GOAL: Patient will complete sequencing and thought organization tasks with 70% accuracy, min cues to improve mental flexibility for daily living scenarios. INTERVENTIONS: Did not address this date d/t focus on other goals. Long-Term Goals:  Time Frame for Long Term Goals: 3 weeks from evaluation    LONG TERM GOAL #1:  Goal 1: Patient will consume a regular texture diet and thin liquids with stable pulmonary status and adequate endurance to assist with meeting nutrition/hydration needs.      LONG TERM GOAL #2:  Goal 2: Patient will improve cognitive-linguistic skills to a min assist or higher upon discharge in order to make safe/successful discharge to least restrictive environment and resume ADL completion with least amount of supervision/assistance. Comprehension: 5 - Patient understands basic needs (hungry/hot/pain)  Expression: 4 - Expresses basic ideas/needs 75-90%+ of the time  Social Interaction: 4 - Patient appropriate 75-90%+ of the time  Problem Solvin - Patient solves simple/routine tasks 75-90%+   Memory: 3 - Patient remembers 50%-74% of the time    EDUCATION:  Learner: Patient  Education:  Reviewed ST goals and Plan of Care, Reviewed recommendations for follow-up, Home Safety Education, and SOS intelligibility strategies  Evaluation of Education: Verbalizes understanding, Needs further instruction, and Family not present    ASSESSMENT/PLAN:   Activity Tolerance:  Patient tolerance of  treatment: good. Good participation in trialed tasks. Assessment/Plan: Patient progressing toward established goals. Continues to require skilled care of licensed speech pathologist to progress toward achievement of established goals and plan of care. .     Plan for Next Session: Trial advanced diet textures, sequencing/thought organization  Discharge Recommendations: [facility]) [address], [place], [address]

## 2023-01-17 NOTE — PROGRESS NOTES
[address] [address]  INPATIENT PHYSICAL THERAPY  Daily Note  [facility] - 7E-70/070-A    Time In: 1130  Time Out: 1200  Timed Code Treatment Minutes: 30 Minutes  Minutes: 30          Date: 2023  Patient Name: Courtney Henning,  Gender:  male        MRN: [id number]  : 1965  (65 y.o.)     Referring Practitioner: Jacob Mendes DO  Diagnosis: Debility  Additional Pertinent Hx: Per H&P \"Bruce Dal Nageotte is a 65 y.o. male admitted to [address] [place] [address] on 2023. Patient with past medical history of DM, HTN, and bipolar disorder who presented to [facility] for productive cough, fever and SOB. This had become progressively worse and is worse with exertion. While in the ER, patient did test positive for influenza A. CXR revealed coarsened appearance to the pulmonary parenchyma ?chronic. Pulmonary vascularity is normal. Tamiflu was started. Patient was admitted to [facility] Med-Surgical Unit. On 1/3/23 CODE Stroke was called due to new onset left facial weakness, lethargy and dysarthria. NIH of 3. 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 Based on patient's symptoms and time since last known well decision was made for TNK to be given. 22 mg of TNK given at 5:05 AM.  Patient transferred to the ICU with thrombolytic precautions. Patient was placed on HFNC. MRI complete 23 without acute findings of CVA. echocardiogram 1/3/2023 that showed EF of 55% and overall normal left ventricular function. Patient was cleared for diet by SLP. Tamilfu completed 23. Solu-Medrol 23. Out of droplet isolation 23. Plan was for patient to return to Assisted Living once medically stable, but needs to be able to care for self except for housemaking tasks. \"     Prior Level of Function:  Lives With: Alone  Type of Home: Assisted living [facility])  Home Layout: One level  Home Access: Level entry  Home Equipment: None   Bathroom Shower/Tub: Walk-in shower  Bathroom Toilet: Standard  Bathroom Equipment: Grab bars around toilet, Grab bars in shower    ADL Assistance: Independent  Homemaking Assistance: Needs assistance  Ambulation Assistance: Independent  Transfer Assistance: Independent  Active : No  Additional Comments: Pt reports using no AD at assisted living. Pt reports an aide is sometimes present so supervise showers, but he completes dressing, grooming and toileting independently. Facility completes housekeeping, cooking and transportation. Pt usually ambulates to dining room for meals. Restrictions/Precautions:  Restrictions/Precautions: Fall Risk, General Precautions, Modified Diet  Position Activity Restriction  Other position/activity restrictions: impulsive     SUBJECTIVE: Pt. Seated on his BS chair and pleasantly agrees to therapy session. Pt. Reports light-headedness 1x during session but quickly subsides once seated. No reports of lightheadedness thereafter. PAIN: None indicated    Vitals:   Patient Vitals for the past 8 hrs:   BP Patient Position Temp Temp src Pulse Resp SpO2 O2 Device O2 Flow Rate (L/min)   01/17/23 0944 114/71 Sitting 98.8 °F (37.1 °C) Oral (!) 109 16 93 % None (Room air) --   01/17/23 0554 -- -- -- -- -- -- 92 % Nasal cannula 4 L/min   01/17/23 0545 -- -- -- -- -- -- (!) 86 % Nasal cannula 2 L/min        OBJECTIVE:  Bed Mobility:  Not Tested    Transfers:  Sit to Stand: Stand By Assistance  Stand to Sit:Stand By Assistance    Ambulation:  Stand By Assistance, [employer] Assistance  Distance: 150' x 2, 60' x 1  Surface: Level Tile  Device: No Device  Gait Deviations:  Slow May, Decreased Step Length Bilaterally, Decreased Gait Speed, Decreased Heel Strike on Right, and Mild Path Deviations. Cues for quicker may. Pt. Completed heel-walking in // bars to strengthen anterior tibs and improve heel strike with gait.      Stairs:  None    Balance:  None    Neuromuscular Re-education  Pt. Completed multi-tasking/gait activities ambulating over uneven surfaces and on level tile while balancing tennis balls on cones to improve coordination, gait mechanics, and Environmental awareness for improved functional mobility. Exercise:  None    Functional Outcome Measures:   Not completed    ASSESSMENT:  Assessment: Patient progressing toward established goals. Activity Tolerance:  Patient tolerance of  treatment: good. Equipment Recommendations:Equipment Needed: No (Continue to assess pending progress)  Discharge Recommendations: Continue to assess pending progress, Patient would benefit from continued therapy after discharge     Plan: Current Treatment Recommendations: Strengthening, Balance training, Functional mobility training, Gait training, Safety education & training, Home exercise program, Patient/Caregiver education & training, Transfer training, Endurance training, Therapeutic activities, Neuromuscular re-education, Stair training  General Plan:  (5x/wk 90min)    Patient Education  Patient Education: Plan of Care, Functional Mobility, Reviewed Prior Education, Health Promotion and Wellness Education, Safety    Goals:  Patient Goals : None Stated  Short Term Goals  Time Frame for Short Term Goals: 1 week  Short Term Goal 1: Patient to perform bed mobility supine<>sit with Supervision in order to assist with getting into and out of bed. Short Term Goal 2: Patient to perform sit to stand transfers without AD with </=1 cue for hand placement from various surfaces in order to assist with safety with transfers in home. Short Term Goal 3: Patient to ambulate >/=75 feet without AD with SBA in order to assist with home mobility. Short Term Goal 4: Patient to score >/=38/56 on the Augustine Balance Test in order to assist with reduced risk for falls.   Long Term Goals  Time Frame for Long Term Goals : 2 weeks from IPR evaluation  Long Term Goal 1: Patient to perform bed mobility supine<>sit with Mod I in order to assist with getting into and out of bed. Long Term Goal 2: Patient to perform sit to stand transfers without use of Ad with Mod I in order to assist with safety with transfers in home. Long Term Goal 3: Patient to ambulate >/=200 feet without AD with Mod I in order to assist with ambulating to and  from dining room for all meals. Long Term Goal 4: Patient to perform car transfer with Supervision in order to assist with getting to and from appointments. Long Term Goal 5: Patient to perform TUG in </=12 seconds in order to assist with functional dynamic balance. Following session, patient left in safe position with all fall risk precautions in place.

## 2023-01-17 NOTE — PROGRESS NOTES
[facility]  [facility]  Occupational Therapy  Daily Note  Time:   Time In: 730  Time Out: 0830  Timed Code Treatment Minutes: 60 Minutes  Minutes: 60          Date: 2023  Patient Name: Kristy Sims,   Gender: male      Room: [facility]/070-A  MRN: [id number]  : 1965  (65 y.o.)  Referring Practitioner: Dr. Carmella Clark  Diagnosis: Debility  Additional Pertinent Hx: Kristy Sims is a 65 y.o. male admitted to [facility] on 2023. Patient with past medical history of DM, HTN, and bipolar disorder who presented to [facility] for productive cough, fever and SOB. This had become progressively worse and is worse with exertion. While in the ER, patient did test positive for influenza A. CXR revealed coarsened appearance to the pulmonary parenchyma ?chronic. Pulmonary vascularity is normal. Tamiflu was started. Patient was admitted to [facility] Med-Surgical Unit. On 1/3/23 CODE Stroke was called due to new onset left facial weakness, lethargy and dysarthria. NIH of 3. 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 Based on patient's symptoms and time since last known well decision was made for TNK to be given. 22 mg of TNK given at 5:05 AM.  Patient transferred to the ICU with thrombolytic precautions. Patient was placed on HFNC. MRI complete 23 without acute findings of CVA. echocardiogram 1/3/2023 that showed EF of 55% and overall normal left ventricular function. Patient was cleared for diet by SLP. Tamilfu completed 23. Solu-Medrol 23. Out of droplet isolation 23. Pt admitted to [facility] on 1/10 for therapy needs with eventual plan to return to Assisted Living once medically stable, but needs to be able to care for self except for housemaking tasks.     Restrictions/Precautions:  Restrictions/Precautions: Fall Risk, General Precautions, Modified Diet  Position Activity Restriction  Other position/activity restrictions: impulsive     SUBJECTIVE: Pt supine in bed, slow to engage in therapy session, but agreeable once more awake. PAIN: 0/10:     Vitals: Oxygen: 89% to 92% on room air at rest;   90-92% during activity   Heart Rate:  bpm     COGNITION: Slow Processing, Impaired Memory, Decreased Problem Solving, and Impulsive    ADL:   Grooming: Stand By Assistance. With cues to initiate hand hygiene following toileting   Toileting: Stand By Assistance. Toilet Transfer: Stand By Assistance. Pt toileting x2 trials (incontinent or urine and bowel)     BED MOBILITY:  Sit to Supine: Modified Independent HOB flat, used bedrail    TRANSFERS:  Sit to Stand:  Stand By Assistance. Stand to Sit: Stand By Assistance. FUNCTIONAL MOBILITY:  Assistive Device: None  Assist Level:  Stand By Assistance and Contact Guard Assistance. Distance:  to/from easy street and around easy [address] over various surfaces , cues for larger step length and pace as patient ambulating very slowly- losing balance at times due to slow step length     ADDITIONAL ACTIVITIES:  Patient completed BUE strengthening exercises with skilled education on HEP: completed x10 reps x1  set with a 3# weighted dowel edmar in all joints and all planes in order to improve UE strength and activity tolerance required for BADL routine and toilet / shower transfers. Patient tolerated well, requiring minimal rest breaks. Patient completed dynamic standing task that facilitated dynamic weightshifting and larger step lengths. Patient required SBA, and demo'ed an endurance of 8 then 4 minutes. Demo good  tolerance with patient requiring  rest breaks. Standing task completed to challenge endurance and balance required for ADL and IADL skills. ASSESSMENT:  Activity Tolerance:  Patient tolerance of  treatment: good. Discharge Recommendations: 24 hour assistance or supervision  Equipment Recommendations:  Other: Pt will have a shower chair and grab bars to use in his AL apartment. Plan: Times Per Week: 5x/wk for 90 min  Current Treatment Recommendations: Functional mobility training, Balance training, Self-Care / ADL, Safety education & training, Endurance training, Equipment evaluation, education, & procurement, Patient/Caregiver education & training    Patient Education  Patient Education: IADL's    Goals  Short Term Goals  Time Frame for Short Term Goals: 1 week  Short Term Goal 1: Pt will sequence and complete bathing tasks with SBA and 0 vcs for perseveration to improve indep with showering at home. Short Term Goal 2: Pt will tolerate standing 4 min with SBA for increased ease of sinkside grooming. Short Term Goal 3: Pt will gather appropriate clothing and dress self with SBA and min vcs for sequencing and initation to improve indep with self care. Short Term Goal 4: Pt will sequence and initate grooming tasks following toileting with SBA and min vcs to improve indep with self care. Long Term Goals  Time Frame for Long Term Goals : 2 weeks from IPR evaluation  Long Term Goal 1: Pt will ambulate within room with Mod I and no device to improve indep with self care. Long Term Goal 2: Pt will complete dressing tasks with mod I and no cues for sequencing to improve indep with self care at AL. Long Term Goal 3: Pt will complete grooming and toileting tasks with mod I to return to prior level of function at AL. Following session, patient left in safe position with all fall risk precautions in place.

## 2023-01-17 NOTE — PLAN OF CARE
Problem: Discharge Planning  Goal: Discharge to home or other facility with appropriate resources  Outcome: Progressing  Flowsheets (Taken 1/17/2023 0910)  Discharge to home or other facility with appropriate resources: Identify barriers to discharge with patient and caregiver  Home 02 eval is needed pt has been needing 02 at night. Problem: Safety - Adult  Goal: Free from fall injury  Outcome: Progressing  Pt will remain free of falls. Pt is standby assist      Problem: Metabolic/Fluid and Electrolytes - Adult  Goal: Electrolytes maintained within normal limits  Recent Flowsheet Documentation  Taken 1/17/2023 0910 by Fuentes Kim LPN  Electrolytes maintained within normal limits:   Monitor labs and assess patient for signs and symptoms of electrolyte imbalances   Administer electrolyte replacement as ordered    VS are stable.

## 2023-01-17 NOTE — PROGRESS NOTES
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 *      Plan: Will increase the metformin due to the elevated CS        Subjective:     Patient has no complaint of CP or SOB. .   Medication side effects: none    Scheduled Meds:   metFORMIN  1,000 mg Oral BID WC    nicotine  1 patch TransDERmal Daily    aspirin  81 mg Oral Daily    atorvastatin  20 mg Oral Daily    cloZAPine  200 mg Oral Nightly    cloZAPine  50 mg Oral BID    desvenlafaxine succinate  50 mg Oral Daily    divalproex  500 mg Oral QAM    divalproex  1,000 mg Oral Nightly    docusate sodium  100 mg Oral Daily    enoxaparin  40 mg SubCUTAneous Daily    insulin glargine  7 Units SubCUTAneous Nightly    metoprolol succinate  25 mg Oral Daily     Continuous Infusions:  PRN Meds:clotrimazole, glucose, glucagon (rDNA), sodium chloride flush, polyethylene glycol, albuterol, ipratropium-albuterol, acetaminophen, ondansetron    Review of Systems  Pertinent items are noted in HPI. Objective:     Patient Vitals for the past 8 hrs:   BP Temp Temp src Pulse Resp SpO2   01/16/23 2030 107/71 97.7 °F (36.5 °C) Oral 94 16 94 %     I/O last 3 completed shifts: In: 2615 [P.O.:2615]  Out: -   No intake/output data recorded.     /71   Pulse 94   Temp 97.7 °F (36.5 °C) (Oral)   Resp 16   Ht 5' 9\" (1.753 m)   Wt 183 lb 7 oz (83.2 kg)   SpO2 94%   BMI 27.09 kg/m²     General appearance: alert, appears stated age, and cooperative  Head: Normocephalic, without obvious abnormality, atraumatic  Lungs: clear to auscultation bilaterally  Chest wall: no tenderness  Heart: regular rate and rhythm, S1, S2 normal, no murmur, click, rub or gallop  Abdomen: soft, non-tender; bowel sounds normal; no masses,  no organomegaly  Extremities: extremities normal, atraumatic, no cyanosis or edema  Skin: Skin color, texture, turgor normal. No rashes or lesions  Neurologic:  weak    Data Review:    Latest Reference Range & Units 1/14/23 08:32 1/15/23 07:37 1/16/23 08:15   POC Glucose 70 - 108 mg/dl 172 (H) 143 (H) 164 (H)   (H): Data is abnormally high    Electronically signed by Ty Altamirano MD on 1/16/2023 at 9:24 PM

## 2023-01-17 NOTE — PROGRESS NOTES
[address] [place] [address]  Occupational Therapy  Daily Note  Time:   Time In: 1330  Time Out: 1400  Timed Code Treatment Minutes: 30 Minutes  Minutes: 30    Date: 2023  Patient Name: Jimi Lazaro,   Gender: male      Room: [facility]/070-A  MRN: [id number]  : 1965  (65 y.o.)  Referring Practitioner: Dr. Ame Smith  Diagnosis: Debility  Additional Pertinent Hx: Jimi Lazaro is a 65 y.o. male admitted to [address] [place] [address] on 2023. Patient with past medical history of DM, HTN, and bipolar disorder who presented to [facility] for productive cough, fever and SOB. This had become progressively worse and is worse with exertion. While in the ER, patient did test positive for influenza A. CXR revealed coarsened appearance to the pulmonary parenchyma ?chronic. Pulmonary vascularity is normal. Tamiflu was started. Patient was admitted to [facility] Med-Surgical Unit. On 1/3/23 CODE Stroke was called due to new onset left facial weakness, lethargy and dysarthria. NIH of 3. 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 Based on patient's symptoms and time since last known well decision was made for TNK to be given. 22 mg of TNK given at 5:05 AM.  Patient transferred to the ICU with thrombolytic precautions. Patient was placed on HFNC. MRI complete 23 without acute findings of CVA. echocardiogram 1/3/2023 that showed EF of 55% and overall normal left ventricular function. Patient was cleared for diet by SLP. Tamilfu completed 23. Solu-Medrol 23. Out of droplet isolation 23. Pt admitted to [facility] on 1/10 for therapy needs with eventual plan to return to Assisted Living once medically stable, but needs to be able to care for self except for housemaking tasks.     Restrictions/Precautions:  Restrictions/Precautions: Fall Risk, General Precautions, Modified Diet  Position Activity Restriction  Other position/activity restrictions: impulsive     SUBJECTIVE: Patient pleasant and cooperative, agreeable to OT. Flat affect througout     PAIN: Denies pain      Vitals: Vitals not assessed per clinical judgement, see nursing flowsheet    COGNITION: Slow Processing, Decreased Recall, Decreased Insight, and Impaired Attention    ADL:   Grooming: Stand By Assistance. Hand hygiene x1 min, 2 events. Toileting: Stand By Assistance. Toilet Transfer: Stand By Assistance. STS . BALANCE:  Sitting Balance:  Independent. Standing Balance: Stand By Assistance. BED MOBILITY:  Supine to Sit: Modified Independent    Sit to Supine: Modified Independent      TRANSFERS:  Sit to Stand:  Stand By Assistance. EOB, standard chair - multiple trials. Stand to Sit: Stand By Assistance. FUNCTIONAL MOBILITY:  Assistive Device: None  Assist Level:  Stand By Assistance. Distance:  to/from easy street       ADDITIONAL ACTIVITIES:  Patient completed recreational craft task that was graded to challenge pt's ability to initiate, problem solve and sequence unfamiliar tasks, standing endurance and standing balance with BUEs engaged into task. Pt required mod cues to initiate and sequence task, and required cues for working memory as pt required directions repeated 4x during simple portions of task. Pt was able to stand for 2-3 min at a time, but self initiated sitting regularly due to fatigue, demo'ing poor endurance and a fair+ balance with SBA. Overall simple task took 20 min due to cognitive limitations. Task was completed to challenge above areas for safe return to showering routines. ASSESSMENT:     Activity Tolerance:  Patient tolerance of  treatment: good       Discharge Recommendations: Home with Home Health OT  Equipment Recommendations: Other: Pt will have a shower chair and grab bars to use in his AL apartment.   Plan: Times Per Week: 5x/wk for 90 min  Current Treatment Recommendations: Functional mobility training, Balance training, Self-Care / ADL, Safety education & training, Endurance training, Equipment evaluation, education, & procurement, Patient/Caregiver education & training    Patient Education  Patient Education:  transfer safety, role of OT, problem solving/initiaiton/sequencing new tasks     Goals  Short Term Goals  Time Frame for Short Term Goals: 1 week  Short Term Goal 1: Pt will sequence and complete bathing tasks with SBA and 0 vcs for perseveration to improve indep with showering at home. Short Term Goal 2: Pt will tolerate standing 4 min with SBA for increased ease of sinkside grooming. Short Term Goal 3: Pt will gather appropriate clothing and dress self with SBA and min vcs for sequencing and initation to improve indep with self care. Short Term Goal 4: Pt will sequence and initate grooming tasks following toileting with SBA and min vcs to improve indep with self care. Long Term Goals  Time Frame for Long Term Goals : 2 weeks from IPR evaluation  Long Term Goal 1: Pt will ambulate within room with Mod I and no device to improve indep with self care. Long Term Goal 2: Pt will complete dressing tasks with mod I and no cues for sequencing to improve indep with self care at AL. Long Term Goal 3: Pt will complete grooming and toileting tasks with mod I to return to prior level of function at AL. Following session, patient left in safe position with all fall risk precautions in place.

## 2023-01-17 NOTE — PROGRESS NOTES
Physical Medicine & Rehabilitation   Progress Note    Chief Complaint:  debility, impaired mobility and ADLs     Subjective:  Patient seen and examined. NAEO. Patient resting in bed at time of exam. He reports good sleep overnight. Denies any CP or SOB. No reports of n/v/d. Rehabilitation:   PT 01/16/2023:  Bed Mobility:  Rolling to Right: Stand By Assistance   Supine to Sit: Stand By Assistance  Sit to Supine: Stand By Assistance      Transfers:  Sit to Stand: Stand By Assistance  Stand to Sit:Stand By Assistance     Ambulation:  Stand By Assistance, [employer], with increased time for completion  Distance: 170 ft x2  Surface: Level Tile  Device:No Device  Gait Deviations: Forward Flexed Posture, Slow May, Decreased Step Length Bilaterally, Decreased Arm Swing, Decreased Gait Speed, and Decreased Heel Strike Bilaterally  **Cues to avoid shuffling feet     Exercise:  Patient was guided in 1 set(s) 10 reps of exercise to both lower extremities. Seated marches and Long arc quads with 2# ankle weights. Exercises were completed for increased independence with functional mobility. Patient completed 10 min on Nu-Step machine on L 0 using BUE/BLE to improve strength and endurance for improved functional mobility. **Patient required lengthy rest breaks 4x        Functional Outcome Measures: Not completed     OT 01/17/2023:  BED MOBILITY:  Sit to Supine: Modified Independent HOB flat, used bedrail     TRANSFERS:  Sit to Stand:  Stand By Assistance. Stand to Sit: Stand By Assistance. FUNCTIONAL MOBILITY:  Assistive Device: None  Assist Level:  Stand By Assistance and Contact Guard Assistance.    Distance:  to/from easy street and around [address] over various surfaces , cues for larger step length and pace as patient ambulating very slowly- losing balance at times due to slow step length      ADDITIONAL ACTIVITIES:  Patient completed BUE strengthening exercises with skilled education on HEP: completed x10 reps x1  set with a 3# weighted dowel edmar in all joints and all planes in order to improve UE strength and activity tolerance required for BADL routine and toilet / shower transfers. Patient tolerated well, requiring minimal rest breaks. Patient completed dynamic standing task that facilitated dynamic weightshifting and larger step lengths. Patient required SBA, and demo'ed an endurance of 8 then 4 minutes. Demo good  tolerance with patient requiring  rest breaks. Standing task completed to challenge endurance and balance required for ADL and IADL skills. SLP 01/16/2023:     Short-Term Goals:  SHORT TERM GOAL #1:  Goal 1: Patient will consume a soft and bite-size diet and thin liquids and advanced PO trials as clinically indicated with ST only with stable pulmonary status and adequate endurance to assist with meeting nutrition/hydration needs -GOAL NOT MET, CONTINUE GOAL   INTERVENTIONS:  Skilled dietary analysis completed with patient's breakfast tray consisting of scrambled eggs, pancakes, sausage and orange juice. Patient was noted to rapidly take several bites at once and then set his fork down to chew. He was able to demonstrate thorough mastication of soft and bite sized solids and clear oral cavity independently or with a liquid wash. With the last bite of sausage, patient was noted to [facility] for several minutes. When clinician questioned if he was having difficulty he stated 'I have no teeth'. Patient reports that he has dentures at his ECF but he does not wear them because they do not fit. He continued to masticate the bite of sausage and demonstrate piecemeal deglutition and utilized liquid washes before it finally cleared after more than 5 minutes. Patient reported that he was on a regular diet and thin liquids at his ECF.   It is recommended that a regular diet tray be trialed prior to discharge to allow analysis for regular textured solids, patient's use of strategies and to determine the patient's safest diet and liquid level. SHORT TERM GOAL #2:  Goal 2: Patient will complete structured speech drills/tasks (DDK rates, AMRs, multi-syllabic word repetition) with implementation of SOS speech strategies to improve intelligibility to 70% in all contexts to optimize communicative efficacy. - GOAL NOT MET, CONTINUE GOAL  INTERVENTIONS:  Goal not addressed this date due to a focus on other objectives. SHORT TERM GOAL #3:  Goal 3: Patient will complete functional recall tasks (immediate, delayed, working, orientation, biographics) with 70% accuracy, mod cues to improve retention of pertinent information. - GOAL NOT MET, CONTINUE GOAL  INTERVENTIONS:  Goal not addressed this date due to a focus on other goals. SHORT TERM GOAL #4:  Goal 4: Patient will complete problem solving, verbal/visual reasoning, and executive functioning tasks (time, money/math/finances, medications) with 60% accuracy, mod cues to improve contributions within ADL/IADL completion.- GOAL MET  NEW GOAL: Patient will complete problem solving, verbal/visual reasoning and executive functioning tasks (time, basic math/finances) with 80% accuracy with min cues to improve contributions to ADL/IADL completion. INTERVENTIONS:  Verbal Problem solving - ECF environment  100% independent. Time - Verbal Problem Solving  100% independent     SHORT TERM GOAL #5:  Goal 5: Patient will complete sequencing and thought organization tasks with 70% accuracy, mod cues to improve mental flexibility for daily living scenarios.- GOAL MET  NEW GOAL: Patient will complete sequencing and thought organization tasks with 70% accuracy, min cues to improve mental flexibility for daily living scenarios. INTERVENTIONS:  Goal not addressed this date due to a focus on other objectives.       Long-Term Goals:  Time Frame for Long Term Goals: 3 weeks from evaluation     LONG TERM GOAL #1:  Goal 1: Patient will consume a regular texture diet and thin liquids with stable pulmonary status and adequate endurance to assist with meeting nutrition/hydration needs. - GOAL NOT MET     LONG TERM GOAL #2:  Goal 2: Patient will improve cognitive-linguistic skills to a min assist or higher upon discharge in order to make safe/successful discharge to least restrictive environment and resume ADL completion with least amount of supervision/assistance. -GOAL NOT MET     Comprehension: 5 - Patient understands basic needs (hungry/hot/pain)  Expression: 4 - Expresses basic ideas/needs 75-90%+ of the time  Social Interaction: 4 - Patient appropriate 75-90%+ of the time  Problem Solvin - Patient solves simple/routine tasks 75-90%+   Memory: 3 - Patient remembers 50%-74% of the time         Review of Systems:  CONSTITUTIONAL:  negative for  fevers and chills  EYES:  negative for  glasses and visual disturbance  HEENT:  negative for  voice change  RESPIRATORY:  negative for  dyspnea and wheezing  CARDIOVASCULAR:  negative for  chest pain, palpitations  GASTROINTESTINAL:  negative for nausea, vomiting, and change in bowel habits  GENITOURINARY:  negative for dysuria and urinary incontinence  SKIN:  negative rash or wound   MUSCULOSKELETAL:  positive for  muscle weakness; negative for pain  NEUROLOGICAL:  positive for speech problems and gait problems  BEHAVIOR/PSYCH:  positive for history of bipolar disorder  10 point system review otherwise negative      Objective:  /71   Pulse (!) 109   Temp 98.8 °F (37.1 °C) (Oral)   Resp 16   Ht 5' 9\" (1.753 m)   Wt 183 lb 7 oz (83.2 kg)   SpO2 93%   BMI 27.09 kg/m²   Patient is awake and alert, no family at bedside   Mood: within normal limits  Affect: calm  General appearance: Patient is well nourished, well developed, well groomed and in no acute distress     Memory:   not formally assess but able to answer questions regarding history   Attention/Concentration: follow conversation  Language: abnormal - chronic dysarthria     Cranial Nerves:  cranial nerves II-XII appear intact  ROM:  normal  Motor Exam: Full strength in BUE and BLE  Tone:  normal  Muscle bulk: within normal limits  Sensory:  Sensory intact  Coordination:   normal     Heart: RRR, no m/r/g noted   Lungs: CTAB, no wheezing, rales, or rhonchi noted; he is breathing comfortably on room air without any increased WOB  Abdomen: non-distended   Skin: warm and dry, no rash or erythema  Edema: none    Diagnostics:   Recent Results (from the past 24 hour(s))   POCT Glucose    Collection Time: 01/17/23  8:45 AM   Result Value Ref Range    POC Glucose 165 (H) 70 - 108 mg/dl     CXR 01/13/2023:        Impression:       1. Borderline heart size. No effusion. 2. Very subtle mild scattered interstitial infiltrates in both central lung fields and right lung base, consistent with interstitial pneumonitis. Impression:  Acute respiratory failure  Influenza A  Post viral syndrome  Moderate cognitive impairment  Diabetes Mellitis type 2 with hyperglycemia  HTN  Bipolar disorder  Chronic tobacco use, not previously dx with chronic lung disease  Impaired ADLs and mobility     Plan:  Continue inpatient rehabilitation program involving at least 3 hours per day, 5 days per week of intensive rehabilitation. Rehabilitation services will include PT, OT, and SLP/RT in order to improve functional status prior to discharge. Family education and training will be completed. Equipment evaluations and recommendations will be completed as appropriate. Rehabilitation nursing will be involved for bowel, bladder, skin, and pain management. Nursing will also provide education and training to patient and family. Dr. Alexandre López consulted for medical management  Acute Hypoxic Respiratory failure: 2/2 Influenza A. Not on oxygen at baseline. Now on supplemental O2 via nasal cannula- will continue to wean oxygen as tolerated.  Patient has completed course of tamiflu and dolu-medrol. Leukocytosis: WBC 11.2 today. UA obtained due to increased urinary frequency  which is negative for infection. CXR obtained due to reports of dry cough- xray with  \"Very subtle mild scattered interstitial infiltrates in both central lung fields and right lung base, consistent with interstitial pneumonitis\". No findings to suggest acute PNA. Continue to monitor for signs or symptoms of infection   1/14: WBC WNL  Episode of left facial droop: Possible TIA? . 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 MRI negative for any intracranial abnormality. Continue aspirin and statin  T2DM: Continue Lantus 7 units nightly and Metformin 500 mg BID- Metformin increased to 1000mg BID by Dr. Yesenia Garrido   HTN: Continue home lopressor  Bipolar Disorder: Continue home Clozapine, Pristiq, and Depakote  Prophylaxis:  DVT: Lovenox. Pain: Tylenol   Dysphagia: On soft and bite sized diet. Diet to be upgraded when appropriate per SLP  Tobacco dependence: Nicotine patch added   Nutrition:  Consultation to dietician for nutritional counseling and recommendations. Prealbumin 17.0 on admission. Bladder: Per rehab nursing   Bowel: Continue docusate   and case management consultations for coordination of care and discharge planning. Team conference held Thursday with anticipated DC back to assisted living on 1/20/2023.     Missed Therapy Time:  None    Jaz Never, DO

## 2023-01-17 NOTE — PROGRESS NOTES
[address] [address]  INPATIENT PHYSICAL THERAPY  Daily Note  [facility] - [id number]    Time In: 0900  Time Out: 0930  Timed Code Treatment Minutes: 30 Minutes  Minutes: 30          Date: 2023  Patient Name: Farshad Jones,  Gender:  male        MRN: [id number]  : 1965  (65 y.o.)     Referring Practitioner: Katie Moreno DO  Diagnosis: Debility  Additional Pertinent Hx: Per H&P \"Chico Bedoya is a 65 y.o. male admitted to [address] [place] [address] on 2023. Patient with past medical history of DM, HTN, and bipolar disorder who presented to [facility] for productive cough, fever and SOB. This had become progressively worse and is worse with exertion. While in the ER, patient did test positive for influenza A. CXR revealed coarsened appearance to the pulmonary parenchyma ?chronic. Pulmonary vascularity is normal. Tamiflu was started. Patient was admitted to [facility] Med-Surgical Unit. On 1/3/23 CODE Stroke was called due to new onset left facial weakness, lethargy and dysarthria. NIH of 3. 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 Based on patient's symptoms and time since last known well decision was made for TNK to be given. 22 mg of TNK given at 5:05 AM.  Patient transferred to the ICU with thrombolytic precautions. Patient was placed on HFNC. MRI complete 23 without acute findings of CVA. echocardiogram 1/3/2023 that showed EF of 55% and overall normal left ventricular function. Patient was cleared for diet by SLP. Tamilfu completed 23. Solu-Medrol 23. Out of droplet isolation 23. Plan was for patient to return to Assisted Living once medically stable, but needs to be able to care for self except for housemaking tasks. \"     Prior Level of Function:  Lives With: Alone  Type of Home: Assisted living [facility])  Home Layout: One level  Home Access: Level entry  Home Equipment: None   Bathroom Shower/Tub: Walk-in shower  Bathroom Toilet: Standard  Bathroom Equipment: Grab bars around toilet, Grab bars in shower    ADL Assistance: Independent  Homemaking Assistance: Needs assistance  Ambulation Assistance: Independent  Transfer Assistance: Independent  Active : No  Additional Comments: Pt reports using no AD at assisted living. Pt reports an aide is sometimes present so supervise showers, but he completes dressing, grooming and toileting independently. Facility completes housekeeping, cooking and transportation. Pt usually ambulates to dining room for meals. Restrictions/Precautions:  Restrictions/Precautions: Fall Risk, General Precautions, Modified Diet  Position Activity Restriction  Other position/activity restrictions: impulsive     SUBJECTIVE: Pt. Seated on his BS chair and pleasantly agrees to therapy session. PAIN: None indicated    Vitals:   Patient Vitals for the past 8 hrs:   BP Patient Position Temp Temp src Pulse Resp SpO2 O2 Device O2 Flow Rate (L/min)   01/17/23 0944 114/71 Sitting 98.8 °F (37.1 °C) Oral (!) 109 16 93 % None (Room air) --   01/17/23 0554 -- -- -- -- -- -- 92 % Nasal cannula 4 L/min   01/17/23 0545 -- -- -- -- -- -- (!) 86 % Nasal cannula 2 L/min        OBJECTIVE:  Bed Mobility:  Not Tested    Transfers:  Sit to Stand: Stand By Assistance  Stand to Sit:Stand By Assistance    Ambulation:  Stand By Assistance, [employer] Assistance  Distance: 150' x 2, multiple shorter distances. Surface: Level Tile  Device: No Device  Gait Deviations:  Slow May, Decreased Step Length Bilaterally, Decreased Gait Speed, Decreased Heel Strike on Right, Decreased Foot Clearance Right, and Decreased Foot Clearance Left    Stairs:  None    Balance:  Pt. Completed standing dynamic balance activity: ball toss with Normal DAVID on level tile and No UE support on No Device with Stand By Assistance.  Activity completed to improve balance, enhance functional mobility, and reduce risk of falls. Neuromuscular Re-education  Pt. Completed dynamic gait and multi-tasking activities using No Device to improve coordination, core stability, gait mechanics, and Environmental awareness for improved functional mobility. Pt. With slow processing requiring cues and demonstration of tasks throughout. Exercise:  None    Functional Outcome Measures:   Not completed    ASSESSMENT:  Assessment: Patient progressing toward established goals. Activity Tolerance:  Patient tolerance of  treatment: good. Equipment Recommendations:Equipment Needed: No (Continue to assess pending progress)  Discharge Recommendations: Continue to assess pending progress, Patient would benefit from continued therapy after discharge     Plan: Current Treatment Recommendations: Strengthening, Balance training, Functional mobility training, Gait training, Safety education & training, Home exercise program, Patient/Caregiver education & training, Transfer training, Endurance training, Therapeutic activities, Neuromuscular re-education, Stair training  General Plan:  (5x/wk 90min)    Patient Education  Patient Education: Plan of Care, Functional Mobility, Reviewed Prior Education, Health Promotion and Wellness Education, Safety    Goals:  Patient Goals : None Stated  Short Term Goals  Time Frame for Short Term Goals: 1 week  Short Term Goal 1: Patient to perform bed mobility supine<>sit with Supervision in order to assist with getting into and out of bed. Short Term Goal 2: Patient to perform sit to stand transfers without AD with </=1 cue for hand placement from various surfaces in order to assist with safety with transfers in home. Short Term Goal 3: Patient to ambulate >/=75 feet without AD with SBA in order to assist with home mobility. Short Term Goal 4: Patient to score >/=38/56 on the [place] Balance Test in order to assist with reduced risk for falls.   Long Term Goals  Time Frame for Long Term Goals : 2 weeks from IPR evaluation  Long Term Goal 1: Patient to perform bed mobility supine<>sit with Mod I in order to assist with getting into and out of bed. Long Term Goal 2: Patient to perform sit to stand transfers without use of Ad with Mod I in order to assist with safety with transfers in home. Long Term Goal 3: Patient to ambulate >/=200 feet without AD with Mod I in order to assist with ambulating to and  from dining room for all meals. Long Term Goal 4: Patient to perform car transfer with Supervision in order to assist with getting to and from appointments. Long Term Goal 5: Patient to perform TUG in </=12 seconds in order to assist with functional dynamic balance. Following session, patient left in safe position with all fall risk precautions in place.

## 2023-01-18 LAB — GLUCOSE BLD-MCNC: 157 MG/DL (ref 70–108)

## 2023-01-18 PROCEDURE — 97129 THER IVNTJ 1ST 15 MIN: CPT

## 2023-01-18 PROCEDURE — 1180000000 HC REHAB R&B

## 2023-01-18 PROCEDURE — 6370000000 HC RX 637 (ALT 250 FOR IP): Performed by: FAMILY MEDICINE

## 2023-01-18 PROCEDURE — 6370000000 HC RX 637 (ALT 250 FOR IP): Performed by: STUDENT IN AN ORGANIZED HEALTH CARE EDUCATION/TRAINING PROGRAM

## 2023-01-18 PROCEDURE — 97116 GAIT TRAINING THERAPY: CPT

## 2023-01-18 PROCEDURE — 94762 N-INVAS EAR/PLS OXIMTRY CONT: CPT

## 2023-01-18 PROCEDURE — 97530 THERAPEUTIC ACTIVITIES: CPT

## 2023-01-18 PROCEDURE — 97110 THERAPEUTIC EXERCISES: CPT

## 2023-01-18 PROCEDURE — 6360000002 HC RX W HCPCS: Performed by: STUDENT IN AN ORGANIZED HEALTH CARE EDUCATION/TRAINING PROGRAM

## 2023-01-18 PROCEDURE — 97130 THER IVNTJ EA ADDL 15 MIN: CPT

## 2023-01-18 PROCEDURE — 99232 SBSQ HOSP IP/OBS MODERATE 35: CPT | Performed by: STUDENT IN AN ORGANIZED HEALTH CARE EDUCATION/TRAINING PROGRAM

## 2023-01-18 PROCEDURE — 82948 REAGENT STRIP/BLOOD GLUCOSE: CPT

## 2023-01-18 RX ADMIN — ENOXAPARIN SODIUM 40 MG: 100 INJECTION SUBCUTANEOUS at 08:40

## 2023-01-18 RX ADMIN — DOCUSATE SODIUM 100 MG: 100 CAPSULE, LIQUID FILLED ORAL at 08:40

## 2023-01-18 RX ADMIN — METFORMIN HYDROCHLORIDE 1000 MG: 500 TABLET ORAL at 08:40

## 2023-01-18 RX ADMIN — CLOZAPINE 50 MG: 25 TABLET ORAL at 17:39

## 2023-01-18 RX ADMIN — DIVALPROEX SODIUM 1000 MG: 500 TABLET, EXTENDED RELEASE ORAL at 21:29

## 2023-01-18 RX ADMIN — METFORMIN HYDROCHLORIDE 1000 MG: 500 TABLET ORAL at 17:39

## 2023-01-18 RX ADMIN — DESVENLAFAXINE 50 MG: 50 TABLET, EXTENDED RELEASE ORAL at 08:40

## 2023-01-18 RX ADMIN — CLOZAPINE 200 MG: 100 TABLET ORAL at 21:29

## 2023-01-18 RX ADMIN — ASPIRIN 81 MG 81 MG: 81 TABLET ORAL at 08:40

## 2023-01-18 RX ADMIN — CLOZAPINE 50 MG: 25 TABLET ORAL at 08:40

## 2023-01-18 RX ADMIN — METOPROLOL SUCCINATE 25 MG: 25 TABLET, EXTENDED RELEASE ORAL at 08:41

## 2023-01-18 RX ADMIN — ATORVASTATIN CALCIUM 20 MG: 20 TABLET, FILM COATED ORAL at 21:29

## 2023-01-18 RX ADMIN — INSULIN GLARGINE 7 UNITS: 100 INJECTION, SOLUTION SUBCUTANEOUS at 21:31

## 2023-01-18 RX ADMIN — DIVALPROEX SODIUM 500 MG: 500 TABLET, EXTENDED RELEASE ORAL at 08:40

## 2023-01-18 RX ADMIN — POLYETHYLENE GLYCOL 3350 17 G: 17 POWDER, FOR SOLUTION ORAL at 06:15

## 2023-01-18 ASSESSMENT — PAIN SCALES - GENERAL: PAINLEVEL_OUTOF10: 0

## 2023-01-18 NOTE — PLAN OF CARE
Care plan reviewed with patient. Patient verbalizes understanding of the plan of care and contribute to goal setting.       Problem: Discharge Planning  Goal: Discharge to home or other facility with appropriate resources  1/18/2023 0142 by Josiah Arango LPN  Outcome: Progressing  Flowsheets (Taken 1/18/2023 0142)  Discharge to home or other facility with appropriate resources:   Identify barriers to discharge with patient and caregiver   Arrange for needed discharge resources and transportation as appropriate     Problem: Safety - Adult  Goal: Free from fall injury  1/18/2023 0142 by Josiah Arango LPN  Outcome: Progressing  Flowsheets (Taken 1/18/2023 0142)  Free From Fall Injury: Instruct family/caregiver on patient safety     Problem: ABCDS Injury Assessment  Goal: Absence of physical injury  Outcome: Progressing  Flowsheets (Taken 1/15/2023 1123 by Catherine Mitchell LPN)  Absence of Physical Injury: Implement safety measures based on patient assessment     Problem: Pain  Goal: Verbalizes/displays adequate comfort level or baseline comfort level  Outcome: Progressing  Flowsheets (Taken 1/18/2023 0142)  Verbalizes/displays adequate comfort level or baseline comfort level:   Encourage patient to monitor pain and request assistance   Assess pain using appropriate pain scale   Administer analgesics based on type and severity of pain and evaluate response   Implement non-pharmacological measures as appropriate and evaluate response     Problem: Infection - Adult  Goal: Absence of infection at discharge  Outcome: Progressing  Flowsheets (Taken 1/18/2023 0142)  Absence of infection at discharge:   Assess and monitor for signs and symptoms of infection   Monitor lab/diagnostic results   Monitor all insertion sites i.e., indwelling lines, tubes and drains     Problem: Infection - Adult  Goal: Absence of infection during hospitalization  Outcome: Progressing     Problem: Infection - Adult  Goal: Absence of fever/infection during anticipated neutropenic period  Outcome: Progressing     Problem: Metabolic/Fluid and Electrolytes - Adult  Goal: Hemodynamic stability and optimal renal function maintained  Outcome: Progressing     Problem: Metabolic/Fluid and Electrolytes - Adult  Goal: Glucose maintained within prescribed range  Outcome: Progressing     Problem: Hematologic - Adult  Goal: Maintains hematologic stability  Outcome: Progressing     Problem: Skin/Tissue Integrity  Goal: Absence of new skin breakdown  Description: 1. Monitor for areas of redness and/or skin breakdown  2. Assess vascular access sites hourly  3. Every 4-6 hours minimum:  Change oxygen saturation probe site  4. Every 4-6 hours:  If on nasal continuous positive airway pressure, respiratory therapy assess nares and determine need for appliance change or resting period.   Outcome: Progressing     Problem: Nutrition Deficit:  Goal: Optimize nutritional status  Outcome: Progressing

## 2023-01-18 NOTE — DISCHARGE INSTR - COC
Continuity of Care Form    Patient Name: Mandie Madrid   :  1965  MRN:  [id number]    Admit date:  1/10/2023  Discharge date:  23    Code Status Order: Full Code   Advance Directives:     Admitting Physician:  Hal Agee DO  PCP: Chencho Angulo MD    Discharging Nurse: [facility] Unit/Room#: 7E-70/070-A  Discharging Unit Phone Number: [phone]    Emergency Contact:   Extended Emergency Contact Information  Primary Emergency Contact: Pam Irwin  Work Phone: [phone]  Mobile Phone: [phone]  Relation: Legal Guardian    Past Surgical History:  No past surgical history on file.     Immunization History:   Immunization History   Administered Date(s) Administered    COVID-19, PFIZER PURPLE top, DILUTE for use, (age 15 y+), 30mcg/0.3mL 2021, 2021, 2021       Active Problems:  Patient Active Problem List   Diagnosis Code    Sepsis ([address] A41.9    Acute respiratory failure with hypoxia (HCC) J96.01    Influenza with respiratory manifestation other than pneumonia J11.1    Cerebral infarction due to internal carotid artery occlusion (HCC) I63.239    Debility R53.81       Isolation/Infection:   Isolation            No Isolation          Patient Infection Status       Infection Onset Added Last Indicated Last Indicated By Review Planned Expiration Resolved Resolved By    None active    Resolved    Influenza 23 COVID-19 & Influenza Combo   23 Pawel Fierro, RN    COVID-19 (Rule Out) 23 COVID-19 & Influenza Combo (Ordered)   23 Rule-Out Test Resulted            Nurse Assessment:  Last Vital Signs: /74   Pulse 100   Temp 97.9 °F (36.6 °C) (Oral)   Resp 16   Ht 5' 9\" (1.753 m)   Wt 183 lb 7 oz (83.2 kg)   SpO2 98%   BMI 27.09 kg/m²     Last documented pain score (0-10 scale): Pain Level: 0  Last Weight:   Wt Readings from Last 1 Encounters:   23 183 lb 7 oz (83.2 kg) Mental Status:  oriented and alert    IV Access:  - None    Nursing Mobility/ADLs:  Walking   Independent  Transfer  Independent  Bathing  Independent  Dressing  Independent  Toileting  Independent  Feeding  Independent  Med [address]  Assisted  Med Delivery   whole    Wound Care Documentation and Therapy:        Elimination:  Continence: Bowel: Yes  Bladder: Yes  Urinary Catheter: None   Colostomy/Ileostomy/Ileal Conduit: No       Date of Last BM: 1/19    Intake/Output Summary (Last 24 hours) at 1/18/2023 1011  Last data filed at 1/18/2023 0616  Gross per 24 hour   Intake 900 ml   Output --   Net 900 ml     I/O last 3 completed shifts: In: 0670 [P.O.:1620]  Out: -     Safety Concerns: At Risk for Falls    Impairments/Disabilities:      None    Nutrition Therapy:  Current Nutrition Therapy:   - Oral Diet:  Carb Control 3 carbs/meal (1500kcals/day) and Dysphagia 3 advanced    Routes of Feeding: Oral  Liquids: Thin Liquids  Daily Fluid Restriction: no  Last Modified Barium Swallow with Video (Video Swallowing Test): not done    Treatments at the Time of Hospital Discharge:   Respiratory Treatments: none  Oxygen Therapy:  is not on home oxygen therapy.   Ventilator:    - No ventilator support    Rehab Therapies: Physical Therapy, Occupational Therapy, and Speech/Language Therapy  Weight Bearing Status/Restrictions: No weight bearing restrictions  Other Medical Equipment (for information only, NOT a DME order):  walker  Other Treatments: none    Patient's personal belongings (please select all that are sent with patient):  None    RN SIGNATURE:  Electronically signed by Kit Velazquez RN on 1/20/23 at 9:34 AM EST    CASE MANAGEMENT/SOCIAL WORK SECTION    Inpatient Status Date: 1/10/23 - 1/20/23    Readmission Risk Assessment Score:  Readmission Risk              Risk of Unplanned Readmission:  12           Discharging to Facility/ Agency   Name: [facility]  Address: [address] [place] [address] [facility] [address]  Phone: [phone]  Fax: [phone]    / signature: Electronically signed by Travis Dodd on 1/18/23 at 10:13 AM EST    PHYSICIAN SECTION    Prognosis: {Prognosis:0116576489}    Condition at Discharge: [address] Patient Condition:[id number]}    Rehab Potential (if transferring to Rehab): {Prognosis:9644067651}    Recommended Labs or Other Treatments After Discharge: ***    Physician Certification: I certify the above information and transfer of Farshad Jones  is necessary for the continuing treatment of the diagnosis listed and that he requires {Admit to Appropriate Level of Care:19033} for {GREATER/LESS:216802186} 30 days.      Update Admission H&P: [facility]}    PHYSICIAN SIGNATURE:  {Esignature:[id number]}

## 2023-01-18 NOTE — PROGRESS NOTES
Discharge pain assessment:    Complete within 3 days of discharge. Pain Effect on Sleep ()   Ask patient: Jaylon Puckett the past 5 days, how much of the time has pain made it hard for you to sleep at night?\" 1.  Rarely or not at all     If no pain is reported, end interview. If pain is reported, continue with the additional questions.    Pain Interference with Therapy Activities   Ask patient: Jaylon Puckett the past 5 days, how often have you limited your participation in rehabilitation therapy sessions due to pain?\" 1.  Rarely or not at all   Pain Interference with Day to Day Activities   Ask patient: Jaylon Puckett the past 5 days, how often have you limited your day to day activities (excluding rehabilitation therapy sessions) because of pain?\" 1.  Rarely or not at all

## 2023-01-18 NOTE — PROGRESS NOTES
[address] [place] [address]  Occupational Therapy  Daily Note  Time:   Time In: 1400  Time Out: 1432  Timed Code Treatment Minutes: 32 Minutes  Minutes: 32    Date: 2023  Patient Name: Jose Peraza,   Gender: male      Room: [facility]/070-A  MRN: [id number]  : 1965  (65 y.o.)  Referring Practitioner: Dr. Rosi Nick  Diagnosis: Debility  Additional Pertinent Hx: Jose Peraza is a 65 y.o. male admitted to [address] [place] [address] on 2023. Patient with past medical history of DM, HTN, and bipolar disorder who presented to [facility] for productive cough, fever and SOB. This had become progressively worse and is worse with exertion. While in the ER, patient did test positive for influenza A. CXR revealed coarsened appearance to the pulmonary parenchyma ?chronic. Pulmonary vascularity is normal. Tamiflu was started. Patient was admitted to [facility] Med-Surgical Unit. On 1/3/23 CODE Stroke was called due to new onset left facial weakness, lethargy and dysarthria. NIH of 3. 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 Based on patient's symptoms and time since last known well decision was made for TNK to be given. 22 mg of TNK given at 5:05 AM.  Patient transferred to the ICU with thrombolytic precautions. Patient was placed on HFNC. MRI complete 23 without acute findings of CVA. echocardiogram 1/3/2023 that showed EF of 55% and overall normal left ventricular function. Patient was cleared for diet by SLP. Tamilfu completed 23. Solu-Medrol 23. Out of droplet isolation 23. Pt admitted to [facility] on 1/10 for therapy needs with eventual plan to return to Assisted Living once medically stable, but needs to be able to care for self except for housemaking tasks.     Restrictions/Precautions:  Restrictions/Precautions: Fall Risk, General Precautions, Modified Diet  Position Activity Restriction  Other position/activity restrictions: impulsive     SUBJECTIVE: Patient pleasant and cooperative. Agreeable to OT. PAIN: Denies     Vitals: Vitals not assessed per clinical judgement, see nursing flowsheet    COGNITION: Slow Processing, Decreased Recall, and Decreased Insight    ADL:   Footwear Mgmt: MI, donning shoes. BALANCE:  Sitting Balance:  Modified Independent. Standing Balance: Supervision. BED MOBILITY:  Supine to Sit: Mod I     TRANSFERS:  Sit to Stand:  Supervision. EOB, standard chair. Close SBA from computer based chair with wheels. Stand to Sit: Supervision. FUNCTIONAL MOBILITY:  Assistive Device: None  Assist Level:  Supervision. Distance:  to/from [address] dining room      ADDITIONAL ACTIVITIES:  Patient completed dynamic standing recreational task of playing the Wii, task was graded to faiclitate: standing endurance, standing balance, ability to attend to directions and multi-task and sequence how to manipulate Wii controller. Pt required mod VCs and min tactile hand over hand cues to sequence Wii controller, after tactile cues pt demo improved follow through. Pt demo endurance of x 8 min, 5 min, and 3 min with a seated rest break between each event with supervision. ASSESSMENT:  Assessment: Buster Merlin is making steady progress on IPR. He has progressed to a supervision level with his fucntional transfers and mobility during his ADL / IADL tasks. He can complete his BADL routine with supervision. Buster Merlin is limited by his sequencing, initiation and problem solving which affects his ADL tasks. Buster Merlin has direct A for most IADLs at [facility] home. [place] cont to requir skilled OT to improve safety and indep with BADL tasks and improve strength and endurance for return to function. Activity Tolerance:  Patient tolerance of  treatment: good. Discharge Recommendations: Home with Home Health OT  Equipment Recommendations:  Other: Pt will have a shower chair and grab bars to use in his AL apartment. Plan: Times Per Week: 5x/wk for 90 min  Current Treatment Recommendations: Functional mobility training, Balance training, Self-Care / ADL, Safety education & training, Endurance training, Equipment evaluation, education, & procurement, Patient/Caregiver education & training    Patient Education  Patient Education:  safety with tasks, endurance building    Goals  Short Term Goals  Time Frame for Short Term Goals: 1 week  Short Term Goal 1: Pt will sequence and complete bathing tasks with consistent S and 0 vcs for perseveration to improve indep with showering at home. Short Term Goal 2: Pt will tolerate standing 4 min with Supervision for increased ease of sinkside grooming. Short Term Goal 3: Pt will gather appropriate clothing and dress self with consistent S and min vcs for sequencing and initation to improve indep with self care. Short Term Goal 4: Pt will sequence and initate grooming tasks following toileting with consistent S and min vcs to improve indep with self care. Long Term Goals  Time Frame for Long Term Goals : 2 weeks from IPR evaluation  Long Term Goal 1: Pt will ambulate within room with Mod I and no device to improve indep with self care. Long Term Goal 2: Pt will complete dressing tasks with mod I and no cues for sequencing to improve indep with self care at AL. Long Term Goal 3: Pt will complete grooming and toileting tasks with mod I to return to prior level of function at AL. Following session, patient left in safe position with all fall risk precautions in place.

## 2023-01-18 NOTE — PROGRESS NOTES
Physical Medicine & Rehabilitation   Progress Note    Chief Complaint:  debility, impaired mobility and ADLs     Subjective:  Patient seen and examined. NAEO. Patient resting in bed at time of exam. He reports good sleep overnight. Denies any CP or SOB. No reports of n/v/d. Reports improved cough. Nicotine patch is sufficiently assisting with cravings . Rehabilitation:   PT 01/17/2023:  Sit to Stand: Stand By Assistance  Stand to Sit:Stand By Assistance     Ambulation:  Stand By Assistance, [employer] Assistance  Distance: 150' x 2, 60' x 1  Surface: Level Tile  Device: No Device  Gait Deviations:  Slow May, Decreased Step Length Bilaterally, Decreased Gait Speed, Decreased Heel Strike on Right, and Mild Path Deviations. Cues for quicker may. Pt. Completed heel-walking in // bars to strengthen anterior tibs and improve heel strike with gait. Stairs:  None     Balance:  None     Neuromuscular Re-education  Pt. Completed multi-tasking/gait activities ambulating over uneven surfaces and on level tile while balancing tennis balls on cones to improve coordination, gait mechanics, and Environmental awareness for improved functional mobility. Exercise:  None     Functional Outcome Measures:   Not completed        OT 01/18/2023:     ADL:   No ADL's completed this session. Isaac Bustos BALANCE:  Sitting Balance:  Modified Independent. Standing Balance: Stand By Assistance. To supervision with static      BED MOBILITY:  Not Tested     TRANSFERS:  Sit to Stand:  Supervision. EOB, \"bench\", recliner   Stand to Sit: Supervision. FUNCTIONAL MOBILITY:  Assistive Device: None  Assist Level:  Stand By Assistance. Distance: To and from therapy apartment       ADDITIONAL ACTIVITIES:  Patient completed IADL task of vacuuming large amount of carpet to challenge standing endurance and standing balance. Pt stood x 10 min, 8 min and 4 min with a seated rest break between each event. Skilled edu on ECT.  Fair+ tolerance. Appropriate sequencing and problem solving of unfamilar task. SBA required for this more dynamic task. Patient completed BUE strengthening exercises with skilled education on HEP: completed x15 reps x1 set with a medium resistive band  in all joints and all planes in order to improve UE strength and activity tolerance required for BADL routine and toilet / shower transfers. Patient tolerated well, requiring min rest breaks. Patient also required min cues for technique. SLP 01/16/2023:  Short-Term Goals:  SHORT TERM GOAL #1:  Goal 1: Patient will consume a soft and bite-size diet and thin liquids and advanced PO trials as clinically indicated with ST only with stable pulmonary status and adequate endurance to assist with meeting nutrition/hydration needs   INTERVENTIONS: ST completed skilled dietary analysis of advanced lunch tray which consisted of a cheeseburger, mashed potatoes, ice cream, and thin liquids via cup. Patient with overall suspected adequate bolus control and manipulation; however, concern for decreased bolus control x1 resulting in cough during mastication of regular texture solid. Patient with prolonged rotary mastication of regular texture solid with effective textural breakdown when given EXTENDED amount of time likely s/t edentulous state and absence of dentures. Patient with consistent swallow initiation achieved across all trials. No overt s/s of aspiration observed with liquid trials. Patient required max verbal cues to utilize small, single bites. Patient consistently taking multiple bites of cheeseburger that filled oral cavity. Patient independently utilizing alternation of bites/sips or bites of puree/ice cream to assist with breakdown/clearance of regular texture trial. At this time, it is recommended patient continues with a soft and bite-size diet with thin liquids.       SHORT TERM GOAL #2:  Goal 2: Patient will complete structured speech drills/tasks (DDK rates, AMRs, multi-syllabic word repetition) with implementation of SOS speech strategies to improve intelligibility to 70% in all contexts to optimize communicative efficacy. INTERVENTIONS:  Did not address this date d/t focus on other goals. SHORT TERM GOAL #3:  Goal 3: Patient will complete functional recall tasks (immediate, delayed, working, orientation, biographics) with 70% accuracy, mod cues to improve retention of pertinent information. INTERVENTIONS: Did not address this date d/t focus on other goals. SHORT TERM GOAL #4:  NEW GOAL: Patient will complete problem solving, verbal/visual reasoning and executive functioning tasks (time, basic math/finances) with 80% accuracy with min cues to improve contributions to ADL/IADL completion. INTERVENTIONS: Did not address this date d/t focus on other goals. SHORT TERM GOAL #5:  NEW GOAL: Patient will complete sequencing and thought organization tasks with 70% accuracy, min cues to improve mental flexibility for daily living scenarios. INTERVENTIONS: Did not address this date d/t focus on other goals. Long-Term Goals:  Time Frame for Long Term Goals: 3 weeks from evaluation     LONG TERM GOAL #1:  Goal 1: Patient will consume a regular texture diet and thin liquids with stable pulmonary status and adequate endurance to assist with meeting nutrition/hydration needs. LONG TERM GOAL #2:  Goal 2: Patient will improve cognitive-linguistic skills to a min assist or higher upon discharge in order to make safe/successful discharge to least restrictive environment and resume ADL completion with least amount of supervision/assistance.       Comprehension: 5 - Patient understands basic needs (hungry/hot/pain)  Expression: 4 - Expresses basic ideas/needs 75-90%+ of the time  Social Interaction: 4 - Patient appropriate 75-90%+ of the time  Problem Solvin - Patient solves simple/routine tasks 75-90%+   Memory: 3 - Patient remembers 50%-74% of the time     EDUCATION:  Learner: Patient  Education:  Reviewed ST goals and Plan of Care, Reviewed recommendations for follow-up, Home Safety Education, and SOS intelligibility strategies  Evaluation of Education: Verbalizes understanding, Needs further instruction, and Family not present      Review of Systems:  CONSTITUTIONAL:  negative for  fevers and chills  EYES:  negative for  glasses and visual disturbance  HEENT:  negative for  voice change  RESPIRATORY:  negative for  dyspnea and wheezing  CARDIOVASCULAR:  negative for  chest pain, palpitations  GASTROINTESTINAL:  negative for nausea, vomiting, and change in bowel habits  GENITOURINARY:  negative for dysuria and urinary incontinence  SKIN:  negative rash or wound   MUSCULOSKELETAL:  positive for  muscle weakness; negative for pain  NEUROLOGICAL:  positive for speech problems and gait problems  BEHAVIOR/PSYCH:  positive for history of bipolar disorder  10 point system review otherwise negative      Objective:  /74   Pulse 100   Temp 97.9 °F (36.6 °C) (Oral)   Resp 16   Ht 5' 9\" (1.753 m)   Wt 183 lb 7 oz (83.2 kg)   SpO2 98%   BMI 27.09 kg/m²   Patient is awake and alert, no family at bedside   Mood: within normal limits  Affect: calm  General appearance: Patient is well nourished, well developed, well groomed and in no acute distress     Memory:   not formally assess but able to answer questions regarding history   Attention/Concentration: follow conversation  Language:  abnormal - chronic dysarthria     Cranial Nerves:  cranial nerves II-XII appear intact  ROM:  normal  Motor Exam: Full strength in BUE and BLE  Tone:  normal  Muscle bulk: within normal limits  Sensory:  Sensory intact  Coordination:   normal     Heart: RRR, no m/r/g noted   Lungs: CTAB, no wheezing, rales, or rhonchi noted; he is breathing comfortably on room air without any increased WOB  Abdomen: non-distended   Skin: warm and dry, no rash or erythema  Edema: none    Diagnostics:   Recent Results (from the past 24 hour(s))   POCT Glucose    Collection Time: 01/18/23  7:26 AM   Result Value Ref Range    POC Glucose 157 (H) 70 - 108 mg/dl     CXR 01/13/2023:        Impression:       1. Borderline heart size. No effusion. 2. Very subtle mild scattered interstitial infiltrates in both central lung fields and right lung base, consistent with interstitial pneumonitis. Impression:  Acute respiratory failure  Influenza A  Post viral syndrome  Moderate cognitive impairment  Diabetes Mellitis type 2 with hyperglycemia  HTN  Bipolar disorder  Chronic tobacco use, not previously dx with chronic lung disease  Impaired ADLs and mobility     Plan:  Continue inpatient rehabilitation program involving at least 3 hours per day, 5 days per week of intensive rehabilitation. Rehabilitation services will include PT, OT, and SLP/RT in order to improve functional status prior to discharge. Family education and training will be completed. Equipment evaluations and recommendations will be completed as appropriate. Rehabilitation nursing will be involved for bowel, bladder, skin, and pain management. Nursing will also provide education and training to patient and family. Dr. Merly Ramirez consulted for medical management  Acute Hypoxic Respiratory failure: 2/2 Influenza A. Not on oxygen at baseline. Now on supplemental O2 via nasal cannula- will continue to wean oxygen as tolerated. Patient has completed course of tamiflu and dolu-medrol. 1/18: Only requiring O2 nocturnally. Will place order for overnight pulse ox testing tonight to eval for home O2  Leukocytosis: WBC 11.2 today. UA obtained due to increased urinary frequency  which is negative for infection. CXR obtained due to reports of dry cough- xray with  \"Very subtle mild scattered interstitial infiltrates in both central lung fields and right lung base, consistent with interstitial pneumonitis\".  No findings to suggest acute PNA. Continue to monitor for signs or symptoms of infection   1/14: WBC WNL  Episode of left facial droop: Possible TIA? . 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 MRI negative for any intracranial abnormality. Continue aspirin and statin  T2DM: Continue Lantus 7 units nightly and Metformin increased to 1000mg BID  HTN: Continue home lopressor  Bipolar Disorder: Continue home Clozapine, Pristiq, and Depakote  Prophylaxis:  DVT: Lovenox. Pain: Tylenol   Dysphagia: On soft and bite sized diet. Diet to be upgraded when appropriate per SLP  Tobacco dependence: Nicotine patch added   Nutrition:  Consultation to dietician for nutritional counseling and recommendations. Prealbumin 17.0 on admission. Bladder: Per rehab nursing   Bowel: Continue docusate   and case management consultations for coordination of care and discharge planning. Team conference held Thursday with anticipated DC back to assisted living on 1/20/2023.     Missed Therapy Time:  None    Leonila Wu DO

## 2023-01-18 NOTE — PLAN OF CARE
Problem: Discharge Planning  Goal: Discharge to home or other facility with appropriate resources  1/18/2023 1558 by DARON Izaguirre  Note: DAVE spoke with Violeta Villeda with [facility]: [facility]. They are able to accept patient for PT, OT and ST services. DAVE spoke with Gloria with [facility]. Transport scheduled for 1/20 at 10:30 AM.    No outstanding needs or concerns at this time. SW will follow and maintain involvement in discharge planning.

## 2023-01-18 NOTE — PROGRESS NOTES
[address] [place] [address]  Inpatient Rehabilitation  Occupational Therapy  Progress Note  Time:  Time In: 730  Time Out: 6745  Timed Code Treatment Minutes: 58 Minutes  Minutes: 58    Date: 2023  Patient Name: Salome Marquez,   Gender: male      Room: [facility]70/070-A  MRN: [id number]  : 1965  (65 y.o.)  Referring Practitioner: Dr. Rigoberto Wilson  Diagnosis: Debility  Additional Pertinent Hx: Salome Marquez is a 65 y.o. male admitted to [address] [place] [address] on 2023. Patient with past medical history of DM, HTN, and bipolar disorder who [facility] for productive cough, fever and SOB. This had become progressively worse and is worse with exertion. While in the ER, patient did test positive for influenza A. CXR revealed coarsened appearance to the pulmonary parenchyma ?chronic. Pulmonary vascularity is normal. Tamiflu was started. Patient was admitted to [facility]. On 1/3/23 CODE Stroke was called due to new onset left facial weakness, lethargy and dysarthria. NIH of 3. 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 Based on patient's symptoms and time since last known well decision was made for TNK to be given. 22 mg of TNK given at 5:05 AM.  Patient transferred to the ICU with thrombolytic precautions. Patient was placed on HFNC. MRI complete 23 without acute findings of CVA. echocardiogram 1/3/2023 that showed EF of 55% and overall normal left ventricular function. Patient was cleared for diet by SLP. Tamilfu completed 23. Solu-Medrol 23. Out of droplet isolation 23. Pt admitted to [facility] on 1/10 for therapy needs with eventual plan to return to Assisted Living once medically stable, but needs to be able to care for self except for housemaking tasks.     Restrictions/Precautions:  Restrictions/Precautions: Fall Risk, General Precautions, Modified Diet  Position Activity Restriction  Other position/activity restrictions: impulsive    SUBJECTIVE: Patient pleasant and cooperative. Agreeable to OT. PAIN: Denies pain      Vitals: Vitals not assessed per clinical judgement, see nursing flowsheet    COGNITION: Slow Processing and Impaired Attention    ADL:   No ADL's completed this session. Candelario Platt BALANCE:  Sitting Balance:  Modified Independent. Standing Balance: Stand By Assistance. To supervision with static     BED MOBILITY:  Not Tested    TRANSFERS:  Sit to Stand:  Supervision. EOB, \"bench\", recliner   Stand to Sit: Supervision. FUNCTIONAL MOBILITY:  Assistive Device: None  Assist Level:  Stand By Assistance. Distance: To and from therapy apartment      ADDITIONAL ACTIVITIES:  Patient completed IADL task of vacuuming large amount of carpet to challenge standing endurance and standing balance. Pt stood x 10 min, 8 min and 4 min with a seated rest break between each event. Skilled edu on ECT. Fair+ tolerance. Appropriate sequencing and problem solving of unfamilar task. SBA required for this more dynamic task. Patient completed BUE strengthening exercises with skilled education on HEP: completed x15 reps x1 set with a medium resistive band  in all joints and all planes in order to improve UE strength and activity tolerance required for BADL routine and toilet / shower transfers. Patient tolerated well, requiring min rest breaks. Patient also required min cues for technique. ASSESSMENT:  Activity Tolerance:  Patient tolerance of  treatment: good. Assessment: Assessment: Dickson Jeff is making steady progress on IPR. He has progressed to a SBA level with his fucntional transfers and mobility during his ADL / IADL tasks. He can complete his BADL routine with SBA. Dickson Jeff is limited by his sequencing, initiation and problem solving which affects his ADL tasks. Dickson Jeff has direct A for most IADLs at [facility].  [place] cont to requir skilled OT to improve safety and indep with BADL tasks and improve strength and endurance for return to function. Discharge Recommendations: Home with Home health OT  Equipment Recommendations: Other: Pt will have a shower chair and grab bars to use in his AL apartment. Plan: Times Per Week: 5x/wk for 90 min  Current Treatment Recommendations: Functional mobility training, Balance training, Self-Care / ADL, Safety education & training, Endurance training, Equipment evaluation, education, & procurement, Patient/Caregiver education & training    Patient Education  Patient Education: IADL's, Home Exercise Program, Reviewed Prior Education, and Assistive Device Safety    Goals  Short Term Goals  Time Frame for Short Term Goals: 1 week  Short Term Goal 1: Pt will sequence and complete bathing tasks with SBA and 0 vcs for perseveration to improve indep with showering at home. GOAL MET, REVISE   Short Term Goal 2: Pt will tolerate standing 4 min with SBA for increased ease of sinkside grooming. GOAL MET, CONT   Short Term Goal 3: Pt will gather appropriate clothing and dress self with SBA and min vcs for sequencing and initation to improve indep with self care. GOAL MET, REVISE   Short Term Goal 4: Pt will sequence and initate grooming tasks following toileting with SBA and min vcs to improve indep with self care. GOAL MET, REVISE   Long Term Goals  Time Frame for Long Term Goals : 2 weeks from IPR evaluation  Long Term Goal 1: Pt will ambulate within room with Mod I and no device to improve indep with self care. GOAL NOT MET, CONT   Long Term Goal 2: Pt will complete dressing tasks with mod I and no cues for sequencing to improve indep with self care at AL. GOAL NOT MET, CONT   Long Term Goal 3: Pt will complete grooming and toileting tasks with mod I to return to prior level of function at AL.  GOAL NOT MET, CONT     Revised Short-Term Goals  Short Term Goals  Time Frame for Short Term Goals: 1 week  Short Term Goal 1: Pt will sequence and complete bathing tasks with consistent S and 0 vcs for perseveration to improve indep with showering at home. Short Term Goal 2: Pt will tolerate standing 4 min with Supervision for increased ease of sinkside grooming. Short Term Goal 3: Pt will gather appropriate clothing and dress self with consistent S and min vcs for sequencing and initation to improve indep with self care. Short Term Goal 4: Pt will sequence and initate grooming tasks following toileting with consistent S and min vcs to improve indep with self care. Long Term Goals  Time Frame for Long Term Goals : 2 weeks from IPR evaluation  Long Term Goal 1: Pt will ambulate within room with Mod I and no device to improve indep with self care. Long Term Goal 2: Pt will complete dressing tasks with mod I and no cues for sequencing to improve indep with self care at AL. Long Term Goal 3: Pt will complete grooming and toileting tasks with mod I to return to prior level of function at AL. Following session, patient left in safe position with all fall risk precautions in place.

## 2023-01-18 NOTE — PROGRESS NOTES
Comprehensive Nutrition Assessment    Type and Reason for Visit:  Reassess    Nutrition Recommendations/Plan:   Recommend diet as per SLP. Continue Glucerna once/day. Recommend MVI. Will monitor need for additional nutrition interventions. Malnutrition Assessment:  Malnutrition Status: At risk for malnutrition (Comment) (01/12/23 5004)    Context:  Acute Illness     Findings of the 6 clinical characteristics of malnutrition:  Energy Intake:  No significant decrease in energy intake  Weight Loss:   (-1.7% in 1 week if weights accurate; -4.5% in 4 months; -7.9% in 1 month)     Body Fat Loss:  No significant body fat loss     Muscle Mass Loss:  No significant muscle mass loss    Fluid Accumulation:  Unable to assess     Strength:  Not Performed    Nutrition Assessment:     Pt. Improving from a nutritional standpoint AEB consuming mostly % of meals, stated acceptance of ONS. At risk for further nutrition compromise r/t weight loss, admit to rehab w/ debility s/p influenza, possible TIA and underlying medical condition (hx bipolar, HTN, DM). Nutrition Related Findings:      Wound Type: None     Pt. Report/Treatments/Miscellaneous: pt. Seen while eating lunch; reports good appetite and intake; states acceptance of ONS; denies any trouble tolerating diet; SLP following  GI Status: 1 BM noted past 24 hours  Pertinent Labs: 1/14: Glucose 140, BUN 8, Cr 0.4  Pertinent Meds: Glucophage, Zofran, Insulin, Colace, Lipitor      Current Nutrition Intake & Therapies:    Average Meal Intake: 26-50%, 51-75%, % (mostly %)  Average Supplements Intake: %  ADULT DIET;  Dysphagia - Soft and Bite Sized; 4 carb choices (60 gm/meal)  ADULT ORAL NUTRITION SUPPLEMENT; Lunch; Diabetic Oral Supplement    Anthropometric Measures:  Height: 5' 9\" (175.3 cm)  Ideal Body Weight (IBW): 160 lbs (73 kg)    Admission Body Weight: 181 lb 1 oz (82.1 kg) (1/10 no edema)  Current Body Weight: 183 lb 7 oz (83.2 kg) (1/16 no edema),      Current BMI (kg/m2): 27.1  Usual Body Weight:  (per pt. 198#; per EMR ([facility]Tom office): 9/9/22: 190#; 12/8/22: 197#; EMR: 1/3/23: 184# 8 oz)                       BMI Categories: Overweight (BMI 25.0-29. 9)    Estimated Daily Nutrient Needs:  Energy Requirements Based On: Kcal/kg  Weight Used for Energy Requirements: Other (Comment) (82)  Energy (kcal/day): 5608-1893 kcals (20-25)  Weight Used for Protein Requirements: Ideal (73)  Protein (g/day): 95+ grams (1.3+)       Nutrition Diagnosis:   Unintended weight loss related to catabolic illness as evidenced by weight loss    Nutrition Interventions:   Food and/or Nutrient Delivery: Continue Current Diet, Continue Oral Nutrition Supplement  Nutrition Education/Counseling: Education initiated (1/12 Encouraged po, good nutrition at best efforts.)  Coordination of Nutrition Care: Continue to monitor while inpatient       Goals:  Previous Goal Met: Progressing toward Goal(s)  Goals: PO intake 75% or greater, by next RD assessment       Nutrition Monitoring and Evaluation:      Food/Nutrient Intake Outcomes: Diet Advancement/Tolerance, Food and Nutrient Intake, Supplement Intake  Physical Signs/Symptoms Outcomes: Biochemical Data, Chewing or Swallowing, GI Status, Fluid Status or Edema, Nutrition Focused Physical Findings, Skin, Weight    Discharge Planning:     Too soon to determine     Saul Diggs RD, LD  Contact: [phone]

## 2023-01-18 NOTE — PROGRESS NOTES
[facility] NOTE    Conference Date: 2023  Admit Date:  1/10/2023  3:26 PM  Patient Name: Janay Madrigal    MRN: [id number]    : 1965  (65 y.o.)  Rehabilitation Admitting Diagnosis:  Debility [R53.81]  Referring Practitioner: Carmen Mullins DO      CASE MANAGEMENT  Current issues/needs regarding patient and family discharge status: Patient continues to be motivated and progressing with therapy. Patient plans on being discharged on Friday,  back to [facility] with home health services for PT, OT and [address] [place] [address] will follow and maintain involvement in discharge planning. PHYSICAL THERAPY  Patient overall is making progress with skilled PT treatment and has met 4/4  STG's and 1/5 LTG's. Patient has progressed to supervision for bed mobility without railings, Supervision to SBA f or sit to st and  transfers with cueing for sequencing and hand placement and SBA to CGA for ambulation without AD with slowed gait speed and unsteadiness at times with cueing for increased gait speed in order to assist with safety. Patient improved his Augustine Balance score from 30/56 to 45/56 and improved his TUG score from 23 seconds to 10.75 seconds indicating he is at low risk for falls. Patient overall can continue to benefit from skilled PT treatment in order to assist with BLE strengthening ,gait training, transfer training, dynamic balance and core strengthening for increased functional mobility. Equipment Needed: No (Continue to assess pending progress)    SPEECH THERAPY  Patient achieved 4/5 STGs and 0/2 LTGs this therapy reporting period. Patient with demonstrated gains in recall and thought organization. Patient continues to present with deficits in problem solving, reasoning, and executive functioning. Patient expressive and receptive language [facility].  Patient with improvement in overall speech intelligibility with implementation of SOS speech strategies; however, patient requiring moderate cues to implement strategies. Patient currently safely consuming a soft and bite-size diet with thin liquids - barrier to diet advancement at this time is edentulous state with poor carryover of recommended swallow strategies. Concerns remaining for safety with ADL/IADL completion. Anticipate further progress with ongoing, intensive, skilled ST services via IPR setting. WithOUT ongoing, skilled ST services, suspect patient may demonstrate difficulty making successful return to PLOF. Patient would benefit from skilled ST services to address aforementioned cognitive skills with recommendations for [facility] ST at discharge and supervision with ADLs/IADLs. OCCUPATIONAL THERAPY  [place] is making steady progress on IPR. He has progressed to a supervision level with his fucntional transfers and mobility during his ADL / IADL tasks. He can complete his BADL routine with supervision. [place] is limited by his sequencing, initiation and problem solving which affects his ADL tasks. Nohemi Hence has direct A for most IADLs at [facility] home. [place] cont to requir skilled OT to improve safety and indep with BADL tasks and improve strength and endurance for return to function. Other: Pt will have a shower chair and grab bars to use in his AL apartment. RECREATIONAL THERAPY  Patient has been offered participation in recreational therapy activities and participates as able. Pt came to our recreational therapy room to get his hair and beard cut by our beautician-he wanted it all off and looks like a different person-affect brightened when staff made over him-sit to stand from w/c with SBA-ambulated 3 ft to recliner with no AD and SBA-pt appreciative  -Pt came to [address] with OT and enjoyed making a craft to take back to his room-affect flat for the most part-did brighten at times-enjoyed listening to our  play a few songs too     NUTRITION  Weight: 183 lb 7 oz (83.2 kg) / Body mass index is 27.09 kg/m². Current diet: ADULT DIET; Dysphagia - Soft and Bite Sized; 4 carb choices (60 gm/meal)  ADULT ORAL NUTRITION SUPPLEMENT; Lunch; Diabetic Oral Supplement  Please see nutrition note for details. NURSING  Continent of Bowel: YES, every other day. Miralax, Colace   Continent of Bladder: Yes, WDL   Pain is Managed:  Yes. Management: none. Frequency of Intervention: none. Adequately Controlled: Yes  Sleep: Adequate  Signs and Symptoms of Infection:  No.   Signs and Symptoms of Skin Breakdown:  No.   Injury and/or Falls during Inpatient Rehabilitation Admission: No  Anticoagulants: ASA, Lovenox   Diabetic: Yes. AM Chem. On Lantus and Metformin   Consultations/Labs/X-rays: none  Oxygen while on IP Rehab:  Yes Currently using  2 liters per NC @HS . Home oxygen: No and HS Pulse OX ordered. Discharge to Assisted Living     Recent Labs     01/17/23  0845 01/18/23  0726 01/19/23  0758   POCGLU 165* 157* 172*       Lab Results   Component Value Date    LDLCALC 44 01/03/2023         Vitals:    01/18/23 2319 01/19/23 0321 01/19/23 0657 01/19/23 0756   BP:    121/66   Pulse: 88 88 83 (!) 105   Resp: 16  16 18   Temp:    98.1 °F (36.7 °C)   TempSrc:    Oral   SpO2: 93% 93% (!) 88% 94%   Weight:       Height:              Family Education:  limited family available for education  Fall Risk:  Falling star program initiated  Is the patient appropriate for a stay in the functional apartment? no    Discharge Plan   Estimated Discharge Date:  01/20/2023    Destination: discharge home with supervision  Services at Discharge: [address], Occupational Therapy, Speech Therapy 2-3x week  Is patient appropriate for an outpatient driving evaluation? No- patient does not drive  Equipment at Discharge: Other: Pt will have a shower chair and grab bars to use in his AL apartment.   Factors facilitating achievement of predicted outcomes: Cooperative and Pleasant  Barriers to the achievement of predicted outcomes: Decreased endurance and Lower extremity weakness  Follow up with physiatrist? no      Team Members Present at Conference:  :SAM CastrejonW, MSW   Occupational Therapist:Alonso PAIZ, OTR/L [id number]  Physical Therapist:Kraig Butt, PT [id number]  Speech Therapist:Ariana Feliciano MS, CCC-SLP [id number]  Nurse:Valeria Rock RN  Psychologist: Kelsie Perez, PhD.    I approve the established interdisciplinary plan of care as documented within the medical record of [place] Anaya.    Yashira Dye, DO

## 2023-01-18 NOTE — PLAN OF CARE
Problem: Discharge Planning  Goal: Discharge to home or other facility with appropriate resources  1/18/2023 1344 by Dejon Singletary RN  Outcome: Progressing  Flowsheets (Taken 1/18/2023 0830)  Discharge to home or other facility with appropriate resources: Identify barriers to discharge with patient and caregiver  1/18/2023 0142 by Gris Espinal LPN  Outcome: Progressing  Flowsheets (Taken 1/18/2023 0142)  Discharge to home or other facility with appropriate resources:   Identify barriers to discharge with patient and caregiver   Arrange for needed discharge resources and transportation as appropriate     Problem: Safety - Adult  Goal: Free from fall injury  1/18/2023 1344 by Dejon Singletary RN  Outcome: Progressing  [address] (Taken 1/18/2023 1053)  Free From Fall Injury: Instruct family/caregiver on patient safety  1/18/2023 0142 by Gris Espinal LPN  Outcome: Progressing  Flowsheets (Taken 1/18/2023 0142)  Free From Fall Injury: Instruct family/caregiver on patient safety     Problem: ABCDS Injury Assessment  Goal: Absence of physical injury  1/18/2023 1344 by Dejon Singletary RN  Outcome: Progressing  Flowsheets (Taken 1/18/2023 1053)  Absence of Physical Injury: Implement safety measures based on patient assessment  1/18/2023 0142 by Gris Espinal LPN  Outcome: Progressing  Flowsheets (Taken 1/15/2023 1123 by Faraz Dyer LPN)  Absence of Physical Injury: Implement safety measures based on patient assessment     Problem: Pain  Goal: Verbalizes/displays adequate comfort level or baseline comfort level  1/18/2023 1344 by Dejon Singletary RN  Outcome: Progressing  1/18/2023 0142 by Gris Espinal LPN  Outcome: Progressing  Flowsheets (Taken 1/18/2023 0142)  Verbalizes/displays adequate comfort level or baseline comfort level:   Encourage patient to monitor pain and request assistance   Assess pain using appropriate pain scale   Administer analgesics based on type and severity of pain and evaluate response   Implement non-pharmacological measures as appropriate and evaluate response     Problem: Infection - Adult  Goal: Absence of infection at discharge  1/18/2023 1344 by Cat Goddard RN  Outcome: Progressing  Flowsheets (Taken 1/18/2023 0830)  Absence of infection at discharge: Assess and monitor for signs and symptoms of infection  1/18/2023 0142 by Collin Patrick LPN  Outcome: Progressing  Flowsheets (Taken 1/18/2023 0142)  Absence of infection at discharge:   Assess and monitor for signs and symptoms of infection   Monitor lab/diagnostic results   Monitor all insertion sites i.e., indwelling lines, tubes and drains  Goal: Absence of infection during hospitalization  1/18/2023 1344 by Cat Goddard RN  Outcome: Progressing  Flowsheets (Taken 1/18/2023 0830)  Absence of infection during hospitalization: Assess and monitor for signs and symptoms of infection  1/18/2023 0142 by Collin Patrick LPN  Outcome: Progressing  Goal: Absence of fever/infection during anticipated neutropenic period  1/18/2023 1344 by Cat Goddard RN  Outcome: Progressing  Flowsheets (Taken 1/18/2023 0830)  Absence of fever/infection during anticipated neutropenic period: Monitor white blood cell count  1/18/2023 0142 by Collin Patrick LPN  Outcome: Progressing     Problem: Metabolic/Fluid and Electrolytes - Adult  Goal: Electrolytes maintained within normal limits  1/18/2023 1344 by Cat Goddard RN  Outcome: Progressing  Flowsheets (Taken 1/18/2023 0830)  Electrolytes maintained within normal limits: Monitor labs and assess patient for signs and symptoms of electrolyte imbalances  1/18/2023 0142 by Collin Patrick LPN  Outcome: Progressing  Goal: Hemodynamic stability and optimal renal function maintained  1/18/2023 1344 by Cat Goddard RN  Outcome: Progressing  Flowsheets (Taken 1/18/2023 0830)  Hemodynamic stability and optimal renal function maintained: Monitor labs and assess for signs and symptoms of volume excess or deficit  1/18/2023 0142 by Kenyon Staton LPN  Outcome: Progressing  Goal: Glucose maintained within prescribed range  1/18/2023 1344 by Ken Platt RN  Outcome: Progressing  Flowsheets (Taken 1/18/2023 0830)  Glucose maintained within prescribed range: Monitor blood glucose as ordered  1/18/2023 0142 by Kenyon Staton LPN  Outcome: Progressing     Problem: Hematologic - Adult  Goal: Maintains hematologic stability  1/18/2023 1344 by Ken Platt RN  Outcome: Progressing  Flowsheets (Taken 1/18/2023 0830)  Maintains hematologic stability: Assess for signs and symptoms of bleeding or hemorrhage  1/18/2023 0142 by Kenyon Staton LPN  Outcome: Progressing     Problem: Skin/Tissue Integrity  Goal: Absence of new skin breakdown  Description: 1. Monitor for areas of redness and/or skin breakdown  2. Assess vascular access sites hourly  3. Every 4-6 hours minimum:  Change oxygen saturation probe site  4. Every 4-6 hours:  If on nasal continuous positive airway pressure, respiratory therapy assess nares and determine need for appliance change or resting period.   1/18/2023 1344 by Ken Platt RN  Outcome: Progressing  1/18/2023 0142 by Kenyon Staton LPN  Outcome: Progressing     Problem: Nutrition Deficit:  Goal: Optimize nutritional status  1/18/2023 1344 by Ken Platt RN  Outcome: Progressing  1/18/2023 0142 by Kenyon Staton LPN  Outcome: Progressing

## 2023-01-18 NOTE — PROGRESS NOTES
[address] [facility]  [facility]  PROGRESS NOTE    TIME   SLP Individual Minutes  Time In: 1130  Time Out: 1200  Minutes: 30  Timed Code Treatment Minutes: 30 Minutes       Date: 2023  Patient Name: iLndsey London      CSN: [id number]   : 1965  (65 y.o.)  Gender: male   Referring Physician:  India Kohli DO  Diagnosis: Debility  Precautions: Fall  Risk,  aspiration risk  Current Diet: Soft and Bite sized,thin liquids  Swallowing Strategies:  Full Upright Position, Small Bite/Sip, Medications Whole with Thin, Alternate Solids and Liquids, and Limit Distractions   Date of Last MBS/FEES: Not Applicable    Pain:  No pain reported. Subjective:  Patient sitting EOB upon ST arrival. Patient agreeable to skilled ST services. Pleasant, cooperative, and engaged throughout. Short-Term Goals:  SHORT TERM GOAL #1:  Goal 1: Patient will consume a soft and bite-size diet and thin liquids and advanced PO trials as clinically indicated with ST only with stable pulmonary status and adequate endurance to assist with meeting nutrition/hydration needs -GOAL MET; CONTINUE  INTERVENTIONS: Did not address this session d/t focus on other goals. PRIOR SESSION   ST completed skilled dietary analysis of advanced lunch tray which consisted of a cheeseburger, mashed potatoes, ice cream, and thin liquids via cup. Patient with overall suspected adequate bolus control and manipulation; however, concern for decreased bolus control x1 resulting in cough during mastication of regular texture solid. Patient with prolonged rotary mastication of regular texture solid with effective textural breakdown when given EXTENDED amount of time likely s/t edentulous state and absence of dentures. Patient with consistent swallow initiation achieved across all trials. No overt s/s of aspiration observed with liquid trials.  Patient required max verbal cues to utilize small, single bites. Patient consistently taking multiple bites of cheeseburger that filled oral cavity. Patient independently utilizing alternation of bites/sips or bites of puree/ice cream to assist with breakdown/clearance of regular texture trial. At this time, it is recommended patient continues with a soft and bite-size diet with thin liquids. SHORT TERM GOAL #2:  Goal 2: Patient will complete structured speech drills/tasks (DDK rates, AMRs, multi-syllabic word repetition) with implementation of SOS speech strategies to improve intelligibility to 70% in all contexts to optimize communicative efficacy. - GOAL MET; REVISE   REVISED Goal 2: Patient will complete structured speech drills/tasks (DDK rates, AMRs, multi-syllabic word repetition) with implementation of SOS speech strategies to improve intelligibility to 80% in all contexts to optimize communicative efficacy. INTERVENTIONS:  Did not address this date d/t focus on other goals. PRIOR SESSION:   Patient completed 2 syllable \"putuhkuh\" x10 with moderate cues, benefited from slowing down and repeating after ST model; fair success   Patient completed 3 syllable \"puhtuhkuh\" x10 with moderate cues, benefited from slowing down and repeating after ST model; fair success  *ST with instruction/education r/t encouragement of moving patient mouth more and slowing down    SHORT TERM GOAL #3:  Goal 3: Patient will complete functional recall tasks (immediate, delayed, working, orientation, biographics) with 70% accuracy, mod cues to improve retention of pertinent information. -GOAL MET; REVISE   REVISED Goal 3: Patient will complete functional recall tasks (immediate, delayed, working, orientation, biographics) with 85% accuracy, min cues to improve retention of pertinent information. INTERVENTIONS: ST completed review of STM strategies using the acronym WRAP--Write it down, Repeat it, Associate it, and Pictures it.  ST then provided patient with 4 item To-Do list (Do laundry, fold clothes, clean room, take out trash). Patient utilized write it down and repeat it x3. Immediate Recall: 2/4 independently, 1/4 given min cues, 1/4 total assist   Delayed Recall (20 minutes): 2/4 independently, 2/4 with list (min cues to utilize call light)     SHORT TERM GOAL #4:  NEW GOAL: Patient will complete problem solving, verbal/visual reasoning and executive functioning tasks (time, basic math/finances) with 80% accuracy with min cues to improve contributions to ADL/IADL completion. -GOAL NOT MET; CONTINUE   INTERVENTIONS:   Money Management - Counting Bills + Coins:  5/6 independently, 1/6 given min cues   *excellent ID of coin + value  *good math computation of basic money variables    Money Management - [place] Menu:  2/6 given min cues, 2/6 given mod cues, 2/6 given max cues   *slow visual scanning   *required verbal cues to utilize pen/paper to write prices down in order to complete math computation - adequate math computation via pen/paper. *reduced mental manipulation. SHORT TERM GOAL #5:  NEW GOAL: Patient will complete sequencing and thought organization tasks with 70% accuracy, min cues to improve mental flexibility for daily living scenarios. - GOAL MET; REVISE   REVISED Goal 5: Patient will complete sequencing and thought organization tasks with 80% accuracy, min cues to improve mental flexibility for daily living scenarios  INTERVENTIONS: Did not address this date d/t focus on other goals. PRIOR SESSION  Sequencing task x4, 4 steps each, patient with x1 error  *increased difficulties for reasoning of order     Long-Term Goals:  Time Frame for Long Term Goals: 3 weeks from evaluation    LONG TERM GOAL #1:  Goal 1: Patient will consume a regular texture diet and thin liquids with stable pulmonary status and adequate endurance to assist with meeting nutrition/hydration needs.  -ONGOING    LONG TERM GOAL #2:  Goal 2: Patient will improve cognitive-linguistic skills to a min assist or higher upon discharge in order to make safe/successful discharge to least restrictive environment and resume ADL completion with least amount of supervision/assistance. - ONGOING       Comprehension: 5 - Patient understands basic needs (hungry/hot/pain)  Expression: 4 - Expresses basic ideas/needs 75-90%+ of the time  Social Interaction: 4 - Patient appropriate 75-90%+ of the time  Problem Solvin - Patient solves simple/routine tasks 75-90%+   Memory: 3 - Patient remembers 50%-74% of the time    EDUCATION:  Learner: Patient  Education:  Reviewed ST goals and Plan of Care, Reviewed recommendations for follow-up, Home Safety Education, and SOS intelligibility strategies  Evaluation of Education: Verbalizes understanding, Needs further instruction, and Family not present    ASSESSMENT/PLAN:   SUMMARY: Patient achieved 4/5 STGs and 0/2 LTGs this therapy reporting period. Patient with demonstrated gains in recall and thought organization. Patient continues to present with deficits in problem solving, reasoning, and executive functioning. Patient expressive and receptive language [facility]. Patient with improvement in overall speech intelligibility with implementation of SOS speech strategies; however, patient requiring moderate cues to implement strategies. Patient currently safely consuming a soft and bite-size diet with thin liquids - barrier to diet advancement at this time is edentulous state with poor carryover of recommended swallow strategies. Concerns remaining for safety with ADL/IADL completion. Anticipate further progress with ongoing, intensive, skilled ST services via [facility] setting. WithOUT ongoing, skilled ST services, suspect patient may demonstrate difficulty making successful return to [facility]. Patient would benefit from skilled ST services to address aforementioned cognitive skills with recommendations for [facility] ST at discharge and supervision with ADLs/IADLs.     Activity Tolerance:  Patient tolerance of  treatment: good. Assessment/Plan: Patient progressing toward established goals. Continues to require skilled care of licensed speech pathologist to progress toward achievement of established goals and plan of care.     Plan for Next Session: Trial advanced diet textures, sequencing/thought organization  Discharge Recommendations: [facility]) [address]

## 2023-01-18 NOTE — PROGRESS NOTES
[facility]  INPATIENT PHYSICAL THERAPY  PROGRESS NOTE  [facility]- [address] [facility],[address] Floor - [id number]    Time In: 0900  Time Out: 1030  Timed Code Treatment Minutes: 90 Minutes  Minutes: 90          Date: 2023  Patient Name: Bhanu Silver,  Gender:  male        MRN: [id number]  : 1965  (65 y.o.)     Referring Practitioner: Layton Naranjo DO  Diagnosis: Debility  Additional Pertinent Hx: Per H&P \"Chico Haddad is a 65 y.o. male admitted to [facility] on 2023. Patient with past medical history of DM, HTN, and bipolar disorder who presented to [facility] for productive cough, fever and SOB. This had become progressively worse and is worse with exertion. While in the ER, patient did test positive for influenza A. CXR revealed coarsened appearance to the pulmonary parenchyma ?chronic. Pulmonary vascularity is normal. Tamiflu was started. Patient was admitted to [facility]. On 1/3/23 CODE Stroke was called due to new onset left facial weakness, lethargy and dysarthria. NIH of 3. 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 Based on patient's symptoms and time since last known well decision was made for TNK to be given. 22 mg of TNK given at 5:05 AM.  Patient transferred to the ICU with thrombolytic precautions. Patient was placed on HFNC. MRI complete 23 without acute findings of CVA. echocardiogram 1/3/2023 that showed EF of 55% and overall normal left ventricular function. Patient was cleared for diet by SLP. Tamilfu completed 23. Solu-Medrol 23. Out of droplet isolation 23. Plan was for patient to return to Assisted Living once medically stable, but needs to be able to care for self except for housemaking tasks. \"     Prior Level of Function:  Lives With: Alone  Type of Home: Assisted living [facility])  Home Layout: One level  Home Access: Level entry  Home Equipment: None   Bathroom Shower/Tub: Walk-in shower  Bathroom Toilet: Standard  Bathroom Equipment: Grab bars around toilet, Grab bars in shower    ADL Assistance: Independent  Homemaking Assistance: Needs assistance  Ambulation Assistance: Independent  Transfer Assistance: Independent  Active : No  Additional Comments: Pt reports using no AD at assisted living. Pt reports an aide is sometimes present so supervise showers, but he completes dressing, grooming and toileting independently. Facility completes housekeeping, cooking and transportation. Pt usually ambulates to dining room for meals. Restrictions/Precautions:  Restrictions/Precautions: Fall Risk, General Precautions, Modified Diet  Position Activity Restriction  Other position/activity restrictions: impulsive     SUBJECTIVE: Patient in chair upon arrival and agreeable to therapy. Patient requested to wash hands at beginning of session and to use the restroom USP through session. Patient requested bottle of water during session. PAIN: denies    Vitals: Oxygen: 94  Heart Rate: 112 bpm  Vitals assessed after ambulating    OBJECTIVE:  Bed Mobility:  Rolling to Left: Supervision   Rolling to Right: Supervision   Supine to Sit: Supervision  Sit to Supine: Supervision   Performed on mat table    Transfers:  Sit to Stand: Supervision, Stand By Assistance  Stand to [address] [place] [address], Stand By Assistance  Car:Stand By Assistance, with verbal cues  **Cues provided for proper sequencing  -Various surfaces    Ambulation:  Stand By Assistance, [address], with verbal cues , with increased time for completion  Distance: 120 ft x1, 75 ft x1, 275 ft x1, 245 ft x1, 40 ft x2  Surface: Level Tile  Device:No Device  Gait Deviations:   Forward Flexed Posture, Slow May, Decreased Step Length Bilaterally, Decreased Gait Speed, Decreased Heel Strike Bilaterally, and Mild Path Deviations  **Verbal cues provided to increase walking speed and to increase stride length  Dynamic gait:            -forward stepping (reciprocally) over 4 flyswatters with no UE support at CGA to increase step length            -performed head turns (Left/Right, Up/Down) while walking to challenge balance and divide attention            -Patient holding onto cone with tennis ball on top to divide attention      Balance:  Dynamic Standing Balance performed at SBA while washing hands in bathroom    Exercise:  Patient was guided in 1 set(s) 15 reps of exercise to both lower extremities. Seated marches, Seated hamstring curls with yellow theraband, Seated heel/toe raises, Seated isometric hip adduction ball squeezes, and Seated abduction/adduction. Exercises were completed for increased independence with functional mobility.  -Patient completed 8 min on Nu-Step machine on L 3 using BUE/BLE to improve strength and endurance for improved functional mobility. Patient required two rest breaks. Functional Outcome Measures: Completed  Fernandez Balance Score: 45  Fernandez Balance Score: 45    FERNANDEZ BALANCE TEST SCORING   Score of < 45 indicates a greater risk of falling  41-56= low fall risk  21-40= medium fall risk (recommendation of walking with assist at all times)  0-20= high fall risk    Timed up and Go Test (TUG)  10.75 seconds       Normative Reference Values  60-69 years:  8.1 seconds  70-79 years: 9.2 seconds  80-99 years: 11.3 seconds    < 10 seconds is normal, a score of > 14 seconds indicates high fall risk      ASSESSMENT:  Assessment: Patient progressing toward established goals. PT Assessment:Patient overall is making progress with skilled PT treatment and has met 4/4  STG's and 1/5 LTG's.  Patient has progressed to supervision for bed mobility without railings, Supervision to SBA f or sit to st and  transfers with cueing for sequencing and hand placement and SBA to [facility] for ambulation without AD with slowed gait speed and unsteadiness at times with cueing for increased gait speed in order to assist with safety. Patient improved his Augustine Balance score from 30/56 to 45/56 and improved his TUG score from 23 seconds to 10.75 seconds indicating he is at low risk for falls. Patient overall can continue to benefit from skilled PT treatment in order to assist with BLE strengthening ,gait training, transfer training, dynamic balance and core strengthening for increased functional mobility. Activity Tolerance:  Patient tolerance of  treatment: good. Equipment Recommendations:Equipment Needed: No (Continue to assess pending progress)  Discharge Recommendations: Home with Home Health PT and Patient would benefit from continued PT at discharge  Plan: Current Treatment Recommendations: Strengthening, Balance training, Functional mobility training, Gait training, Safety education & training, Home exercise program, Patient/Caregiver education & training, Transfer training, Endurance training, Therapeutic activities, Neuromuscular re-education, Stair training  General Plan:  (5x/wk 90min)    Patient Education  Patient Education: Plan of Care, Bed Mobility, Transfers, Gait, Car Transfers, Verbal Exercise Instruction    Goals:  Patient Goals : None Stated  Short Term Goals  Time Frame for Short Term Goals: 1 week  Short Term Goal 1: Patient to perform bed mobility supine<>sit with Supervision in order to assist with getting into and out of bed. MET See LTG  Short Term Goal 2: Patient to perform sit to stand transfers without AD with </=1 cue for hand placement from various surfaces in order to assist with safety with transfers in home. MET See LTG  Short Term Goal 3: Patient to ambulate >/=75 feet without AD with SBA in order to assist with home mobility. MET See LTG  Short Term Goal 4: Patient to score >/=38/56 on the Augustine Balance Test in order to assist with reduced risk for falls.  MET See LTG  Long Term Goals  Time Frame for Long Term Goals : 2 weeks from IPR evaluation  Long Term Goal 1: Patient to perform bed mobility supine<>sit with Mod I in order to assist with getting into and out of bed. NOT MET  Long Term Goal 2: Patient to perform sit to stand transfers without use of Ad with Mod I in order to assist with safety with transfers in home. NOT MET  Long Term Goal 3: Patient to ambulate >/=200 feet without AD with Mod I in order to assist with ambulating to and  from dining room for all meals. NOT MET  Long Term Goal 4: Patient to perform car transfer with Supervision in order to assist with getting to and from appointments. NOT MET  Long Term Goal 5: Patient to perform TUG in </=12 seconds in order to assist with functional dynamic balance. MET    Following session, patient left in safe position with all fall risk precautions in place. Therapy session performed by Albino AMIN under supervision of credentialed therapist Reymundo Templeton PTA. Revised Short-Term Goals:    Short Term Goals  Time Frame for Short Term Goals: 1 week  Short Term Goal 1: Patient to perform bed mobility supine<>sit with Supervision in order to assist with getting into and out of bed. Short Term Goal 2: Patient to perform sit to stand transfers without AD with Supervision from various surfaces in order to assist wtih safety with transfers in home. Short Term Goal 3: Patient to ambulate with </=1 cue for normalized gait speed with Supervision in order to assist with reduced unsteadiness with home mobility. Short Term Goal 4: Patient to score >/=48/56 on the Augustine Balance Test in order to assist with reduced risk for falls. Revised Long-Term Goals  Long Term Goals  Time Frame for Long Term Goals : 2 weeks from IPR evaluation  Long Term Goal 1: Patient to perform bed mobility supine<>sit with Mod I in order to assist with getting into and out of bed. Long Term Goal 2: Patient to perform sit to stand transfers without use of Ad with Mod I in order to assist with safety with transfers in home.   Long Term Goal 3: Patient to ambulate >/=200 feet without AD with Mod I in order to assist with ambulating to and  from dining room for all meals. Long Term Goal 4: Patient to perform car transfer with Supervision in order to assist with getting to and from appointments. Long Term Goal 5: Patient to perform TUG in </=8 seconds in order to assist with functional dynamic balance.

## 2023-01-19 LAB — GLUCOSE BLD-MCNC: 172 MG/DL (ref 70–108)

## 2023-01-19 PROCEDURE — 99233 SBSQ HOSP IP/OBS HIGH 50: CPT | Performed by: STUDENT IN AN ORGANIZED HEALTH CARE EDUCATION/TRAINING PROGRAM

## 2023-01-19 PROCEDURE — 97129 THER IVNTJ 1ST 15 MIN: CPT

## 2023-01-19 PROCEDURE — 97110 THERAPEUTIC EXERCISES: CPT

## 2023-01-19 PROCEDURE — 1180000000 HC REHAB R&B

## 2023-01-19 PROCEDURE — 97112 NEUROMUSCULAR REEDUCATION: CPT

## 2023-01-19 PROCEDURE — 6360000002 HC RX W HCPCS: Performed by: STUDENT IN AN ORGANIZED HEALTH CARE EDUCATION/TRAINING PROGRAM

## 2023-01-19 PROCEDURE — 97530 THERAPEUTIC ACTIVITIES: CPT

## 2023-01-19 PROCEDURE — 6370000000 HC RX 637 (ALT 250 FOR IP): Performed by: FAMILY MEDICINE

## 2023-01-19 PROCEDURE — 94761 N-INVAS EAR/PLS OXIMETRY MLT: CPT

## 2023-01-19 PROCEDURE — 97130 THER IVNTJ EA ADDL 15 MIN: CPT

## 2023-01-19 PROCEDURE — 82948 REAGENT STRIP/BLOOD GLUCOSE: CPT

## 2023-01-19 PROCEDURE — 97116 GAIT TRAINING THERAPY: CPT

## 2023-01-19 PROCEDURE — 6370000000 HC RX 637 (ALT 250 FOR IP): Performed by: STUDENT IN AN ORGANIZED HEALTH CARE EDUCATION/TRAINING PROGRAM

## 2023-01-19 PROCEDURE — 97535 SELF CARE MNGMENT TRAINING: CPT

## 2023-01-19 RX ORDER — NICOTINE 21 MG/24HR
1 PATCH, TRANSDERMAL 24 HOURS TRANSDERMAL DAILY
Qty: 30 PATCH | Refills: 1 | Status: SHIPPED | OUTPATIENT
Start: 2023-01-20

## 2023-01-19 RX ORDER — METOPROLOL SUCCINATE 25 MG/1
25 TABLET, EXTENDED RELEASE ORAL DAILY
Qty: 30 TABLET | Refills: 1 | Status: SHIPPED | OUTPATIENT
Start: 2023-01-20

## 2023-01-19 RX ADMIN — ENOXAPARIN SODIUM 40 MG: 100 INJECTION SUBCUTANEOUS at 08:02

## 2023-01-19 RX ADMIN — INSULIN GLARGINE 7 UNITS: 100 INJECTION, SOLUTION SUBCUTANEOUS at 22:42

## 2023-01-19 RX ADMIN — CLOZAPINE 50 MG: 25 TABLET ORAL at 08:02

## 2023-01-19 RX ADMIN — METFORMIN HYDROCHLORIDE 1000 MG: 500 TABLET ORAL at 08:02

## 2023-01-19 RX ADMIN — DIVALPROEX SODIUM 1000 MG: 500 TABLET, EXTENDED RELEASE ORAL at 20:17

## 2023-01-19 RX ADMIN — DESVENLAFAXINE 50 MG: 50 TABLET, EXTENDED RELEASE ORAL at 08:02

## 2023-01-19 RX ADMIN — CLOZAPINE 50 MG: 25 TABLET ORAL at 17:10

## 2023-01-19 RX ADMIN — METOPROLOL SUCCINATE 25 MG: 25 TABLET, EXTENDED RELEASE ORAL at 08:02

## 2023-01-19 RX ADMIN — CLOZAPINE 200 MG: 100 TABLET ORAL at 20:17

## 2023-01-19 RX ADMIN — DIVALPROEX SODIUM 500 MG: 500 TABLET, EXTENDED RELEASE ORAL at 08:02

## 2023-01-19 RX ADMIN — METFORMIN HYDROCHLORIDE 1000 MG: 500 TABLET ORAL at 17:10

## 2023-01-19 RX ADMIN — DOCUSATE SODIUM 100 MG: 100 CAPSULE, LIQUID FILLED ORAL at 08:02

## 2023-01-19 RX ADMIN — ASPIRIN 81 MG 81 MG: 81 TABLET ORAL at 08:02

## 2023-01-19 RX ADMIN — ATORVASTATIN CALCIUM 20 MG: 20 TABLET, FILM COATED ORAL at 20:17

## 2023-01-19 ASSESSMENT — PAIN SCALES - GENERAL
PAINLEVEL_OUTOF10: 0

## 2023-01-19 NOTE — PLAN OF CARE
Problem: Discharge Planning  Goal: Discharge to home or other facility with appropriate resources  Outcome: Progressing   Note: Patient plans to discharge to [facility] 1/20 with home health services. Will follow up with home O2 eval from last night. Problem: Pain  Goal: Verbalizes/displays adequate comfort level or baseline comfort level  1/19/2023 0945 by Fermin Benson RN  Outcome: Progressing  Note:   Patient denies pain at this time, will continue to assess. Problem: Safety - Adult  Goal: Free from fall injury  1/19/2023 0945 by Fermin Benson RN  Outcome: Progressing  Note: Patient will continue to use call light for assistance to prevent falls and promote patient safety.

## 2023-01-19 NOTE — PLAN OF CARE
Problem: Discharge Planning  Goal: Discharge to home or other facility with appropriate resources  1/19/2023 1448 by DARON Santana  Note: Transportation:   Has transportation kept you from medical appointments, meetings, work, or from getting things needed for daily living? (Check all that apply)  No.      Health Literacy:   How often do you need to have someone help you when you read instructions, pamphlets, or other written material from your doctor or pharmacy? 0. - Never    Social Isolation:  How often do you feel lonely or isolated from those around you?  0. Never      Patient Mood Interview (PHQ-2 to 9) (from [email or web address])   Say to Patient: \"Over the last 2 weeks, have you been bothered by any of the following problems? \"   If symptom is present, enter 1 (yes) in column 1 (Symptom Presence)  If yes in column 1, then ask the patient: About how often have you been bothered by this?   Read and show the patient a card with the symptom frequency choices. Indicate response in column 2, Symptom Frequency. Symptom Presence  No (enter 0 in column 2)   Yes (enter 0-3 in column 2)  9. No response (leave column 2 blank) Symptom Frequency  Never or 1 day  2-6 days (several days)  7-11 days (half or more of the days)  12-14 days (nearly every day    Symptom Presence Symptom Frequency   Little interest or pleasure in doing things 0. No 0. Never or 1 day   Feeling down, depressed, or hopeless 0. No 0.  Never or 1 day

## 2023-01-19 NOTE — PROGRESS NOTES
Calm,pleasant. Alert, oriented to person, place, time. TESS 5 mm-3 mm, brisk. Clear speech. Mucous membranes pink, moist. Tongue midline. Smile symmetrical. Lung sounds wheezing upon inhalation, anterior, posterior, lateral. Respirations deep, easy. No cough noted. Heart sounds strong, regular. Bowel sounds active all 4 quadrants. Abdomen round, soft. Non-tender to palpation. Denies flatulence. Last bowel movement 1/18/23. Denies difficulty urinating. Radial pulses strong, bilateral. Hand grasp strong, bilateral. Capillary refill, less than 3 seconds. Skin turgor, brisk. Pedal pulses strong, bilateral. Pedal push and pull strong, bilateral. Negative homans' sign. No edema noted. Leg lift strong, bilateral. Denies numbness and tingling. In recliner resting. Call light and over-bed table within reach. Denies needs.  Jarrett,PNS

## 2023-01-19 NOTE — PROGRESS NOTES
[address] [facility] THERAPY  [facility]  DAILY NOTE    TIME   SLP Individual Minutes  Time In: 1400  Time Out: 1430  Minutes: 30  Timed Code Treatment Minutes: 30 Minutes       Date: 2023  Patient Name: Salome Marquez      CSN: [id number]   : 1965  (65 y.o.)  Gender: male   Referring Physician:  Isabelle Reddy DO  Diagnosis: Debility  Precautions: Fall  Risk,  aspiration risk  Current Diet: Soft and Bite sized,thin liquids  Swallowing Strategies:  Full Upright Position, Small Bite/Sip, Medications Whole with Thin, Alternate Solids and Liquids, and Limit Distractions   Date of Last MBS/FEES: Not Applicable    Pain:  No pain reported. Subjective:  Patient sitting EOB finishing lunch upon ST arrival. Patient agreeable to skilled ST services. Pleasant, cooperative, and engaged throughout. Short-Term Goals:  SHORT TERM GOAL #1:  Goal 1: Patient will consume a soft and bite-size diet and thin liquids and advanced PO trials as clinically indicated with ST only with stable pulmonary status and adequate endurance to assist with meeting nutrition/hydration needs   INTERVENTIONS: Did not address this session d/t focus on other goals. SHORT TERM GOAL #2:  Goal 2: Patient will complete structured speech drills/tasks (DDK rates, AMRs, multi-syllabic word repetition) with implementation of SOS speech strategies to improve intelligibility to 80% in all contexts to optimize communicative efficacy. INTERVENTIONS:  Did not address this date d/t focus on other goals. SHORT TERM GOAL #3:  Goal 3: Patient will complete functional recall tasks (immediate, delayed, working, orientation, biographics) with 85% accuracy, min cues to improve retention of pertinent information.    INTERVENTIONS:   Delayed recall (1 day) - To do list  4/4 independent with utilization of written list    *patient did not remember making a list or writing it down  *ST provided notepad list was written on and patient found the list within the notepad    Nima Barrett 1277 #4:  GOAL: Patient will complete problem solving, verbal/visual reasoning and executive functioning tasks (time, basic math/finances) with 80% accuracy with min cues to improve contributions to ADL/IADL completion. Monthly Calendar Interpretation  3/ independently, 8/ given min cues, 2/ given mod cues    *patient requiring min cues to repeat questions and improve understanding of question requirements (JAVIER vs. Date)  *patient requiring mod cues for scanning entire calendar and providing total number hours, date, or JAVIER based on question requirements. Time Management - Word Problems presented verbally  5/10 independently, 1/10 given min cues, 2/10 given mod cues, 2/10 given max cues    *patient requiring multiple repetitions of questions; poor mental manipulation  *increased success with written visual of times    SHORT TERM GOAL #5:  Goal 5: Patient will complete sequencing and thought organization tasks with 80% accuracy, min cues to improve mental flexibility for daily living scenarios  INTERVENTIONS: Did not address this date d/t focus on other goals. Long-Term Goals:  Time Frame for Long Term Goals: 3 weeks from evaluation    LONG TERM GOAL #1:  Goal 1: Patient will consume a regular texture diet and thin liquids with stable pulmonary status and adequate endurance to assist with meeting nutrition/hydration needs. LONG TERM GOAL #2:  Goal 2: Patient will improve cognitive-linguistic skills to a min assist or higher upon discharge in order to make safe/successful discharge to least restrictive environment and resume ADL completion with least amount of supervision/assistance.         Comprehension: 5 - Patient understands basic needs (hungry/hot/pain)  Expression: 4 - Expresses basic ideas/needs 75-90%+ of the time  Social Interaction: 4 - Patient appropriate 75-90%+ of the time  Problem Solvin - Patient solves simple/routine tasks 75-90%+   Memory: 3 - Patient remembers 50%-74% of the time    EDUCATION:  Learner: Patient  Education:  Reviewed ST goals and Plan of Care, Reviewed recommendations for follow-up, Home Safety Education, and SOS intelligibility strategies  Evaluation of Education: Verbalizes understanding, Needs further instruction, and Family not present    ASSESSMENT/PLAN:     Activity Tolerance:  Patient tolerance of  treatment: good. Assessment/Plan: Patient progressing toward established goals. Continues to require skilled care of licensed speech pathologist to progress toward achievement of established goals and plan of care. Plan for Next Session: Trial advanced diet textures, sequencing/thought organization  Discharge Recommendations: [address]  Clinician

## 2023-01-19 NOTE — NURSE NAVIGATOR
Neuro Nurse Navigator Follow Up    This RN attended care conference. Plan at time of conference is [facility] therapy when discharge is appropriate.

## 2023-01-19 NOTE — PROGRESS NOTES
Discharge pain assessment:    Complete within 3 days of discharge. Pain Effect on Sleep ()   Ask patient: Bijal To the past 5 days, how much of the time has pain made it hard for you to sleep at night?\" 1.  Rarely or not at all     If no pain is reported, end interview. If pain is reported, continue with the additional questions.    Pain Interference with Therapy Activities   Ask patient: Bijal To the past 5 days, how often have you limited your participation in rehabilitation therapy sessions due to pain?\" 1.  Rarely or not at all   Pain Interference with Day to Day Activities   Ask patient: Bijal To the past 5 days, how often have you limited your day to day activities (excluding rehabilitation therapy sessions) because of pain?\" 1.  Rarely or not at all

## 2023-01-19 NOTE — PROGRESS NOTES
[facility]  INPATIENT PHYSICAL THERAPY  Daily Note  [facility] - 7E-70/070-A    Time In: 1100  Time Out: 1230  Timed Code Treatment Minutes: 90 Minutes  Minutes: 90          Date: 2023  Patient Name: Bhanu Silver,  Gender:  male        MRN: [id number]  : 1965  (65 y.o.)     Referring Practitioner: Layton Naranjo DO  Diagnosis: Debility  Additional Pertinent Hx: Per H&P \"Chioc Haddad is a 65 y.o. male admitted to [facility] on 2023. Patient with past medical history of DM, HTN, and bipolar disorder who presented to [facility] for productive cough, fever and SOB. This had become progressively worse and is worse with exertion. While in the ER, patient did test positive for influenza A. CXR revealed coarsened appearance to the pulmonary parenchyma ?chronic. Pulmonary vascularity is normal. Tamiflu was started. Patient was admitted to [facility] Med-Surgical Unit. On 1/3/23 CODE Stroke was called due to new onset left facial weakness, lethargy and dysarthria. NIH of 3. 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 Based on patient's symptoms and time since last known well decision was made for TNK to be given. 22 mg of TNK given at 5:05 AM.  Patient transferred to the ICU with thrombolytic precautions. Patient was placed on HFNC. MRI complete 23 without acute findings of CVA. echocardiogram 1/3/2023 that showed EF of 55% and overall normal left ventricular function. Patient was cleared for diet by SLP. Tamilfu completed 23. Solu-Medrol 23. Out of droplet isolation 23. Plan was for patient to return to Assisted Living once medically stable, but needs to be able to care for self except for housemaking tasks. \"     Prior Level of Function:  Lives With: Alone  Type of Home: Assisted living [facility])  Home Layout: One level  Home Access: Level entry  Home Equipment: None   Bathroom Shower/Tub: Walk-in shower  Bathroom Toilet: Standard  Bathroom Equipment: Grab bars around toilet, Grab bars in shower    ADL Assistance: Independent  Homemaking Assistance: Needs assistance  Ambulation Assistance: Independent  Transfer Assistance: Independent  Active : No  Additional Comments: Pt reports using no AD at assisted living. Pt reports an aide is sometimes present so supervise showers, but he completes dressing, grooming and toileting independently. Facility completes housekeeping, cooking and transportation. Pt usually ambulates to dining room for meals. Restrictions/Precautions:  Restrictions/Precautions: Fall Risk, General Precautions, Modified Diet  Position Activity Restriction  Other position/activity restrictions: impulsive     SUBJECTIVE: Pt. Seated on his BS chair and pleasantly agrees to therapy session. Pt. Requests to use restroom during session. PAIN: Denies    Vitals:   Patient Vitals for the past 8 hrs:   BP Patient Position Temp Temp src Pulse Resp SpO2 O2 Device   01/19/23 1203 -- -- -- -- (!) 116 -- 97 % --   01/19/23 1200 -- -- -- -- (!) 114 -- 96 % --   01/19/23 0756 121/66 Sitting 98.1 °F (36.7 °C) Oral (!) 105 18 94 % None (Room air)   01/19/23 0657 -- -- -- -- 83 16 (!) 88 % --        OBJECTIVE:  Bed Mobility:  Rolling to Left: Modified Independent   Rolling to Right: Modified Independent   Supine to Sit: Modified Independent  Sit to Supine: Modified Independent     Transfers:  Sit to Stand: Modified Independent  Stand to Sit:Modified Independent  To/From Bed and Chair: Modified Independent  Car:Modified Independent    Ambulation:  Modified Independent  Distance: 150' x 1, 300' x 1, 75' x 2, multiple shorter distances, up/down 10' ramp. Surface: Ramp  Device: No Device  Gait Deviations:  Slow May, Decreased Trunk Rotation, and Decreased Gait Speed    Stairs:  Stairs: 6\" steps X 16 using Bilateral Handrails and Modified Independent.   Platform: 6\" platform X 1 using No Device and Modified Independent. Balance:  Pt. Completed standing dynamic balance activity: ring toss with Normal DAVID on Airex balance pad and Intermittent UE support on Parallel Bars with Stand By Assistance, [employer]. Activity completed to improve balance, enhance functional mobility, and reduce risk of falls. Pt. Completed standing dynamic balance activity: balloon volleyball with Normal DAVID on foam Surface and No UE support with Stand By Assistance. Activity completed to improve balance, enhance functional mobility, and reduce risk of falls. Pt. Completed standing dynamic balance activity: maintaining balance with Normal DAVID on rockerboard and Intermittent UE support on Parallel Bars with Contact Guard Assistance. Activity completed to improve balance, enhance functional mobility, and reduce risk of falls. Pt. Able to  an object from floor using No Device with Modified Independent    Neuromuscular Re-education  Pt. Completed standing, Stepping, and dynamic gait activities using No UE support to improve proprioception, coordination, gait mechanics, and lateral weight shifting for improved functional mobility. Pt. Greta Drew decreased stability during SLS requiring cues for lateral weight shifting and for safety. Exercise:  Patient was guided in 1 set(s) 10 reps of exercises: Standing heel/toe raises, Standing marches, Standing hip abduction/adduction, Standing hip extension, Standing hamstring curls, Mini squats. Exercises were completed for increased independence with functional mobility. Pt. Completed 10 minutes on Nu-Step machine on L2 utilizing Bilateral Upper Extremities and Bilateral Lower Extremities to improve strength and endurance for improved functional mobility. Functional Outcome Measures:   Not completed    ASSESSMENT:  Assessment: Patient progressing toward established goals. Activity Tolerance:  Patient tolerance of  treatment: good. Equipment Recommendations:Equipment Needed: No (Continue to assess pending progress)  Discharge Recommendations: Continue to assess pending progress, Patient would benefit from continued therapy after discharge     Plan: Current Treatment Recommendations: Strengthening, Balance training, Functional mobility training, Gait training, Safety education & training, Home exercise program, Patient/Caregiver education & training, Transfer training, Endurance training, Therapeutic activities, Neuromuscular re-education, Stair training  General Plan:  (5x/wk 90min)    Patient Education  Patient Education: Plan of Care, Functional Mobility, Reviewed Prior Education, Health Promotion and Wellness Education, Safety, Verbal Exercise Instruction    Goals:  Patient Goals : None Stated  Short Term Goals  Time Frame for Short Term Goals: 1 week  Short Term Goal 1: Patient to perform bed mobility supine<>sit with Supervision in order to assist with getting into and out of bed. Short Term Goal 2: Patient to perform sit to stand transfers without AD with Supervision from various surfaces in order to assist wtih safety with transfers in home. Short Term Goal 3: Patient to ambulate with </=1 cue for normalized gait speed with Supervision in order to assist with reduced unsteadiness with home mobility. Short Term Goal 4: Patient to score >/=48/56 on the [place] Balance Test in order to assist with reduced risk for falls. Long Term Goals  Time Frame for Long Term Goals : 2 weeks from IPR evaluation  Long Term Goal 1: Patient to perform bed mobility supine<>sit with Mod I in order to assist with getting into and out of bed. Long Term Goal 2: Patient to perform sit to stand transfers without use of Ad with Mod I in order to assist with safety with transfers in home. Long Term Goal 3: Patient to ambulate >/=200 feet without AD with Mod I in order to assist with ambulating to and  from dining room for all meals.   Long Term Goal 4: Patient to perform car transfer with Supervision in order to assist with getting to and from appointments. Long Term Goal 5: Patient to perform TUG in </=8 seconds in order to assist with functional dynamic balance. Following session, patient left in safe position with all fall risk precautions in place.

## 2023-01-19 NOTE — PROGRESS NOTES
[address] [place] [address]  Occupational Therapy  Daily Note  Time:   Time In: 0900  Time Out: 1030  Timed Code Treatment Minutes: 90 Minutes  Minutes: 90          Date: 2023  Patient Name: Opal Herrera,   Gender: male      Room: [facility]70/070-A  MRN: [id number]  : 1965  (65 y.o.)  Referring Practitioner: Dr. Raine Hidalgo  Diagnosis: Debility  Additional Pertinent Hx: Opal Herrera is a 65 y.o. male admitted to [address] [place] [address] on 2023. Patient with past medical history of DM, HTN, and bipolar disorder who presented to [facility] for productive cough, fever and SOB. This had become progressively worse and is worse with exertion. While in the ER, patient did test positive for influenza A. CXR revealed coarsened appearance to the pulmonary parenchyma ?chronic. Pulmonary vascularity is normal. Tamiflu was started. Patient was admitted to [facility] Med-Surgical Unit. On 1/3/23 CODE Stroke was called due to new onset left facial weakness, lethargy and dysarthria. NIH of 3. 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 Based on patient's symptoms and time since last known well decision was made for TNK to be given. 22 mg of TNK given at 5:05 AM.  Patient transferred to the ICU with thrombolytic precautions. Patient was placed on HFNC. MRI complete 23 without acute findings of CVA. echocardiogram 1/3/2023 that showed EF of 55% and overall normal left ventricular function. Patient was cleared for diet by SLP. Tamilfu completed 23. Solu-Medrol 23. Out of droplet isolation 23. Pt admitted to [facility] on 1/10 for therapy needs with eventual plan to return to Assisted Living once medically stable, but needs to be able to care for self except for housemaking tasks.     Restrictions/Precautions:  Restrictions/Precautions: Fall Risk, General Precautions, Modified Diet  Position Activity Restriction  Other position/activity restrictions: impulsive     SUBJECTIVE: Patient lying in bed upon arrival. Agreeable to OT session    PAIN: Denies    Vitals: Vitals not assessed per clinical judgement, see nursing flowsheet    COGNITION: Decreased Insight, Decreased Safety Awareness, Impulsive, and Decreased initiation     ADL:   Shower Transfer: Supervision. To/from shower chair with use of grab bar . EATING:Independent. Delores Dye CARE Score: 6. ORAL HYGIENE:Independent. Delores Dye CARE Score: 6. TOILETING HYGIENE:Supervision or touching assistance (1 slight LOB during clothing management requiring SBA for balance). Delores Dye CARE Score: 4. SHOWERING/BATHING:Supervision or touching assistance (Cues required for initiation throughout, patient perseverated on washing UB.). Delores Dye CARE Score: 4.     UPPER BODY DRESSING:Supervision or touching assistance (Cues for orientation of sweatshirt and initiation). Delores Dye CARE Score: 4. LOWER BODY DRESSING:Supervision or touching assistance (Cues for initiation to thread LEs into pants). Delores Dye CARE Score: 4. FOOTWEAR:Supervision or touching assistance (Cues for initiation of doffing/donning B socks/slippers)   . CARE Score: 4. TOILET TRANSFER: Supervision or touching assistance (1 cue for hand placement). Delores Dye CARE Score: 4. Completed CAM and BIMs     BALANCE:  Sitting Balance:  Modified Independent. Standing Balance: Stand By Assistance. - supervision  Patient completed dynamic standing task reaching outside base of support. Patient required SBA-supervision for balance, and demo'ed an endurance of 6 minutes. Demo fair tolerance with 1 seated rest break after task. Standing task completed to challenge endurance and balance required for ADL and IADL skills. BED MOBILITY:  Supine to Sit: Modified Independent      TRANSFERS:  Sit to Stand:  Supervision. From various surfaces   Stand to Sit: Supervision.  To various surfaces    FUNCTIONAL MOBILITY:  Assistive Device: None  Assist Level:  Contact Guard Assistance (x1 occasion due to LOB towards R side). Stand By Assistance. - supervision  Distance: To and from bathroom, To and from shower room, To/from therapy apartment, to/from grocery store and To and from therapy gym  Slightly unsteady at times, cues to take bigger steps during mobility. Occasional seated rest break required      ADDITIONAL ACTIVITIES:  Patient completed task that facilitated retrieving linens from graded planes and heights, including floor level, to challenge balance and activity tolerance. Once all linens were retrieved patient stood to fold. Patient required close SBA while retrieving linens from ground level with education completed on safe techniques and fall prevention strategies with patient demo'ing fair carry over. Patient tolerated task for approx 5 minutes. Patient completed BUE strengthening exercises with skilled education on HEP(handout provided to patient): completed x20 reps x1 set with a max resistance band in all joints and all planes in order to improve UE strength and activity tolerance required for BADL routine and toilet / shower transfers. Patient tolerated fair, requiring rest breaks. Patient also required minimal cues for technique. Educated patient to complete x2-3 times a day, verbalizing understanding. Patient completed simulated grocery task - picked 5 items he wanted to shop for in grocery store. Patient stated when he goes to [facility] once a month he doesn't write out list and goes from his memory. Patient completed functional mobility around grocery store and was able to recall 1/5 items from memory. Discussed utilizing notepad for memory strategy- verbalized understanding. After writing all items down, patient able to locate without difficulty and marked off once he retrieved and placed in cart. Patient required SBA-supervision for balance throughout task with no LOB noted.  Patient then completed money management task- therapist gave patient $20s, $10s, $5s and $1s and prompted patient total was $18.75. Patient gave therapist 2-$10s. Good problem solving noted. ASSESSMENT:     Activity Tolerance:  Patient tolerance of  treatment: fair. Discharge Recommendations: Home with Home Health OT  Equipment Recommendations: Other: Pt will have a shower chair and grab bars to use in his AL apartment. Plan: Times Per Week: 5x/wk for 90 min  Current Treatment Recommendations: Functional mobility training, Balance training, Self-Care / ADL, Safety education & training, Endurance training, Equipment evaluation, education, & procurement, Patient/Caregiver education & training    Patient Education  Patient Education:  safety with transfers and mobility, IADL strategies, ADL strategies, HEP (handout and max resistance band provided)    Goals  Short Term Goals  Time Frame for Short Term Goals: 1 week  Short Term Goal 1: Pt will sequence and complete bathing tasks with consistent S and 0 vcs for perseveration to improve indep with showering at home. Short Term Goal 2: Pt will tolerate standing 4 min with Supervision for increased ease of sinkside grooming. Short Term Goal 3: Pt will gather appropriate clothing and dress self with consistent S and min vcs for sequencing and initation to improve indep with self care. Short Term Goal 4: Pt will sequence and initate grooming tasks following toileting with consistent S and min vcs to improve indep with self care. Long Term Goals  Time Frame for Long Term Goals : 2 weeks from IPR evaluation  Long Term Goal 1: Pt will ambulate within room with Mod I and no device to improve indep with self care. Long Term Goal 2: Pt will complete dressing tasks with mod I and no cues for sequencing to improve indep with self care at AL. Long Term Goal 3: Pt will complete grooming and toileting tasks with mod I to return to prior level of function at AL.     Following session, patient left in safe position with all fall risk precautions in place.

## 2023-01-19 NOTE — PLAN OF CARE
Problem: Discharge Planning  Goal: Discharge to home or other facility with appropriate resources  1/19/2023 1350 by DARON Gutierrez  Note: Team conference held Thursday, 1/19. Recommendations of the team were explained to the patient by Dr Tanya Cheng and SW. Team is recommending that patient continue on acute inpatient rehab for PT, OT and ST, with expected discharge date of Friday, 1/20. Following discharge, team is recommending home health for PT, OT and ST. Care plan reviewed with patient. Patient verbalized understanding of the plan of care and contributed to goal setting. SW spoke with Alonso Isaacs at [facility] to update her on transport at 10:30 am. [facility]. No outstanding needs and concerns. SW to follow and maintain involvement in discharge planning.

## 2023-01-19 NOTE — PLAN OF CARE
Problem: Safety - Adult  Goal: Free from fall injury  1/19/2023 0139 by Vaibhav Palma RN  Outcome: Progressing     Problem: ABCDS Injury Assessment  Goal: Absence of physical injury  1/19/2023 0139 by Vaibhav Palma RN  Outcome: Progressing     Problem: Pain  Goal: Verbalizes/displays adequate comfort level or baseline comfort level  1/19/2023 0139 by Vaibhav Palma RN  Outcome: Progressing     Problem: Infection - Adult  Goal: Absence of infection at discharge  1/19/2023 0139 by Vaibhav Palma RN  Outcome: Progressing  [address] (Taken 1/18/2023 2115)  Absence of infection at discharge:   Assess and monitor for signs and symptoms of infection   Monitor lab/diagnostic results     Problem: Infection - Adult  Goal: Absence of infection during hospitalization  1/19/2023 0139 by Vaibhav Palma RN  Outcome: Progressing  Flowsheets (Taken 1/18/2023 2115)  Absence of infection during hospitalization:   Assess and monitor for signs and symptoms of infection   Monitor lab/diagnostic results     Problem: Infection - Adult  Goal: Absence of fever/infection during anticipated neutropenic period  1/19/2023 0139 by Vaibhav Palma RN  Outcome: Progressing     Problem: Metabolic/Fluid and Electrolytes - Adult  Goal: Electrolytes maintained within normal limits  1/19/2023 0139 by Vaibhav Palma RN  Outcome: Progressing  Flowsheets (Taken 1/18/2023 2115)  Electrolytes maintained within normal limits:   Monitor labs and assess patient for signs and symptoms of electrolyte imbalances   Administer electrolyte replacement as ordered     Problem: Metabolic/Fluid and Electrolytes - Adult  Goal: Hemodynamic stability and optimal renal function maintained  1/19/2023 0139 by Vaibhav Palma RN  Outcome: Progressing  Flowsheets (Taken 1/18/2023 2115)  Hemodynamic stability and optimal renal function maintained:   Monitor labs and assess for signs and symptoms of volume excess or deficit   Monitor intake, output and patient weight Problem: Metabolic/Fluid and Electrolytes - Adult  Goal: Glucose maintained within prescribed range  1/19/2023 0139 by Marjorie Ramirez RN  Outcome: Progressing  Flowsheets (Taken 1/18/2023 2115)  Glucose maintained within prescribed range: Monitor blood glucose as ordered     Problem: Hematologic - Adult  Goal: Maintains hematologic stability  1/19/2023 0139 by Marjorie Ramirez RN  Outcome: Progressing  Flowsheets (Taken 1/18/2023 2115)  Maintains hematologic stability: Assess for signs and symptoms of bleeding or hemorrhage     Problem: Skin/Tissue Integrity  Goal: Absence of new skin breakdown  Description: 1. Monitor for areas of redness and/or skin breakdown  2. Assess vascular access sites hourly  3. Every 4-6 hours minimum:  Change oxygen saturation probe site  4. Every 4-6 hours:  If on nasal continuous positive airway pressure, respiratory therapy assess nares and determine need for appliance change or resting period.   1/19/2023 0139 by Marjorie Ramirez RN  Outcome: Progressing

## 2023-01-19 NOTE — PROGRESS NOTES
Physical Medicine & Rehabilitation   Progress Note    Chief Complaint:  debility, impaired mobility and ADLs     Subjective:  Patient seen this morning on rounds with ALEJANDRA Canales, JOSE MANUEL, following patient's inpatient Rehab Team Conference. Overnight O2 evaluation completed and patient qualified for overnight O2 at home. Patient not requiring O2 during the day or with activity. Will place order for home O2 but recommend that patient follow up with PCP with possible pulmonology referral. Patient denies CP or SOB. No pain. No significant changes to bowel or bladder. Rehabilitation: PT, OT, and SLP updates reviewed during team rounds. See separate note.       Review of Systems:  CONSTITUTIONAL:  negative for  fevers and chills  EYES:  negative for  glasses and visual disturbance  HEENT:  negative for  voice change  RESPIRATORY:  negative for  dyspnea and wheezing  CARDIOVASCULAR:  negative for  chest pain, palpitations  GASTROINTESTINAL:  negative for nausea, vomiting, and change in bowel habits  GENITOURINARY:  negative for dysuria and urinary incontinence  SKIN:  negative rash or wound   MUSCULOSKELETAL:  positive for  muscle weakness; negative for pain  NEUROLOGICAL:  positive for speech problems and gait problems  BEHAVIOR/PSYCH:  positive for history of bipolar disorder  10 point system review otherwise negative      Objective:  /66   Pulse (!) 105   Temp 98.1 °F (36.7 °C) (Oral)   Resp 18   Ht 5' 9\" (1.753 m)   Wt 183 lb 7 oz (83.2 kg)   SpO2 94%   BMI 27.09 kg/m²   Patient is awake and alert, no family at bedside   Mood: within normal limits  Affect: calm  General appearance: Patient is well nourished, well developed, well groomed and in no acute distress     Memory:   not formally assess but able to answer questions regarding history   Attention/Concentration: follow conversation  Language:  abnormal - chronic dysarthria     Cranial Nerves:  cranial nerves II-XII appear intact  ROM: normal  Motor Exam: Full strength in BUE and BLE  Tone:  normal  Muscle bulk: within normal limits  Sensory:  Sensory intact  Coordination:   normal     Heart: well perfused extremities   Lungs:he is breathing comfortably on room air without any increased WOB  Abdomen: non-distended   Skin: warm and dry, no rash or erythema  Edema: none    Diagnostics:   Recent Results (from the past 24 hour(s))   POCT Glucose    Collection Time: 01/19/23  7:58 AM   Result Value Ref Range    POC Glucose 172 (H) 70 - 108 mg/dl     CXR 01/13/2023:        Impression:       1. Borderline heart size. No effusion. 2. Very subtle mild scattered interstitial infiltrates in both central lung fields and right lung base, consistent with interstitial pneumonitis. Impression:  Acute respiratory failure  Influenza A  Post viral syndrome  Moderate cognitive impairment  Diabetes Mellitis type 2 with hyperglycemia  HTN  Bipolar disorder  Chronic tobacco use, not previously dx with chronic lung disease  Impaired ADLs and mobility     Plan:  Continue inpatient rehabilitation program involving at least 3 hours per day, 5 days per week of intensive rehabilitation. Rehabilitation services will include PT, OT, and SLP/RT in order to improve functional status prior to discharge. Family education and training will be completed. Equipment evaluations and recommendations will be completed as appropriate. Rehabilitation nursing will be involved for bowel, bladder, skin, and pain management. Nursing will also provide education and training to patient and family. Dr. Daisha Max consulted for medical management  Acute Hypoxic Respiratory failure: 2/2 Influenza A. Not on oxygen at baseline. Now on supplemental O2 via nasal cannula- will continue to wean oxygen as tolerated. Patient has completed course of tamiflu and dolu-medrol. 1/13: CXR with findings consistent  with interstitial pneumonitis  1/18: Only requiring O2 nocturnally.  Will place order for overnight pulse ox testing tonight to [facility] for home O2  1/19: Patient qualified for nocturnal home O2  Patient was evaluated today for the diagnosis of  interstitial pneumonitis . I entered a DME order for home oxygen because the diagnosis and testing requires the patient to have supplemental nocturnal oxygen. Condition will improve or be benefited by oxygen use. The patient is  able to perform good mobility in a home setting and therefore does require the use of a portable oxygen system. The need for this equipment was discussed with the patient and he understands and is in agreement. Leukocytosis: WBC 11.2 today. UA obtained due to increased urinary frequency  which is negative for infection. CXR obtained due to reports of dry cough- xray with  \"Very subtle mild scattered interstitial infiltrates in both central lung fields and right lung base, consistent with interstitial pneumonitis\". No findings to suggest acute PNA. Continue to monitor for signs or symptoms of infection   1/14: WBC WNL  Episode of left facial droop: Possible TIA? . 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 MRI negative for any intracranial abnormality. Continue aspirin and statin  T2DM: Continue Lantus 7 units nightly and Metformin increased to 1000mg BID  HTN: Continue home lopressor  Bipolar Disorder: Continue home Clozapine, Pristiq, and Depakote  Prophylaxis:  DVT: Lovenox. Pain: Tylenol   Dysphagia: On soft and bite sized diet. Diet to be upgraded when appropriate per SLP  Tobacco dependence: Nicotine patch added   Nutrition:  Consultation to dietician for nutritional counseling and recommendations. Prealbumin 17.0 on admission. Bladder: Per rehab nursing   Bowel: Continue docusate   and case management consultations for coordination of care and discharge planning. Team conference held today with anticipated DC back to assisted living on 1/20/2023.     Missed Therapy Time:  None    Milderd Tari, DO

## 2023-01-20 VITALS
BODY MASS INDEX: 27.17 KG/M2 | RESPIRATION RATE: 15 BRPM | HEART RATE: 104 BPM | SYSTOLIC BLOOD PRESSURE: 124 MMHG | DIASTOLIC BLOOD PRESSURE: 62 MMHG | HEIGHT: 69 IN | WEIGHT: 183.44 LBS | OXYGEN SATURATION: 90 % | TEMPERATURE: 98 F

## 2023-01-20 LAB — GLUCOSE BLD STRIP.AUTO-MCNC: 157 MG/DL (ref 70–108)

## 2023-01-20 PROCEDURE — 97535 SELF CARE MNGMENT TRAINING: CPT

## 2023-01-20 PROCEDURE — 97530 THERAPEUTIC ACTIVITIES: CPT

## 2023-01-20 PROCEDURE — 6370000000 HC RX 637 (ALT 250 FOR IP): Performed by: FAMILY MEDICINE

## 2023-01-20 PROCEDURE — 97129 THER IVNTJ 1ST 15 MIN: CPT

## 2023-01-20 PROCEDURE — 82948 REAGENT STRIP/BLOOD GLUCOSE: CPT

## 2023-01-20 PROCEDURE — 6370000000 HC RX 637 (ALT 250 FOR IP): Performed by: STUDENT IN AN ORGANIZED HEALTH CARE EDUCATION/TRAINING PROGRAM

## 2023-01-20 PROCEDURE — 99239 HOSP IP/OBS DSCHRG MGMT >30: CPT | Performed by: STUDENT IN AN ORGANIZED HEALTH CARE EDUCATION/TRAINING PROGRAM

## 2023-01-20 PROCEDURE — 97116 GAIT TRAINING THERAPY: CPT

## 2023-01-20 PROCEDURE — 97130 THER IVNTJ EA ADDL 15 MIN: CPT

## 2023-01-20 RX ADMIN — DOCUSATE SODIUM 100 MG: 100 CAPSULE, LIQUID FILLED ORAL at 08:41

## 2023-01-20 RX ADMIN — DIVALPROEX SODIUM 500 MG: 500 TABLET, EXTENDED RELEASE ORAL at 08:41

## 2023-01-20 RX ADMIN — ASPIRIN 81 MG 81 MG: 81 TABLET ORAL at 08:41

## 2023-01-20 RX ADMIN — METOPROLOL SUCCINATE 25 MG: 25 TABLET, EXTENDED RELEASE ORAL at 08:41

## 2023-01-20 RX ADMIN — DESVENLAFAXINE 50 MG: 50 TABLET, EXTENDED RELEASE ORAL at 08:41

## 2023-01-20 RX ADMIN — METFORMIN HYDROCHLORIDE 1000 MG: 500 TABLET ORAL at 08:41

## 2023-01-20 RX ADMIN — CLOZAPINE 50 MG: 25 TABLET ORAL at 08:41

## 2023-01-20 NOTE — PROGRESS NOTES
[facility] PHYSICAL THERAPY  Discharge Note  [facility] - [id number]    Time In: 0900  Time Out: 0930  Timed Code Treatment Minutes: 30 Minutes  Minutes: 30          Date: 2023  Patient Name: Kimberly Bahena,  Gender:  male        MRN: [id number]  : 1965  (65 y.o.)     Referring Practitioner: Jacqueline Jacobs DO  Diagnosis: Debility  Additional Pertinent Hx: Per H&P \"Chico Bonds is a 65 y.o. male admitted to [address] [place] [address] on 2023. Patient with past medical history of DM, HTN, and bipolar disorder who presented to [facility] for productive cough, fever and SOB. This had become progressively worse and is worse with exertion. While in the ER, patient did test positive for influenza A. CXR revealed coarsened appearance to the pulmonary parenchyma ?chronic. Pulmonary vascularity is normal. Tamiflu was started. Patient was admitted to [facility] Med-Surgical Unit. On 1/3/23 CODE Stroke was called due to new onset left facial weakness, lethargy and dysarthria. NIH of 3. 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 Based on patient's symptoms and time since last known well decision was made for TNK to be given. 22 mg of TNK given at 5:05 AM.  Patient transferred to the ICU with thrombolytic precautions. Patient was placed on HFNC. MRI complete 23 without acute findings of CVA. echocardiogram 1/3/2023 that showed EF of 55% and overall normal left ventricular function. Patient was cleared for diet by SLP. Tamilfu completed 23. Solu-Medrol 23. Out of droplet isolation 23. Plan was for patient to return to Assisted Living once medically stable, but needs to be able to care for self except for housemaking tasks. \"     Prior Level of Function:  Lives With: Alone  Type of Home: Assisted living [facility])  Home Layout: One level  Home Access: Level entry  Home Equipment: None   Bathroom Shower/Tub: Walk-in shower  Bathroom Toilet: Standard  Bathroom Equipment: Grab bars around toilet, Grab bars in shower    ADL Assistance: Independent  Homemaking Assistance: Needs assistance  Ambulation Assistance: Independent  Transfer Assistance: Independent  Active : No  Additional Comments: Pt reports using no AD at assisted living. Pt reports an aide is sometimes present so supervise showers, but he completes dressing, grooming and toileting independently. Facility completes housekeeping, cooking and transportation. Pt usually ambulates to dining room for meals. Restrictions/Precautions:  Restrictions/Precautions: Fall Risk, General Precautions, Modified Diet  Position Activity Restriction  Other position/activity restrictions: impulsive     SUBJECTIVE: Pt. Laying in his bed and pleasantly agrees to therapy session. Pt. Motivated for discharge. PAIN: None indicated  Vitals:   Patient Vitals for the past 8 hrs:   BP Patient Position Temp Temp src Pulse Resp SpO2 O2 Device   01/20/23 0810 124/62 Sitting 98 °F (36.7 °C) Temporal (!) 104 15 90 % None (Room air)       OBJECTIVE:  Bed Mobility:  Supine to Sit: Modified Independent    Transfers:  Sit to Stand: Modified Independent  Stand to Sit:Modified Independent  To/From Bed and Chair: Modified Independent    Ambulation:  Modified Independent  Distance: 150' x 2  Surface: Level Tile  Device: No Device  Gait Deviations:  Slow May (one cue provided to correct with poor carryover), Decreased Step Length Bilaterally, Decreased Trunk Rotation, and Decreased Gait Speed    Stairs:  None    Balance:  None    Neuromuscular Re-education  None    Exercise:  None    Functional Outcome Measures:   Completed. Augustine Balance Scale   1. Sitting to Standing: Able to stand without using hands and stabilize independently  2. Standing Unsupported: Able to stand safely for 2 minutes  3.  Sitting with Back Unsupported but Feet Supported on Floor or on a Stool: Able to sit safely and securely for 2 minutes  4. Standing to Sitting: Sits safely with minimal use of hands  5. Transfers: Able to transfer safely with minor use of hands  6. Standing Unsupported with Eyes Closed: Able to stand 10 seconds safely  7. Standing Unsupported with Feet Together: Able to place feet together independently and stand 1 minute with supervision  8. Reach Forward with Outstretched Arm While Standing: Can reach forward confidently 25 cm (10 inches)  9.  Object from Floor from a Standing Position: Able to  slipper but needs supervision  10. Turning to Look Behind Over Left and Right Shoulders While Standing: Turns sideways only but maintains balance  11. Turn 360 Degrees: Able to turn 360 degrees safely but slowly  12. Place Alternate Foot on Step or Stool While Standing Unsupported: Able to stand independently and safely and complete 8 steps in 20 seconds  13. Standing Unsupported One Foot in Front: Able to place foot ahead independently and hold 30 seconds  14. Standing on One Leg: Able to lift leg independently and hold 5-10 seconds  Augustine Balance Score: 48  Augustine Balance Disability Index: 1-19%  Augustine CMS Modifier: CI     Current Score: 48 / 56 (Date: 1/20/2023)    Interpretation of Score: Each section is scored on a 0-4 scale, 0 representing the patient's inability to perform the task and 4 representing independence. The scores of each section are added together for a total score of 56. The higher the patient's score, the more independent the patient. Any score below 45 indicates increased risk for falls. Low risk for falls (41-46), medium risk for falls (21-40), and high risk for falls (0-20). Timed Up and Go (TUG)  Current Score: 9.2 Seconds (Date: 1/20/2023)    Interpretation of Score: This tests the patients mobility and fall risk.  Less than 10 seconds = freely mobile; <20 seconds = mostly independent; 20-29 seconds = variable mobility; > 20 seconds = impaired mobility; > 30 seconds = very high fall risk. Additional scorin.1 seconds in vestibular patients = increased fall risk; > 13.5 seconds in elderly = Increased fall risk)    ASSESSMENT:  Assessment: Patient progressing toward established goals. Activity Tolerance:  Patient tolerance of  treatment: good. Equipment Recommendations:Equipment Needed: No (Continue to assess pending progress)  Discharge Recommendations: Continue to assess pending progress, Patient would benefit from continued therapy after discharge Home with Home Health PT    Plan: Pt. to be discharged home with home health PT    Patient Education  Patient Education: Plan of Care, Functional Mobility, Reviewed Prior Education, Health Promotion and Wellness Education    Goals:  Patient Goals : None Stated    Short Term Goals  Time Frame for Short Term Goals: 1 week  Short Term Goal 1: Patient to perform bed mobility supine<>sit with Supervision in order to assist with getting into and out of bed. GOAL MET, SEE LTG  Short Term Goal 2: Patient to perform sit to stand transfers without AD with Supervision from various surfaces in order to assist wtih safety with transfers in home. GOAL MET, SEE LTG  Short Term Goal 3: Patient to ambulate with </=1 cue for normalized gait speed with Supervision in order to assist with reduced unsteadiness with home mobility. GOAL NOT MET  Short Term Goal 4: Patient to score >/=48/56 on the Augustine Balance Test in order to assist with reduced risk for falls. GOAL MET, SEE LTG              Long Term Goals  Time Frame for Long Term Goals : 2 weeks from IPR evaluation  Long Term Goal 1: Patient to perform bed mobility supine<>sit with Mod I in order to assist with getting into and out of bed. GOAL MET  Long Term Goal 2: Patient to perform sit to stand transfers without use of Ad with Mod I in order to assist with safety with transfers in home.  GOAL MET  Long Term Goal 3: Patient to ambulate >/=200 feet without AD with Mod I in order to assist with ambulating to and  from dining room for all meals. GOAL MET  Long Term Goal 4: Patient to perform car transfer with Supervision in order to assist with getting to and from appointments. GOAL MET  Long Term Goal 5: Patient to perform TUG in </=8 seconds in order to assist with functional dynamic balance. GOAL NOT MET            Following session, patient left in safe position with all fall risk precautions in place.

## 2023-01-20 NOTE — PLAN OF CARE
Problem: Discharge Planning  Goal: Discharge to home or other facility with appropriate resources  Note: Patient to be discharged on Friday, 1/20 to [facility]. Patient will be under the supervision of staff. Family education was not provided. Patient will be receiving services through [facility]: [facility] PT, OT and [address] [place] [address] provided information to [place] on 1/16 via Epic. DAVE spoke with BODØ on this date to confirm discharge.

## 2023-01-20 NOTE — PROGRESS NOTES
Calm, pleasant. Alert, oriented to person, place, time. TESS 5 mm-3 mm, brisk. Clear speech. Mucous membranes pink, moist. Tongue midline. Smile symmetrical. Lung sounds wheezes upon inhalation anterior, posterior, lateral. Respirations deep, easy. Non productive cough noted. Heart sounds strong, regular. Bowel sounds active all 4 quadrants. Abdomen round, soft. Non-tender to palpation. Confirms flatulence. Last bowel movement 1/18/23. Denies difficulty urinating. Radial pulses strong, bilateral. Hand grasp strong, bilateral.Capillary refill, less than 3 seconds. Skin turgor, brisk. Pedal pulses strong, bilateral.Pedal push and pull strong, bilateral. Negative gaby's sign. No edema noted. Leg lift strong, bilateral. Denies numbness or tingling. Preparing for shower. Call light and over-bed table within reach. Denies needs.  Jarrett, PNS

## 2023-01-20 NOTE — PROGRESS NOTES
[address] [address]  Inpatient Rehabilitation  Occupational Therapy  Discharge Note  Time:  Time In: 1001  Time Out: 1018  Timed Code Treatment Minutes: 17 Minutes  Minutes: 17  Transport arrived early for discharge           Date: 2023  Patient Name: Rj White,   Gender: male      Room: [facility]/070-A  MRN: [id number]  : 1965  (65 y.o.)  Referring Practitioner: Dr. Farrah Lester  Diagnosis: Debility  Additional Pertinent Hx: Rj White is a 65 y.o. male admitted to [address] [place] [address] on 2023. Patient with past medical history of DM, HTN, and bipolar disorder who presented to [facility] for productive cough, fever and SOB. This had become progressively worse and is worse with exertion. While in the ER, patient did test positive for influenza A. CXR revealed coarsened appearance to the pulmonary parenchyma ?chronic. Pulmonary vascularity is normal. Tamiflu was started. Patient was admitted to [facility] Med-Surgical Unit. On 1/3/23 CODE Stroke was called due to new onset left facial weakness, lethargy and dysarthria. NIH of 3. 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 Based on patient's symptoms and time since last known well decision was made for TNK to be given. 22 mg of TNK given at 5:05 AM.  Patient transferred to the ICU with thrombolytic precautions. Patient was placed on HFNC. MRI complete 23 without acute findings of CVA. echocardiogram 1/3/2023 that showed EF of 55% and overall normal left ventricular function. Patient was cleared for diet by SLP. Tamilfu completed 23. Solu-Medrol 23. Out of droplet isolation 23. Pt admitted to [facility] on 1/10 for therapy needs with eventual plan to return to Assisted Living once medically stable, but needs to be able to care for self except for housemaking tasks.     Restrictions/Precautions:  Restrictions/Precautions: Fall Risk, General Precautions, Modified Diet  Position Activity Restriction  Other position/activity restrictions: impulsive    SUBJECTIVE: Pt agreeable to OT but appears flat, withdrawn from conversation. Pt reports he just feels tired. Session not fully completed due to early arrival of discharge transportation. PAIN: no c/o pain    Vitals: Vitals not assessed per clinical judgement, see nursing flowsheet    COGNITION: Slow Processing and Decreased Insight    ADL:   TOILETING HYGIENE:Independent. Anup Lewis CARE Score: 6. UPPER BODY DRESSING:Independent. francisco vidales this date. CARE Score: 6. LOWER BODY DRESSING:Independent. doffing shorts, donning jeans. Anup Lewis CARE Score: 6. FOOTWEAR:Independent  able to don slip on shoes. CARE Score: 6. TOILET TRANSFER: Independent. STS. CARE Score: 6. BALANCE:  Sitting Balance:  Independent. Standing Balance: Independent. BED MOBILITY:  Supine to Sit: Modified Independent      TRANSFERS:  Sit to Stand:  Modified Independent. EOB, recliner   Stand to Sit: Modified Independent. FUNCTIONAL MOBILITY:  Assistive Device: None  Assist Level:  Supervision. Distance: To and from bathroom  To/form rehab gym, but had to turn back around due to arrival of transport      ASSESSMENT:  Activity Tolerance:  Patient tolerance of  treatment: good. Assessment: Assessment: Drew Jones has made steady progress on IPR. He has progressed to a MI level with his fucntional transfers and mobility during his ADL / IADL tasks. He can complete his BADL bathing routine with supervision but can complete rote dressing and toileting tasks with mod I.  Chico is limited by his sequencing, initiation and problem solving which affects his ADL tasks. Drew Jones has direct A for most IADLs at [facility] home, but he would cont to benefit from [facility] OT to improve safety and indep with BADLs. Discharge Recommendations: Home with Home health OT  Equipment Recommendations:  Other: Pt will have a shower chair and grab bars to use in his AL apartment. Plan: home with [facility] OT     Patient Education  Patient Education: ADL's    Goals  Short Term Goals  Time Frame for Short Term Goals: 1 week  Short Term Goal 1: Pt will sequence and complete bathing tasks with consistent S and 0 vcs for perseveration to improve indep with showering at home. GOAL MET   Short Term Goal 2: Pt will tolerate standing 4 min with Supervision for increased ease of sinkside grooming. GOAL MET   Short Term Goal 3: Pt will gather appropriate clothing and dress self with consistent S and min vcs for sequencing and initation to improve indep with self care. GOAL MET   Short Term Goal 4: Pt will sequence and initate grooming tasks following toileting with consistent S and min vcs to improve indep with self care. GOAL MET   Long Term Goals  Time Frame for Long Term Goals : 2 weeks from IPR evaluation  Long Term Goal 1: Pt will ambulate within room with Mod I and no device to improve indep with self care. GOAL MET   Long Term Goal 2: Pt will complete dressing tasks with mod I and no cues for sequencing to improve indep with self care at AL. GOAL MET   Long Term Goal 3: Pt will complete grooming and toileting tasks with mod I to return to prior level of function at AL. GOAL MET     Following session, patient left in safe position with all fall risk precautions in place.

## 2023-01-20 NOTE — PROGRESS NOTES
Report called to Dave at [facility], discharged byLACP wc transport

## 2023-01-20 NOTE — PROGRESS NOTES
[facility]  Recreational Therapy  Discharge Note  Inpatient Rehabilitation Unit         Date:  1/20/2023       Patient Name: Skyler Santamaria      MRN: [id number]       YOB: 1965 (65 y.o.)       Gender: male  Diagnosis: Debility  Referring Practitioner: Dr. Dave Perez    Patient discharged from Recreational Therapy at this time. See recreational therapy notes for details.     Electronically signed by: VIDAL Velazquez  Date: 1/20/2023

## 2023-01-20 NOTE — DISCHARGE SUMMARY
Physical Medicine & Rehabilitation   Discharge Summary     Patient Identification:  Lilly Palacios  : 1965  Admit date: 1/10/2023  Discharge date: 2023   Attending provider: Rohit Michael DO        Primary care provider: Naun Zuniga MD     Discharge Diagnoses:   Debility  Acute respiratory failure  Influenza A  Post viral syndrome  Moderate cognitive impairment  *Diabetes Mellitis type 2 with hyperglycemia  HTN  Bipolar disorder  Chronic tobacco use, not previously dx with chronic lung disease  Impaired ADLs and mobility     Consults:   [address] Course:   Lilly Palacios is a 65 y.o. male admitted to [facility] on 2023. Patient with past medical history of DM, HTN, and bipolar disorder who presented to [facility] for productive cough, fever and SOB. This had become progressively worse and is worse with exertion. While in the ER, patient did test positive for influenza A. CXR revealed coarsened appearance to the pulmonary parenchyma ?chronic. Pulmonary vascularity is normal. Tamiflu was started. Patient was admitted to [facility] Med-Surgical Unit. On 1/3/23 CODE Stroke was called due to new onset left facial weakness, lethargy and dysarthria. NIH of 3. 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 Based on patient's symptoms and time since last known well decision was made for TNK to be given. 22 mg of TNK given at 5:05 AM.  Patient transferred to the ICU with thrombolytic precautions. Patient was placed on HFNC. MRI complete 23 without acute findings of CVA. echocardiogram 1/3/2023 that showed EF of 55% and overall normal left ventricular function. Patient was cleared for diet by SLP. Tamilfu completed 23. Solu-Medrol 23. Out of droplet isolation 23.  Plan was for patient to return to Assisted Living once medically stable, but needs to be able to care for self except for housemaking tasks. On day of consultation, patient seen resting in bed. Patient is very pleasant and cooperative. Patient with mumbled speech, which he states is baseline for him. Patient is very independent at his Assisted Living. He is able to care for himself and is independent with walking to rec [facility] and [employer]. Discussed with patient about concerns with returning to A. L. with current level of mobility. Patient in agreement. Discussed IPR, patient in agreement with this. Patient denies any other questions or concerns at this time. On admission to inpatient rehabilitation unit, patient reports that he is doing well. He has been off supplemental O2 since this morning but utilized it overnight. He denies any CP or SOB. No cough. He is not in any pain at this time. He is looking forward to starting IPR program to regain strength and functional independence. Inpatient Rehabilitation Course:   Jason Meade is a 65 y.o. male admitted to inpatient rehabilitation from 1/10/2023-01/20/2023 for Debility. During this time, the patient participated in an aggressive multidisciplinary inpatient rehabilitation program involving 3 hours per day, 5 days per week of rehabilitation. Dr Sterling Gill followed during the IPR stay for medical management. Patient's had a relatively uncomplicated rehab stay and progressed well with therapies making significant functional gains throughout his stay. Patient was discharged Home with [place] in Stable condition. See below for problem list addressed during rehab stay and for a functional status update. Problem List:  Acute Hypoxic Respiratory failure: 2/2 Influenza A. Not on oxygen at baseline. Now on supplemental O2 via nasal cannula- will continue to wean oxygen as tolerated. Patient has completed course of tamiflu and dolu-medrol. 1/13: CXR with findings consistent  with interstitial pneumonitis  1/18: Only requiring O2 nocturnally.  Will place order for overnight pulse ox testing tonight to [facility] for home O2  1/19: Patient qualified for nocturnal home O2  Patient was evaluated today for the diagnosis of  interstitial pneumonitis . I entered a DME order for home oxygen because the diagnosis and testing requires the patient to have supplemental nocturnal oxygen. Condition will improve or be benefited by oxygen use. The patient is  able to perform good mobility in a home setting and therefore does require the use of a portable oxygen system. The need for this equipment was discussed with the patient and he understands and is in agreement. Leukocytosis: WBC 11.2 today. UA obtained due to increased urinary frequency  which is negative for infection. CXR obtained due to reports of dry cough- xray with  \"Very subtle mild scattered interstitial infiltrates in both central lung fields and right lung base, consistent with interstitial pneumonitis\". No findings to suggest acute PNA. Continue to monitor for signs or symptoms of infection   1/14: WBC WNL  Episode of left facial droop: Possible TIA? . 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 MRI negative for any intracranial abnormality. Continue aspirin and statin  T2DM: Can resume home regimen  HTN: Continue metoprolol XL 25mg daily  Bipolar Disorder: Continue home Clozapine, Pristiq, and Depakote  Pain: Tylenol   Dysphagia: On soft and bite sized diet  Tobacco dependence: Continue Nicotine patch - educated on importance of cessation, but if he does return to smoking he should not continue usage of patch      Functional Status at time of Discharge:  Occupational Therapy:  EATING:Independent. Destinee  CARE Score: 6. ORAL HYGIENE:Independent. Destinee  CARE Score: 6. TOILETING HYGIENE:Supervision or touching assistance (1 slight LOB during clothing management requiring SBA for balance). Destinee  CARE Score: 4.      SHOWERING/BATHING:Supervision or touching assistance (Cues required for initiation throughout, patient perseverated on washing UB.). Cherl Spinner CARE Score: 4.     UPPER BODY DRESSING:Supervision or touching assistance (Cues for orientation of sweatshirt and initiation). Cherl Spinner CARE Score: 4. LOWER BODY DRESSING:Supervision or touching assistance (Cues for initiation to thread LEs into pants). Cherl Spinner CARE Score: 4. FOOTWEAR:Supervision or touching assistance (Cues for initiation of doffing/donning B socks/slippers)   . CARE Score: 4. TOILET TRANSFER: Supervision or touching assistance (1 cue for hand placement). Cherl Spinner CARE Score: 4. Physical Therapy:  ROLLING LEFT AND RIGHT: Independent. . 6. SIT TO SIDELYING:  Independent. . 6. SIDELYING TO SIT: Independent. . 6. SIT TO STAND:Independent. .  .     CHAIR TO BED TRANSFER:Independent. . 6. CAR TRANSFER:Independent. . 6. WALK 10 FEET:Independent. .  .  6. WALK 50 FEET WITH 2 TURNS:Independent. .  .  6. WALK 150 FEET:Independent. .  .  6. WALK 10 FEET ON EVEN SURFACES:Independent. .  .  6. NAVIGATE 1 STEP:Independent. .  .  6. NAVIGATE 4 STEPS:Independent. .  .  6. NAVIGATE 12 STEPS:Independent. .  .  6. PROPEL WHEELCHAIR 50 FEET WITH 2 TURNS: . .  .   . PROPEL WHEELCHAIR 150 FEET: . .  .   .        Speech Therapy:   Comprehension: 5 - Patient understands basic needs (hungry/hot/pain)  Expression: 4 - Expresses basic ideas/needs 75-90%+ of the time  Social Interaction: 4 - Patient appropriate 75-90%+ of the time  Problem Solvin - Patient solves simple/routine tasks 75-90%+   Memory: 3 - Patient remembers 50%-74% of the time       Review of Systems:  CONSTITUTIONAL:  negative for  fevers and chills  EYES:  negative for  glasses and visual disturbance  HEENT:  negative for  voice change  RESPIRATORY:  negative for  dyspnea and wheezing  CARDIOVASCULAR:  negative for  chest pain, palpitations  GASTROINTESTINAL:  negative for nausea, vomiting, and change in bowel habits  GENITOURINARY:  negative for dysuria and urinary incontinence  SKIN:  negative rash or wound   MUSCULOSKELETAL:  positive for  muscle weakness; negative for pain  NEUROLOGICAL:  positive for speech problems and gait problems  BEHAVIOR/PSYCH:  positive for history of bipolar disorder  10 point system review otherwise negative        Objective:  Patient is awake and alert, no family at bedside   Mood: within normal limits  Affect: calm  General appearance: Patient is well nourished, well developed, well groomed and in no acute distress     Memory:   not formally assess but able to answer questions regarding history   Attention/Concentration: follow conversation  Language:  abnormal - chronic dysarthria     Cranial Nerves:  cranial nerves II-XII appear intact  ROM:  normal  Motor Exam: Full strength in BUE and BLE  Tone:  normal  Muscle bulk: within normal limits  Sensory:  Sensory intact  Coordination:   normal     Heart: RRR, no m/r/g noted  Lungs:he is breathing comfortably on room air without any increased WOB; CTAB  Abdomen: non-distended   Skin: warm and dry, no rash or erythema  Edema: none    Significant Diagnostics:   CBC with Differential:    Lab Results   Component Value Date/Time    WBC 6.8 01/14/2023 05:35 AM    RBC 4.32 01/14/2023 05:35 AM    HGB 13.4 01/14/2023 05:35 AM    HCT 41.6 01/14/2023 05:35 AM     01/14/2023 05:35 AM    MCV 96.3 01/14/2023 05:35 AM    MCH 31.0 01/14/2023 05:35 AM    MCHC 32.2 01/14/2023 05:35 AM    RDW 13.2 01/17/2017 12:50 PM    NRBC 0 01/14/2023 05:35 AM    SEGSPCT 57.1 01/14/2023 05:35 AM    MONOPCT 13.5 01/14/2023 05:35 AM    MONOSABS 0.9 01/14/2023 05:35 AM    LYMPHSABS 1.9 01/14/2023 05:35 AM    EOSABS 0.0 01/14/2023 05:35 AM    BASOSABS 0.0 01/14/2023 05:35 AM     BMP:    Lab Results   Component Value Date/Time     01/14/2023 05:39 AM    K 4.3 01/14/2023 05:39 AM    K 4.1 01/11/2023 06:17 AM     01/14/2023 05:39 AM    CO2 25 01/14/2023 05:39 AM BUN 8 01/14/2023 05:39 AM    LABALBU 3.2 01/11/2023 06:17 AM    CREATININE 0.4 01/14/2023 05:39 AM    CALCIUM 8.6 01/14/2023 05:39 AM    LABGLOM >60 01/14/2023 05:39 AM    GLUCOSE 140 01/14/2023 05:39 AM        Recent Labs     01/18/23  0726 01/19/23  0758 01/20/23  0732   POCGLU 157* 172* 157*       Cholesterol Panel:   Results in Past 30 Days  Result Component Current Result Ref Range Previous Result Ref Range   Cholesterol, Total 101 (1/3/2023) 100 - 199 mg/dL Not in Time Range    HDL 34 (1/3/2023) mg/dL Not in Time Range    LDL Calculated 44 (1/3/2023) mg/dL Not in Time Range    Triglycerides 113 (1/3/2023) 0 - 199 mg/dL Not in Time Range        Patient Instructions:   Home with Home Health   Therapy orders: PT, OT, and SLP   Discharge lab work: Per PCP  Code status: Full Code   Activity: activity as tolerated  Diet: ADULT DIET; Dysphagia - Soft and Bite Sized; 4 carb choices (60 gm/meal)  ADULT ORAL NUTRITION SUPPLEMENT; Lunch; Diabetic Oral Supplement    Wound Care: none needed  Equipment: : Pt will have a shower chair and grab bars to use in his AL apartment.     Follow-up visits: See after visit summary from hospitalization       Discharge Medications:  Current Discharge Medication List             Details   metoprolol succinate (TOPROL XL) 25 MG extended release tablet Take 1 tablet by mouth daily  Qty: 30 tablet, Refills: 1                Details   nicotine (NICODERM CQ) 14 MG/24HR Place 1 patch onto the skin daily  Qty: 30 patch, Refills: 1                Details   empagliflozin (JARDIANCE) 25 MG tablet Take 25 mg by mouth daily      dulaglutide (TRULICITY) 1.5 NB/6.2LN SC injection Inject 1.5 mg into the skin once a week      metFORMIN (GLUCOPHAGE) 500 MG tablet Take 500 mg by mouth 2 times daily (with meals)      atorvastatin (LIPITOR) 20 MG tablet Take 20 mg by mouth daily      !! cloZAPine (CLOZARIL) 100 MG tablet Take 200 mg by mouth nightly      !! divalproex (DEPAKOTE ER) 500 MG extended release tablet Take 1,000 mg by mouth nightly      !! cloZAPine (CLOZARIL) 50 MG tablet Take 50 mg by mouth 2 times daily At 0800 and 1700      hydrocortisone 1 % cream Apply topically 3 times daily as needed      ibuprofen (ADVIL;MOTRIN) 600 MG tablet Take 600 mg by mouth every 8 hours as needed      acetaminophen (TYLENOL) 325 MG tablet Take 650 mg by mouth every 6 hours as needed for Pain or Fever      glucose monitoring kit (FREESTYLE) monitoring kit 1 kit by Does not apply route daily  Qty: 1 kit, Refills: 0      blood glucose monitor strips Test 3 times a day & as needed for symptoms of irregular blood glucose. Qty: 100 strip, Refills: 0    Comments: Brand per patient preference. May round up to next available package size. aspirin 81 MG tablet Take 81 mg by mouth daily      docusate sodium (COLACE) 100 MG capsule Take 100 mg by mouth daily      clotrimazole (LOTRIMIN) 1 % cream Apply topically 2 times daily as needed       !! divalproex (DEPAKOTE ER) 500 MG ER tablet Take 500 mg by mouth every morning       desvenlafaxine (PRISTIQ) 50 MG TB24 Take 50 mg by mouth daily       ! ! - Potential duplicate medications found. Please discuss with provider. Controlled substances monitoring: not applicable.      35 minutes spent preparing the patient for discharge    Matthew Orozco DO

## 2023-01-20 NOTE — PROGRESS NOTES
[address] [facility] THERAPY  [address] [facility]  DISCHARGE NOTE    TIME   SLP Individual Minutes  Time In: 0800  Time Out: 0830  Minutes: 30  Timed Code Treatment Minutes: 30 Minutes       Date: 2023  Patient Name: Debbie Dick      CSN: [id number]   : 1965  (65 y.o.)  Gender: male   Referring Physician:  Yolis Douglas DO  Diagnosis: Debility  Precautions: Fall  Risk,  aspiration risk  Current Diet: Soft and Bite sized,thin liquids  Swallowing Strategies:  Full Upright Position, Small Bite/Sip, Medications Whole with Thin, Alternate Solids and Liquids, and Limit Distractions   Date of Last MBS/FEES: Not Applicable    Pain:  No pain reported. Subjective:  Patient sitting upright in recliner upon ST arrival with student RN present in room. Patient pleasant, cooperative, and engaged throughout. No family present. Family Education  During completion of family education (completed with patient only), ST reviewed rationale for speech therapy services, including patient's specific cognitive impairments and impact on return to home at discharge, as well as performance during specific contextualized treatment, progress towards goals, and persisting areas of impairment. Memory: Provided education on the various types of memory, compensatory memory strategies (write it, repeat it, associate it, picture it), and specific examples related to implementing strategies at home (ie: use of calendar for events/appointments, notepad for making lists and keeping notes). Safety: Discussed keeping all important numbers in phone and in the home for easy access. Organization: Reviewed recommendations for maintaining some level of involvement in medication management, finance management, schedule management, etc.  Attention: Informed patient on practice of limiting distractions and slowing down when completing complex tasks.   Swallowing: Reviewed current diet level, soft and bite size diet with thin liquids, compensatory swallowing strategies (I.e.,Full Upright Position, Small Bite/Sip, Medications Whole with Thin, Alternate Solids and Liquids, and Limit Distractions ), risks/implications of airway invasion events, and recommendations for f/u (e.g., advanced PO trials with ST). Additional Recommendations: Reviewed recommendations for activities to complete on a daily to continue to facilitate cognitive functioning (reading the newspaper, card games, cross word puzzles, etc). *Patient with verbal receptiveness noted. Short-Term Goals:  SHORT TERM GOAL #1:  Goal 1: Patient will consume a soft and bite-size diet and thin liquids and advanced PO trials as clinically indicated with ST only with stable pulmonary status and adequate endurance to assist with meeting nutrition/hydration needs. GOAL MET  INTERVENTIONS: DNT due to focus on additional STGs. Previous Session:  ST completed skilled dietary analysis of advanced lunch tray which consisted of a cheeseburger, mashed potatoes, ice cream, and thin liquids via cup. Patient with overall suspected adequate bolus control and manipulation; however, concern for decreased bolus control x1 resulting in cough during mastication of regular texture solid. Patient with prolonged rotary mastication of regular texture solid with effective textural breakdown when given EXTENDED amount of time likely s/t edentulous state and absence of dentures. Patient with consistent swallow initiation achieved across all trials. No overt s/s of aspiration observed with liquid trials. Patient required max verbal cues to utilize small, single bites. Patient consistently taking multiple bites of cheeseburger that filled oral cavity.  Patient independently utilizing alternation of bites/sips or bites of puree/ice cream to assist with breakdown/clearance of regular texture trial. At this time, it is recommended patient continues with a soft and bite-size diet with thin liquids. SHORT TERM GOAL #2:  Goal 2: Patient will complete structured speech drills/tasks (DDK rates, AMRs, multi-syllabic word repetition) with implementation of SOS speech strategies to improve intelligibility to 80% in all contexts to optimize communicative efficacy. GOAL NOT MET  INTERVENTIONS:  DNT due to focus on additional STGs. Previous Session:  Patient completed 2 syllable \"putuhkuh\" x10 with moderate cues, benefited from slowing down and repeating after ST model; fair success   Patient completed 3 syllable \"puhtuhkuh\" x10 with moderate cues, benefited from slowing down and repeating after ST model; fair success  *ST with instruction/education r/t encouragement of moving patient mouth more and slowing down      SHORT TERM GOAL #3:  Goal 3: Patient will complete functional recall tasks (immediate, delayed, working, orientation, biographics) with 85% accuracy, min cues to improve retention of pertinent information. GOAL MET  INTERVENTIONS:     Previous Session:  Delayed recall (1 day) - To do list  4/4 independent with utilization of written list    *patient did not remember making a list or writing it down  *ST provided notepad list was written on and patient found the list within the notepad    Nima Barrett 1277 #4:  GOAL: Patient will complete problem solving, verbal/visual reasoning and executive functioning tasks (time, basic math/finances) with 80% accuracy with min cues to improve contributions to ADL/IADL completion. GOAL NOT MET  INTERVENTIONS:DNT due to focus on additional STGs. Previous Session:  Monthly Calendar Interpretation  3/13 independently, 8/13 given min cues, 2/13 given mod cues    *patient requiring min cues to repeat questions and improve understanding of question requirements (JAVIER vs. Date)  *patient requiring mod cues for scanning entire calendar and providing total number hours, date, or JAVIER based on question requirements.     Time Management - Word Problems presented verbally  5/10 independently, 1/10 given min cues, 2/10 given mod cues, 2/10 given max cues    *patient requiring multiple repetitions of questions; poor mental manipulation  *increased success with written visual of times    SHORT TERM GOAL #5:  Goal 5: Patient will complete sequencing and thought organization tasks with 80% accuracy, min cues to improve mental flexibility for daily living scenarios. GOAL MET  INTERVENTIONS:   Written/Visual Sequencing (6 Steps)   Task 1: 3/6 independent, 2/6 with minimal cuing, 1/6 with moderate cuing   Task 2: 4/6 independent, 1/6 with minimal cuing, 1/6 with moderate cuing   Task 3: 4/6 independent, 2/6 with minimal cuing for vocabulary (I.e., decade, century)   Task 4: 4/5 independent, 1/6 with minimal cuing  *Minimal cuing to initiate task  *Minimal increasing to moderate cuing needed for task success given step reversal       Long-Term Goals:  Time Frame for Long Term Goals: 3 weeks from evaluation    LONG TERM GOAL #1:  Goal 1: Patient will consume a regular texture diet and thin liquids with stable pulmonary status and adequate endurance to assist with meeting nutrition/hydration needs. GOAL NOT MET    LONG TERM GOAL #2:  Goal 2: Patient will improve cognitive-linguistic skills to a min assist or higher upon discharge in order to make safe/successful discharge to least restrictive environment and resume ADL completion with least amount of supervision/assistance.  GOAL NOT MET       Comprehension: 5 - Patient understands basic needs (hungry/hot/pain)  Expression: 4 - Expresses basic ideas/needs 75-90%+ of the time  Social Interaction: 4 - Patient appropriate 75-90%+ of the time  Problem Solvin - Patient solves simple/routine tasks 75-90%+   Memory: 3 - Patient remembers 50%-74% of the time    EDUCATION:  Learner: Patient  Education:  Reviewed ST goals and Plan of Care, Reviewed recommendations for follow-up, Home Safety Education, and SOS intelligibility strategies  Evaluation of Education: Verbalizes understanding, Needs further instruction, and Family not present    ASSESSMENT/PLAN:   Summary: Patient achieved 3/5 STGs and 0/2 LTGs this [facility] admission. Patient with demonstrated gains in recall and thought organization. Patient continues to present with deficits in problem solving, reasoning, and executive functioning. Patient expressive and receptive language [facility]. Patient with improvement in overall speech intelligibility with implementation of SOS speech strategies; however, patient requiring moderate cues to implement strategies; approximating 75-80% intelligibility. Patient currently safely consuming a soft and bite-size diet with thin liquids - barrier to diet advancement at this time is edentulous state with poor carryover of recommended swallow strategies. Concerns remaining for safety with ADL/IADL completion. Patient would benefit from skilled ST services to address aforementioned cognitive skills with [facility] at discharge and supervision with ADLs/IADLs. Activity Tolerance:  Patient tolerance of  treatment: good. Assessment/Plan: Patient discharged from Speech Therapy at this time due to discharge from [facility]. Discharge Disposition: [facility].   Continued Speech Therapy Services recommended: [facility] ST    Discharge Recommendations: [address] [place], [place]., [facility]

## 2023-01-20 NOTE — PLAN OF CARE
Problem: Safety - Adult  Goal: Free from fall injury  Outcome: Progressing  No falls sustained at this time. Patient alert to call light and uses appropriately to alert staff to needs. Problem: Skin/Tissue Integrity  Goal: Absence of new skin breakdown  Description: 1. Monitor for areas of redness and/or skin breakdown. No new skin issues noted this shift. Outcome: Progressing  Care plan reviewed with patient. Patient verbalize understanding of the plan of care and contribute to goal setting.

## 2023-07-10 ENCOUNTER — HOSPITAL ENCOUNTER (OUTPATIENT)
Dept: CT IMAGING | Age: 58
Discharge: HOME | End: 2023-07-10
Payer: MEDICARE

## 2023-07-10 DIAGNOSIS — I65.22 ICAO (INTERNAL CAROTID ARTERY OCCLUSION), LEFT: ICD-10-CM

## 2023-07-10 PROCEDURE — 70498 CT ANGIOGRAPHY NECK: CPT

## 2023-07-10 PROCEDURE — 6360000004 HC RX CONTRAST MEDICATION: Performed by: SOCIAL WORKER

## 2023-07-10 PROCEDURE — 70496 CT ANGIOGRAPHY HEAD: CPT

## 2023-07-10 RX ADMIN — IOPAMIDOL 80 ML: 755 INJECTION, SOLUTION INTRAVENOUS at 09:50

## 2023-08-09 ENCOUNTER — OFFICE VISIT (OUTPATIENT)
Dept: NEUROLOGY | Age: 58
End: 2023-08-09
Payer: MEDICARE

## 2023-08-09 VITALS
DIASTOLIC BLOOD PRESSURE: 73 MMHG | HEART RATE: 104 BPM | HEIGHT: 69 IN | BODY MASS INDEX: 27.55 KG/M2 | WEIGHT: 186 LBS | SYSTOLIC BLOOD PRESSURE: 103 MMHG

## 2023-08-09 DIAGNOSIS — I65.22 CAROTID OCCLUSION, LEFT: ICD-10-CM

## 2023-08-09 DIAGNOSIS — R25.1 TREMOR: Primary | ICD-10-CM

## 2023-08-09 PROCEDURE — G8419 CALC BMI OUT NRM PARAM NOF/U: HCPCS | Performed by: NURSE PRACTITIONER

## 2023-08-09 PROCEDURE — 3017F COLORECTAL CA SCREEN DOC REV: CPT | Performed by: NURSE PRACTITIONER

## 2023-08-09 PROCEDURE — 99213 OFFICE O/P EST LOW 20 MIN: CPT | Performed by: NURSE PRACTITIONER

## 2023-08-09 PROCEDURE — G8427 DOCREV CUR MEDS BY ELIG CLIN: HCPCS | Performed by: NURSE PRACTITIONER

## 2023-08-09 PROCEDURE — 4004F PT TOBACCO SCREEN RCVD TLK: CPT | Performed by: NURSE PRACTITIONER

## 2023-08-09 RX ORDER — GUAIFENESIN/DEXTROMETHORPHAN 200-10MG/5
LIQUID (ML) ORAL DAILY
COMMUNITY
Start: 2023-07-28

## 2023-08-09 NOTE — PROGRESS NOTES
normal  Right arm pronator drift: absent  Left arm pronator drift: absent    BUE: 5/5  BLE: 5/5     Sensory Exam   Light touch normal.     Gait, Coordination, and Reflexes     Coordination   Finger to nose coordination: normal  Heel to shin coordination: normal    Tremor   Resting tremor: absent  Intention tremor: absent  Action tremor: absent     Labs/Imaging/Diagnostics       Imaging:  IMPRESSION:     1. Stable CTA of the head and neck, no interval change since previous study dated 1/3/2023.  2. Occluded left internal carotid artery just distal to the carotid bifurcation. There is no hemodynamic stenosis in the left common carotid artery. 3. 50% stenosis involving the right carotid bifurcation and origin of the right internal carotid artery. 4. There is antegrade flow in the right and left vertebral arteries. 5. There is atherosclerotic calcification aortic arch and at the origin of the left subclavian artery. 6. There is no antegrade flow in the left internal carotid artery intracranially. 7. There is attenuated flow in the left superior cerebellar artery. **This report has been created using voice recognition software. It may contain minor errors which are inherent in voice recognition technology. **     Final report electronically signed by DR Naldo Aguilar on 7/11/2023 8:15 AM           Exam Ended: 07/10/23 10:22 EDT Last Resulted: 07/11/23 08:15 EDT             Assessment and Plan:          Chronic left carotid artery occlusion  CT angiogram head and neck on 7- reveals stable occluded left internal carotid artery with good collateral flow.    Continue aspirin 81 mg, Lipitor 20 mg daily  Aggressive risk factor control: Blood pressure less than 130/80, LDL 45-70, A1c less than 7.0, smoking cessation  Present promptly to the emergency department with any strokelike symptoms such as dizziness, gait imbalance, double or blurred vision, dizziness, speech difficulty, weakness, or

## 2023-10-04 NOTE — PROGRESS NOTES
[address] [address]  INPATIENT PHYSICAL THERAPY  Daily Note  [facility] - 7E-70/070-A    Time In: 1100  Time Out: 1130  Timed Code Treatment Minutes: 30 Minutes  Minutes: 30          Date: 2023  Patient Name: Francia Wren,  Gender:  male        MRN: [id number]  : 1965  (65 y.o.)     Referring Practitioner: Naila Marc DO  Diagnosis: Debility  Additional Pertinent Hx: Per H&P \"Chico Negrete is a 65 y.o. male admitted to [address] [place] [address] on 2023. Patient with past medical history of DM, HTN, and bipolar disorder who presented to [address] [place] [address] for productive cough, fever and SOB. This had become progressively worse and is worse with exertion. While in the ER, patient did test positive for influenza A. CXR revealed coarsened appearance to the pulmonary parenchyma ?chronic. Pulmonary vascularity is normal. Tamiflu was started. Patient was admitted to [address] [place] [address] Med-Surgical Unit. On 1/3/23 CODE Stroke was called due to new onset left facial weakness, lethargy and dysarthria. NIH of 3. 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 Based on patient's symptoms and time since last known well decision was made for TNK to be given. 22 mg of TNK given at 5:05 AM.  Patient transferred to the ICU with thrombolytic precautions. Patient was placed on HFNC. MRI complete 23 without acute findings of CVA. echocardiogram 1/3/2023 that showed EF of 55% and overall normal left ventricular function. Patient was cleared for diet by SLP. Tamilfu completed 23. Solu-Medrol 23. Out of droplet isolation 23. Plan was for patient to return to Assisted Living once medically stable, but needs to be able to care for self except for housemaking tasks. \"     Prior Level of Function:  Lives With: Alone  Type of Home: Assisted living [facility])  Home Layout: One level  Home Access: Level entry  Home Equipment: Detail Level: Detailed None   Bathroom Shower/Tub: Walk-in shower  Bathroom Toilet: Standard  Bathroom Equipment: Grab bars around toilet, Grab bars in shower    ADL Assistance: Independent  Homemaking Assistance: Needs assistance  Ambulation Assistance: Independent  Transfer Assistance: Independent  Active : No  Additional Comments: Pt reports using no AD at assisted living. Pt reports an aide is sometimes present so supervise showers, but he completes dressing, grooming and toileting independently. Facility completes housekeeping, cooking and transportation. Pt usually ambulates to dining room for meals. Restrictions/Precautions:  Restrictions/Precautions: Fall Risk, General Precautions, Modified Diet  Position Activity Restriction  Other position/activity restrictions: impulsive     SUBJECTIVE: Pt. Laying in his bed and pleasantly agrees to therapy session. Pt. On room air throughout session. PAIN: None indicated    Vitals:   Patient Vitals for the past 8 hrs:   BP Patient Position Temp Temp src Pulse Resp SpO2 O2 Device O2 Flow Rate (L/min)   01/12/23 1145 112/60 Lying right side 97.5 °F (36.4 °C) Oral (!) 107 24 98 % Nasal cannula --   01/12/23 1019 -- -- -- -- (!) 110 -- -- -- --   01/12/23 1015 -- -- -- -- (!) 120 -- -- -- --   01/12/23 0830 124/78 Lying right side 97.5 °F (36.4 °C) Oral (!) 116 24 90 % Nasal cannula --   01/12/23 0638 -- -- -- -- (!) 107 24 93 % Nasal cannula 2 L/min   01/12/23 0629 -- -- -- -- (!) 111 28 (!) 88 % Nasal cannula 1 L/min   01/12/23 0528 100/68 -- 99.1 °F (37.3 °C) -- (!) 113 18 91 % -- --        OBJECTIVE:  Bed Mobility:  Rolling to Right: Supervision   Supine to Sit: Supervision    Transfers:  Sit to Stand: Stand By Assistance  Stand to Sit:Stand By Assistance    Ambulation:  Contact Guard Assistance  Distance: 70' x 2, multiple shorter distances.    Surface: Level Tile  Device: No Device  Gait Deviations:  Slow May, Decreased Step Length Bilaterally, Decreased Gait Speed, Decreased Heel Strike Bilaterally, Mild Path Deviations, and mild lateral trunk sway  Pt. Completed dynamic gait activities weaving through cones and tapping cones with feet to improve coordination and gait mechanics for improved functional mobility. Stairs:  None    Balance:  Pt. Completed standing dynamic balance activity: balloon volleyball with Normal DAVID on level tile and No UE support  with Supervision. Activity completed to improve balance, enhance functional mobility, and reduce risk of falls. Pt. Completed standing dynamic balance activity: tapping numbered pods with feet with Normal DAVID on level tile and No UE support with Contact Guard Assistance. Activity completed to improve balance, enhance functional mobility, and reduce risk of falls. Exercise:  None    Functional Outcome Measures:   Not completed    ASSESSMENT:  Assessment: Patient progressing toward established goals. Activity Tolerance:  Patient tolerance of  treatment: good. Equipment Recommendations:Equipment Needed: No (Continue to assess pending progress)  Discharge Recommendations: Continue to assess pending progress, Patient would benefit from continued therapy after discharge     Plan: Current Treatment Recommendations: Strengthening, Balance training, Functional mobility training, Gait training, Safety education & training, Home exercise program, Patient/Caregiver education & training, Transfer training, Endurance training, Therapeutic activities, Neuromuscular re-education, Stair training  General Plan:  (5x/wk 90min)    Patient Education  Patient Education: Plan of Care, Functional Mobility, Reviewed Prior Education, Health Promotion and Wellness Education, Safety    Goals:  Patient Goals : None Stated  Short Term Goals  Time Frame for Short Term Goals: 1 week  Short Term Goal 1: Patient to perform bed mobility supine<>sit with Supervision in order to assist with getting into and out of bed.   Short Term Goal 2: Patient to perform sit to stand transfers without AD with </=1 cue for hand placement from various surfaces in order to assist with safety with transfers in home. Short Term Goal 3: Patient to ambulate >/=75 feet without AD with SBA in order to assist with home mobility. Short Term Goal 4: Patient to score >/=38/56 on the Augustine Balance Test in order to assist with reduced risk for falls. Long Term Goals  Time Frame for Long Term Goals : 2 weeks from IPR evaluation  Long Term Goal 1: Patient to perform bed mobility supine<>sit with Mod I in order to assist with getting into and out of bed. Long Term Goal 2: Patient to perform sit to stand transfers without use of Ad with Mod I in order to assist with safety with transfers in home. Long Term Goal 3: Patient to ambulate >/=200 feet without AD with Mod I in order to assist with ambulating to and  from dining room for all meals. Long Term Goal 4: Patient to perform car transfer with Supervision in order to assist with getting to and from appointments. Long Term Goal 5: Patient to perform TUG in </=12 seconds in order to assist with functional dynamic balance. Following session, patient left in safe position with all fall risk precautions in place.

## 2023-12-07 ENCOUNTER — OFFICE VISIT (OUTPATIENT)
Dept: NEUROLOGY | Age: 58
End: 2023-12-07
Payer: MEDICARE

## 2023-12-07 VITALS
HEART RATE: 76 BPM | OXYGEN SATURATION: 94 % | SYSTOLIC BLOOD PRESSURE: 106 MMHG | HEIGHT: 69 IN | DIASTOLIC BLOOD PRESSURE: 74 MMHG | BODY MASS INDEX: 27.7 KG/M2 | WEIGHT: 187 LBS

## 2023-12-07 DIAGNOSIS — R29.90 STROKE-LIKE SYMPTOMS: Primary | ICD-10-CM

## 2023-12-07 DIAGNOSIS — I65.22 LEFT CAROTID ARTERY OCCLUSION: ICD-10-CM

## 2023-12-07 DIAGNOSIS — I63.89 OTHER CEREBRAL INFARCTION (HCC): ICD-10-CM

## 2023-12-07 PROCEDURE — 3017F COLORECTAL CA SCREEN DOC REV: CPT | Performed by: PSYCHIATRY & NEUROLOGY

## 2023-12-07 PROCEDURE — G8484 FLU IMMUNIZE NO ADMIN: HCPCS | Performed by: PSYCHIATRY & NEUROLOGY

## 2023-12-07 PROCEDURE — 99204 OFFICE O/P NEW MOD 45 MIN: CPT | Performed by: PSYCHIATRY & NEUROLOGY

## 2023-12-07 PROCEDURE — 4004F PT TOBACCO SCREEN RCVD TLK: CPT | Performed by: PSYCHIATRY & NEUROLOGY

## 2023-12-07 PROCEDURE — G8419 CALC BMI OUT NRM PARAM NOF/U: HCPCS | Performed by: PSYCHIATRY & NEUROLOGY

## 2023-12-07 PROCEDURE — G8427 DOCREV CUR MEDS BY ELIG CLIN: HCPCS | Performed by: PSYCHIATRY & NEUROLOGY

## 2023-12-07 RX ORDER — ALBUTEROL SULFATE 90 UG/1
2 AEROSOL, METERED RESPIRATORY (INHALATION) EVERY 6 HOURS PRN
COMMUNITY

## 2023-12-07 RX ORDER — IBUPROFEN 800 MG/1
800 TABLET ORAL EVERY 6 HOURS PRN
COMMUNITY

## 2023-12-07 RX ORDER — PEN NEEDLE, DIABETIC 31 GX5/16"
NEEDLE, DISPOSABLE MISCELLANEOUS
COMMUNITY

## 2023-12-07 NOTE — PATIENT INSTRUCTIONS
EEG  Continue with antiplatelets  Continue with lipid lowering medication, target LDL below 70  Homocystine level  Follow through with recommendations from interventional neurology group  Modify risk factors for stroke (blood pressure, cholesterol, diabetes, smoking cessation etc.. Control)  You need to quit smoking as discussed, please reach out to your PCP regarding options of medications to help. We will follow up on results of studies  Call with any new symptoms or concerns.    Follow up as needed

## 2024-05-01 NOTE — PROGRESS NOTES
Patient: Mo Holt  Unit/Bed: 7E-70/070-A  YOB: 1965  MRN: [id number] Acct: [de-identified]   Admitting Diagnosis: Debility [R53.81]  Admit Date:  1/10/2023  Hospital Day: 5    Assessment:     Principal Problem:    Debility  Active Problems:    Acute respiratory failure with hypoxia ([address]    Influenza with respiratory manifestation other than pneumonia    Cerebral infarction due to internal carotid artery occlusion (HCC)  Resolved Problems:    * No resolved hospital problems. *      Plan:     The oxygen needs have decreased  The CS are acceptable        Subjective:     Patient has no complaint of CP or SOB. .   Medication side effects: none    Scheduled Meds:   nicotine  1 patch TransDERmal Daily    metFORMIN  500 mg Oral BID WC    aspirin  81 mg Oral Daily    atorvastatin  20 mg Oral Daily    cloZAPine  200 mg Oral Nightly    cloZAPine  50 mg Oral BID    desvenlafaxine succinate  50 mg Oral Daily    divalproex  500 mg Oral QAM    divalproex  1,000 mg Oral Nightly    docusate sodium  100 mg Oral Daily    enoxaparin  40 mg SubCUTAneous Daily    insulin glargine  7 Units SubCUTAneous Nightly    metoprolol succinate  25 mg Oral Daily     Continuous Infusions:  PRN Meds:clotrimazole, glucose, glucagon (rDNA), sodium chloride flush, polyethylene glycol, albuterol, ipratropium-albuterol, acetaminophen, ondansetron    Review of Systems  Pertinent items are noted in HPI. Objective:     Patient Vitals for the past 8 hrs:   BP Temp Temp src Pulse Resp SpO2   01/15/23 2102 124/73 98.8 °F (37.1 °C) Oral 95 16 92 %     I/O last 3 completed shifts: In: 2518 [P.O.:2513; I.V.:5]  Out: -   No intake/output data recorded.     /73   Pulse 95   Temp 98.8 °F (37.1 °C) (Oral)   Resp 16   Ht 5' 9\" (1.753 m)   Wt 187 lb (84.8 kg)   SpO2 92%   BMI 27.62 kg/m²     General appearance: alert, appears stated age, and cooperative  Head: Normocephalic, without obvious abnormality, atraumatic  Lungs: rhonchi bilaterally  Chest wall: no tenderness  Heart: regular rate and rhythm, S1, S2 normal, no murmur, click, rub or gallop  Abdomen: soft, non-tender; bowel sounds normal; no masses,  no organomegaly  Extremities: extremities normal, atraumatic, no cyanosis or edema  Skin: Skin color, texture, turgor normal. No rashes or lesions  Neurologic:  weak    Data Review:    Latest Reference Range & Units 1/14/23 08:32 1/15/23 07:37   POC Glucose 70 - 108 mg/dl 172 (H) 143 (H)   (H): Data is abnormally high    Electronically signed by Janes Coburn MD on 1/15/2023 at 9:42 PM Male